# Patient Record
Sex: MALE | Race: WHITE | NOT HISPANIC OR LATINO | Employment: OTHER | URBAN - NONMETROPOLITAN AREA
[De-identification: names, ages, dates, MRNs, and addresses within clinical notes are randomized per-mention and may not be internally consistent; named-entity substitution may affect disease eponyms.]

---

## 2017-02-08 ENCOUNTER — COMMUNICATION - GICH (OUTPATIENT)
Dept: FAMILY MEDICINE | Facility: OTHER | Age: 62
End: 2017-02-08

## 2017-02-09 ENCOUNTER — AMBULATORY - GICH (OUTPATIENT)
Dept: SCHEDULING | Facility: OTHER | Age: 62
End: 2017-02-09

## 2017-02-12 ENCOUNTER — HISTORY (OUTPATIENT)
Dept: EMERGENCY MEDICINE | Facility: OTHER | Age: 62
End: 2017-02-12

## 2017-02-13 ENCOUNTER — COMMUNICATION - GICH (OUTPATIENT)
Dept: FAMILY MEDICINE | Facility: OTHER | Age: 62
End: 2017-02-13

## 2017-02-13 DIAGNOSIS — I10 ESSENTIAL (PRIMARY) HYPERTENSION: ICD-10-CM

## 2017-02-17 ENCOUNTER — HISTORY (OUTPATIENT)
Dept: INTERNAL MEDICINE | Facility: OTHER | Age: 62
End: 2017-02-17

## 2017-02-17 ENCOUNTER — OFFICE VISIT - GICH (OUTPATIENT)
Dept: INTERNAL MEDICINE | Facility: OTHER | Age: 62
End: 2017-02-17

## 2017-02-17 DIAGNOSIS — M54.31 SCIATICA OF RIGHT SIDE: ICD-10-CM

## 2017-02-20 ENCOUNTER — COMMUNICATION - GICH (OUTPATIENT)
Dept: INTERNAL MEDICINE | Facility: OTHER | Age: 62
End: 2017-02-20

## 2017-02-27 ENCOUNTER — HOSPITAL ENCOUNTER (OUTPATIENT)
Dept: PHYSICAL THERAPY | Facility: OTHER | Age: 62
Setting detail: THERAPIES SERIES
End: 2017-02-27
Attending: INTERNAL MEDICINE

## 2017-02-27 ENCOUNTER — COMMUNICATION - GICH (OUTPATIENT)
Dept: PHYSICAL THERAPY | Facility: OTHER | Age: 62
End: 2017-02-27

## 2017-02-27 DIAGNOSIS — M54.31 SCIATICA OF RIGHT SIDE: ICD-10-CM

## 2017-03-02 ENCOUNTER — COMMUNICATION - GICH (OUTPATIENT)
Dept: INTERNAL MEDICINE | Facility: OTHER | Age: 62
End: 2017-03-02

## 2017-03-02 DIAGNOSIS — M54.31 SCIATICA OF RIGHT SIDE: ICD-10-CM

## 2017-03-04 ENCOUNTER — HISTORY (OUTPATIENT)
Dept: FAMILY MEDICINE | Facility: OTHER | Age: 62
End: 2017-03-04

## 2017-03-04 ENCOUNTER — OFFICE VISIT - GICH (OUTPATIENT)
Dept: FAMILY MEDICINE | Facility: OTHER | Age: 62
End: 2017-03-04

## 2017-03-04 DIAGNOSIS — M54.31 SCIATICA OF RIGHT SIDE: ICD-10-CM

## 2017-03-07 ENCOUNTER — HOSPITAL ENCOUNTER (OUTPATIENT)
Dept: RADIOLOGY | Facility: OTHER | Age: 62
End: 2017-03-07
Attending: INTERNAL MEDICINE

## 2017-03-07 ENCOUNTER — COMMUNICATION - GICH (OUTPATIENT)
Dept: INTERNAL MEDICINE | Facility: OTHER | Age: 62
End: 2017-03-07

## 2017-03-07 DIAGNOSIS — M54.41 LOW BACK PAIN WITH RIGHT-SIDED SCIATICA: ICD-10-CM

## 2017-03-07 DIAGNOSIS — M51.26 OTHER INTERVERTEBRAL DISC DISPLACEMENT, LUMBAR REGION: ICD-10-CM

## 2017-03-07 DIAGNOSIS — M48.061 SPINAL STENOSIS OF LUMBAR REGION: ICD-10-CM

## 2017-03-07 DIAGNOSIS — M54.31 SCIATICA OF RIGHT SIDE: ICD-10-CM

## 2017-03-14 ENCOUNTER — HOSPITAL ENCOUNTER (OUTPATIENT)
Dept: PHYSICAL THERAPY | Facility: OTHER | Age: 62
Setting detail: THERAPIES SERIES
End: 2017-03-14
Attending: INTERNAL MEDICINE

## 2017-03-20 ENCOUNTER — OFFICE VISIT - GICH (OUTPATIENT)
Dept: FAMILY MEDICINE | Facility: OTHER | Age: 62
End: 2017-03-20

## 2017-03-20 ENCOUNTER — HISTORY (OUTPATIENT)
Dept: FAMILY MEDICINE | Facility: OTHER | Age: 62
End: 2017-03-20

## 2017-03-20 ENCOUNTER — HOSPITAL ENCOUNTER (OUTPATIENT)
Dept: RADIOLOGY | Facility: OTHER | Age: 62
End: 2017-03-20
Attending: NURSE PRACTITIONER

## 2017-03-20 DIAGNOSIS — R06.02 SHORTNESS OF BREATH: ICD-10-CM

## 2017-03-20 DIAGNOSIS — R06.2 WHEEZING: ICD-10-CM

## 2017-03-20 DIAGNOSIS — J20.9 ACUTE BRONCHITIS: ICD-10-CM

## 2017-04-24 ENCOUNTER — HOSPITAL ENCOUNTER (OUTPATIENT)
Dept: RADIOLOGY | Facility: OTHER | Age: 62
End: 2017-04-24
Attending: PHYSICIAN ASSISTANT

## 2017-04-24 ENCOUNTER — HISTORY (OUTPATIENT)
Dept: FAMILY MEDICINE | Facility: OTHER | Age: 62
End: 2017-04-24

## 2017-04-24 ENCOUNTER — OFFICE VISIT - GICH (OUTPATIENT)
Dept: FAMILY MEDICINE | Facility: OTHER | Age: 62
End: 2017-04-24

## 2017-04-24 DIAGNOSIS — R05.9 COUGH: ICD-10-CM

## 2017-04-24 DIAGNOSIS — J22 ACUTE LOWER RESPIRATORY INFECTION: ICD-10-CM

## 2017-04-25 ENCOUNTER — COMMUNICATION - GICH (OUTPATIENT)
Dept: FAMILY MEDICINE | Facility: OTHER | Age: 62
End: 2017-04-25

## 2017-04-25 ENCOUNTER — HISTORY (OUTPATIENT)
Dept: EMERGENCY MEDICINE | Facility: OTHER | Age: 62
End: 2017-04-25

## 2017-04-27 ENCOUNTER — HISTORY (OUTPATIENT)
Dept: PEDIATRICS | Facility: OTHER | Age: 62
End: 2017-04-27

## 2017-04-27 ENCOUNTER — OFFICE VISIT - GICH (OUTPATIENT)
Dept: PEDIATRICS | Facility: OTHER | Age: 62
End: 2017-04-27

## 2017-04-27 DIAGNOSIS — E83.42 HYPOMAGNESEMIA: ICD-10-CM

## 2017-04-27 DIAGNOSIS — E11.8 TYPE 2 DIABETES MELLITUS WITH COMPLICATIONS (H): ICD-10-CM

## 2017-04-27 DIAGNOSIS — I10 ESSENTIAL (PRIMARY) HYPERTENSION: ICD-10-CM

## 2017-04-27 DIAGNOSIS — R06.01 ORTHOPNEA: ICD-10-CM

## 2017-04-27 DIAGNOSIS — R00.2 PALPITATIONS: ICD-10-CM

## 2017-04-27 DIAGNOSIS — R06.09 OTHER FORMS OF DYSPNEA: ICD-10-CM

## 2017-04-27 LAB
BNP SERPL-MCNC: 25 PG/ML
CHOL/HDL RATIO - HISTORICAL: 4.67
CHOLESTEROL TOTAL: 168 MG/DL
ESTIMATED AVERAGE GLUCOSE: 160 MG/DL
HDLC SERPL-MCNC: 36 MG/DL (ref 23–92)
HEMOGLOBIN A1C MONITORING (POCT) - HISTORICAL: 7.2 % (ref 4–6.2)
LDLC SERPL CALC-MCNC: 109 MG/DL
NON-HDL CHOLESTEROL - HISTORICAL: 132 MG/DL
PATIENT STATUS - HISTORICAL: ABNORMAL
TRIGL SERPL-MCNC: 117 MG/DL
TSH - HISTORICAL: 2.6 UIU/ML (ref 0.34–5.6)

## 2017-04-27 ASSESSMENT — PATIENT HEALTH QUESTIONNAIRE - PHQ9: SUM OF ALL RESPONSES TO PHQ QUESTIONS 1-9: 0

## 2017-04-28 LAB — LYME SCREEN W/REFLEX WEST BLOT - HISTORICAL: NEGATIVE

## 2017-07-25 ENCOUNTER — COMMUNICATION - GICH (OUTPATIENT)
Dept: FAMILY MEDICINE | Facility: OTHER | Age: 62
End: 2017-07-25

## 2017-07-25 DIAGNOSIS — R05.9 COUGH: ICD-10-CM

## 2017-07-25 DIAGNOSIS — J22 ACUTE LOWER RESPIRATORY INFECTION: ICD-10-CM

## 2017-07-29 ENCOUNTER — HISTORY (OUTPATIENT)
Dept: FAMILY MEDICINE | Facility: OTHER | Age: 62
End: 2017-07-29

## 2017-07-29 ENCOUNTER — OFFICE VISIT - GICH (OUTPATIENT)
Dept: FAMILY MEDICINE | Facility: OTHER | Age: 62
End: 2017-07-29

## 2017-07-29 DIAGNOSIS — R06.2 WHEEZING: ICD-10-CM

## 2017-07-29 DIAGNOSIS — R05.9 COUGH: ICD-10-CM

## 2017-08-14 ENCOUNTER — OFFICE VISIT - GICH (OUTPATIENT)
Dept: FAMILY MEDICINE | Facility: OTHER | Age: 62
End: 2017-08-14

## 2017-08-14 ENCOUNTER — HISTORY (OUTPATIENT)
Dept: FAMILY MEDICINE | Facility: OTHER | Age: 62
End: 2017-08-14

## 2017-08-14 DIAGNOSIS — J45.21 MILD INTERMITTENT ASTHMA WITH ACUTE EXACERBATION: ICD-10-CM

## 2017-08-14 DIAGNOSIS — E11.9 TYPE 2 DIABETES MELLITUS WITHOUT COMPLICATIONS (H): ICD-10-CM

## 2017-08-14 DIAGNOSIS — I10 ESSENTIAL (PRIMARY) HYPERTENSION: ICD-10-CM

## 2017-08-14 LAB
ANION GAP - HISTORICAL: 11 (ref 5–18)
BUN SERPL-MCNC: 16 MG/DL (ref 7–25)
BUN/CREAT RATIO - HISTORICAL: 17
CALCIUM SERPL-MCNC: 9.8 MG/DL (ref 8.6–10.3)
CHLORIDE SERPLBLD-SCNC: 96 MMOL/L (ref 98–107)
CO2 SERPL-SCNC: 31 MMOL/L (ref 21–31)
CREAT SERPL-MCNC: 0.96 MG/DL (ref 0.7–1.3)
ESTIMATED AVERAGE GLUCOSE: 157 MG/DL
GFR IF NOT AFRICAN AMERICAN - HISTORICAL: >60 ML/MIN/1.73M2
GLUCOSE SERPL-MCNC: 196 MG/DL (ref 70–105)
HEMOGLOBIN A1C MONITORING (POCT) - HISTORICAL: 7.1 % (ref 4–6.2)
POTASSIUM SERPL-SCNC: 3.4 MMOL/L (ref 3.5–5.1)
SODIUM SERPL-SCNC: 138 MMOL/L (ref 133–143)

## 2017-08-14 ASSESSMENT — ANXIETY QUESTIONNAIRES
2. NOT BEING ABLE TO STOP OR CONTROL WORRYING: NOT AT ALL
7. FEELING AFRAID AS IF SOMETHING AWFUL MIGHT HAPPEN: NOT AT ALL
5. BEING SO RESTLESS THAT IT IS HARD TO SIT STILL: NOT AT ALL
4. TROUBLE RELAXING: NOT AT ALL
6. BECOMING EASILY ANNOYED OR IRRITABLE: NOT AT ALL
GAD7 TOTAL SCORE: 0
3. WORRYING TOO MUCH ABOUT DIFFERENT THINGS: NOT AT ALL
1. FEELING NERVOUS, ANXIOUS, OR ON EDGE: NOT AT ALL

## 2017-08-15 ENCOUNTER — COMMUNICATION - GICH (OUTPATIENT)
Dept: FAMILY MEDICINE | Facility: OTHER | Age: 62
End: 2017-08-15

## 2017-08-15 DIAGNOSIS — R05.9 COUGH: ICD-10-CM

## 2017-08-17 ENCOUNTER — HOSPITAL ENCOUNTER (OUTPATIENT)
Dept: RADIOLOGY | Facility: OTHER | Age: 62
End: 2017-08-17
Attending: FAMILY MEDICINE

## 2017-08-17 DIAGNOSIS — R05.9 COUGH: ICD-10-CM

## 2017-08-21 ENCOUNTER — HOSPITAL ENCOUNTER (OUTPATIENT)
Dept: RESPIRATORY THERAPY | Facility: OTHER | Age: 62
End: 2017-08-21

## 2017-08-21 DIAGNOSIS — J45.21 MILD INTERMITTENT ASTHMA WITH ACUTE EXACERBATION: ICD-10-CM

## 2017-08-30 ENCOUNTER — COMMUNICATION - GICH (OUTPATIENT)
Dept: FAMILY MEDICINE | Facility: OTHER | Age: 62
End: 2017-08-30

## 2017-09-15 ENCOUNTER — HISTORY (OUTPATIENT)
Dept: INTERNAL MEDICINE | Facility: OTHER | Age: 62
End: 2017-09-15

## 2017-09-15 ENCOUNTER — OFFICE VISIT - GICH (OUTPATIENT)
Dept: INTERNAL MEDICINE | Facility: OTHER | Age: 62
End: 2017-09-15

## 2017-09-15 DIAGNOSIS — E87.6 HYPOKALEMIA: ICD-10-CM

## 2017-09-15 DIAGNOSIS — Z78.9 OTHER SPECIFIED HEALTH STATUS (CODE): ICD-10-CM

## 2017-09-15 DIAGNOSIS — E11.9 TYPE 2 DIABETES MELLITUS WITHOUT COMPLICATIONS (H): ICD-10-CM

## 2017-09-15 DIAGNOSIS — J41.0 SIMPLE CHRONIC BRONCHITIS (H): ICD-10-CM

## 2017-09-15 DIAGNOSIS — J44.9 CHRONIC OBSTRUCTIVE PULMONARY DISEASE (H): ICD-10-CM

## 2017-09-15 DIAGNOSIS — Z77.090 CONTACT WITH AND (SUSPECTED) EXPOSURE TO ASBESTOS (CODE): ICD-10-CM

## 2017-09-15 DIAGNOSIS — Z23 ENCOUNTER FOR IMMUNIZATION: ICD-10-CM

## 2017-09-15 DIAGNOSIS — I10 ESSENTIAL (PRIMARY) HYPERTENSION: ICD-10-CM

## 2017-12-28 NOTE — PATIENT INSTRUCTIONS
Patient Information     Patient Name MRN Efra Nino [id number] Male 1955      Patient Instructions by Dominic Holguin MD at 2017  3:00 PM     Author:  Dominic Holguin MD Service:  (none) Author Type:  Physician     Filed:  2017  3:37 PM Encounter Date:  2017 Status:  Signed     :  Dominic Holguin MD (Physician)            You do in fact have diabetes.  I would suggest starting metformin 500mg twice per day and checking blood sugar once per day in the morning.    I would also like you to meet with our dietician Kristen Klinefelter to better understand how you can dramatically improve your blood sugar by changing what types of food and how much you eat.    In terms of your cough, you have significant wheezing, I think this is likely asthma/COPD.  Start the flovent.  Take it twice per day every day.  Take the albuterol only as needed.  If you are taking it more than 3-4 times in the average week we need to increase the flovent dose.    You should have a chest xray and spirometry.  Both are ordered.  You can stop by main building any time and have the chest xray done.  They will call you for spirometry.

## 2017-12-28 NOTE — PROGRESS NOTES
Patient Information     Patient Name MRN Sex Efra Farias [id number] Male 1955      Progress Notes by Erendira Agustin NP at 2017  3:30 PM     Author:  Erendira Agustin NP Service:  (none) Author Type:  PHYS- Nurse Practitioner     Filed:  2017  4:07 PM Encounter Date:  2017 Status:  Signed     :  Erendira Agustin NP (PHYS- Nurse Practitioner)            HPI:  Nursing Notes:   Skyla Carrasco  2017  3:57 PM  Signed  Patient presents to clinic with cough, and wheezing.  Skyla CATES DaniloLPN ....................  2017   3:39 PM      Efra Zuniga is a 62 y.o. male who presents to clinic today for wheezing, SOB and cough that started three weeks ago and is making it hard to sleep at night. Denies history of COPD or asthma, never smoker, has had ongoing cough and wheezing for three months. Denies fevers, sore throat, ear pain, increased work of breathing, heartburn, weight gain or swelling in hands or feet. Cough seems to be the worst at night, especially when moving in bed. Treating symptoms with Albuterol which seems to improve them.    Past Medical History:     Diagnosis  Date     Arthralgia     Intermittent arthralgias      Blood per rectum     multiple colonoscopies scheduled and canceled by patient      Family history of heart murmur      Irritable bowel syndrome with constipation     Functioning bowel syndrome/constipation      Past Surgical History:      Procedure  Laterality Date     APPENDECTOMY      Coronary angiogram done by Aron Quick at [facility] in 2006 shows patent coronary arteries       CHOLECYSTECTOMY       SCAN-ANGIOGRAM  2006     Coronary angiogram done by Aron Quick at [facility] in shows patent coronary arteries       Social History     Substance Use Topics       Smoking status: Never Smoker     Smokeless tobacco: Never Used     Alcohol use No     Current Outpatient Prescriptions       Medication  Sig Dispense Refill      "acetaminophen (TYLENOL EXTRA STRGTH) 500 mg tablet Take 1,000 mg by mouth every 6 hours if needed. Max acetaminophen dose: 4000mg in 24 hrs.       albuterol (PROVENTIL) 0.083 % neb solution Inhale 3 mL via a nebulizer every 4 hours if needed. 1 box 0     albuterol HFA 90 mcg/actuation inhaler Inhale 2 Puffs by mouth every 4 hours if needed. 1 Inhaler 0     aspirin (ECOTRIN) 81 mg enteric coated tablet Take 1 tablet by mouth once daily with a meal.  0     aspirin-calcium carbonate 81 mg-300 mg calcium(777 mg) tab Take 81 mg by mouth.       atenolol-chlorthalidone, 100-25 mg, (TENORETIC 100) 100-25 mg tablet Take 1 tablet by mouth once daily. 90 tablet 3     magnesium oxide (MAG-) 400 mg tablet Take 1 tablet by mouth once daily. 30 tablet 0     Nebulizer Nebulizer, disposable neb kit x 4, reuseable neb kit x 1, mask x 1, filters x 1.   Frequency of use: daily;  Medication:albuterol Length of need: 99 months 1 Device 0     potassium chloride (KLOR-CON M20) 20 mEq Extended-Release tablet Take 1 tablet by mouth once daily with a meal. 90 tablet 3     No current facility-administered medications for this visit.      Medications have been reviewed by me and are current to the best of my knowledge and ability.    No Known Allergies    ROS:  Refer to HPI    /72  Pulse 60  Temp 97.7  F (36.5  C) (Tympanic)   Ht 1.88 m (6' 2\")  Wt 122.8 kg (270 lb 12.8 oz)  SpO2 96%  BMI 34.77 kg/m2    EXAM:  General Appearance: Well appearing male, appropriate appearance for age. No acute distress  Ears: Left TM with bony landmarks appreciated with cone of light, no erythema, no effusion, no bulging, no purulence.  Right TM with bony landmarks appreciated with cone of light, no erythema, no effusion, no bulging, no purulence.   Left auditory canal clear, Right auditory canal clear, normal external ears, non tender.  Orophayrnx: moist mucous membranes, posterior pharynx, tonsils without hypertrophy, no erythema, no exudates " or petechiae, no post nasal drip seen.  No sinus pain upon palpation of the frontal, maxillary, or ethmoid sinuses  Neck: supple without adenopathy  Respiratory: normal chest wall and respirations.  Normal effort, wheezes right and left upper lobes, right middle and lower lobes clear to ausculation, left lobe clear to auscultation oxygen saturation-96%  Cardiac: RRR  Psychological: normal affect, alert and pleasant    ASSESSMENT/PLAN:    ICD-10-CM    1. Wheezing R06.2    2. Cough R05 albuterol HFA 90 mcg/actuation inhaler   Cough and wheezing  On exam: well appearing male without fever, wheezes right and left upper lobes, other lobes clear to ausculation  Diagnosis: Wheezing  Treat with Albuterol inhaler 1-2 puffs every 4-6 hours prn wheezing/cough  Appointment scheduled with Dr. Dominic Holguin to evaluate and treat ongoing cough and wheezing      Patient Instructions      Index Syriac   Acute Bronchitis: Brief Version   What is acute bronchitis?  When you have acute bronchitis, the airways between your windpipe and your lungs are swollen and irritated. It is also called a chest cold.  What is the cause?  Acute bronchitis is most often caused by a virus, like a cold or the flu. Less often, it can be caused by bacteria.  Most of the time, it clears up in several days, but the cough can take longer to go away. It may take you longer to get better if:    You smoke cigarettes.    You have a heart or lung disease or other health problems.    There s a lot of air pollution or secondhand smoke where you live or work.  What are the symptoms?  You may:    Have a deep cough with yellowish or greenish mucus.    Feel pain in your chest when you breathe deeply or cough.    Wheeze or feel short of breath.    Have a fever or chills.  How can I take care of myself?  Rest at home and drink plenty of fluids to keep the mucus loose. Don t smoke, and stay away from others who are smoking. You should get better in a several days.  If  your symptoms are severe or you have heart or lung disease or diabetes, you may need to see your healthcare provider or take medicine.  How can I help prevent acute bronchitis?  You can lower your chances of getting bronchitis if you wash your hands after using the restroom, coughing, sneezing, or blowing your nose. Also wash your hands before eating or touching your eyes.  Developed by [employer].  Adult Advisor 2016.3 published by [employer].  Last modified: 2016-06-29  Last reviewed: 2016-06-08  This content is reviewed periodically and is subject to change as new health information becomes available. The information is intended to inform and educate and is not a replacement for medical evaluation, advice, diagnosis or treatment by a healthcare professional.  References   Adult Advisor 2016.3 Index    Copyright   2016 [employer], a division of [employer] All rights reserved.

## 2017-12-28 NOTE — TELEPHONE ENCOUNTER
Patient Information     Patient Name MRN Sex Efra Farias [id number] Male 1955      Telephone Encounter by Adrián Gonzalez RN at 2017 11:06 AM     Author:  Adrián Gonzalez RN Service:  (none) Author Type:  NURS- Registered Nurse     Filed:  2017 11:14 AM Encounter Date:  8/15/2017 Status:  Signed     :  Adrián Gonzalez RN (NURS- Registered Nurse)            Call placed to patient to discuss. Advised patient that chest xray order was placed by Dr. Raphael. No order for an oral steroid or abx at this time. Patient reports that he had spoken to Ifeoma at [facility] pharmacy. Reports that due to his income, flovent will more than likely be cheaper for him at [facility] pharmacy. Patient plans to meet with Ifeoma this afternoon to get that set up. Writer encouraged patient to get chest xray done at the same time. Patient is willing to do so. Understands plan of care. Writer transferred patient to scheduling staff for chest xray at this time.    Adrián Gonzalez RN ....................  2017   11:13 AM

## 2017-12-28 NOTE — TELEPHONE ENCOUNTER
Patient Information     Patient Name MRN Sex Efra Farias [id number] Male 1955      Telephone Encounter by Sinan Craft LPN at 2017  9:27 AM     Author:  Sinan Craft LPN Service:  (none) Author Type:  NURS- Licensed Practical Nurse     Filed:  2017  9:29 AM Encounter Date:  8/15/2017 Status:  Signed     :  Sinan Craft LPN (NURS- Licensed Practical Nurse)            Patient is wanting to try a different inhaler then the one he was last prescribed being it was over 200 dollars. Patient states he also did not receive the steroid pills that was discussed at his last appointment. Please send Rx's to [facility]. Please advise.  Sinan Craft LPN ..............2017 9:28 AM

## 2017-12-28 NOTE — TELEPHONE ENCOUNTER
Patient Information     Patient Name MRN Sex Efra Farias [id number] Male 1955      Telephone Encounter by Sinan Craft LPN at 8/15/2017  8:44 AM     Author:  Sinan Craft LPN Service:  (none) Author Type:  NURS- Licensed Practical Nurse     Filed:  8/15/2017  8:45 AM Encounter Date:  8/15/2017 Status:  Signed     :  Sinan Craft LPN (NURS- Licensed Practical Nurse)            Please advise.  Sinan Craft LPN ..............8/15/2017 8:44 AM

## 2017-12-28 NOTE — TELEPHONE ENCOUNTER
Patient Information     Patient Name MRN Sex Efra Farias [id number] Male 1955      Telephone Encounter by Tyesha James at 2017  9:38 AM     Author:  Tyesha James Service:  (none) Author Type:  (none)     Filed:  2017  9:40 AM Encounter Date:  8/15/2017 Status:  Signed     :  Tyesha James            Spoke with [facility], they stated there is no other inhaler that will be cheaper than the Flovent by more than 10 dollars, this may be due to patient's deductible, will call and notify patient. Tyesha James LPN......................2017 9:39 AM

## 2017-12-28 NOTE — TELEPHONE ENCOUNTER
Patient Information     Patient Name MRN Efra Nino [id number] Male 1955      Telephone Encounter by Adrián Gonzalez RN at 2017 10:34 AM     Author:  Adrián Gonzalez RN Service:  (none) Author Type:  NURS- Registered Nurse     Filed:  2017 10:58 AM Encounter Date:  8/15/2017 Status:  Signed     :  Adrián Gonzalez RN (NURS- Registered Nurse)            Writer received fax from [facility] pharmacy as well with regards to this concern (inhaler was too expensive for patient, as well as no oral steroid ordered for patient). Chart review shows that Dr. Raphael has addressed concern for steroid inhaler, but not oral steroid. Office visit notes with Dr. Holguin still pending from 17. Writer did contact [facility] pharmacy and spoke to Ifeoma about [facility]     Ifeoma reports that Flovent inhaler could be as low as $33 if patient meets guidelines for [facility] program.    Call placed to patient to discuss. Patient is willing to call the [facility] pharmacy to see if Flovent would be cheaper through our pharmacy. Number given to patient.     While on phone with patient, he states that he is typically prescribed an antibiotic and an oral steroid for the concerns he is having. Would like that to be completed if possible. Patient also reports that he was told by a nurse he was supposed to have a chest x-ray, but when he called to schedule it, there was no order so schedulers couldn't schedule it.    Chart review confirms no chest x-ray order, and office visit notes on 17 indicate that patient is to have a chest xray.     Patient also reports that he is going to start on metformin. Doesn't want to take his blood sugars however. Writer encouraged patient to do so. Patient states he will try.    Results of phone call as follows:    1. Patient needs an order for a chest xray as per Dr. Holguin's note on 17.  2. Writer advised patient that oral  steroid and abx are probably going to be dependent on chest xray results  3. Patient will call [facility] pharmacy to check on pricing of flovent through community care program    Patient would like this phone call sent to Dr. Raphael in PCP's absence so that orders as needed can be placed, as well as would like a call back with a plan of care.    Writer will route encounter to Dr. Raphael at this time.    Adrián Gonzalez RN ....................  8/17/2017   10:57 AM

## 2017-12-28 NOTE — TELEPHONE ENCOUNTER
Patient Information     Patient Name MRN Sex Efra Farias [id number] Male 1955      Telephone Encounter by Leonel Raphael MD at 2017 11:02 AM     Author:  Leonel Raphael MD Service:  (none) Author Type:  Physician     Filed:  2017 11:03 AM Encounter Date:  8/15/2017 Status:  Signed     :  Leonel Raphael MD (Physician)            cxr ordered

## 2017-12-28 NOTE — TELEPHONE ENCOUNTER
Patient Information     Patient Name MRN Sex Efra Farias [id number] Male 1955      Telephone Encounter by Isa Hawkins at 2017  4:52 PM     Author:  Isa Hawkins Service:  (none) Author Type:  (none)     Filed:  2017  4:53 PM Encounter Date:  2017 Status:  Signed     :  Isa Hawkins            Patient notified of Dr Holguin's response. He will call tomorrow to schedule a follow up appointment.

## 2017-12-28 NOTE — TELEPHONE ENCOUNTER
Patient Information     Patient Name MRN Sex Efra Farias [id number] Male 1955      Telephone Encounter by Tyesha James at 2017  9:24 AM     Author:  Tyesha James Service:  (none) Author Type:  (none)     Filed:  2017  9:38 AM Encounter Date:  8/15/2017 Status:  Signed     :  Tyesha James            Spoke with patient he stated he was seen by Dominic Holguin MD on Monday and he ordered an inhaler Flovent which his insurance doesn't cover 250 dollars of the cost, patient did not fill it. He is wondering if there is something else that would be less expensive to try. He also is wondering if he could get an oral steroid and an antibiotic, PCP is out until next Tuesday. Tyesha James LPN......................2017 9:28 AM

## 2017-12-28 NOTE — TELEPHONE ENCOUNTER
Patient Information     Patient Name MRN Sex Efra Farias [id number] Male 1955      Telephone Encounter by Leonel Raphael MD at 2017 10:30 AM     Author:  Leonel Raphael MD Service:  (none) Author Type:  Physician     Filed:  2017 10:31 AM Encounter Date:  8/15/2017 Status:  Signed     :  Leonel Raphael MD (Physician)            I would suggest he work with [facility] pharmacy as they might be able to provide a similar inhaler for much cheaper through their community care plan.

## 2017-12-28 NOTE — PATIENT INSTRUCTIONS
Patient Information     Patient Name MREfra Mena [id number] Male 1955      Patient Instructions by Erendira Agustin NP at 2017  3:30 PM     Author:  Erendira Agustin NP Service:  (none) Author Type:  PHYS- Nurse Practitioner     Filed:  2017  4:29 PM Encounter Date:  2017 Status:  Signed     :  Erendira Agustin NP (PHYS- Nurse Practitioner)               Index Macedonian   Acute Bronchitis: Brief Version   What is acute bronchitis?  When you have acute bronchitis, the airways between your windpipe and your lungs are swollen and irritated. It is also called a chest cold.  What is the cause?  Acute bronchitis is most often caused by a virus, like a cold or the flu. Less often, it can be caused by bacteria.  Most of the time, it clears up in several days, but the cough can take longer to go away. It may take you longer to get better if:    You smoke cigarettes.    You have a heart or lung disease or other health problems.    There s a lot of air pollution or secondhand smoke where you live or work.  What are the symptoms?  You may:    Have a deep cough with yellowish or greenish mucus.    Feel pain in your chest when you breathe deeply or cough.    Wheeze or feel short of breath.    Have a fever or chills.  How can I take care of myself?  Rest at home and drink plenty of fluids to keep the mucus loose. Don t smoke, and stay away from others who are smoking. You should get better in a several days.  If your symptoms are severe or you have heart or lung disease or diabetes, you may need to see your healthcare provider or take medicine.  How can I help prevent acute bronchitis?  You can lower your chances of getting bronchitis if you wash your hands after using the restroom, coughing, sneezing, or blowing your nose. Also wash your hands before eating or touching your eyes.  Developed by [employer].  Adult Advisor 2016.3 published by [employer].  Last  modified: 2016-06-29  Last reviewed: 2016-06-08  This content is reviewed periodically and is subject to change as new health information becomes available. The information is intended to inform and educate and is not a replacement for medical evaluation, advice, diagnosis or treatment by a healthcare professional.  References   Adult Advisor 2016.3 Index    Copyright   2016 [employer], a division of [employer] All rights reserved.

## 2017-12-28 NOTE — PROGRESS NOTES
Patient Information     Patient Name MRPETTY Sex Efra Farias [id number] Male 1955      Progress Notes by Lukas Kramer MD at 9/15/2017 11:00 AM     Author:  Lukas Karmer MD Service:  (none) Author Type:  Physician     Filed:  9/15/2017  5:45 PM Encounter Date:  9/15/2017 Status:  Signed     :  Lukas Kramer MD (Physician)            Nursing Notes:   Rosy Deleon  9/15/2017 10:40 AM  Signed  Previous A1C is at goal of <8  HEMOGLOBIN A1C MONITORING (POCT) (%)    Date Value   2017 7.1 (H)     Urine microalbumin:creatine: no results found  Foot exam unsure  Eye exam no eye exam in the last 365 days noted    Patient is a current smoker  Patient is not on a daily aspirin  Patient is on a Statin.  Blood pressure today of   is at the goal of <139/89 for diabetics.    Rosy Deleon LPN..............9/15/2017 10:39 AM    Efra Zuniga presents to clinic today for:   Chief Complaint    Patient presents with      Breathing Problem     HPI: Mr. Zuniga is a 62 y.o. male who presents today for evaluation of above.     (J41.0) Simple chronic bronchitis (HC)  (primary encounter diagnosis)  (Z78.9) Nonsmoker  (Z77.090) Asbestos exposure  (J44.9) Moderate COPD (chronic obstructive pulmonary disease) (HC)  (Z23) Need for 23-polyvalent pneumococcal polysaccharide vaccine  (E11.9) Type 2 diabetes mellitus without complication, without long-term current use of insulin (HC)  (I10) Essential hypertension  (E87.6) Hypokalemia     Patient presented for evaluation of chronic breathing issues.  States he has a lot of phlegm and mucus.  Seems like he almost drowns at night when he lays down.  Reports having a lot of clear phlegm and mucus.  He was recently put on fluticasone inhaler he does seem to think it helps.  He is still using his albuterol inhaler rather frequently.  He was intermittently on bouts of prednisone in the past which he thought was quite helpful.  There is frequent and large amount of  clear mucus and phlegm, starts anticholinergic inhaler.  Prescription printed.    Patient states he is a lifetime nonsmoker.  He does have history of asbestos exposure as well as working at the [employer].    Recent PFTs showed moderate COPD.  No significant reversible component noted.    Pneumococcal vaccination do.    Type 2 diabetes, new diagnosis.  Diarrhea with metformin 500 mg twice daily he cut down to 500 mg once daily.  Advised that he try taking 250 mg 3 times a day with each meal.  Medications list updated.    Hypertension, blood pressures are high.  He is taking atenolol and chlorthalidone.  Appears that he has a lot of issues with electrolyte abnormalities.  He is been taking oral potassium replacement.  Start low-dose spironolactone 25 mg daily.  Advised to continue current medication regimen otherwise.  He still has low potassium levels.  Hopefully, we can get him off his other medications    hypokalemia, ongoing despite oral potassium replacement.  Continue for now.  Start spironolactone.    Mr. Olsons Body mass index is 35.21 kg/(m^2). This is out of the normal range for a 62 y.o. Normal range for ages 18+ is between 18.5 and 24.9. To lose weight we reviewed risks and benefits of appropriate options such as diet, exercise, and medications. Patient's strategy will be  self-directed nutrition plan and self-directed exercise program   BP Readings from Last 1 Encounters:09/15/17 : 142/88  Mr. Walsh blood pressure is out of the normal range for adults. Per JNC-8 guidelines normal adult blood pressure is < 120/80, pre-hypertensive is between 120/80 and 139/89, and hypertension is 140/90 or greater. Risks of hypertension were discussed. Patient's strategy will be weight loss, increased activity and reduced salt intake    Functional Capacity: > or about 4 METS.   Reports that he can climb a flight of stairs with rare chest pain/heaviness + exertional shortness of breath noted.   Patient  reports no current symptoms of fevers, chills, nausea/vomiting.   Regular cough with clear phelgm - improved after starting fluticasone inhaler.   No resting shortness of breath.     + gurgles at night when laying down with breathing. + sleeps somewhat upright.   + loose stools with 1000 mg metformin.     No change in bowel/bladder habits. No melena, hematochezia. No Hematuria.   No rashes. No palpitations.  No vision or hearing issues.   No significant mood issues   No bruising.     JOE:  JOE-7 ANXIETY SCREENING 2/17/2017 8/14/2017   JOE date (doc flow) (No Data) 8/14/2017   Nervous, anxious - 0   Cannot stop worrying - 0   Worry about different things - 0   Cannot relax - 0   Feeling restless - 0   Easily annoyed/irritated - 0   Afraid of awful event - 0   Score - 0   Severity - none   Some recent data might be hidden         PHQ9:  PHQ Depression Screening 4/27/2017 8/14/2017   Date of PHQ exam (doc flow) 4/27/2017 8/14/2017   1. Lack of interest/pleasure 0 - Not at all 0 - Not at all   2. Feeling down/depressed 0 - Not at all 0 - Not at all   PHQ-2 TOTAL SCORE 0 0   3. Trouble sleeping 0 - Not at all -   4. Decreased energy 0 - Not at all -   5. Appetite change 0 - Not at all -   6. Feelings of failure 0 - Not at all -   7. Trouble concentrating 0 - Not at all -   8. Activity level 0 - Not at all -   9. Hurting yourself 0 - Not at all -   PHQ-9 TOTAL SCORE 0 -   PHQ-9 Severity Level none -   Functional Impairment not difficult at all -   Some recent data might be hidden          I have personally reviewed the past medical history, past surgical history, medications, allergies, family and social history as listed below, on 9/15/2017.    Patient Active Problem List       Diagnosis  Date Noted     Nonsmoker  09/15/2017     Simple chronic bronchitis (HC)  09/15/2017     Asbestos exposure  09/15/2017     Moderate COPD (chronic obstructive pulmonary disease) (HC)  09/15/2017     Essential hypertension  04/27/2017      Diabetes mellitus type 2, controlled (HC)  05/02/2014     ANXIETY DEPRESSION       chronic          BENIGN PROSTATIC HYPERTROPHY, HX OF       Past Medical History:     Diagnosis  Date     Arthralgia     Intermittent arthralgias      Blood per rectum     multiple colonoscopies scheduled and canceled by patient      Family history of heart murmur      Irritable bowel syndrome with constipation     Functioning bowel syndrome/constipation      Past Surgical History:      Procedure  Laterality Date     APPENDECTOMY      Coronary angiogram done by Aron Quick at [facility] in March 2006 shows patent coronary arteries       CHOLECYSTECTOMY       SCAN-ANGIOGRAM  March 2006     Coronary angiogram done by Aron Quick at [facility] in shows patent coronary arteries       Current Outpatient Prescriptions       Medication  Sig Dispense Refill     acetaminophen (TYLENOL EXTRA STRGTH) 500 mg tablet Take 1,000 mg by mouth every 6 hours if needed. Max acetaminophen dose: 4000mg in 24 hrs.       albuterol (PROVENTIL) 0.083 % neb solution Inhale 3 mL via a nebulizer every 4 hours if needed. 1 box 0     albuterol HFA 90 mcg/actuation inhaler Inhale 2 Puffs by mouth every 4 hours if needed. 1 Inhaler 0     aspirin (ECOTRIN) 81 mg enteric coated tablet Take 1 tablet by mouth once daily with a meal.  0     aspirin-calcium carbonate 81 mg-300 mg calcium(777 mg) tab Take 81 mg by mouth.       atenolol-chlorthalidone, 100-25 mg, (TENORETIC 100) 100-25 mg tablet Take 1 tablet by mouth once daily. 90 tablet 3     blood-glucose meter Dispense meter, test strips, lancets covered by pt ins. E11.65 IDDM type II, uncontrolled - Test 1 time/day 1 Device 0     fluticasone (FLOVENT HFA) 110 mcg/Actuation inhaler Inhale 1 Puff by mouth 2 times daily. 1 Inhaler 2     magnesium oxide (MAG-) 400 mg tablet Take 1 tablet by mouth once daily. 30 tablet 0     metFORMIN (GLUCOPHAGE) 500 mg tablet Take 0.5 tablets by mouth 3 times daily with meals. -- dose change  9/15/2017 60 tablet 2     Nebulizer Nebulizer, disposable neb kit x 4, reuseable neb kit x 1, mask x 1, filters x 1.   Frequency of use: daily;  Medication:albuterol Length of need: 99 months 1 Device 0     potassium chloride (KLOR-CON M20) 20 mEq Extended-Release tablet Take 1 tablet by mouth once daily with a meal. 90 tablet 3     spironolactone (ALDACTONE) 25 mg tablet Take 0.5-1 tablets by mouth once daily. - for blood pressure 30 tablet 0     umeclidinium-vilanterol (ANORO ELLIPTA) 62.5-25 mcg/actuation inhaler Take 2 puffs off of each capsule -- 1 capsule Daily -- For COPD -- vs similar anticholinergic inhaler 1 Each 11     No Known Allergies  Family History       Problem   Relation Age of Onset     Alzheimer's disease  Mother      Cancer  Father       with a brain tumor       Heart Disease  Father      CABG x3       ALS  Sister      Heart Disease  Maternal Grandmother      Heart Disease  Maternal Grandfather      Heart Disease  Paternal Grandmother      Heart Disease  Paternal Grandfather      Family Status     Relation  Status     Mother     alive in her 80s      Father     brain tumor      Sister      Maternal Grandmother      Maternal Grandfather      Paternal Grandmother      Paternal Grandfather      Social History     Social History        Marital status:       Spouse name: N/A     Number of children:  N/A     Years of education:  N/A     Social History Main Topics       Smoking status: Never Smoker     Smokeless tobacco: Never Used     Alcohol use No     Drug use: No     Sexual activity: Not on file     Other Topics  Concern     Not on file      Social History Narrative     Works at  home  .    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     EXAM:   Vitals:     09/15/17 1039   BP: 142/88   Pulse: 56   Resp: 20   Temp: 97.3  F (36.3  C)  "  TempSrc: Tympanic   SpO2: 94%   Weight: 124.4 kg (274 lb 3.2 oz)   Height: 1.88 m (6' 2\")     BP Readings from Last 3 Encounters:    09/15/17 142/88   08/14/17 138/82   07/29/17 132/72     Wt Readings from Last 3 Encounters:    09/15/17 124.4 kg (274 lb 3.2 oz)   08/14/17 122 kg (269 lb)   07/29/17 122.8 kg (270 lb 12.8 oz)     Estimated body mass index is 35.21 kg/(m^2) as calculated from the following:    Height as of this encounter: 1.88 m (6' 2\").    Weight as of this encounter: 124.4 kg (274 lb 3.2 oz).     EXAM:  Constitutional: well groomed / good hygiene, casual dress  Eyes:  Extraocular muscles intact, Sclera non-icteric, Conjunctiva without erythema  Lymphatic Exam: Non-palpable nodes in neck, clavicular regions  Pulmonary: Prolonged expiratory phase noted and Few scattered wheezes throughout bilaterally  Cardiovascular Exam: regular rate and rhythm, trace pedal edema present  Gastrointestinal Exam: Obese  Integument: No abnormal rashes, sores, or ulcerations noted  Neurologic Exam: CN 3-12 grossly intact   Musculoskeletal Exam: Moves upper and lower extremities symmetrically, No focal weakness  Gait and station appear grossly normal  Psychiatric Exam: Awake and Alert, Affect and mood appropriate  Speech is fluent, Thought process is normal    INVESTIGATIONS:  Results for orders placed or performed in visit on 08/14/17      Hgb A1c      Result  Value Ref Range    HEMOGLOBIN A1C MONITORING (POCT) 7.1 (H) 4.0 - 6.2 %    ESTIMATED AVERAGE GLUCOSE  157 mg/dL   BASIC METABOLIC PANEL      Result  Value Ref Range    SODIUM 138 133 - 143 mmol/L    POTASSIUM 3.4 (L) 3.5 - 5.1 mmol/L    CHLORIDE 96 (L) 98 - 107 mmol/L    CO2,TOTAL 31 21 - 31 mmol/L    ANION GAP 11 5 - 18                    GLUCOSE 196 (H) 70 - 105 mg/dL    CALCIUM 9.8 8.6 - 10.3 mg/dL    BUN 16 7 - 25 mg/dL    CREATININE 0.96 0.70 - 1.30 mg/dL    BUN/CREAT RATIO           17                    GFR if African American >60 >60 ml/min/1.73m2    GFR " if not African American >60 >60 ml/min/1.73m2     --> PFT and CXR -- see below.     ASSESSMENT AND PLAN:  Efra was seen today for breathing problem.    Diagnoses and all orders for this visit:    Simple chronic bronchitis (HC)  -     umeclidinium-vilanterol (ANORO ELLIPTA) 62.5-25 mcg/actuation inhaler; Take 2 puffs off of each capsule -- 1 capsule Daily -- For COPD -- vs similar anticholinergic inhaler    Nonsmoker    Asbestos exposure    Moderate COPD (chronic obstructive pulmonary disease) (HC)    Need for 23-polyvalent pneumococcal polysaccharide vaccine  -     PNEUMOCOCCAL VACCINE 23-VALENT => 3 YO IM  -     MI ADMIN VACC INITIAL    Type 2 diabetes mellitus without complication, without long-term current use of insulin (HC)    Essential hypertension  -     spironolactone (ALDACTONE) 25 mg tablet; Take 0.5-1 tablets by mouth once daily. - for blood pressure    Hypokalemia  -     spironolactone (ALDACTONE) 25 mg tablet; Take 0.5-1 tablets by mouth once daily. - for blood pressure    lab results and schedule of future lab studies reviewed with patient, reviewed diet, exercise and weight control, recommended sodium restriction, cardiovascular risk and specific lipid/LDL goals reviewed, specific diabetic recommendations low cholesterol diet, weight control and daily exercise discussed, home glucose monitoring emphasized, foot care discussed and Podiatry visits discussed, annual eye examinations at Ophthalmology discussed, glycohemoglobin and other lab monitoring discussed and long term diabetic complications discussed, use of aspirin to prevent MI and TIA's discussed    -- Expected clinical course discussed   -- Medications and their side effects discussed    The 10-year ASCVD risk score (Dashankit JIMENEZ Jr, et al., 2013) is: 28.9%    Values used to calculate the score:      Age: 62 years      Sex: Male      Is Non- : No      Diabetic: Yes      Tobacco smoker: No      Systolic Blood Pressure: 142  mmHg      Is BP treated: Yes      HDL Cholesterol: 36 mg/dL      Total Cholesterol: 168 mg/dL    Efra is also recommended to eat a heart-healthy diet, do regular aerobic exercises, maintain a desirable body weight, and avoid tobacco products. These recommendations are from the American Heart Association (AHA) which stresses the importance of lifestyle changes to lower cardiovascular disease risk.     Return in about 1 month (around 10/15/2017) for - follow-up breathing, HTN.    Patient Instructions     1. Nonsmoker  2. Simple chronic bronchitis (HC)    Start new inhaler Anoro -- or similar -- to help reduce your chronic phlegm.   Okay to continue fluticasone inhaler for now.     - umeclidinium-vilanterol (ANORO ELLIPTA) 62.5-25 mcg/actuation inhaler; Take 2 puffs off of each capsule -- 1 capsule Daily -- For COPD -- vs similar anticholinergic inhaler    Check on: ** [facility] **     3. Asbestos exposure  4. Moderate COPD (chronic obstructive pulmonary disease) (HC)    5. Need for 23-polyvalent pneumococcal polysaccharide vaccine  - PNEUMOCOCCAL VACCINE 23-VALENT => 3 YO IM    6. Type 2 diabetes mellitus without complication, without long-term current use of insulin (HC)    Dose change:  - metFORMIN (GLUCOPHAGE) 500 mg tablet; Take 0.5 tablets by mouth 3 times daily with meals. -- dose change 9/15/2017  Dispense: 60 tablet; Refill: 2    7. Essential hypertension  8. Hypokalemia    Start:   - spironolactone (ALDACTONE) 25 mg tablet; Take 0.5-1 tablets by mouth once daily. - for blood pressure  Dispense: 30 tablet; Refill: 0    -- hopefully with Spironolactone, we can get rid of some of your other blood pressure medications.     Blood pressure checks at home - check some in AM, some in Afternoon, some in Evening and record   -- bring these with you to your next appointment.     Goal blood pressures -- less than 140 and less than 90.     -- If running higher than this on regular basis, will need to  adjust your medications.   -- Ideally would like the numbers about 120-130 and 70-80.       Immunization History     Administered  Date(s) Administered     HepA-HepB (Twinrix) 03/25/2013, 04/23/2013     Hepatitis B (Adult) 09/27/2013     Influenza Virus, Unspecified 10/01/2003     Pneumococcal Poly,23-Valent (Pneumovax) 09/01/2003, 09/15/2017     Td (Age >=7 Years) 09/01/2003        -- Pneumococcal PCV 23 shot today.     Pneumococcal Pneumonia vaccines (PCV 13 and PCV 23)     Pneumococcal Conjugate 13 - Valent Vaccine (One time only).     Pneumococcal 23 - Valent Vaccine -- Two doses (One before age 65 and One After)    -- repeat every 5 years in certain patient populations.     PCV 13 should be given prior to PCV 23 -- THEN -- In eight weeks or more, PCV 23 can be given.   If the patient has already received PCV 23, they should not receive PCV 13 for one year.        Simple spirometry 8/21/2017 ---   Indication: 62-year-old male with diagnosis of asthma.  No reported dyspnea.  Productive cough.  Constant wheezing.  Nonsmoker.  Home medications include albuterol, Flovent.  Computer interpretation reviewed.   There is a moderately severe obstructive lung defect with no significant response of FVC or FEV1 to bronchodilator.    The FEV1 and FEF 25-75% reduced, but the FEV1/FVC ratio is increased.  Pre:  FVC 2.63 L (49 % pred), FEV1 2.15 L (53 % pred), [id number]  2.19 L/sec (68 % pred).  Post: FVC 2.82 L (52 % pred), FEV1 2.34 L (58 % pred), [id number]  2.97 L/sec (93 % pred).  FVC changed by +7 %. FEV1 changed by plus a %. [id number] changed by +35 %.  Additional restrictive lung defect or diffusion defect cannot be excluded by spirometry alone - consider more detailed PFT if clinically indicated.     Impression: Moderately severe obstructive airway disease.  Possible moderately severe restriction.      8/17/2017 -- XR CHEST 2 VIEWS PA AND LATERAL     HISTORY: 62 years Male Cough     COMPARISON: 04/24/2017     TECHNIQUE: 2  views of the chest were obtained.     FINDINGS: Two views of the chest were obtained. Heart size and pulmonary vascularity are within normal limits, lungs are clear both views. No consolidating air space opacities are present.     IMPRESSION: Clear chest.     Electronically Signed By: Sage Car M.D. on 8/17/2017 1:50 PM      Return in approximately 1 month(s), or sooner as needed for follow-up with Dr. Kramer.  - follow-up breathing, HTN    Clinic : [phone]  Appointment line: [phone]      Lukas Kramer MD

## 2017-12-28 NOTE — TELEPHONE ENCOUNTER
Patient Information     Patient Name MRN Efra Nino [id number] Male 1955      Telephone Encounter by Dominic Holguin MD at 2017  4:48 PM     Author:  Dominic Holguin MD Service:  (none) Author Type:  Physician     Filed:  2017  4:50 PM Encounter Date:  2017 Status:  Signed     :  Dominic Holguin MD (Physician)            Results show moderate to severe COPD.  I would like to see him back in clinic in a week or two to potentially look at daily medications to help with his lung disease. It would be good to discuss this in person. I also sent letter.

## 2017-12-28 NOTE — TELEPHONE ENCOUNTER
Patient Information     Patient Name MRN Sex Efra Farias [id number] Male 1955      Telephone Encounter by Isa Hawkins at 2017  1:05 PM     Author:  Isa Hawkins Service:  (none) Author Type:  (none)     Filed:  2017  1:08 PM Encounter Date:  2017 Status:  Signed     :  Isa Hawkins            Patient is calling for the results of his recent spirometry.

## 2017-12-28 NOTE — TELEPHONE ENCOUNTER
Patient Information     Patient Name MRN Sex Efra Farias [id number] Male 1955      Telephone Encounter by Medina Patel RN at 2017  4:03 PM     Author:  Medina Patel RN Service:  (none) Author Type:  NURS- Registered Nurse     Filed:  2017  4:20 PM Encounter Date:  2017 Status:  Signed     :  Medina Patel RN (NURS- Registered Nurse)            Request physician consideration to refill Ventolin HFA MDI as requested. This patient was seen by you on 17 and remains due for a follow up OV. Reminder letter sent    Bronchodilator Inhalers   17 OV with Dr. Chen-follow up in 2 weeks  Last visit with TONY GARCIA was on: 2017 in [facility]  17 OV with Dr. Chen-follow up   Max refills 12 months from last office visit.Prescription refilled per RN Unable to complete prescription refill per RN Medication Refill Policy.................... Medina Patel RN ....................  2017   4:17 PM

## 2017-12-28 NOTE — PROGRESS NOTES
Patient Information     Patient Name MRN Sex Efra Farias [id number] Male 1955      Progress Notes by Dominic Holguin MD at 2017  3:00 PM     Author:  Dominic Holguin MD Service:  (none) Author Type:  Physician     Filed:  2017  4:01 PM Encounter Date:  2017 Status:  Signed     :  Dominic Holguin MD (Physician)            SUBJECTIVE:  Efra Zuniga is a 62 y.o. Male.  He comes in to follow-up from recent rapid clinic visit. He reports he has had a persistent cough for 2 months. He tells me the cough is minimally productive. He has not had any fevers or chills. He feels wheezy and at times a little short of breath. He was prescribed albuterol inhaler 2 weeks ago rapid clinic and reports that it does seem to help for several hours after taking however then symptoms return. He does not have a formal diagnosis of COPD and has never smoked, but does report having frequent episodes of wheezing and symptoms that sound consistent with mild intermittent asthma.    It was also noted that patient has type 2 diabetes mellitus but tells me that he is not aware of this. He is not on any medications for this and does not check his blood sugar. Patient is obese.    Patient does take Tenoretic for hypertension. Has not had any side effects that he is aware of.      Social History     Substance Use Topics       Smoking status: Never Smoker     Smokeless tobacco: Never Used     Alcohol use No       I have personally reviewed and updated above noted social, family and/or past medical history.    A comprehensive review of systems was negative except for items noted in HPI/Subjective.      OBJECTIVE:  /82  Pulse 72  Temp 97.9  F (36.6  C)  Wt 122 kg (269 lb)  BMI 34.54 kg/m2  EXAM:  General Appearance: Pleasant, alert, appropriate appearance for age. No acute distress  Chest/Respiratory Exam: Patient has expiratory wheeze but no focal consolidation. Currently no increased work  of breathing  Cardiovascular Exam: Regular rate and rhythm. S1, S2, no murmur, click, gallop, or rubs.  Gastrointestinal Exam: Soft, nontender, no abnormal masses or organomegaly.    ASSESSMENT/Plan :    I personally reviewed the patients' labs and imaging.    Results for orders placed or performed in visit on 08/14/17      Hgb A1c      Result  Value Ref Range    HEMOGLOBIN A1C MONITORING (POCT) 7.1 (H) 4.0 - 6.2 %    ESTIMATED AVERAGE GLUCOSE  157 mg/dL   BASIC METABOLIC PANEL      Result  Value Ref Range    SODIUM 138 133 - 143 mmol/L    POTASSIUM 3.4 (L) 3.5 - 5.1 mmol/L    CHLORIDE 96 (L) 98 - 107 mmol/L    CO2,TOTAL 31 21 - 31 mmol/L    ANION GAP 11 5 - 18                    GLUCOSE 196 (H) 70 - 105 mg/dL    CALCIUM 9.8 8.6 - 10.3 mg/dL    BUN 16 7 - 25 mg/dL    CREATININE 0.96 0.70 - 1.30 mg/dL    BUN/CREAT RATIO           17                    GFR if African American >60 >60 ml/min/1.73m2    GFR if not African American >60 >60 ml/min/1.73m2       Efra was seen today for cough.    Diagnoses and all orders for this visit:    Type 2 diabetes mellitus without complication, without long-term current use of insulin (HC)  I discussed with patient that based on his last A1c 3 months ago he definitely does have diabetes. I discussed with him what exactly this means and how it develops. Stressed importance of weight loss and healthy eating. I think he should start metformin immediately. I also would like him to start checking blood sugars once per day immediately. He needs to meet with the dietitian for further instructions on dietary changes. Patient does not smoke. His blood pressure is borderline elevated but less than 140/90. He is currently taking aspirin 81 mg. He is not on a statin. I discussed with him that he likely should be on a statin but as to not overwhelm him we will first get him started on checking blood sugar and taking metformin. He needs to come back no later than 3 months and at that point we can  discuss with him starting statin.    -     metFORMIN (GLUCOPHAGE) 500 mg tablet; Take 1 tablet by mouth 2 times daily with meals.  -     blood-glucose meter; Dispense meter, test strips, lancets covered by pt ins. E11.65 IDDM type II, uncontrolled - Test 1 time/day  -     Hgb A1c; Future  -     AMB CONSULT TO DIETICIAN; Future  -     Hgb A1c    Essential hypertension with goal blood pressure less than 140/90  current antihypertensives.  -     atenolol-chlorthalidone, 100-25 mg, (TENORETIC 100) 100-25 mg tablet; Take 1 tablet by mouth once daily.    HYPERTENSION, LABILE  -     BASIC METABOLIC PANEL; Future  -     potassium chloride (KLOR-CON M20) 20 mEq Extended-Release tablet; Take 1 tablet by mouth once daily with a meal.  -     BASIC METABOLIC PANEL    Mild intermittent asthma with acute exacerbation  at this point I do not see any definitive evidence for infection. I discussed with patient for most common causes of chronic cough. I discussed with him that I think this would most likely be asthma/COPD especially with albuterol helping. I would like him to get on a daily inhaler such as Flovent. We can switch this to any inhaled steroid that insurance will cover. I would also like him to get spirometry a couple of weeks later to determine that we have the correct diagnosis. Discussed with patient that this could be silent acid reflux as he does not have any symptoms of GERD but this is less likely. Also on the differential diagnosis is allergies and postinfectious inflammation. He does not think he has had allergy symptoms and it has been going on for some time for it to be postinfectious. I discussed with patient that it is very unlikely to be malignancy but I would recommend chest x-ray.    If patient does not have improvement with inhaler we could do a burst of oral steroids. If he has fevers or chills he will need to come back in for antibiotics and lab work.    -     fluticasone (FLOVENT HFA) 110 mcg/Actuation  inhaler; Inhale 1 Puff by mouth 2 times daily.  -     SPIROMETRY W/ BRONCHODILATOR; Future          Patient Instructions   You do in fact have diabetes.  I would suggest starting metformin 500mg twice per day and checking blood sugar once per day in the morning.    I would also like you to meet with our dietician Kristen Klinefelter to better understand how you can dramatically improve your blood sugar by changing what types of food and how much you eat.    In terms of your cough, you have significant wheezing, I think this is likely asthma/COPD.  Start the flovent.  Take it twice per day every day.  Take the albuterol only as needed.  If you are taking it more than 3-4 times in the average week we need to increase the flovent dose.    You should have a chest xray and spirometry.  Both are ordered.  You can stop by main building any time and have the chest xray done.  They will call you for spirometry.      Dominic Holguin MD    This document was created using computer generated templates and voice activated software.

## 2017-12-29 NOTE — PATIENT INSTRUCTIONS
Patient Information     Patient Name MRN Efra Nino [id number] Male 1955      Patient Instructions by Lukas Kramer MD at 9/15/2017 11:00 AM     Author:  Lukas Kraemr MD  Service:  (none) Author Type:  Physician     Filed:  9/15/2017 11:52 AM  Encounter Date:  9/15/2017 Status:  Addendum     :  Lukas Kramer MD (Physician)        Related Notes: Original Note by Lukas Kramer MD (Physician) filed at 9/15/2017 11:52 AM            1. Nonsmoker  2. Simple chronic bronchitis (HC)    Start new inhaler Anoro -- or similar -- to help reduce your chronic phlegm.   Okay to continue fluticasone inhaler for now.     - umeclidinium-vilanterol (ANORO ELLIPTA) 62.5-25 mcg/actuation inhaler; Take 2 puffs off of each capsule -- 1 capsule Daily -- For COPD -- vs similar anticholinergic inhaler    [facility]: ** [facility] **     3. Asbestos exposure  4. Moderate COPD (chronic obstructive pulmonary disease) (HC)    5. Need for 23-polyvalent pneumococcal polysaccharide vaccine  - PNEUMOCOCCAL VACCINE 23-VALENT => 3 YO IM    6. Type 2 diabetes mellitus without complication, without long-term current use of insulin (HC)    Dose change:  - metFORMIN (GLUCOPHAGE) 500 mg tablet; Take 0.5 tablets by mouth 3 times daily with meals. -- dose change 9/15/2017  Dispense: 60 tablet; Refill: 2    7. Essential hypertension  8. Hypokalemia    Start:   - spironolactone (ALDACTONE) 25 mg tablet; Take 0.5-1 tablets by mouth once daily. - for blood pressure  Dispense: 30 tablet; Refill: 0    -- hopefully with Spironolactone, we can get rid of some of your other blood pressure medications.     Blood pressure checks at home - check some in AM, some in Afternoon, some in Evening and record   -- bring these with you to your next appointment.     Goal blood pressures -- less than 140 and less than 90.     -- If running higher than this on regular basis, will need to adjust your medications.   -- Ideally  would like the numbers about 120-130 and 70-80.       Immunization History     Administered  Date(s) Administered     HepA-HepB (Twinrix) 03/25/2013, 04/23/2013     Hepatitis B (Adult) 09/27/2013     Influenza Virus, Unspecified 10/01/2003     Pneumococcal Poly,23-Valent (Pneumovax) 09/01/2003, 09/15/2017     Td (Age >=7 Years) 09/01/2003        -- Pneumococcal PCV 23 shot today.     Pneumococcal Pneumonia vaccines (PCV 13 and PCV 23)     Pneumococcal Conjugate 13 - Valent Vaccine (One time only).     Pneumococcal 23 - Valent Vaccine -- Two doses (One before age 65 and One After)    -- repeat every 5 years in certain patient populations.     PCV 13 should be given prior to PCV 23 -- THEN -- In eight weeks or more, PCV 23 can be given.   If the patient has already received PCV 23, they should not receive PCV 13 for one year.        Simple spirometry 8/21/2017 ---   Indication: 62-year-old male with diagnosis of asthma.  No reported dyspnea.  Productive cough.  Constant wheezing.  Nonsmoker.  Home medications include albuterol, Flovent.  Computer interpretation reviewed.   There is a moderately severe obstructive lung defect with no significant response of FVC or FEV1 to bronchodilator.    The FEV1 and FEF 25-75% reduced, but the FEV1/FVC ratio is increased.  Pre:  FVC 2.63 L (49 % pred), FEV1 2.15 L (53 % pred), [id number]  2.19 L/sec (68 % pred).  Post: FVC 2.82 L (52 % pred), FEV1 2.34 L (58 % pred), [id number]  2.97 L/sec (93 % pred).  FVC changed by +7 %. FEV1 changed by plus a %. [id number] changed by +35 %.  Additional restrictive lung defect or diffusion defect cannot be excluded by spirometry alone - consider more detailed PFT if clinically indicated.     Impression: Moderately severe obstructive airway disease.  Possible moderately severe restriction.      8/17/2017 -- XR CHEST 2 VIEWS PA AND LATERAL     HISTORY: 62 years Male Cough     COMPARISON: 04/24/2017     TECHNIQUE: 2 views of the chest were  obtained.     FINDINGS: Two views of the chest were obtained. Heart size and pulmonary vascularity are within normal limits, lungs are clear both views. No consolidating air space opacities are present.     IMPRESSION: Clear chest.     Electronically Signed By: Sage Car M.D. on 8/17/2017 1:50 PM      Return in approximately 1 month(s), or sooner as needed for follow-up with Dr. Kramer.  - follow-up breathing, HTN    Clinic : [phone]  Appointment line: [phone]

## 2017-12-30 NOTE — NURSING NOTE
Patient Information     Patient Name MRN Sex Efra Farias [id number] Male 1955      Nursing Note by Skyla Carrasco at 2017  3:30 PM     Author:  Skyla Carrasco Service:  (none) Author Type:  (none)     Filed:  2017  3:57 PM Encounter Date:  2017 Status:  Signed     :  Skyla Carrasco            Patient presents to clinic with cough, and wheezing.  Skyla Zee ....................  2017   3:39 PM

## 2017-12-30 NOTE — NURSING NOTE
Patient Information     Patient Name MRN Sex Efra Farias [id number] Male 1955      Nursing Note by Rosy Deleon at 9/15/2017 11:00 AM     Author:  Rosy Deleon Service:  (none) Author Type:  (none)     Filed:  9/15/2017 10:40 AM Encounter Date:  9/15/2017 Status:  Signed     :  Rosy Deleon            Previous A1C is at goal of <8  HEMOGLOBIN A1C MONITORING (POCT) (%)    Date Value   2017 7.1 (H)     Urine microalbumin:creatine: no results found  Foot exam unsure  Eye exam no eye exam in the last 365 days noted    Patient is a current smoker  Patient is not on a daily aspirin  Patient is on a Statin.  Blood pressure today of   is at the goal of <139/89 for diabetics.    Rosy Deleon LPN..............9/15/2017 10:39 AM

## 2018-01-03 NOTE — TELEPHONE ENCOUNTER
Patient Information     Patient Name MRN Sex Efra Farias [id number] Male 1955      Telephone Encounter by Shelia Negrete PT at 2017  1:18 PM     Author:  Shelia Negrete PT Service:  (none) Author Type:  PT- Physical Therapist     Filed:  2017  1:30 PM Encounter Date:  2017 Status:  Signed     :  Shelia Negrete PT (PT- Physical Therapist)            Dr. Antoine:    Efra Zuniga was seen for PT evaluation for right sided sciatica today. Thank you for the referral. My evaluation report is available for your review.    I have 2 concerns after meeting with Mr. Zuniga. First, I am concerned with his use of over the counter medications to try to control his pain. He stated that he is using Naproxen twice daily as was recommended but he is also taking Tylenol 8-10 times a day as this seems to be the only thing that helps his pain.  Pain is still at an 8/10 during the night. I believe you did have him try a prednisone course which he stated did not really help.    Second, I am concerned about the altered sensation and the weakness in his leg. He is noted to have mild  quad and hip flexor weakness which may be more pain mediated. However, he has 3+/5 dorsiflexion at the ankle and is reportedly catching his foot frequently. Gastroc/soleus tests at 3/5 and great toe extension at 3+/5. His great toe on the right is numb on the dorsal surface with decreased light touch and proprioceptive testing noted. While he is slowly improving overall, there is concern with these findings. On chart review, he did have hip/pelvic xrays when in the ED. Do you feel these findings would warrant further imaging?     Mr. Zuniga does not have a follow up scheduled with you. He asked me to contact you with these concerns and then have your nurse contact him with recommendations.    Shelia Negrete PT

## 2018-01-03 NOTE — PROGRESS NOTES
Patient Information     Patient Name MRN Sex Efra Farias [id number] Male 1955      Progress Notes by Lucie Salas at 3/7/2017  8:26 AM     Author:  Lucie Salas Service:  (none) Author Type:  Other Clinical Staff     Filed:  3/7/2017  8:26 AM Date of Service:  3/7/2017  8:26 AM Status:  Signed     :  Lucie Salas (Other Clinical Staff)            Falls Risk Criteria:    Age 65 and older or under age 4        Sensory deficits    Poor vision    Use of ambulatory aides    Impaired judgment    Unable to walk independently    Meets High Risk criteria for falls:  no

## 2018-01-03 NOTE — TELEPHONE ENCOUNTER
Patient Information     Patient Name MRN Sex Efra Farias [id number] Male 1955      Telephone Encounter by Aarti Burciaga at 2017 10:35 AM     Author:  Aarti Burciaga Service:  (none) Author Type:  (none)     Filed:  2017 10:35 AM Encounter Date:  2017 Status:  Signed     :  Aarti Burciaga            Insurance information obtained from patient and PA done for Potassium.  Aarti Burciaga LPN....................2017 10:35 AM

## 2018-01-03 NOTE — TELEPHONE ENCOUNTER
Patient Information     Patient Name MRN Sex Efra Farias [id number] Male 1955      Telephone Encounter by Aarti Burciaga at 2017  9:09 AM     Author:  Aarti Burciaga Service:  (none) Author Type:  (none)     Filed:  2017  9:09 AM Encounter Date:  2017 Status:  Signed     :  Aarti Burciaga            Wife will have patient call back.  Aarti Burciaga LPN....................2017 9:09 AM

## 2018-01-03 NOTE — TELEPHONE ENCOUNTER
Patient Information     Patient Name MRN Sex Efra Farias [id number] Male 1955      Telephone Encounter by Luisa Antoine DO at 3/2/2017 11:53 AM     Author:  Luisa Antoine DO Service:  (none) Author Type:  PHYS- Osteopathic     Filed:  3/2/2017 11:53 AM Encounter Date:  3/2/2017 Status:  Signed     :  Luisa Antoine DO (PHYS- Osteopathic)            Order placed for MRI.  Please schedule.

## 2018-01-03 NOTE — PROGRESS NOTES
Patient Information     Patient Name MRN Sex     Efra Zuniga [id number] Male 1955      Progress Notes by Ayana Reyes at 3/4/2017  4:00 PM     Author:  Ayana Reyes Service:  (none) Author Type:  PHYS- Nurse Practitioner     Filed:  3/8/2017 10:15 PM Encounter Date:  3/4/2017 Status:  Signed     :  Ayana Reyes (PHYS- Nurse Practitioner)            HPI:    Efra Zuniga is a 61 y.o. male who presents to clinic today for increased pain in the right buttock, lateral thigh and medial calve. Rates pain 6/10 but stated earlier this am it was 20/10.  Is having difficulty walking and like he having trouble with controlling leg.  Pain has been increasing over the last 24 hours. Was in [facility] this AM, and went to [facility]. Reports that pain increased on the way back also. Is having numbness and tingling into right foot. Took two of the Zanaflex and did not feel like it helped at all. Has been taking tylenol up to 12 tabs a day, has taken 8 so far today. Also has taken 4 aleve today, has been taking about 8 of those. Reports with both of those he has had some relief. Denies saddle numbness, denies difficulty or changes with urinating or BMs.  Denies other consitutional symptoms.  Has seen Dr. Antoine and has an MRI scheduled on Tuesday. Has also seen physical therapy already bit felt there was no improvement. Asked patient about diabetes, reports he is unsure if he has diabetes. States was on metformin in the past but was unsure if he was indeed diabetic.        Past Medical History      Diagnosis   Date     Arthralgia       Intermittent arthralgias      Blood per rectum       multiple colonoscopies scheduled and canceled by patient      Family history of heart murmur       Irritable bowel syndrome with constipation       Functioning bowel syndrome/constipation      Past Surgical History       Procedure   Laterality Date     Appendectomy        Coronary angiogram done by Aron Quick at [facility]  in March 2006 shows patent coronary arteries       Cholecystectomy        Scan-angiogram   March 2006      Coronary angiogram done by Aron Quick at [facility] in shows patent coronary arteries       Social History     Substance Use Topics       Smoking status: Never Smoker     Smokeless tobacco: Never Used     Alcohol use No     Current Outpatient Prescriptions       Medication  Sig Dispense Refill     acetaminophen (TYLENOL EXTRA STRGTH) 500 mg tablet Take 1,000 mg by mouth every 6 hours if needed. Max acetaminophen dose: 4000mg in 24 hrs.       aspirin (ECOTRIN) 81 mg enteric coated tablet Take 1 tablet by mouth once daily with a meal.  0     atenolol-chlorthalidone, 100-25 mg, (TENORETIC 100) 100-25 mg tablet Take 1 tablet by mouth once daily. 90 tablet 3     potassium chloride (KLOR-CON M20) 20 mEq Extended-Release tablet Take 1 tablet by mouth once daily with a meal. 90 tablet 3     tiZANidine (ZANAFLEX) 2 mg tablet 2-4mg PO qHS PRN back pain/spasm. 15 tablet 0     No current facility-administered medications for this visit.      Medications have been reviewed by me and are current to the best of my knowledge and ability.    No Known Allergies    ROS:  Refer to HPI, otherwise negative.    EXAM:  General appearance: well appearing male, in no acute distress  Head: normocephalic, atraumatic,   Eyes: conjunctivae normal   Orophayrnx: moist mucous membranes,   Respiratory: clear to auscultation bilaterally  Cardiac: RRR with no murmurs, capillary refill is brisk bilaterally. Pedal pulses intact bilaterally  Musculoskeletal: Slight decreased strength with plantar flexion of right foot. Gait is steady, but slight guarded, no overt foot drop.   No paraspinal tenderness.   Dermatological: no rashes or lesions  Psychological: normal affect, alert and pleasant    ASSESSMENT/PLAN:    ICD-10-CM    1. Sciatic pain, right M54.31 traMADol (ULTRAM) 50 mg tablet       Plan: Pt is in the process of treating right sciatic pain. Is  awaiting MRI on 3.7.17  Significant increase in pain today. Has been taking excess NSAID's and tylenol.   Reviewed at length maximum daily doses of acetaminophen and NSAID's.  Discussed consistent use of Zanaflex, as well as use of ice and heat.   Small Rx - 15 tabs of tramadol.   Instructed to follow up with Dr. Antoine this week and also discuss resumption of metformin.   Reviewed importance of following instructions of MD and PT.   See patient instructions. Pt in agreements with plan.  Discussed expected course, Signs and symptoms to return to PCP.   No further questions.       Patient Instructions   Your circulation is intact to your foot. You need to follow up with having your MRI on Tuesday.     Avoid any activity which causes pain. Keep using the cane.     Apply cold packs to the affected area for 15-20 minutes, 4 times a day. A bag of frozen corn or peas often works well as a cold pack. A cold pack is usually the best treatment for the 1st two days after an injury. After 48 hours, apply heat or ice, whichever gives relief.    Continue to sleep with the pillow between your legs as the physical therapist has recommended. Continue seeing the physical therapist regularly.     Also, take ibuprofen (Advil, Motrin) 2-3 tabs up to three times a day or naproxen (Aleve) 2 tabs twice a day, or a similar prescription medication. Use regularly for the first 7-10 days. Later, take as needed for pain, swelling or stiffness. Take this type of medication with food to minimize any stomach irritation.     Tylenol may also be taken 2 tabs up to 4 times per day in addition to the ibuprofen or naproxen to help ease the pain. Maximum 8 pills a day.     I ordered a small supply of tramadol (ultram) 1 tab up to 4 times a day. This can cause constipation. Take Senna S - a natural vegetable laxative take 2 a day, up to 4 pills a day.     Call or return to clinic as needed if your pain becomes significantly worse, or fails to improve as  anticipated despite following the above recommendations.    You need to follow up with your primary care doctor on a regular bases. You need to ask about getting back on your metformin also.

## 2018-01-03 NOTE — NURSING NOTE
Patient Information     Patient Name MRN Sex Efra Farias [id number] Male 1955      Nursing Note by Nalini Fu at 2017  3:50 PM     Author:  Nalini Fu Service:  (none) Author Type:  (none)     Filed:  2017  4:01 PM Encounter Date:  2017 Status:  Signed     :  Nalini Fu            Patient presents to clinic experiencing right hip pain radiating down into right leg for past week after shoveling snow.  Pain rated at 8 to 9 when walking or with activity.  Nalini Fu LPN..................2017  3:56 PM

## 2018-01-03 NOTE — TELEPHONE ENCOUNTER
"Patient Information     Patient Name MRN Efra Nino [id number] Male 1955      Telephone Encounter by Ajit Montaño RN at 2017  9:05 AM     Author:  Ajit Montaño RN Service:  (none) Author Type:  NURS- Registered Nurse     Filed:  2017  9:19 AM Encounter Date:  2017 Status:  Signed     :  Ajit Montaño RN (NURS- Registered Nurse)            Patient states his pain is still not any better, he does not want to come in, as MICHAEL FERRER DO is not in today, and he only wants pain medication prescribed over the phone.  He does not want to even come in to see MICHAEL FERRER DO , states he was just in to see her, and he does not want to pick a PCP, as he states \"you can never get into see any of them.\"  AJIT MONTAÑO RN ....................  2017   9:17 AM]          "

## 2018-01-03 NOTE — PROGRESS NOTES
"Patient Information     Patient Name MREfra Mena [id number] Male 1955      Progress Notes by Luisa Antoine DO at 2017  3:50 PM     Author:  Luisa Antoine DO Service:  (none) Author Type:  PHYS- Osteopathic     Filed:  2017  4:31 PM Encounter Date:  2017 Status:  Signed     :  Luisa Antoine DO (PHYS- Osteopathic)            Chief Complaint     Patient presents with       Hip Pain/problem      pain radiating down into leg rated at 8        Subjective:   Mr. Zuniga is a 61 y.o. male  seen for the acute concern today of right hip pain that radiates down his leg.  He states this started approximately a week ago.  He denies any specific trauma.  He has been going to the chiropractor who felt he should come in and be seen.  The chiropractor recommended that he start prednisone and get an MRI.  We discussed today that probably we could try some more conservative treatments initially.  He has been taking Tylenol at home and it does help minimally but not completely.  He has previously had sciatica on the left and this feels very similar.  He does report that the pain starts in his mid buttock and goes all the way down his leg to his toes.  He does feel that he has 2 numb toes.  He does overall feel his leg is less strong and may give out on him.    He  reports that he has never smoked. He has never used smokeless tobacco.    ROS:   Pertinent ROS was performed and was negative, including for fever, chills, changes in bowel or bladder. No other concerns, with exception of HPI above.      Objective:    /70  Pulse 64  Temp 97  F (36.1  C) (Tympanic)  Resp 20  Ht 1.88 m (6' 2\")  Wt 126.3 kg (278 lb 8 oz)  BMI 35.76 kg/m2  GEN: Vitals reviewed.  Patient is in no acute distress. Cooperative with exam.  SKIN: Warm and dry to touch.  No rash on face, arms and legs.  EXT: No clubbing or cyanosis.  No peripheral edema.  MSK:  Gait is normal. ROM of hip flexors and extensors is limited by " pain.  No pain to palpation of paraspinal muscles in lumbar region.  No pain to palpation of spine directly.  BUE and BLE sensation is intact.  Minimal muscle spasm and hypertrophy in the lumbar region.  BUE and BLE muscle strength intact     Assessment/Plan:   1. Right sided sciatica  - Ice, elevation, gentle movement/rest as tolerated  - although we discussed using nonstructural anti-inflammatory medications, I am okay providing a short course of prednisone to try to reduce the inflammation.  - Ibuprofen, Naproxen or Tylenol as needed  - offered PT, patient accepted at this time and will set up appointment  - patient is to call if he has additional problems with this or if new symptoms develop and we will need to consider additional imaging at that time.  - AMB CONSULT TO PHYSICAL THERAPY; Future  - predniSONE (DELTASONE) 20 mg tablet; Take 1 tablet by mouth once daily with a meal.  Dispense: 5 tablet; Refill: 0      Return if symptoms worsen or fail to improve.    Patient Instructions   Ice every 3-4 hours, gentle exercise/rest as much as possible.    Start Naproxen 220 mg tablet (1-2 tablets) 1-2 times daily for 5-7 days after prednisone ends and then as needed.  Be sure to take with food.  Max of 4 per day.    Caution with NSAIDS (ibuprofen, aspirin, naproxen, aleve, advil) due to risk for increased blood pressure, stomach pain/nausea/ulcers and kidney damage; use minimal amount necessary    -- in addition to --     Tylenol 500mg-1000mg (1-2- extra strength 500mg tablets or 2-3 regular strength 325mg tablets) three times a day as needed; Maximum of 4000mg daily    - Return/call as needed for follow-up should any new symptoms develop, for worsening of current symptoms or if symptoms do not resolve with above plan.         MICHAEL FERRER DO   2/17/2017 4:23 PM    This document was prepared using voice generated softwear. While every attempt was made for accuracy, grammatical errors may exist.

## 2018-01-03 NOTE — TELEPHONE ENCOUNTER
Patient Information     Patient Name MRN Sex Efra Farias [id number] Male 1955      Telephone Encounter by Nalini Fu at 3/2/2017 11:20 AM     Author:  Nalini Fu Service:  (none) Author Type:  (none)     Filed:  3/2/2017 11:23 AM Encounter Date:  3/2/2017 Status:  Signed     :  Nalini Fu            Patient said he has not had any back surgeries in the past.  Per [facility], please place MDI order w/o contrast.    Patient states the prednisone did not help and all it did was upset his stomach.  He will wait for [facility] to call for assistance in scheduling the MRI and then set up follow up appointment with a provider.  Nalini Fu LPN..................3/2/2017  11:22 AM

## 2018-01-03 NOTE — TELEPHONE ENCOUNTER
Patient Information     Patient Name MRN Sex Efra Farias [id number] Male 1955      Telephone Encounter by Nalini Fu at 3/2/2017  9:56 AM     Author:  Nalini Fu  Service:  (none) Author Type:  (none)     Filed:  3/2/2017  9:58 AM  Encounter Date:  3/2/2017 Status:  Addendum     :  Nalini Fu        Related Notes: Original Note by Nalini Fu filed at 3/2/2017  9:57 AM            Left message for patient to return call to clinic.  He can be seen by Luisa Antoine DO today at 11:30 or tomorrow - Friday at 11:50 or 1:30 to discuss his pain management  Nalini Fu LPN..................3/2/2017  9:57 AM

## 2018-01-03 NOTE — TELEPHONE ENCOUNTER
Patient Information     Patient Name Efra Jefferson [id number] Male 1955      Telephone Encounter by Shelia Negrete, PT at 3/2/2017  9:43 AM     Author:  Shelia Negrete, PT Service:  (none) Author Type:  PT- Physical Therapist     Filed:  3/2/2017  9:45 AM Encounter Date:  2017 Status:  Signed     :  Shelia Negrete, PT (PT- Physical Therapist)            3/2/2017 Efra called and cancelled his PT appointments for next week, stating he has not heard back from Dr. Antoine about a follow up with her. I did directly contact Dr. Elina Reece's nurse by telephone to have her contact Mr Zuniga to schedule a clinic appointment.

## 2018-01-03 NOTE — TELEPHONE ENCOUNTER
Patient Information     Patient Name MRN Sex Efra Farias [id number] Male 1955      Telephone Encounter by Luisa Antoine DO at 3/7/2017  9:26 AM     Author:  Luisa Antoine DO Service:  (none) Author Type:  PHYS- Osteopathic     Filed:  3/7/2017  9:34 AM Encounter Date:  3/7/2017 Status:  Signed     :  Luisa Antoine DO (PHYS- Osteopathic)            Results shared with patient.  At this time we will place a neurosurgery consult for possible surgical intervention however patient does feel his pain has improved some at this time.    He was given tramadol through the walk-in clinic which he does feel has been helping significantly.    He was encouraged to set up an appointment if he feels he will need additional medication in the future.

## 2018-01-03 NOTE — TELEPHONE ENCOUNTER
Patient Information     Patient Name MRN Sex Efra Farias [id number] Male 1955      Telephone Encounter by Nalini Fu at 3/2/2017 10:05 AM     Author:  Nalini Fu Service:  (none) Author Type:  (none)     Filed:  3/2/2017 10:08 AM Encounter Date:  3/2/2017 Status:  Signed     :  Nalini Fu            Patient states his right leg is aching steady and he can not sleep.  The aleve medication work in the morning but at night the pain is unbearable and walking is very hard.  He is requesting pain medication and an immediate MRI for his pinched nerve.   Patient refuses to be seen in the clinic by provider again until he has MRI.  Please advise.    Nalini Fu LPN..................3/2/2017  10:06 AM

## 2018-01-03 NOTE — TELEPHONE ENCOUNTER
Patient Information     Patient Name MRN Sex Efra Farias [id number] Male 1955      Telephone Encounter by Luisa Antoine DO at 2017  1:42 PM     Author:  Luisa Antoine DO Service:  (none) Author Type:  PHYS- Osteopathic     Filed:  2017  1:44 PM Encounter Date:  2017 Status:  Signed     :  Luisa Antoine DO (PHYS- Osteopathic)            I would recommend he come back for follow-up in the next week or so to discuss possible MRI and medications.  Depending on the dose of the Tylenol he is taking 8-10 may be appropriate however more information would be needed.    Please assist him in scheduling this.

## 2018-01-03 NOTE — PATIENT INSTRUCTIONS
Patient Information     Patient Name MRN Sex Efra Farias [id number] Male 1955      Patient Instructions by Ayana Reyes at 3/4/2017  4:00 PM     Author:  Ayana Reyes Service:  (none) Author Type:  PHYS- Nurse Practitioner     Filed:  3/4/2017  4:59 PM Encounter Date:  3/4/2017 Status:  Signed     :  Ayana Reyes (PHYS- Nurse Practitioner)            Your circulation is intact to your foot. You need to follow up with having your MRI on Tuesday.     Avoid any activity which causes pain. Keep using the cane.     Apply cold packs to the affected area for 15-20 minutes, 4 times a day. A bag of frozen corn or peas often works well as a cold pack. A cold pack is usually the best treatment for the 1st two days after an injury. After 48 hours, apply heat or ice, whichever gives relief.    Continue to sleep with the pillow between your legs as the physical therapist has recommended. Continue seeing the physical therapist regularly.     Also, take ibuprofen (Advil, Motrin) 2-3 tabs up to three times a day or naproxen (Aleve) 2 tabs twice a day, or a similar prescription medication. Use regularly for the first 7-10 days. Later, take as needed for pain, swelling or stiffness. Take this type of medication with food to minimize any stomach irritation.     Tylenol may also be taken 2 tabs up to 4 times per day in addition to the ibuprofen or naproxen to help ease the pain. Maximum 8 pills a day.     I ordered a small supply of tramadol (ultram) 1 tab up to 4 times a day. This can cause constipation. Take Senna S - a natural vegetable laxative take 2 a day, up to 4 pills a day.     Call or return to clinic as needed if your pain becomes significantly worse, or fails to improve as anticipated despite following the above recommendations.    You need to follow up with your primary care doctor on a regular bases. You need to ask about getting back on your metformin also.

## 2018-01-03 NOTE — TELEPHONE ENCOUNTER
Patient Information     Patient Name MRN Sex Efra Farias [id number] Male 1955      Telephone Encounter by Luisa Antoine DO at 3/2/2017 11:05 AM     Author:  Luisa Antoine DO Service:  (none) Author Type:  PHYS- Osteopathic     Filed:  3/2/2017 11:12 AM Encounter Date:  3/2/2017 Status:  Signed     :  Luisa Antoine DO (PHYS- Osteopathic)            I will place an order for the MRI of the lumbar spine at this time.  It will be scheduled as soon as possible.    I would recommend that the patient follow up with myself or another provider 2 or 3 days after the MRI to review the results.    I am willing to send an additional prednisone if the patient felt that this helped and if not he can continue on the naproxen however if he feels he needs other medications he will need to make an appointment to be seen by a provider at the clinic.    Thanks.

## 2018-01-03 NOTE — PATIENT INSTRUCTIONS
Patient Information     Patient Name MRN Sex Efra Farias [id number] Male 1955      Patient Instructions by Luisa Antoine DO at 2017  3:50 PM     Author:  Luisa Antoine DO  Service:  (none) Author Type:  PHYS- Osteopathic     Filed:  2017  4:14 PM  Encounter Date:  2017 Status:  Addendum     :  Luisa Antoine DO (PHYS- Osteopathic)        Related Notes: Original Note by Luisa Antoine DO (PHYS- Osteopathic) filed at 2017  4:12 PM            Ice every 3-4 hours, gentle exercise/rest as much as possible.    Start Naproxen 220 mg tablet (1-2 tablets) 1-2 times daily for 5-7 days after prednisone ends and then as needed.  Be sure to take with food.  Max of 4 per day.    Caution with NSAIDS (ibuprofen, aspirin, naproxen, aleve, advil) due to risk for increased blood pressure, stomach pain/nausea/ulcers and kidney damage; use minimal amount necessary    -- in addition to --     Tylenol 500mg-1000mg (1-2- extra strength 500mg tablets or 2-3 regular strength 325mg tablets) three times a day as needed; Maximum of 4000mg daily    - Return/call as needed for follow-up should any new symptoms develop, for worsening of current symptoms or if symptoms do not resolve with above plan.

## 2018-01-03 NOTE — PROGRESS NOTES
"Patient Information     Patient Name MRN Sex Efra Farias [id number] Male 1955      Progress Notes by Shelia Negrete PT at 3/14/2017  8:42 AM     Author:  Shelia Negrete PT Service:  (none) Author Type:  PT- Physical Therapist     Filed:  3/14/2017 10:02 AM Date of Service:  3/14/2017  8:42 AM Status:  Signed     :  Shelia Negrete PT (PT- Physical Therapist)            [facility]  Outpatient PT - Daily Note        Date of Service: 3/14/2017     Number of Visits: 2    Patient Name: Efra Zuniga   YOB: 1955   Referring MD/Provider: Luisa Antoine DO  Medical and Treatment Diagnosis: right-sided sciatica  PT Treatment Diagnosis: right sided sciatica, lower back pain, weakness in right LE, limited ROM, postural dysfunction, decreased right LE sensation, facet and SI joint dysfunction  Insurance: Medicare and [facility]  Start of Service: 17  Certification Dates: Start of Service: 17  Medicare/[place] Re-Cert Due: 17         Subjective    Since pt was seen for his initial visit, he did undergo a lumbar MRI with findings as below. Notes that he went to [facility] yesterday to see the neurosurgeon: he thinks this was Dr. Medina. It was decided that he did not need surgery at this time but if the pain returns, then he may need to consider this. From what he describes, this would be a discectomy and laminectomy.  His pain has pretty much resolved. Notes that the only trouble he has now is with walking: his leg is weak and the muscles seem tight. Tightness is in the  Left leg in the back of the thigh. \"I have to watch myself as the right leg does not always go where I want it to and I am running in to things. I lose my balance but I have not fallen. I am catching my foot--I kind of drag it instead of picking it up\".    Pain Ratin = no pain, comfortable / Location:  Pain is present only when I cough--causes pain in the hip or the " back.       FINDINGS  Alignment of the lumbar spine is normal.. Signal intensity and caliber of the thoracic spinal cord is normal. The conus medullaris is at the level of L1-L2..     L5-S1: There is loss of disc signal. Disc height is normal. A mild broad-based disc bulge is present. The central canal is patent. The neuroforamen are patent. There is bilateral facet osteoarthritis.     L4-L5: There is moderate loss of disc height and signal. There is a broad-based disc protrusion with large right paracentral disc herniation. There is moderate to severe narrowing of the central spinal canal and severe narrowing of the right lateral recess. There is mass effect upon the right L5 nerve root. The neuroforamen are patent.     L3-L4: There is loss of disc signal without loss of disc height. Central spinal canal and neuroforamen are patent.     L2-L3: There is normal disc space height and signal. The central spinal canal and neural foramen are patent.     L1-L2: There is normal disc space height and signal. The central spinal canal and neural foramen are patent.      IMPRESSION: There is a large right paracentral disc herniation at L4-L5. There is moderate to severe central spinal canal stenosis and severe right lateral recess narrowing. There is mass effect upon the right L5 nerve root.     There is mild degenerative disc disease at L5-S1 and L3-L4.     Electronically Signed By: Sage Car M.D. on 3/7/2017 9:15 AM    Objective  Items left blank indicate that the test was inappropriate or not meaningful at the time of visit and therefore not performed.  Continues to have significant gait deviation with ER of the right LE and limited dorsiflexion and limited push off. Limited hip and knee flexion noted as well. Continues to have significant Hamstring tightness, L> R with 90/90 hamstring test at -45 left and -40 right. No pain with stretching. Ankle weakness noted in all 4 planes R>L. Quad and hip flexor weakness  "noted on the right as well.    Today's Intervention:  All exercises completed bilaterally:  Discussion of MRI findings and resultant LE weakness  Supine self stretch to hamstrings 2\" X 10  Supine SLR X 10  Supine TKE 3# X 10    Ankle theraband exercises: X 10 each  Dorsiflexion red  Inversion yellow  Eversion yellow     Standing calf raises with UE support  Sit to stand-standard chair height    Home Exercise Program:  Supine self stretch to hamstrings 2\" X 10  Supine SLR X 10  Supine TKE 3# X 10    Ankle theraband exercises: X 10 each  Dorsiflexion red  Inversion yellow  Eversion yellow     Standing calf raises with UE support  Sit to stand-standard chair height  Assessment    Therapist Assessment / Clinical Impression: Efra Zuniga is referred to PT due to right sciatica which has been present for one month after shoveling snow. He is slowly getting better with primary complaint of pain at night which disrupts his sleep. Noted to have difficulty being on his feet and with lifting as well as bending his right hip and knee. He notes that he is catching his right foot when he walks. Concern with the amount of OTC medication he is utilizing. Clinically, he presents with right sided sciatica, lower back pain, weakness in right LE, limited ROM, postural dysfunction, decreased right LE sensation, facet and SI joint dysfunction. Concerns with medication use and right sided sensory deficits and weakness relayed to Dr. Antoine.       3/14/2017 Pt has undergone MRI and had neurosurgical consult. He was found to have a  large right paracentral disc herniation at L4-L5 with moderate to severe central spinal canal stenosis and severe right lateral recess narrowing. There is mass effect upon the right L5 nerve root. No surgery is scheduled at this time. Pain has for the most part resolved but he continues to have weakness and limited flexibility.    Clinical Impression:  Right Lumbar Radiculopathy:  Facet Dysfunction:  Piriformis " Syndrome vs sciatica  Postural Dysfunction:  SI Joint dysfunction:  Myofascial:         Direct PT services are indicated to address the above noted impairments and to promote self management.        G codes and Modifier taken from patient completing the PSFS: 2/27/2017  Initial Primary G Code and Modifier:   Per the Patient's intake and/or assessment the Primary G Code is: Mobility .  The Patient's Impairment, Limitation or Restriction Modifier would be best described as: CJ - 20% - 40% Impairment.   Goal Primary G Code and Modifier:   The Patient's G Code Goal would be: Mobility   The Patient's Impairment, Limitation or Restriction Modifier goal would be best described as: CI - 1% - 20% Impairment.         Goals:  Patient goal (time reference required): to sleep at night and to be able to stretch my leg out. To get my leg a little stronger. (8 weeks)     Long term goal: Patient is to self-manage symptoms and return to prior function in 8-12 weeks.      Functional goals:   Patient is to be independent in their Home Exercise Program in 4 weeks.  Patient is to tolerate walking with improved gait with reports of catching his foot only 1-2 times daily  in 8 weeks.  Patient is to have improved spine mobility to flexion to mid shin to allow for improved dressing and self-cares with minimal to no pain in 6 weeks.  Patient is report the ability to sleep 4-6 hours without awakening due to pain in 8-12 weeks.  Patient is to display and maintain normal joint mobility/symmetry in the lumbar spine and pelvis to allow improved standing in 8 weeks.  Patient is to demonstrate improved LE strength by 1/2 grade or better to assist in improving gait and decreasing fall risk in 8-12 weeks.            Response to Intervention:  Good understanding of HEP. Motivated to work at strengthening. Noted he felt good after session.       Plan    Treatment Plan / Targeted Outcomes:     Frequency:   16 visits     Duration of Treatment: 2  months    Planned Interventions:  May include any of the following:  Home Exercise Program development  Therapeutic Exercise (ROM & Strengthening)  Manual Therapy  Neuromuscular Re-education  Ultrasound  Electrical Stimulation  Therapeutic Activities  Gait Training  Posture and Body Mechanics Education    Plan for next visit:  Will see pt again in approx 2-3 weeks to update HEP and to provide education in body mechanics.     Student or PTA has been instructed in and demonstrates skills necessary to carry out above stated treatment plan: Yes    Thank you for your referral to [facility].  Please call with any questions, concerns or comments.  [phone]

## 2018-01-03 NOTE — PATIENT INSTRUCTIONS
Patient Information     Patient Name MRN Sex Efra Farias [id number] Male 1955      Patient Instructions by Hazel James NP at 3/20/2017 12:45 PM     Author:  Hazel James NP Service:  (none) Author Type:  PHYS- Nurse Practitioner     Filed:  3/20/2017  1:15 PM Encounter Date:  3/20/2017 Status:  Signed     :  Hazel James NP (PHYS- Nurse Practitioner)            Azithromycin daily x 5 days     Albuterol inhaler 2 puffs every 4 hours as needed for shortness of breath, wheezing    Follow up with primary provider for physical and if symptoms persist/worsen

## 2018-01-03 NOTE — TELEPHONE ENCOUNTER
Patient Information     Patient Name MRN Sex Efra Farias [id number] Male 1955      Telephone Encounter by Nalini Fu at 3/2/2017 12:34 PM     Author:  Nalini Fu Service:  (none) Author Type:  (none)     Filed:  3/2/2017 12:34 PM Encounter Date:  3/2/2017 Status:  Signed     :  Nalini Fu            [facility] will assist patient with scheduling MRI.  Nalini Fu LPN..................3/2/2017  12:34 PM

## 2018-01-03 NOTE — INITIAL ASSESSMENTS
Patient Information     Patient Name MRN Sex Efra Farias [id number] Male 1955      Initial Assessments by Shelia Negrete PT at 2017 11:31 AM     Author:  Shelia Negrete PT Service:  (none) Author Type:  PT- Physical Therapist     Filed:  2017  1:58 PM Date of Service:  2017 11:31 AM Status:  Signed     :  Shelia Negrete PT (PT- Physical Therapist)            [facility]  Outpatient PT - Initial Evaluation  Lumbosacral  Evaluation       Date of Service: 2017     Patient Name: Efra Zuniga   YOB: 1955   Referring MD/Provider: Luisa Antoine DO  Medical and Treatment Diagnosis: right-sided sciatica  PT Treatment Diagnosis: right sided sciatica, lower back pain, weakness in right LE, limited ROM, postural dysfunction, decreased right LE sensation, facet and SI joint dysfunction  Insurance: Medicare and BCBS  Start of Service: 17  Certification Dates: Start of Service: 17   Medicare/MA Re-Cert Due: 17      Preferred Name: Gallo    Living Situations:  Independent in Living Situation     Preadmission Functional Mobility: Independent Notes that he had to use a cane when he had the pinched nerve--I could hardly walk  Precautions:    Cognition:  Oriented to Person, Place, and Time.     Were cultural / age or other special adaptations needed? No      Patient is a vulnerable adult: No      Patient is aware of diagnosis: Yes      Risks and benefits explained: Yes    Subjective      History of Injury: Notes that he was shoveling snow about a month ago and injured his back. Notes that he felt something pop but was able to continue. Notes the next day his back was very bad. Notes that he went into the ED after--he thought it was his hip. Notes he was given a muscle relaxant. Notes that this was not really helpful. Did see his chiropractor. His chiropractor told him it was a nerve problem so he told him to stop the medication, Notes that  then he was seen by Dr. Antoine for follow up. Notes that he was told to take aleve. Notes now that he does not have much pain during the day. Notes that at night his leg just aches--has to get up and take tylenol. Notes that he tried to to call to get some pain pills but was told he could not get them unless he went in to the clinic. Notes he is taking  Tylenol 8-10 times per day and aleve 2 times per day. Notes that he is having stomach problems now from the medication. Notes that he has seen the chiropractor 3 times--feels good when he goes in but then it comes back 12 hours later.  Notes he tried predisone for 5 days--did not change his pain. In bed, I get so hot at night--I sweat then. Wonders if this does something to cause his pain worsen. I want to sleep on my right side but it hurts. I have to try to sleep on my left side.         Current Symptoms:  ?   Decreased Motion a ittle bit. I cannot stretch my leg out  Weakness right leg: my foot does not  so I catch it  Instability occasionally-  Swelling  I don't know  Tingling/Numbness  My big toe and the one next to it are numb on top. My toes are ice cold--I have been sitting with a heating pad on my feet  Bowel/bladder changes no  Pain Ratin = no pain, comfortable Pain at night is 8/10 about 1-2 am  / Location:  Starts in the right hip laterally to the knee and it goes from there down to the ankle. When I get up I have a hard time walking        Aggravation Factors: sleeping, being on his feet for long periods, was told not to lift things. Catches his foot when he walks       Easing Factors: has not found anything that helps other than tylenol. Has tried heat and ice.       Subjective rating of current functional limitations:    Patient Specific Functional and Pain Scales (PSFS):    Clinician Instructions: Complete after the history and before the exam.    Initial Assessment: We want to know what 3 activities in your life you are unable to  perform, or are having the most difficulty performing, as a result of your chief problem. Please list and score at least 3 activities that you are unable to perform, or having the most difficulty performing, because of your chief problem.   Patient Specific Activity Scoring Scheme (score one number for each activity):   Activity Score (0-10)  0= Unable to perform activity  10= Able to perform activity at same level as before injury or problem   1. sleeping 3/10   2. shoveling 8/10   3. Being up on his feet for a long time. 9/10   Totals:  20/30 = 67 % ability which relates to 33% impairment    Patient verbally states that they understand that the information they have provided above is current and complete to the best of their knowledge.    Patient Specific Functional Scale Modifier Scale Conversion: (patient's modifier that correlates with pt's score on PSFS): 7-CJ (30% Impaired).          Functional Activity No Difficulty Mild Difficulty Mod. 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    Previous Injury / Surgery:     Notes hx of lower back problems--I think it is about the same thing but now the nerve is pinched    Previous Treatment:    Pain Meds / Anti-inflammatory Meds    ?   Chiropractic    Diagnostics:       X-Ray      Technique: Pelvis 2 views, right hip 2 views     Findings: Pelvis: The pelvis is intact. The sacrum and sacroiliac joints appear normal. There is mild degenerative arthritic changes seen of both hips.     Right hip 2 views the articular spaces are normal height at the right hip. There are no fractures or destructive lesions. There is sclerosis of the roof of the  acetabulum with subchondral cystic change.         Impression: Mild degenerative changes of both hips. No acute pelvic or hip abnormalities     Electronically Signed By: Chito Alberto M.D. on 2/12/2017 11:34 AM  Current Medications:   ? Reviewed (see chart)    Drug Allergies:  ? Reviewed (see chart)  ?   Latex Allergy:    Significant PMHX:    Past Medical History      Diagnosis   Date     Arthralgia       Intermittent arthralgias      Blood per rectum       multiple colonoscopies scheduled and canceled by patient      Family history of heart murmur       Irritable bowel syndrome with constipation       Functioning bowel syndrome/constipation      Past Surgical History       Procedure   Laterality Date     Appendectomy        Coronary angiogram done by Aron Quick at [facility] in March 2006 shows patent coronary arteries       Cholecystectomy        Scan-angiogram   March 2006      Coronary angiogram done by Aron Quick at [facility] in shows patent coronary arteries         Patient Goal: to sleep at night and to be able to stretch my leg out. To get my leg a little  Stronger.    Other: no physician follow up scheduled at this time.    Objective    Items left blank indicate that the test was inappropriate or not meaningful at the time of evaluation and therefore not performed.    Fall Risk Screening:  Patient reported fear or concerns of falling  Any other observed patient behaviors in the clinic that would warrant additional screening   Will incorporate into later session.     Posture/Structural:   Hand Dominance:   _____Right    Head and Neck Position: forward   Shoulders/scapulae: forward   Thoracic spine:kyphotic   Scoliosis:    Lumbar lordosis: decreased side bent right   [place]:  PSIS:  Weight bearing:decreased right    Seated Posture (Compared to standing):  Kyphotic, decreased weight on right. Holds right foot in 35 degrees flexion    Gait: right LE externally rotated. Decreased right hip and knee motion,  "decreased right ankle dorsiflexion    Postural loading:  General Listening: right LS  Head:   decreased  Shoulders: decrease to right foot  Pelvis: decreased to right foot proximal        Range of Motion:   AROM Repetitive: Comments:   Flexion To reach knees  Dysrhythmia:  ____ Yes/No     Extension 10     Left lateral flexion wfl     Right lateral flexion decreased     Right rotation (seated)      Left rotation (seated)          Special tests:   Standing Tests:   Right  Left Comments:   Standing flexion x     Stork x     Hip Drop        Seated Tests:   Right Left Comments:   Seated flexion x     Tissue fullness      ELLA inferior/posterior x     Seated SLR -15 -5    Slump sit neg neg      Supine Tests:   Right Left Comments:   Pubes X inf mild     ASIS inf     Leg length long     Iliac Crest      Iliac Shear Test      Knees to Chest 85   groin pain right   SLR 15 30    Laseque 15 neg    ARIELLE x  Groin pain right   FADIR x   groin pain right         Prone Tests: \"my toes go numb when I lay on my stomach\"   Right Left Comments:   Leg length  long    PSIS  inf    Ischial tuberosity   level   Sacrotuberous ligament x     Sacral base post     ELLA Posterior/inferior x     S-I Spring test      Iliopsoas tightness x x      Positional Facet Diagnoses:   ERS Right ERS Left Neutral  SB R/Rot Left Neutral  SB L/Rot Right FRS Right FRS Left   T10-11         T11-12         T12-L1         L1-2     x    L2-3      x   L3-4         L4-5 x    x    L5-S1         (* ERS = extended/rotated/sidebent; FRS = flexed/rotated/sidebent)    LE strength 5=normal      L    R   L2 Hip Flexion 4 4-   L3 Knee Ext.      5 4+   L4 Ankle DF 5- 3+   L5 1st MTP Ext. 5 3+   S1 Ankle P.F. 4 5   S2 Knee Flexion 5 5     Sensation screen: intact on left LE. Decreased light touch and proprioception over dorsal and medial surface of great toe.    Palpation: Tender over L4. Tender over the sacrotuberous ligament, ELLA right as well as the right sciatic " nerve/piriformis    Not able to assess flexibility of the piriformis on the right due to groin pain.    Today's Intervention:    Completed evaluation and discussed findings  PSFS  Manual stretch to right hip, hamstrings  MFR to right sacrotuberous ligament  MET shotgun technique for pubes    Home Exercise Program:   Not initiated due to time constraints.    Assessment    Therapist Assessment / Clinical Impression: Efra Zuniga is referred to PT due to right sciatica which has been present for one month after shoveling snow. He is slowly getting better with primary complaint of pain at night which disrupts his sleep. Noted to have difficulty being on his feet and with lifting as well as bending his right hip and knee. He notes that he is catching his right foot when he walks. Concern with the amount of OTC medication he is utilizing. Clinically, he presents with right sided sciatica, lower back pain, weakness in right LE, limited ROM, postural dysfunction, decreased right LE sensation, facet and SI joint dysfunction. Concerns with medication use and right sided sensory deficits and weakness relayed to Dr. Antoine.    Clinical Impression:   Right Lumbar Radiculopathy:   Facet Dysfunction:   Piriformis Syndrome vs sciatica   Postural Dysfunction:   SI Joint dysfunction:   Myofascial:       Direct PT services are indicated to address the above noted impairments and to promote self management.      Functional Impairment(s): Decreased Activity Tolerance:  limited sleep, standing/walking, lifting     Prior Function:   Not Limited    Physical Impairment(s):       MUSCULOSKELETAL:  Deconditioned, Loss of Motion, Muscle Tightness, Pain, Postural Problems and Weakness    G codes and Modifier taken from patient completing the PSFS: 2/27/2017  Initial Primary G Code and Modifier:    Per the Patient's intake and/or assessment the Primary G Code is: Mobility .   The Patient's Impairment, Limitation or Restriction Modifier would  be best described as: CJ - 20% - 40% Impairment.   Goal Primary G Code and Modifier:    The Patient's G Code Goal would be: Mobility    The Patient's Impairment, Limitation or Restriction Modifier goal would be best described as: CI - 1% - 20% Impairment.       Goals:  Patient goal (time reference required): to sleep at night and to be able to stretch my leg out. To get my leg a little stronger. (8 weeks)    Long term goal: Patient is to self-manage symptoms and return to prior function in 8-12 weeks.      Functional goals:   Patient is to be independent in their Home Exercise Program in 4 weeks.  Patient is to tolerate walking with improved gait with reports of catching his foot only 1-2 times daily  in 8 weeks.  Patient is to have improved spine mobility to flexion to mid shin to allow for improved dressing and self-cares with minimal to no pain in 6 weeks.  Patient is report the ability to sleep 4-6 hours without awakening due to pain in 8-12 weeks.  Patient is to display and maintain normal joint mobility/symmetry in the lumbar spine and pelvis to allow improved standing in 8 weeks.  Patient is to demonstrate improved LE strength by 1/2 grade or better to assist in improving gait and decreasing fall risk in 8-12 weeks.      Patient participated in goal selection and understand(s) the plan of care: Yes   Patient Potential for Achieving Desired Outcome:  Good    Response to Intervention:  Good understanding of evaluation/plan of care       Plan    Treatment Plan / Targeted Outcomes:     Frequency:   16 visits     Duration of Treatment: 2 months    Planned Interventions:  May include any of the following:  Home Exercise Program development  Therapeutic Exercise (ROM & Strengthening)  Manual Therapy  Neuromuscular Re-education  Ultrasound  Electrical Stimulation  Therapeutic Activities  Gait Training  Posture and Body Mechanics Education    Plan for next visit:  Manual therapy, MET, exercise; awaiting further  direction from Dr. Antoine    Student or PTA has been instructed in and demonstrates skills necessary to carry out above stated treatment plan: Yes    Thank you for your referral to [facility].  Please call with any questions, concerns or comments.  [phone]    The signature, of the referring medical provider, on this document indicates certification of the above prescribed plan of care and is medically necessary.

## 2018-01-03 NOTE — NURSING NOTE
Patient Information     Patient Name MRN Sex Efra Farias [id number] Male 1955      Nursing Note by Nadege Bowling at 3/20/2017 12:45 PM     Author:  Nadege Bowling Service:  (none) Author Type:  NURS- Student Practical Nurse     Filed:  3/20/2017 12:49 PM Encounter Date:  3/20/2017 Status:  Signed     :  Nadege Bowling (NURS- Student Practical Nurse)            Patient presents with cough nonproductive, shortness of breath starting last week. Patient states he was seen in December for the same issues. Patient has tried OTC with no relief. Nadege Bowling LPN .............3/20/2017  12:37 PM

## 2018-01-04 NOTE — PATIENT INSTRUCTIONS
Patient Information     Patient Name MRN Sex Efra Farias [id number] Male 1955      Patient Instructions by Fish Chen MD at 2017  7:45 AM     Author:  Fish Chen MD  Service:  (none) Author Type:  Physician     Filed:  2017  9:43 AM  Encounter Date:  2017 Status:  Addendum     :  Fish Chen MD (Physician)        Related Notes: Original Note by Fish Chen MD (Physician) filed at 2017  9:42 AM             -- Schedule echocardiogram   -- Schedule stress test: lexiscan cardiolyte   -- Schedule Zio patch   -- Schedule breathing test   -- Complete azithromycin and prednisone   -- Follow-up in 2 weeks or so, after testing   -- Follow-up sooner if you are worse

## 2018-01-04 NOTE — NURSING NOTE
Patient Information     Patient Name MRN Sex Efra Farias [id number] Male 1955      Nursing Note by Jyoti Barrientos at 2017  7:45 AM     Author:  Jyoti Barrientos Service:  (none) Author Type:  (none)     Filed:  2017  8:07 AM Encounter Date:  2017 Status:  Signed     :  Jyoti Barrientos            Patient presents to clinic for ER F/U on 17 for reactive airway disease.  States the was driving home and he became very short of breath and was starting to pass out.  States that he thinks he had a stroke.  Is still have the shortness of breath and fatigue.  Jyoti Barrientos LPN ....................  2017   7:50 AM

## 2018-01-04 NOTE — PATIENT INSTRUCTIONS
Patient Information     Patient Name MRN Efra Nino [id number] Male 1955      Patient Instructions by Celia Hart PA-C at 2017  8:45 AM     Author:  Celia Hart PA-C Service:  (none) Author Type:  PHYS- Physician Assistant     Filed:  2017  9:31 AM Encounter Date:  2017 Status:  Signed     :  Celia Hart PA-C (PHYS- Physician Assistant)            Antibiotic has been sent to pharmacy. Please take full course of antibiotic even if symptoms have completely resolved. This helps prevent against antibiotic resistance.     Encouraged to use albuterol inhaler at least 3 times per day over the next week to calm down coughing and inflammation.     Patient prescribed antibiotics. Monitor for any fevers or chills. Return in 7-10 days if not feeling better. Please call clinic with any questions or concerns. Please take in a lot of fluids and get rest. Return with any change or worsening of symptoms.    May use symptomatic care with tylenol or ibuprofen. Sudafed or mucinex work well for congestion. May use cough syrup or cough drops.  Using a humidifier works well to break up the congestion. You can also sleep propped up on a couple pillows to decrease symptoms at night. A nettipot works well to decrease nasal congestion.    Use a Neti Pot/sinus flush (Sukh Med Sinus Rinse) 3 times daily to irrigate sinuses/mucosal tissue.     Sudafed or mucinex work well for congestion.   If you choose pseudoephedrine, use for only 5-7 days AS DIRECTED. Speak to your pharmacist if you have any concerns about your medications. May also use decongestant nasal spray, but only for 3 days MAXIMUM.    You will need to be evaluated if you start to experience:  Fever higher than 102.5 F (39.2 C)   Sudden and severe pain in the face and head   Trouble seeing or seeing double   Trouble thinking clearly   Swelling or redness around 1 or both eyes   Trouble breathing or a stiff neck    * If you are a  smoker, try to quit *    Call 9-1-1 or go to the emergency room if you:  Have trouble breathing   Are drooling because you cannot swallow your saliva   Have swelling of the neck or tongue   Cannot move your neck or have trouble opening your mouth

## 2018-01-04 NOTE — NURSING NOTE
Patient Information     Patient Name MRN Efra Nino [id number] Male 1955      Nursing Note by Na Pendleton at 2017  8:45 AM     Author:  Na Pendleton Service:  (none) Author Type:  (none)     Filed:  2017  8:55 AM Encounter Date:  2017 Status:  Signed     :  Na Pendleton            Patient presents to the clinic for cough, chest congestion and shortness of breath that he states he has had for a couple of days.  Na Pendleton LPN........................2017  8:45 AM

## 2018-01-04 NOTE — DISCHARGE SUMMARY
"Patient Information     Patient Name MRN Efra Nino [id number] Male 1955      Discharge Summaries by Shelia Negrete PT at 2017 10:48 AM     Author:  Shelia Negrete PT Service:  (none) Author Type:  PT- Physical Therapist     Filed:  2017 10:54 AM Date of Service:  2017 10:48 AM Status:  Signed     :  Shelia Negrete PT (PT- Physical Therapist)            [facility]  Outpatient PT - Discharge Summary    Date of discharge: 2017     Patient Name: Efra Zuniga   YOB: 1955   Referring MD/Provider: Luisa Antoine DO  Medical and Treatment Diagnosis: right-sided sciatica  PT Treatment Diagnosis: right sided sciatica, lower back pain, weakness in right LE, limited ROM, postural dysfunction, decreased right LE sensation, facet and SI joint dysfunction  Insurance: Medicare and BCBS  Start of Service: 17  Certification Dates: Start of Service: 17  Medicare/MA Re-Cert Due: 17    Dates of Service: 17    Thru:  3-14-17  Number of visits: 2           Reason for Discharge:  Patient was last seen on 3-14-17. He then cancelled a follow up on 17, noting he just had too many other things going on to come in to PT. Plan was to hold his chart open X 2-3 weeks in the event that he called to reschedule. Patient failed to schedule further appointments      Progress from Initial to Discharge (if applicable):  Unknown. At the time of his last visit, he was doing much better with pain only present in his lower back and hip if he coughed. He did have significant weakness in his leg. Left leg tightness was also noted.    Comments: refer to note from 3-14-17 for last assessment of pt status. Established Home Exercise Program as below:  Supine self stretch to hamstrings 2\" X 10  Supine SLR X 10  Supine TKE 3# X 10     Ankle theraband exercises: X 10 each  Dorsiflexion red  Inversion yellow  Eversion yellow      Standing calf raises with UE " support  Sit to stand-standard chair height      G Codes and Modifier taken from patient completing the FOTO assessment:   The Patient's G Code Goal would be Mobility     The Patient's Impairment, Limitation or Restriction Modifier would be best described as CI - 1% - 20% Impairment.     The Patient's status upon Discharge is unknown due to unplanned discharge.        Further Recommendations:      Patient will hopefully continue with established home exercise program       Patient is discharged from Physical Therapy    Thank you for your referral to [facility].  Please call with any questions, concerns or comments.  [phone]

## 2018-01-04 NOTE — TELEPHONE ENCOUNTER
"Patient Information     Patient Name MRN Efra Nino [id number] Male 1955      Telephone Encounter by Damaris Mejia RN at 2017  1:32 PM     Author:  Damaris Mejia RN Service:  (none) Author Type:  NURS- Registered Nurse     Filed:  2017  1:40 PM Encounter Date:  2017 Status:  Signed     :  Damaris Mejia RN (NURS- Registered Nurse)            Patient saw Celia Hart PA-C yesterday and was diagnosed with pneumonia. He was started on azithromycin and albuterol inhaler. He states that today he is having episodes where \"everything turns black, a loud noise blares in my ears, it feels like needles in my face, and the top of my head burns.\" This has happened several times, today only. Due to momentary loss of vision with these episodes, patient was advised to present to ED. He was advised not to drive himself and stated he already had someone on the way to him.    Reason for Disposition    Loss of vision or double vision (Exception: same as prior migraines)    Protocols used: ADULT HEADACHE-A-AH            "

## 2018-01-04 NOTE — NURSING NOTE
Patient Information     Patient Name MRN Sex Efra Farias [id number] Male 1955      Nursing Note by Na Pendleton at 2017  8:45 AM     Author:  Na Pendleton Service:  (none) Author Type:  (none)     Filed:  2017  9:26 AM Encounter Date:  2017 Status:  Signed     :  Na Pendleton            Albuterol 2.5mg/3ml ordered by Celia MOSQUEDA.  Medication administered per order in [facility]   Lot # [id number]  Exp. 2018  NDC 6701-8866-57  Patient tolerated well.  Na Pendleton LPN ..................2017  9:25 AM

## 2018-01-04 NOTE — PROGRESS NOTES
Patient Information     Patient Name MRN Sex Efra Farias [id number] Male 1955      Progress Notes by Shelia Negrete PT at 4/3/2017  1:10 PM     Author:  Shelia Negrete, PT Service:  (none) Author Type:  PT- Physical Therapist     Filed:  4/3/2017  1:12 PM Date of Service:  4/3/2017  1:10 PM Status:  Signed     :  Shelia Negrete PT (PT- Physical Therapist)            [facility]  Outpatient PT - Cancellation        Date : 4/3/2017     Patient Name: Efra Zuniga   YOB: 1955   Referring MD/Provider: Luisa Antoine DO  Medical and Treatment Diagnosis: right-sided sciatica  PT Treatment Diagnosis: right sided sciatica, lower back pain, weakness in right LE, limited ROM, postural dysfunction, decreased right LE sensation, facet and SI joint dysfunction  Insurance: Medicare and [facility]  Start of Service: 17  Certification Dates: Start of Service: 17 Medicare/[place] Re-Cert Due: 17          Patient called to cancel his PT appt for tomorrow, noting he has just too much going on right now. He did request that his chart be held open for several weeks in the event he calls to reschedule.    Plan: hold chart open X 2-3 weeks.

## 2018-01-04 NOTE — PROGRESS NOTES
"Patient Information     Patient Name MRN Efra Nino [id number] Male 1955      Progress Notes by Fish Chen MD at 2017  7:45 AM     Author:  Fish Chen MD Service:  (none) Author Type:  Physician     Filed:  2017  9:52 AM Encounter Date:  2017 Status:  Signed     :  Fish Chen MD (Physician)            Subjective  Efra Zuniga is a 62 y.o. male who presents for ER follow-up. He's been coughing off and on all winter. He was accompanying his wife to a doctor's appointment. He was sitting in the car when he \"kind of blacked out.\" His face felt kind of prickly. He worried if he was having a stroke. He was seen in [facility] and diagnosed with lower respiratory tract infection, started on azithromycin. He was not improving. He was seen in the emergency room and diagnosed with reactive airway disease, started on prednisone. First dose was taken this morning. Last angiogram 20 years ago. Last stress test many years ago. He does have a history of fall allergies with weeds, but does not think it's been an allergy all winter long. This is a significant change over the last week. About a week or 2 ago he was unloading a load of black dirt easily. He did not work with any mold the or rotten logs. No chest pains. Last week he did have a spell where his heart would start racing and his chin would feel funny. He's probably had 5 episodes like this over the last couple of weeks that lasts for about 10 minutes. They're associated with dyspnea but no nausea or diaphoresis. He's been sleeping in a recliner for a few weeks because he feels shortness of breath with lying flat. His exercise tolerance has significantly decreased. He has lower extremity edema. Standing all day. He thinks he snores but doesn't know if he stops breathing. He has a history of hypertension which has been controlled with atenolol-chlorthalidone complicated by hypokalemia on potassium " supplementation.    Review of Systems:  Constitutional: Very fatigued, over the last 1-2 weeks significant worsening  Eyes: Normal  Ears/nose/mouth/throat: Normal  Cardiology/vascular: See HPI  Respiratory: See HPI  Psychiatric: Normal  GI: Normal  : Normal  Musculoskeletal: Normal  Neurological: See HPI  Endocrine: History of diabetes mellitus, on no medications, does not check his sugars.  Hematological/lymphatic: Normal  Integumentary: Normal  Allergy/immunizations: Normal      Problem List/PMH: reviewed in EMR, and made relevant updates today.  Medications: reviewed in EMR, and made relevant updates today.  Allergies: reviewed in EMR, and made relevant updates today.    Social Hx:  Social History     Substance Use Topics       Smoking status: Never Smoker     Smokeless tobacco: Never Used     Alcohol use No     Social History Narrative    Works at  home  .              I reviewed social history and made relevant updates today.    Family Hx:   Family History       Problem   Relation Age of Onset     Alzheimer's disease  Mother      Cancer  Father       with a brain tumor       Heart Disease  Father      CABG x3       ALS  Sister      Heart Disease  Maternal Grandmother      Heart Disease  Maternal Grandfather      Heart Disease  Paternal Grandmother      Heart Disease  Paternal Grandfather        Objective  Vitals: reviewed in EMR.  /76  Pulse 56  Temp 97.6  F (36.4  C) (Tympanic)   Wt 125.2 kg (276 lb)  SpO2 95%  BMI 35.44 kg/m2    Gen: Pleasant male, NAD.  HEENT: MMM, no OP erythema.   Neck: Supple, no JVD, no bruits.  CV: RRR no m/r/g.   Pulm: Decreased breath sounds throughout with scattered expiratory squeaks and no rhonchi.  Neuro: Grossly intact  Msk: No lower extremity edema.  Skin: No concerning lesions.  Psychiatric: Normal affect and insight. Does not appear anxious or depressed.    Component      Latest Ref Rng & Units 2017   WHITE BLOOD COUNT              4.5 -  11.0 thou/cu mm 5.3   RED BLOOD COUNT                4.30 - 5.90 mil/cu mm 5.90   HEMOGLOBIN                     13.5 - 17.5 g/dL 17.6 (H)   HEMATOCRIT                     37.0 - 53.0 % 52.9   MCV                            80 - 100 fL 90   MCH                            26.0 - 34.0 pg 29.7   MCHC                           32.0 - 36.0 g/dL 33.2   RDW                            11.5 - 15.5 % 12.2   PLATELET COUNT                 140 - 440 thou/cu mm 180   MPV                            6.5 - 11.0 fL 8.5   NEUTROPHILS                    42.0 - 72.0 % 56.9   LYMPHOCYTES                    20.0 - 44.0 % 25.0   MONOCYTES                      <12.0 % 14.1 (H)   EOSINOPHILS                    <8.0 % 3.0   BASOPHILS                      <3.0 % 1.1   ABSOLUTE NEUTROPHILS           1.7 - 7.0 thou/cu mm 3.0   ABSOLUTE LYMPHOCYTES           0.9 - 2.9 thou/cu mm 1.3   ABSOLUTE MONOCYTES             <0.9 thou/cu mm 0.8   ABSOLUTE EOSINOPHILS           <0.5 thou/cu mm 0.2   ABSOLUTE BASOPHILS             <0.3 thou/cu mm 0.1   SODIUM      133 - 143 mmol/L 137   POTASSIUM      3.5 - 5.1 mmol/L 3.1 (L)   CHLORIDE      98 - 107 mmol/L 93 (L)   CO2,TOTAL      21 - 31 mmol/L 31   ANION GAP      5 - 18                 13   GLUCOSE      70 - 105 mg/dL 161 (H)   CALCIUM      8.6 - 10.3 mg/dL 9.4   BUN      7 - 25 mg/dL 17   CREATININE      0.70 - 1.30 mg/dL 0.83   BUN/CREAT RATIO                 20   GFR if African American      >60 ml/min/1.73m2 >60   GFR if not African American      >60 ml/min/1.73m2 >60   INFLUENZA  A  PCR       Negative   INFLUENZA B PCR       Negative   MAGNESIUM      1.9 - 2.7 mg/dL 1.7 (L)       Results for orders placed or performed in visit on 04/27/17      BNP      Result  Value Ref Range    BRAIN TALON PEPTIDE GIH 25 <100 pg/mL   TSH      Result  Value Ref Range    TSH 2.60 0.34 - 5.60 uIU/mL   Hgb A1c      Result  Value Ref Range    HEMOGLOBIN A1C MONITORING (POCT) 7.2 (H) 4.0 - 6.2 %    ESTIMATED AVERAGE GLUCOSE   160 mg/dL   LIPID PANEL      Result  Value Ref Range    CHOLESTEROL,TOTAL 168 <200 mg/dL    TRIGLYCERIDES 117 <150 mg/dL    HDL CHOLESTEROL 36 23 - 92 mg/dL    NON-HDL CHOLESTEROL 132 <145 mg/dl    CHOL/HDL RATIO            4.67 (H) <4.50                    LDL CHOLESTEROL 109 (H) <100 mg/dL    PATIENT STATUS            NON-FASTING                     Result Notes   Notes Recorded by Lukas Kramer MD on 4/26/2017 at 10:11 PM  Normal sinus rhythm.  Rate 60 BPM.  Borderline left atrial enlargement.  Otherwise normal EKG.  Compared to previous tracing 4/3/2015, bradycardia has resolved.  First-degree AV block no longer noted.  Lukas Kramer MD     EKG study not available for my review today.    Results   XR CHEST 2 VIEWS PA AND LATERAL (Accession [id number]) (Order [id number])      Original Order Diagnosis: Cough [786.2]       PACS Images   Show images for XR CHEST 2 VIEWS PA AND LATERAL   Results         PROCEDURE:  XR CHEST 2 VIEWS PA AND LATERAL     HISTORY: Cough.     COMPARISON:  03/30/2017     FINDINGS:  The cardiomediastinal contours are stable.  The trachea is midline.  No focal consolidation, effusion or pneumothorax.    No suspicious osseous lesion or subdiaphragmatic free air.     IMPRESSION:       No new consolidation.       Electronically Signed By: Bong Ramesh on 4/24/2017 10:52 AM     chest x-ray was personally reviewed today, heart size appears upper limit of normal.    Assessment    ICD-10-CM    1. Orthopnea R06.01 BNP      TSH      LYME SCREEN W/REFLEX      ECHO COMPLETE W CONTRAST      ZIO PATCH-GICH      NM CARDIAC MPI STRESS TEST      STRESS TEST PHARMACOLOGICAL      BNP      TSH      LYME SCREEN W/REFLEX      COMPLETE PFT SPIROMETRY LUNG VOL DIFFUSION   2. RODRIGUEZ (dyspnea on exertion) R06.09 BNP      TSH      LYME SCREEN W/REFLEX      ECHO COMPLETE W CONTRAST      ZIO PATCH-GICH      NM CARDIAC MPI STRESS TEST      STRESS TEST PHARMACOLOGICAL      BNP      TSH      LYME SCREEN W/REFLEX       COMPLETE PFT SPIROMETRY LUNG VOL DIFFUSION   3. Heart palpitations R00.2 BNP      TSH      LYME SCREEN W/REFLEX      ECHO COMPLETE W CONTRAST      ZIO PATCH-GICH      NM CARDIAC MPI STRESS TEST      STRESS TEST PHARMACOLOGICAL      BNP      TSH      LYME SCREEN W/REFLEX      COMPLETE PFT SPIROMETRY LUNG VOL DIFFUSION   4. Hypomagnesemia E83.42    5. Controlled type 2 diabetes mellitus with complication, without long-term current use of insulin (HC) E11.8 Hgb A1c      LIPID PANEL      Hgb A1c      LIPID PANEL   6. Essential hypertension I10        Mr. Zuniga is a 62-year-old gentleman with a history of diabetes mellitus type 2, hypertension, morbid obesity who presents with acute on chronic shortness of breath. Differential diagnosis includes systolic and/or diastolic heart failure, atherosclerotic cardiovascular disease with angina, valvular disease, paroxysmal atrial fibrillation with rapid ventricular response, Lyme or viral myocarditis, asthma, interstitial lung disease, blastomycosis, acid reflux with aspiration, dysphagia with aspiration, adverse medication reaction, others. Based on his history and exam I was most suspicious of heart failure however BNP is totally normal today. We discussed options and decided to not make any medication changes today rather obtain further diagnostic workup including echocardiogram, stress testing, event monitoring.    Plan   -- Expected clinical course discussed   -- Medications and their side effects discussed  Patient Instructions    -- Schedule echocardiogram   -- Schedule stress test: lexiscan cardiolyte   -- Schedule Zio patch   -- Schedule breathing test   -- Complete azithromycin and prednisone   -- Follow-up in 2 weeks or so, after testing   -- Follow-up sooner if you are worse    Return in about 2 weeks (around 5/11/2017) for Discuss Results.    Signed, Fish Chen MD  Internal Medicine & Pediatrics

## 2018-01-04 NOTE — PROGRESS NOTES
Patient Information     Patient Name MRN Sex     Efra Zuniga [id number] Male 1955      Progress Notes by Celia Hart PA-C at 2017  8:45 AM     Author:  Celia Hart PA-C Service:  (none) Author Type:  PHYS- Physician Assistant     Filed:  2017 10:11 AM Encounter Date:  2017 Status:  Signed     :  Celia Hart PA-C (PHYS- Physician Assistant)            Nursing Notes:   Na Pendleton  2017  8:55 AM  Signed  Patient presents to the clinic for cough, chest congestion and shortness of breath that he states he has had for a couple of days.  Na Pendleton LPN........................2017  8:45 AM      Na Pendleton  2017  9:26 AM  Signed  Albuterol 2.5mg/3ml ordered by Celia STRATTON  Medication administered per order in [facility]   Lot # [id number]  Exp. 2018  NDC 5492-2510-42  Patient tolerated well.  aN Pendleton LPN ..................2017  9:25 AM      HPI:    Efra Zuniga is a 62 y.o. male who presents for cough, chest congestion and shortness of breath that he states he has had for a couple of days. 2-3 days. Hard to breath. Wheezing.  Get winded.  Hx pneumonia.  NO fevers, GI symptoms. Right ear pain.  No sinus pain. Hx runny nose.  ST previously.  PND.  Rattling in chest.      Past Medical History:     Diagnosis  Date     Arthralgia     Intermittent arthralgias      Blood per rectum     multiple colonoscopies scheduled and canceled by patient      Family history of heart murmur      Irritable bowel syndrome with constipation     Functioning bowel syndrome/constipation        Past Surgical History:      Procedure  Laterality Date     APPENDECTOMY      Coronary angiogram done by Aron Quick at [facility] in 2006 shows patent coronary arteries       CHOLECYSTECTOMY       SCAN-ANGIOGRAM  2006     Coronary angiogram done by Aron Quick at [facility] in shows patent coronary arteries         Social History     Substance Use Topics       Smoking  status: Never Smoker     Smokeless tobacco: Never Used     Alcohol use No       Current Outpatient Prescriptions       Medication  Sig Dispense Refill     acetaminophen (TYLENOL EXTRA STRGTH) 500 mg tablet Take 1,000 mg by mouth every 6 hours if needed. Max acetaminophen dose: 4000mg in 24 hrs.       albuterol HFA 90 mcg/actuation inhaler Inhale 2 Puffs by mouth every 4 hours if needed. 1 Inhaler 0     aspirin (ECOTRIN) 81 mg enteric coated tablet Take 1 tablet by mouth once daily with a meal.  0     atenolol-chlorthalidone, 100-25 mg, (TENORETIC 100) 100-25 mg tablet Take 1 tablet by mouth once daily. 90 tablet 3     potassium chloride (KLOR-CON M20) 20 mEq Extended-Release tablet Take 1 tablet by mouth once daily with a meal. 90 tablet 3     No current facility-administered medications for this visit.      Medications have been reviewed by me and are current to the best of my knowledge and ability.      No Known Allergies    REVIEW OF SYSTEMS:  Refer to HPI.    EXAM:   Vitals:    /68  Pulse 80  Temp 96.8  F (36  C) (Tympanic)  Wt 124.5 kg (274 lb 6.4 oz)  SpO2 90%  BMI 36.17 kg/m2    General Appearance: Pleasant, alert, appropriate appearance for age. No acute distress  Ear Exam: Normal TM's bilaterally, grey, translucent, bony landmarks appreciated.   Left/Right TM: Effusion is not present. TM is not bulging. There is no pus appreciated.    Normal auditory canals and external ears. Non-tender.   Nose Exam: Normal external nose, mucus membranes, and septum.  OroPharynx Exam:  Erythematous posterior pharynx with no exudates. No sinus pain upon palpation of frontal, ethmoid, and maxillary sinuses.  Chest/Respiratory Exam: Normal chest wall and respirations. Wheezing appreciated on posterior lobes bilaterally. No crackling appreciated. No retractions appreciated.  Cardiovascular Exam: Regular rate and rhythm. S1, S2, no murmur, click, gallop, or rubs.  Lymphatic Exam: ACLN.  Skin: no rash or  abnormalities  Psychiatric Exam: Alert and oriented - appropriate affect.    PHQ Depression Screen  Date of PHQ exam: 04/24/17  Over the last 2 weeks, how often have you been bothered by any of the following problems?  1. Little interest or pleasure in doing things: 0 - Not at all  2. Feeling down, depressed, or hopeless: 0 - Not at all       LABS:    No results found for this visit on 04/24/17.    ASSESSMENT AND PLAN:      ICD-10-CM    1. Lower resp. tract infection J22 azithromycin (ZITHROMAX) 250 mg tablet      albuterol HFA 90 mcg/actuation inhaler   2. Cough R05 albuterol (PROVENTIL) 0.083 % neb solution      XR CHEST 2 VIEWS PA AND LATERAL      azithromycin (ZITHROMAX) 250 mg tablet      albuterol HFA 90 mcg/actuation inhaler       Completed chest xray.  I personally reviewed the xray. I found no concerns appreciated upon initial read of xray.  Final read pending by radiology.    Gave albuterol neb in clinic. Oxygen improved from 88 to 90%.     Started on azithromycin and albuterol inhaler for lower respiratory tract infection.   Gave warning signs/symptoms.   Return to clinic with change/worsening of symptoms.     Patient Instructions   Antibiotic has been sent to pharmacy. Please take full course of antibiotic even if symptoms have completely resolved. This helps prevent against antibiotic resistance.     Encouraged to use albuterol inhaler at least 3 times per day over the next week to calm down coughing and inflammation.     Patient prescribed antibiotics. Monitor for any fevers or chills. Return in 7-10 days if not feeling better. Please call clinic with any questions or concerns. Please take in a lot of fluids and get rest. Return with any change or worsening of symptoms.    May use symptomatic care with tylenol or ibuprofen. Sudafed or mucinex work well for congestion. May use cough syrup or cough drops.  Using a humidifier works well to break up the congestion. You can also sleep propped up on a couple  pillows to decrease symptoms at night. A nettipot works well to decrease nasal congestion.    Use a Neti Pot/sinus flush (Sukh Med Sinus Rinse) 3 times daily to irrigate sinuses/mucosal tissue.     Sudafed or mucinex work well for congestion.   If you choose pseudoephedrine, use for only 5-7 days AS DIRECTED. Speak to your pharmacist if you have any concerns about your medications. May also use decongestant nasal spray, but only for 3 days MAXIMUM.    You will need to be evaluated if you start to experience:  Fever higher than 102.5 F (39.2 C)   Sudden and severe pain in the face and head   Trouble seeing or seeing double   Trouble thinking clearly   Swelling or redness around 1 or both eyes   Trouble breathing or a stiff neck    * If you are a smoker, try to quit *    Call 9-1-1 or go to the emergency room if you:  Have trouble breathing   Are drooling because you cannot swallow your saliva   Have swelling of the neck or tongue   Cannot move your neck or have trouble opening your mouth        Celia Hart PA-C..................4/24/2017 8:56 AM

## 2018-01-04 NOTE — PROGRESS NOTES
Patient Information     Patient Name MRN Sex     Efra Zuniga [id number] Male 1955      Progress Notes by Hazel James NP at 3/20/2017 12:45 PM     Author:  Hazel James NP Service:  (none) Author Type:  PHYS- Nurse Practitioner     Filed:  3/20/2017  3:17 PM Encounter Date:  3/20/2017 Status:  Signed     :  Hazel James NP (PHYS- Nurse Practitioner)            HPI:    Efra Zuniga is a 62 y.o. male who presents to clinic today for cough.  Cough x 4 days.  Coughed up some phlegm yesterday, non productive today.  No chest tightness.  No chest heaviness.  Shortness of breath with activity.  No fevers.  Chills initially.  Runny and stuffy nose initially, resolved.  Sore throat initially, resolved.  No headaches.  No body aches.  Appetite normal.  Energy decreased, feeling fatigued.  Non smoker.  No flu shot.            Past Medical History      Diagnosis   Date     Arthralgia       Intermittent arthralgias      Blood per rectum       multiple colonoscopies scheduled and canceled by patient      Family history of heart murmur       Irritable bowel syndrome with constipation       Functioning bowel syndrome/constipation      Past Surgical History       Procedure   Laterality Date     Appendectomy        Coronary angiogram done by Aron Quick at [facility] in 2006 shows patent coronary arteries       Cholecystectomy        Scan-angiogram   2006      Coronary angiogram done by Aron Quick at [facility] in shows patent coronary arteries       Social History     Substance Use Topics       Smoking status: Never Smoker     Smokeless tobacco: Never Used     Alcohol use No     Current Outpatient Prescriptions       Medication  Sig Dispense Refill     acetaminophen (TYLENOL EXTRA STRGTH) 500 mg tablet Take 1,000 mg by mouth every 6 hours if needed. Max acetaminophen dose: 4000mg in 24 hrs.       aspirin (ECOTRIN) 81 mg enteric coated tablet Take 1 tablet by mouth once daily with a meal.  0      "atenolol-chlorthalidone, 100-25 mg, (TENORETIC 100) 100-25 mg tablet Take 1 tablet by mouth once daily. 90 tablet 3     potassium chloride (KLOR-CON M20) 20 mEq Extended-Release tablet Take 1 tablet by mouth once daily with a meal. 90 tablet 3     No current facility-administered medications for this visit.      Medications have been reviewed by me and are current to the best of my knowledge and ability.    No Known Allergies    ROS:  Refer to HPI    Visit Vitals       /80     Pulse 61     Temp 97.2  F (36.2  C) (Tympanic)     Ht 1.855 m (6' 1.03\")     Wt 126.6 kg (279 lb 3.2 oz)     SpO2 91%  Comment: RA     BMI 36.8 kg/m2       EXAM:  General Appearance: Well appearing adult male, appropriate appearance for age. No acute distress  Eyes: conjunctivae normal, no drainage  Orophayrnx: moist mucous membranes, posterior pharynx without erythema, tonsils without hypertrophy, no erythema, no exudates or petechiae, no post nasal drip seen.    Respiratory: normal chest wall and respirations.  Normal effort.  Diffuse wheezes and rattles through out to auscultation bilaterally, occasional congested cough appreciated, oxygen saturation 91%  Cardiac: RRR with no murmurs  Psychological: normal affect, alert and pleasant        PROCEDURE:  XR CHEST 2 VIEWS PA AND LATERAL  HISTORY: Shortness of breath.  COMPARISON:  10/16/2015  FINDINGS:  The cardiomediastinal contours are stable.  The trachea is midline.  No focal consolidation, effusion or pneumothorax.    No suspicious osseous lesion or subdiaphragmatic free air.  IMPRESSION:    Cardiomegaly. No new focal consolidation.    Electronically Signed By: Bong Ramesh on 3/20/2017 1:22 PM        ASSESSMENT/PLAN:    ICD-10-CM    1. Shortness of breath R06.02 NV PULSE OXIMETRY SINGLE DETERMINATION      XR CHEST 2 VIEWS PA AND LATERAL      albuterol HFA 90 mcg/actuation inhaler   2. Wheezing on auscultation R06.2 XR CHEST 2 VIEWS PA AND LATERAL      albuterol HFA 90 mcg/actuation " inhaler   3. Acute bronchitis, unspecified organism J20.9 azithromycin (ZITHROMAX Z-SANTOSH) 250 mg tablet           CXR completed and reviewed, slight lower haziness, radiologist over read normal  Azithromycin daily x 5 days   Albuterol inhaler 2 puffs Q 4 hours PRN.  No prednisone use as he has been on it twice recently and has hx of elevated BGTs.    Encouraged fluids  Symptomatic treatment - salt water gargles, honey, elevation, humidifier, sinus rinse/netti pot, lozenges, etc   Tylenol or ibuprofen PRN  Follow up if symptoms persist or worsen or concerns        Patient Instructions   Azithromycin daily x 5 days     Albuterol inhaler 2 puffs every 4 hours as needed for shortness of breath, wheezing    Follow up with primary provider for physical and if symptoms persist/worsen

## 2018-01-16 PROBLEM — F34.1 DYSTHYMIC DISORDER: Status: ACTIVE | Noted: 2018-01-16

## 2018-01-16 PROBLEM — J41.0 SIMPLE CHRONIC BRONCHITIS (H): Status: ACTIVE | Noted: 2017-09-15

## 2018-01-16 PROBLEM — Z77.090 ASBESTOS EXPOSURE: Status: ACTIVE | Noted: 2017-09-15

## 2018-01-16 PROBLEM — Z78.9 NONSMOKER: Status: ACTIVE | Noted: 2017-09-15

## 2018-01-16 PROBLEM — Z87.898 HISTORY OF DISEASE: Status: ACTIVE | Noted: 2018-01-16

## 2018-01-16 RX ORDER — FLUTICASONE PROPIONATE 110 UG/1
1 AEROSOL, METERED RESPIRATORY (INHALATION)
Status: ON HOLD | COMMUNITY
Start: 2017-08-14 | End: 2024-01-07

## 2018-01-16 RX ORDER — INSULIN PUMP SYRINGE, 3 ML
EACH MISCELLANEOUS
Status: ON HOLD | COMMUNITY
Start: 2017-08-14 | End: 2024-01-07

## 2018-01-16 RX ORDER — SPIRONOLACTONE 25 MG/1
12.5-25 TABLET ORAL
Status: ON HOLD | COMMUNITY
Start: 2017-09-15 | End: 2024-01-07

## 2018-01-16 RX ORDER — ACETAMINOPHEN 500 MG
1000 TABLET ORAL
Status: ON HOLD | COMMUNITY
End: 2024-01-07

## 2018-01-16 RX ORDER — ASPIRIN 81 MG/1
81 TABLET ORAL
Status: ON HOLD | COMMUNITY
Start: 2015-10-16 | End: 2024-01-07

## 2018-01-16 RX ORDER — ATENOLOL AND CHLORTHALIDONE TABLET 100; 25 MG/1; MG/1
1 TABLET ORAL
COMMUNITY
Start: 2017-08-14 | End: 2019-01-02

## 2018-01-16 RX ORDER — POTASSIUM CHLORIDE 1500 MG/1
20 TABLET, EXTENDED RELEASE ORAL
COMMUNITY
Start: 2017-08-14 | End: 2019-01-02

## 2018-01-16 RX ORDER — ALBUTEROL SULFATE 90 UG/1
2 AEROSOL, METERED RESPIRATORY (INHALATION)
Status: ON HOLD | COMMUNITY
Start: 2017-07-29 | End: 2024-01-07

## 2018-01-16 RX ORDER — MAGNESIUM OXIDE 400 MG/1
400 TABLET ORAL
COMMUNITY
Start: 2017-04-25 | End: 2019-01-02

## 2018-01-16 RX ORDER — ALBUTEROL SULFATE 0.83 MG/ML
2.5 SOLUTION RESPIRATORY (INHALATION)
Status: ON HOLD | COMMUNITY
Start: 2017-04-25 | End: 2024-01-07

## 2018-01-26 ENCOUNTER — DOCUMENTATION ONLY (OUTPATIENT)
Dept: FAMILY MEDICINE | Facility: OTHER | Age: 63
End: 2018-01-26

## 2018-01-27 VITALS
TEMPERATURE: 96.8 F | HEIGHT: 73 IN | WEIGHT: 279.2 LBS | HEART RATE: 61 BPM | TEMPERATURE: 97.6 F | BODY MASS INDEX: 35.44 KG/M2 | TEMPERATURE: 97.2 F | BODY MASS INDEX: 37 KG/M2 | BODY MASS INDEX: 35.23 KG/M2 | DIASTOLIC BLOOD PRESSURE: 80 MMHG | HEART RATE: 56 BPM | OXYGEN SATURATION: 90 % | DIASTOLIC BLOOD PRESSURE: 68 MMHG | WEIGHT: 276 LBS | SYSTOLIC BLOOD PRESSURE: 140 MMHG | DIASTOLIC BLOOD PRESSURE: 76 MMHG | HEART RATE: 80 BPM | OXYGEN SATURATION: 91 % | SYSTOLIC BLOOD PRESSURE: 142 MMHG | OXYGEN SATURATION: 95 % | WEIGHT: 274.4 LBS | SYSTOLIC BLOOD PRESSURE: 130 MMHG

## 2018-01-27 VITALS
TEMPERATURE: 97.7 F | DIASTOLIC BLOOD PRESSURE: 72 MMHG | BODY MASS INDEX: 34.75 KG/M2 | SYSTOLIC BLOOD PRESSURE: 132 MMHG | HEART RATE: 60 BPM | HEIGHT: 74 IN | WEIGHT: 270.8 LBS | OXYGEN SATURATION: 96 %

## 2018-01-27 VITALS
RESPIRATION RATE: 20 BRPM | BODY MASS INDEX: 35.74 KG/M2 | HEIGHT: 74 IN | HEART RATE: 64 BPM | WEIGHT: 278.5 LBS | TEMPERATURE: 97 F | DIASTOLIC BLOOD PRESSURE: 70 MMHG | SYSTOLIC BLOOD PRESSURE: 138 MMHG

## 2018-01-27 VITALS
HEART RATE: 56 BPM | OXYGEN SATURATION: 94 % | SYSTOLIC BLOOD PRESSURE: 142 MMHG | RESPIRATION RATE: 20 BRPM | DIASTOLIC BLOOD PRESSURE: 88 MMHG | HEIGHT: 74 IN | BODY MASS INDEX: 35.19 KG/M2 | TEMPERATURE: 97.3 F | WEIGHT: 274.2 LBS

## 2018-01-27 VITALS
WEIGHT: 284 LBS | HEART RATE: 72 BPM | WEIGHT: 269 LBS | DIASTOLIC BLOOD PRESSURE: 82 MMHG | SYSTOLIC BLOOD PRESSURE: 156 MMHG | TEMPERATURE: 97.9 F | SYSTOLIC BLOOD PRESSURE: 138 MMHG | TEMPERATURE: 97.7 F | DIASTOLIC BLOOD PRESSURE: 84 MMHG

## 2018-02-02 ASSESSMENT — PATIENT HEALTH QUESTIONNAIRE - PHQ9: SUM OF ALL RESPONSES TO PHQ QUESTIONS 1-9: 0

## 2018-02-02 ASSESSMENT — ANXIETY QUESTIONNAIRES: GAD7 TOTAL SCORE: 0

## 2018-02-06 ENCOUNTER — COMMUNICATION - GICH (OUTPATIENT)
Dept: FAMILY MEDICINE | Facility: OTHER | Age: 63
End: 2018-02-06

## 2018-02-06 DIAGNOSIS — E11.9 TYPE 2 DIABETES MELLITUS WITHOUT COMPLICATIONS (H): ICD-10-CM

## 2018-02-13 NOTE — TELEPHONE ENCOUNTER
Patient Information     Patient Name MRN Sex Efra Farias [id number] Male 1955      Telephone Encounter by Nalini Hernandez RN at 2018  1:15 PM     Author:  Nalini Hernandez RN Service:  (none) Author Type:  NURS- Registered Nurse     Filed:  2018  1:31 PM Encounter Date:  2018 Status:  Signed     :  Nalini Hernandez RN (NURS- Registered Nurse)            Biguanides    Office visit in the past 12 months or per provider note.    Last visit with DEVIN OLIVO was on: 2017 in [facility]  Saw Lukas Kramer MD on 9-15-17 for breathing problem. Metformin was reviewed and changed. Letter sent to patient to remind that provider wanted follow up on the breathing concerns and hypertension.     Lab test requirements:  HgbA1c annually or per provider note.  HEMOGLOBIN A1C MONITORING (POCT) (%)    Date Value   2017 7.1 (H)     Max refill for 12 months from last office visit or per provider note.    If taking for polycystic ovary disease, may refill for 12 months.  Prescription refilled per RN Medication Refill Policy.................... Nalini Hernandez RN ....................  2018   1:16 PM

## 2018-07-24 NOTE — PROGRESS NOTES
Patient Information     Patient Name  Efra Zuniga MRN  [id number] Sex  Male   1955      Letter by Fish Chen MD at      Author:  Fish Chen MD Service:  (none) Author Type:  (none)    Filed:   Encounter Date:  2017 Status:  (Other)           Efra Zuniga  [address]  [facility] [address]          2017    Dear Mr. Zuniga:    This is to remind you that you are due for your follow up office visit with Dr. Fish Chen. Your last visit was on 2017.     Additional refills of your medication require you to complete this visit.    Please call [phone] to schedule your appointment.    Thank you for choosing [facility] for your health care needs.    Sincerely,      Refill RN  [facility]  2

## 2018-07-24 NOTE — PROGRESS NOTES
Patient Information     Patient Name  Efra Zuniga MRN  [id number] Sex  Male   1955      Letter by Dominic Holguin MD at      Author:  Dominic Holguin MD Service:  (none) Author Type:  (none)    Filed:   Encounter Date:  2017 Status:  (Other)         Efra Zuniga  [address]  [place] [address]    2017      Dear Mr. Zuniga,    Your lab results are listed below and your A1c remains elevated. I think it is very important that he start taking the metformin and start checking her blood sugars once daily. I would like to see you back in no more than 3 months to see how your diabetes is doing. Additionally, I think you should start taking Lipitor 10 mg. Please let me know if you are in agreement and I will fax it off otherwise we can discuss it further at her next visit.    I apologize for the confusion with the chest x-ray. I did review it and it looks normal. I did send off a burst of prednisone for you but would hold off on antibiotics unless he develops fevers, chills or worsening symptoms. If that is the case please return to the [facility] for a walk in appointment or call and leave a phone message for me.  Please continue on your current medications.    Contact us with any questions.    I'm sending along your actual numbers so that you will have them for your general interest and your records.       Take Care,   Dominic Holguin MD      Resulted Orders      Hgb A1c (Collected: 2017  3:39 PM)      Result  Value Ref Range    HEMOGLOBIN A1C MONITORING (POCT) 7.1 (H) 4.0 - 6.2 %    ESTIMATED AVERAGE GLUCOSE  157 mg/dL    Narrative           (<=6.9%)           Indicates good control       (7.0% to 7.9%)     Indicates fair control       (>=8.0%)           Indicates poor control       NOTE:  These thresholds are guidelines and            individual targets may vary.       BASIC METABOLIC PANEL (Collected: 2017  3:39 PM)      Result  Value Ref Range    SODIUM 138 133 - 143 mmol/L     POTASSIUM 3.4 (L) 3.5 - 5.1 mmol/L    CHLORIDE 96 (L) 98 - 107 mmol/L    CO2,TOTAL 31 21 - 31 mmol/L    ANION GAP 11 5 - 18                    GLUCOSE 196 (H) 70 - 105 mg/dL    CALCIUM 9.8 8.6 - 10.3 mg/dL    BUN 16 7 - 25 mg/dL    CREATININE 0.96 0.70 - 1.30 mg/dL    BUN/CREAT RATIO           17                    GFR if African American >60 >60 ml/min/1.73m2    GFR if not African American >60 >60 ml/min/1.73m2

## 2018-07-24 NOTE — PROGRESS NOTES
Patient Information     Patient Name  Efra Zuniga MRN  [id number] Sex  Male   1955      Letter by Dominic Holguin MD at      Author:  Dominic Holguin MD Service:  (none) Author Type:  (none)    Filed:   Encounter Date:  2018 Status:  (Other)           Efra Zuniga  [address]  [facility] [address]          2018        Dear Mr. Zuniga:      This letter is to remind you that you are due for your follow-up visit with Luksa Kramer MD . At your last comprehensive visit on 9- for breathing problems with Dr. Kramer, he requested that you follow up in one-month to recheck blood pressure and effectiveness of change in medications.     A refill of Metformin was sent into your pharmacy.  Additional refills of medications will require a follow-up appointment with Lukas Kramer MD. Please call the clinic at [phone] to schedule your appointment.    Thank you for choosing [facility] for your health care needs.    Sincerely,         The Refill Nurse  [facility]

## 2018-07-24 NOTE — PROGRESS NOTES
Patient Information     Patient Name  Efra Zuniga MRN  [id number] Sex  Male   1955      Letter by Dominic Holguin MD at      Author:  Dominic Holguin MD Service:  (none) Author Type:  (none)    Filed:   Date of Service:   Status:  (Other)         Efra Zuniga  [address]  [facility] [address]    2017      Dear Mr. Zuniga,      The internist reviewed your recent images and you do have significant COPD.  It would be a good idea to have you come back in the clinic to discuss medications to treat this.  There are a few options and we can discuss pro's and con's (which is mainly cost) of each.    I'm sending along the actual report so that you will have it for your general interest and  records.       Take Care,   Dominic Holguin MD

## 2018-07-24 NOTE — PROGRESS NOTES
Patient Information     Patient Name  Efra Zuniga MRN  [id number] Sex  Male   1955      Letter by Celia Hart PA-C at      Author:  Celia Hart PA-C Service:  (none) Author Type:  (none)    Filed:   Date of Service:   Status:  (Other)         Efra Zuniga  [address]  [place] [address]    2017      Dear Mr. Zuniga,      We've received the results back from the imaging.  Your results are normal. Please contact us at [phone] with any questions or concerns that you have.    I attached your results for your records.        Take Care,         Celia Hart PA-C      Resulted Orders    XR CHEST 2 VIEWS PA AND LATERAL (Exam End: 2017  9:08 AM)     Narrative    PROCEDURE:  XR CHEST 2 VIEWS PA AND LATERAL    HISTORY: Cough.    COMPARISON:  2017    FINDINGS:  The cardiomediastinal contours are stable.  The trachea is midline.  No focal consolidation, effusion or pneumothorax.    No suspicious osseous lesion or subdiaphragmatic free air.    IMPRESSION:      No new consolidation.      Electronically Signed By: Bong Ramesh on 2017 10:52 AM

## 2019-01-02 ENCOUNTER — OFFICE VISIT (OUTPATIENT)
Dept: FAMILY MEDICINE | Facility: OTHER | Age: 64
End: 2019-01-02
Attending: NURSE PRACTITIONER
Payer: COMMERCIAL

## 2019-01-02 VITALS
SYSTOLIC BLOOD PRESSURE: 132 MMHG | WEIGHT: 286.8 LBS | HEART RATE: 73 BPM | DIASTOLIC BLOOD PRESSURE: 70 MMHG | BODY MASS INDEX: 38.85 KG/M2 | OXYGEN SATURATION: 93 % | HEIGHT: 72 IN | TEMPERATURE: 97.6 F

## 2019-01-02 DIAGNOSIS — J44.9 MODERATE COPD (CHRONIC OBSTRUCTIVE PULMONARY DISEASE) (H): Primary | ICD-10-CM

## 2019-01-02 DIAGNOSIS — J22 LOWER RESPIRATORY INFECTION (E.G., BRONCHITIS, PNEUMONIA, PNEUMONITIS, PULMONITIS): ICD-10-CM

## 2019-01-02 PROCEDURE — 99214 OFFICE O/P EST MOD 30 MIN: CPT | Performed by: NURSE PRACTITIONER

## 2019-01-02 PROCEDURE — G0463 HOSPITAL OUTPT CLINIC VISIT: HCPCS

## 2019-01-02 RX ORDER — PREDNISONE 20 MG/1
TABLET ORAL
Qty: 20 TABLET | Refills: 0 | Status: SHIPPED | OUTPATIENT
Start: 2019-01-02 | End: 2019-01-14

## 2019-01-02 RX ORDER — DOXYCYCLINE 100 MG/1
100 CAPSULE ORAL 2 TIMES DAILY
Qty: 14 CAPSULE | Refills: 0 | Status: SHIPPED | OUTPATIENT
Start: 2019-01-02 | End: 2019-01-09

## 2019-01-02 ASSESSMENT — PAIN SCALES - GENERAL: PAINLEVEL: NO PAIN (0)

## 2019-01-02 ASSESSMENT — MIFFLIN-ST. JEOR: SCORE: 2135.92

## 2019-01-02 NOTE — PROGRESS NOTES
Nursing Notes:   Nadege Bowling LPN  1/2/2019  5:28 PM  Sign at exiting of workspace  Chief Complaint   Patient presents with     Cough       Medication Reconciliation: complete   Patient presents with cough productive yellow, congestion for 2 weeks. Patient saw a MD on the 17th and had zpak and prednisone and states it got better but didn't go away.   Patient has not been using nebulizers or inhalers for this. Nadege Bowling LPN .............1/2/2019  5:23 PM        SUBJECTIVE:   Efra Zuniga is a 63 year old male who presents to clinic today for the following health issues:    RESPIRATORY SYMPTOMS      Duration: Got some better after the 17th and came back.     Description  cough, wheezing and SOB    Severity: severe    Accompanying signs and symptoms: No fevers, chills, He is eating well, drinking ok. Sleeping with one pillow    History (predisposing factors):  COPD    Precipitating or alleviating factors: None    Therapies tried and outcome:  rest and fluids No OTC meds, Tried prednisone and Z pac      Problem list and histories reviewed & adjusted, as indicated.  Additional history: as documented    Past Medical History:   Diagnosis Date     Family history of other specified conditions (CODE)     No Comments Provided     Hemorrhage of anus and rectum     multiple colonoscopies scheduled and canceled by patient     Irritable bowel syndrome with constipation     Functioning bowel syndrome/constipation     Pain in joint     Intermittent arthralgias       Current Outpatient Medications   Medication Sig Dispense Refill     acetaminophen (TYLENOL) 500 MG tablet Take 1,000 mg by mouth       aspirin EC 81 MG EC tablet Take 81 mg by mouth       Blood Glucose Monitoring Suppl (FIFTY50 GLUCOSE METER 2.0) W/DEVICE KIT Dispense meter, test strips, lancets covered by pt ins. E11.65 IDDM type II, uncontrolled - Test 1 time/day       metFORMIN (GLUCOPHAGE) 500 MG tablet Take 250 mg by mouth       spironolactone  "(ALDACTONE) 25 MG tablet Take 12.5-25 mg by mouth       albuterol (2.5 MG/3ML) 0.083% neb solution Inhale 2.5 mg into the lungs       albuterol (PROAIR HFA/PROVENTIL HFA/VENTOLIN HFA) 108 (90 BASE) MCG/ACT Inhaler Inhale 2 puffs into the lungs       fluticasone (FLOVENT HFA) 110 MCG/ACT Inhaler Inhale 1 puff into the lungs       umeclidinium-vilanterol (ANORO ELLIPTA) 62.5-25 MCG/INH oral inhaler Take 2 puffs off of each capsule -- 1 capsule Daily -- For COPD -- vs similar anticholinergic inhaler       No Known Allergies      ROS:  Notable findings in the HPI.       OBJECTIVE:     /70 (BP Location: Left arm, Patient Position: Sitting, Cuff Size: Adult Regular)   Pulse 73   Temp 97.6  F (36.4  C) (Tympanic)   Ht 1.84 m (6' 0.44\")   Wt 130.1 kg (286 lb 12.8 oz)   SpO2 93%   BMI 38.42 kg/m    Body mass index is 38.42 kg/m .  GENERAL: alert and no distress  EYES: Eyes grossly normal to inspection  HENT: normal cephalic/atraumatic, right ear: normal: no effusions, no erythema, normal landmarks, left ear: normal: no effusions, no erythema, normal landmarks, nose and mouth without ulcers or lesions, oropharynx clear, oral mucous membranes moist and sinuses: not tender  NECK: no adenopathy  RESP: no rales , no rhonchi, expiratory wheezes R upper posterior, R mid posterior, L upper posterior and L mid posterior and inspiratory wheezes R upper posterior and R mid posterior  CV: regular rates and rhythm, normal S1 S2, no S3 or S4, no murmur, click or rub and no peripheral edema  SKIN: no suspicious lesions or rashes  PSYCH: mentation appears normal, affect normal/bright    Diagnostic Test Results:  none     ASSESSMENT/PLAN:     1. Moderate COPD (chronic obstructive pulmonary disease) (H)  - doxycycline hyclate (VIBRAMYCIN) 100 MG capsule; Take 1 capsule (100 mg) by mouth 2 times daily for 7 days  Dispense: 14 capsule; Refill: 0  - predniSONE (DELTASONE) 20 MG tablet; Take 60 mg by mouth daily for 3 days, THEN 40 mg " daily for 3 days, THEN 20 mg daily for 3 days, THEN 10 mg daily for 3 days.  Dispense: 20 tablet; Refill: 0    2. Lower respiratory infection (e.g., bronchitis, pneumonia, pneumonitis, pulmonitis)  - doxycycline hyclate (VIBRAMYCIN) 100 MG capsule; Take 1 capsule (100 mg) by mouth 2 times daily for 7 days  Dispense: 14 capsule; Refill: 0  - predniSONE (DELTASONE) 20 MG tablet; Take 60 mg by mouth daily for 3 days, THEN 40 mg daily for 3 days, THEN 20 mg daily for 3 days, THEN 10 mg daily for 3 days.  Dispense: 20 tablet; Refill: 0    PLAN:    URI Adult:  Tylenol, Ibuprofen, Fluids, Rest, OTC cough suppressant/expectorant, OTC decongestant/antihistamine and Rx bronchitis  Doxy BID  RX meds as discussed.     Followup:    If not improving or if condition worsens, follow up with your Primary Care Provider    I explained my diagnostic considerations and recommendations to the patient, who voiced understanding and agreement with the treatment plan. All questions were answered. We discussed potential side effects of any prescribed or recommended therapies, as well as expectations for response to treatments. He was advised to contact our office if there is no improvement or worsening of conditions or symptoms.  If s/s worsen or persist, patient will either come back or follow up with PCP.    Disclaimer:  This note consists of words and symbols derived from keyboarding, dictation, or using voice recognition software. As a result, there may be errors in the script that have gone undetected. Please consider this when interpreting information found in this note.      Nivia Yanez NP, 1/2/2019 5:45 PM

## 2019-01-02 NOTE — NURSING NOTE
Chief Complaint   Patient presents with     Cough       Medication Reconciliation: complete   Patient presents with cough productive yellow, congestion for 2 weeks. Patient saw a MD on the 17th and had zpak and prednisone and states it got better but didn't go away.   Patient has not been using nebulizers or inhalers for this. Nadege Bowling LPN .............1/2/2019  5:23 PM

## 2019-01-03 NOTE — PATIENT INSTRUCTIONS
Patient Education     COPD Flare  You have had a flare-up of your COPD.  COPD, or chronic obstructive pulmonary disease, is a common lung disease. It causes your airways to become irritated and narrower. This makes it harder for you to breathe. Emphysema and chronic bronchitis are both types of COPD. This is a chronic condition, which means you always have it. Sometimes it gets worse. When this happens, it is called a flare-up.  Symptoms of COPD  People with COPD may have symptoms most of the time. In a flare-up, your symptoms get worse. These symptoms may mean you are having a flare-up:    Shortness of breath, shallow or rapid breathing, or wheezing that gets worse    Lung infection    Cough that gets worse    More mucus, thicker mucus or mucus of a different color    Tiredness, decreased energy, or trouble doing your usual activities    Fever    Chest tightness    Your symptoms don t get better even when you use your usual medicines, inhalers, and nebulizer    Trouble talking    You feel confused  Causes of flare-ups  Unfortunately, a flare-up can happen even though you did everything right, and you followed your doctor s instructions. Some causes of flare-ups are:    Smoking or secondhand smoke    Colds, the flu, or respiratory infections    Air pollution    Sudden change in the weather    Dust, irritating chemicals, or strong fumes    Not taking your medicines as prescribed  Home care  Here are some things you can do at home to treat a flare-up:    Try not to panic. This makes it harder to breathe, and keeps you from doing the right things.    Don t smoke or be around others who are smoking.    Try to drink more fluids than usual during a flare-up, unless your doctor has told you not to because of heart and kidney problems. More fluids can help loosen the mucus.    Use your inhalers and nebulizer, if you have one, as you have been told to.    If you were given antibiotics, take them until they are used up or  your doctor tells you to stop. It s important to finish the antibiotics, even though you feel better. This will make sure the infection has cleared.    If you were given prednisone or another steroid, finish it even if you feel better.  Preventing a flare-up  Even though flare-ups happen, the best way to treat one is to prevent it before it starts. Here are some pointers:    Don t smoke or be around others who are smoking.    Take your medicines as you have been told.    Talk with your doctor about getting a flu shot every year. Also find out if you need a pneumonia shot.    If there is a weather advisory warning to stay indoors, try to stay inside when possible.    Try to eat healthy and get plenty of sleep.    Try to avoid things that usually set you off, like dust, chemical fumes, hairsprays, or strong perfumes.  Follow-up care  Follow up with your healthcare provider, or as advised.  If a culture was done, you will be told if your treatment needs to be changed. You can call as directed for the results.  If X-rays were done, you will be notified of any new findings that may affect your care.  Call 911  Call 911 if any of these occur:    You have trouble breathing    You feel confused or it s difficult to wake you up    You faint or lose consciousness    You have a rapid heart rate    You have new pain in your chest, arm, shoulder, neck or upper back  When to seek medical advice  Call your healthcare provider right away if any of these occur:    Wheezing or shortness of breath gets worse    You need to use your inhalers more often than usual without relief    Fever of 100.4 F (38 C) or higher, or as directed by your healthcare provider    Coughing up lots of dark-colored or bloody mucus (sputum)    Chest pain with each breath    You do not start to get better within 24 hours    Swelling of your ankles gets worse    Dizziness or weakness

## 2019-10-06 ENCOUNTER — HOSPITAL ENCOUNTER (EMERGENCY)
Facility: OTHER | Age: 64
Discharge: HOME | End: 2019-10-06
Attending: INTERNAL MEDICINE | Admitting: INTERNAL MEDICINE
Payer: MEDICARE

## 2019-10-06 VITALS
RESPIRATION RATE: 18 BRPM | HEIGHT: 72 IN | TEMPERATURE: 98 F | OXYGEN SATURATION: 95 % | WEIGHT: 304 LBS | BODY MASS INDEX: 41.17 KG/M2 | HEART RATE: 61 BPM | SYSTOLIC BLOOD PRESSURE: 130 MMHG | DIASTOLIC BLOOD PRESSURE: 80 MMHG

## 2019-10-06 DIAGNOSIS — I48.0 PAROXYSMAL ATRIAL FIBRILLATION WITH RVR (H): ICD-10-CM

## 2019-10-06 DIAGNOSIS — R79.0 LOW BLOOD MAGNESIUM: ICD-10-CM

## 2019-10-06 LAB
ALBUMIN SERPL-MCNC: 4.3 G/DL (ref 3.5–5.7)
ALP SERPL-CCNC: 55 U/L (ref 34–104)
ALT SERPL W P-5'-P-CCNC: 20 U/L (ref 7–52)
ANION GAP SERPL CALCULATED.3IONS-SCNC: 8 MMOL/L (ref 3–14)
AST SERPL W P-5'-P-CCNC: 16 U/L (ref 13–39)
BASOPHILS # BLD AUTO: 0.1 10E9/L (ref 0–0.2)
BASOPHILS NFR BLD AUTO: 0.8 %
BILIRUB SERPL-MCNC: 0.8 MG/DL (ref 0.3–1)
BUN SERPL-MCNC: 19 MG/DL (ref 7–25)
CALCIUM SERPL-MCNC: 9.1 MG/DL (ref 8.6–10.3)
CHLORIDE SERPL-SCNC: 96 MMOL/L (ref 98–107)
CO2 SERPL-SCNC: 34 MMOL/L (ref 21–31)
CREAT SERPL-MCNC: 0.93 MG/DL (ref 0.7–1.3)
DIFFERENTIAL METHOD BLD: NORMAL
EOSINOPHIL # BLD AUTO: 0.6 10E9/L (ref 0–0.7)
EOSINOPHIL NFR BLD AUTO: 6.7 %
ERYTHROCYTE [DISTWIDTH] IN BLOOD BY AUTOMATED COUNT: 12.7 % (ref 10–15)
GFR SERPL CREATININE-BSD FRML MDRD: 82 ML/MIN/{1.73_M2}
GLUCOSE SERPL-MCNC: 183 MG/DL (ref 70–105)
HCT VFR BLD AUTO: 49.5 % (ref 40–53)
HGB BLD-MCNC: 15.7 G/DL (ref 13.3–17.7)
IMM GRANULOCYTES # BLD: 0 10E9/L (ref 0–0.4)
IMM GRANULOCYTES NFR BLD: 0.2 %
LYMPHOCYTES # BLD AUTO: 2.3 10E9/L (ref 0.8–5.3)
LYMPHOCYTES NFR BLD AUTO: 26.6 %
MAGNESIUM SERPL-MCNC: 1.8 MG/DL (ref 1.9–2.7)
MCH RBC QN AUTO: 29.1 PG (ref 26.5–33)
MCHC RBC AUTO-ENTMCNC: 31.7 G/DL (ref 31.5–36.5)
MCV RBC AUTO: 92 FL (ref 78–100)
MONOCYTES # BLD AUTO: 0.7 10E9/L (ref 0–1.3)
MONOCYTES NFR BLD AUTO: 7.4 %
NEUTROPHILS # BLD AUTO: 5.1 10E9/L (ref 1.6–8.3)
NEUTROPHILS NFR BLD AUTO: 58.3 %
NT-PROBNP SERPL-MCNC: 21 PG/ML (ref 0–100)
PLATELET # BLD AUTO: 192 10E9/L (ref 150–450)
POTASSIUM SERPL-SCNC: 3.8 MMOL/L (ref 3.5–5.1)
PROT SERPL-MCNC: 7.5 G/DL (ref 6.4–8.9)
RBC # BLD AUTO: 5.39 10E12/L (ref 4.4–5.9)
SODIUM SERPL-SCNC: 138 MMOL/L (ref 134–144)
WBC # BLD AUTO: 8.8 10E9/L (ref 4–11)

## 2019-10-06 PROCEDURE — 85025 COMPLETE CBC W/AUTO DIFF WBC: CPT | Performed by: INTERNAL MEDICINE

## 2019-10-06 PROCEDURE — 99284 EMERGENCY DEPT VISIT MOD MDM: CPT | Performed by: INTERNAL MEDICINE

## 2019-10-06 PROCEDURE — 83880 ASSAY OF NATRIURETIC PEPTIDE: CPT | Performed by: INTERNAL MEDICINE

## 2019-10-06 PROCEDURE — 25000132 ZZH RX MED GY IP 250 OP 250 PS 637: Performed by: INTERNAL MEDICINE

## 2019-10-06 PROCEDURE — 83735 ASSAY OF MAGNESIUM: CPT | Performed by: INTERNAL MEDICINE

## 2019-10-06 PROCEDURE — 93005 ELECTROCARDIOGRAM TRACING: CPT | Performed by: INTERNAL MEDICINE

## 2019-10-06 PROCEDURE — 36415 COLL VENOUS BLD VENIPUNCTURE: CPT | Performed by: INTERNAL MEDICINE

## 2019-10-06 PROCEDURE — 93010 ELECTROCARDIOGRAM REPORT: CPT | Performed by: INTERNAL MEDICINE

## 2019-10-06 PROCEDURE — 99283 EMERGENCY DEPT VISIT LOW MDM: CPT | Mod: Z6 | Performed by: INTERNAL MEDICINE

## 2019-10-06 PROCEDURE — 80053 COMPREHEN METABOLIC PANEL: CPT | Performed by: INTERNAL MEDICINE

## 2019-10-06 RX ORDER — FUROSEMIDE 20 MG
20 TABLET ORAL
Status: ON HOLD | COMMUNITY
Start: 2019-07-16 | End: 2024-01-07

## 2019-10-06 RX ORDER — METOPROLOL SUCCINATE 50 MG/1
50 TABLET, EXTENDED RELEASE ORAL
COMMUNITY
Start: 2019-09-26 | End: 2019-10-06

## 2019-10-06 RX ORDER — MAGNESIUM OXIDE 400 MG/1
800 TABLET ORAL ONCE
Status: COMPLETED | OUTPATIENT
Start: 2019-10-06 | End: 2019-10-06

## 2019-10-06 RX ORDER — METOPROLOL SUCCINATE 100 MG/1
100 TABLET, EXTENDED RELEASE ORAL DAILY
Qty: 90 TABLET | Refills: 3 | Status: ON HOLD | OUTPATIENT
Start: 2019-10-06 | End: 2024-01-07

## 2019-10-06 RX ORDER — LOSARTAN POTASSIUM 50 MG/1
TABLET ORAL
Status: ON HOLD | COMMUNITY
Start: 2019-04-20 | End: 2024-01-07

## 2019-10-06 RX ORDER — METOPROLOL SUCCINATE 50 MG/1
50 TABLET, EXTENDED RELEASE ORAL ONCE
Status: COMPLETED | OUTPATIENT
Start: 2019-10-06 | End: 2019-10-06

## 2019-10-06 RX ORDER — POTASSIUM CHLORIDE 20MEQ/15ML
40 LIQUID (ML) ORAL ONCE
Status: COMPLETED | OUTPATIENT
Start: 2019-10-06 | End: 2019-10-06

## 2019-10-06 RX ORDER — FUROSEMIDE 40 MG
80 TABLET ORAL ONCE
Status: COMPLETED | OUTPATIENT
Start: 2019-10-06 | End: 2019-10-06

## 2019-10-06 RX ORDER — CITALOPRAM HYDROBROMIDE 20 MG/1
20 TABLET ORAL
Status: ON HOLD | COMMUNITY
Start: 2019-03-19 | End: 2024-01-07

## 2019-10-06 RX ORDER — WARFARIN SODIUM 5 MG/1
TABLET ORAL
Status: ON HOLD | COMMUNITY
Start: 2019-09-26 | End: 2024-01-07

## 2019-10-06 RX ORDER — IPRATROPIUM BROMIDE AND ALBUTEROL SULFATE 2.5; .5 MG/3ML; MG/3ML
3 SOLUTION RESPIRATORY (INHALATION)
Status: ON HOLD | COMMUNITY
Start: 2019-05-06 | End: 2024-01-07

## 2019-10-06 RX ADMIN — Medication 800 MG: at 14:21

## 2019-10-06 RX ADMIN — FUROSEMIDE 80 MG: 40 TABLET ORAL at 14:21

## 2019-10-06 RX ADMIN — POTASSIUM CHLORIDE 40 MEQ: 1.5 SOLUTION ORAL at 14:21

## 2019-10-06 RX ADMIN — METOPROLOL SUCCINATE 50 MG: 50 TABLET, EXTENDED RELEASE ORAL at 14:21

## 2019-10-06 ASSESSMENT — ENCOUNTER SYMPTOMS
ARTHRALGIAS: 0
DIARRHEA: 0
DYSURIA: 0
AGITATION: 0
HEMATURIA: 0
WOUND: 0
MYALGIAS: 0
LIGHT-HEADEDNESS: 1
FEVER: 0
BRUISES/BLEEDS EASILY: 0
NAUSEA: 0
CHILLS: 0
PALPITATIONS: 1
SHORTNESS OF BREATH: 1
WHEEZING: 0
DIZZINESS: 1
CONFUSION: 0
COUGH: 0
ABDOMINAL PAIN: 0
VOMITING: 0

## 2019-10-06 ASSESSMENT — MIFFLIN-ST. JEOR: SCORE: 2201.93

## 2019-10-06 NOTE — DISCHARGE INSTRUCTIONS
Increase metoprolol extended release (Toprol-XL) up to 100 mg daily.    Start magnesium 400 mg daily.    Make sure you are taking your furosemide/Lasix daily and spironolactone daily.    Send a new pacemaker report in later this week to review your heart rates -- with the new medication dose increase and start a magnesium supplement.    -- Contact your cardiology clinic and maintain follow-up as scheduled later this month.

## 2019-10-06 NOTE — ED PROVIDER NOTES
History     Chief Complaint   Patient presents with     Pacemaker Problem     HPI  Efra Zuniga is a 64 year old male who presents today because of his pacemaker.      + Pacemaker was placed in September 2019 due to pauses.    He had a recent pacemaker check on 9/11.  It showed numerous SVT and NSVT episodes.  There was mention that his RV threshold had been reached.  --Review of the [facility] telephone call/clinic note showed that whoever looked at his pacemaker tracings, it actually looked like he was having multiple episodes of atrial fibrillation with rapid ventricular response.    They tried to reach him a number of times were not able to get a hold of him and he did not call them back.    Yesterday afternoon he states that he got real dizzy and might of passed out while trying to sit down into his chair.  States that he did fall into his chair and thinks he was out for a short amount of time.  He actually feels a lot better today.    He forgot his morning medications and did not take them yesterday until about noon.    He is been dealing with some edema and fluid retention issues and some wheezing and breathing issues.  Just saw his cardiology team at [facility] a few days ago because of some of these concerns.  He has follow-up with his cardiology clinic at the end of October in less than a month from now.    He is currently only on metoprolol XL 50 mg daily.  His magnesium was low with his labs were checked in the last month or 2.  He is only taking Lasix 20 mg daily plus spironolactone 12.5-25 mg daily.    States that he has been having some lightheaded spells intermittently.    States that he typically takes his medications regularly and generally does not ever forget a day.  Incidentally yesterday he forgot to take his morning meds until noon.    Reports that he is actually feeling very good today and feels better than he has in a while.    Allergies:  No Known Allergies    Problem List:     Patient Active Problem List    Diagnosis Date Noted     Paroxysmal atrial fibrillation with RVR (H) 10/06/2019     Priority: Medium     Low blood magnesium 10/06/2019     Priority: Medium     Dysthymic disorder 2018     Priority: Medium     Overview:   chronic       History of disease 2018     Priority: Medium     Asbestos exposure 09/15/2017     Priority: Medium     Moderate COPD (chronic obstructive pulmonary disease) (H) 09/15/2017     Priority: Medium     Nonsmoker 09/15/2017     Priority: Medium     Simple chronic bronchitis (H) 09/15/2017     Priority: Medium     Essential hypertension 2017     Priority: Medium     Diabetes mellitus type 2, controlled (H) 2014     Priority: Medium        Past Medical History:    Past Medical History:   Diagnosis Date     Family history of other specified conditions (CODE)      Hemorrhage of anus and rectum      Irritable bowel syndrome with constipation      Pain in joint        Past Surgical History:    Past Surgical History:   Procedure Laterality Date     APPENDECTOMY OPEN      Coronary angiogram done by Aron Quick at [facility] in 2006 shows patent coronary arteries     CHOLECYSTECTOMY      No Comments Provided     OTHER SURGICAL HISTORY      2006,2074,SCAN-ANGIOGRAM,Coronary angiogram done by Aron Quick at [facility] in shows patent coronary arteries       Family History:    Family History   Problem Relation Age of Onset     Alzheimer Disease Mother         Alzheimer's disease     Cancer Father         Cancer, with a brain tumor     Heart Disease Father         Heart Disease,CABG x3     Heart Disease Maternal Grandmother         Heart Disease     Heart Disease Maternal Grandfather         Heart Disease     Heart Disease Paternal Grandmother         Heart Disease     Heart Disease Paternal Grandfather         Heart Disease     Other - See Comments Sister         ALS       Social History:  Marital Status:  Single [1]  Social History      Tobacco Use     Smoking status: Never Smoker     Smokeless tobacco: Never Used   Substance Use Topics     Alcohol use: No     Drug use: No        Medications:    acetaminophen (TYLENOL) 500 MG tablet  albuterol (PROAIR HFA/PROVENTIL HFA/VENTOLIN HFA) 108 (90 BASE) MCG/ACT Inhaler  aspirin EC 81 MG EC tablet  citalopram (CELEXA) 20 MG tablet  fluticasone (FLOVENT HFA) 110 MCG/ACT Inhaler  furosemide (LASIX) 20 MG tablet  ipratropium - albuterol 0.5 mg/2.5 mg/3 mL (DUONEB) 0.5-2.5 (3) MG/3ML neb solution  losartan (COZAAR) 50 MG tablet  magnesium oxide (MAG-OX) 400 (241.3 Mg) MG tablet  metFORMIN (GLUCOPHAGE) 500 MG tablet  metoprolol succinate ER (TOPROL-XL) 100 MG 24 hr tablet  warfarin ANTICOAGULANT (COUMADIN) 5 MG tablet  albuterol (2.5 MG/3ML) 0.083% neb solution  Blood Glucose Monitoring Suppl (FIFTY50 GLUCOSE METER 2.0) W/DEVICE KIT  spironolactone (ALDACTONE) 25 MG tablet  umeclidinium-vilanterol (ANORO ELLIPTA) 62.5-25 MCG/INH oral inhaler          Review of Systems   Constitutional: Negative for chills and fever.   HENT: Negative for congestion and hearing loss.    Eyes: Negative for visual disturbance.   Respiratory: Positive for shortness of breath. Negative for cough and wheezing.    Cardiovascular: Positive for palpitations and leg swelling. Negative for chest pain.   Gastrointestinal: Negative for abdominal pain, diarrhea, nausea and vomiting.   Endocrine: Negative for cold intolerance and heat intolerance.   Genitourinary: Negative for dysuria and hematuria.   Musculoskeletal: Negative for arthralgias and myalgias.   Skin: Negative for rash and wound.   Allergic/Immunologic: Negative for immunocompromised state.   Neurological: Positive for dizziness and light-headedness.   Hematological: Does not bruise/bleed easily.   Psychiatric/Behavioral: Negative for agitation and confusion.       Physical Exam   BP: (!) 161/90  Pulse: 66  Heart Rate: 63  Temp: 97.6  F (36.4  C)  Resp: 18  Height: 182.9 cm  (6')  Weight: 137.9 kg (304 lb)  SpO2: 96 %      Physical Exam  Constitutional:       General: He is not in acute distress.     Appearance: He is well-developed. He is not diaphoretic.   HENT:      Head: Normocephalic and atraumatic.   Eyes:      General: No scleral icterus.     Conjunctiva/sclera: Conjunctivae normal.   Neck:      Musculoskeletal: Neck supple.   Cardiovascular:      Rate and Rhythm: Normal rate and regular rhythm.      Comments: Occasional paced complexes  Pulmonary:      Effort: Pulmonary effort is normal.      Breath sounds: Wheezing and rhonchi (  Bilateral basilar crackles.) present.   Abdominal:      Palpations: Abdomen is soft.      Tenderness: There is no tenderness.   Musculoskeletal:         General: Swelling present. No deformity.   Lymphadenopathy:      Cervical: No cervical adenopathy.   Skin:     General: Skin is warm and dry.      Findings: No rash.   Neurological:      General: No focal deficit present.      Mental Status: He is alert.   Psychiatric:         Mood and Affect: Mood normal.         Behavior: Behavior normal.         ED Course     ED Course as of Oct 06 1603   Sun Oct 06, 2019   1340 EKG shows ventricular rate of 63.  Normal sinus rhythm, normal EKG.  Time of 13: 37      1443 Patient declines IV.  He is currently asymptomatic.Labs ordered.  Lasix 80 mg x 1 oral tablet, oral magnesium 800 mg, oral metoprolol extended release 24-hour tablet 50 mg x 1 ordered, in addition to 40 mEq of potassium chloride.      1552 Magnesium is a little low, otherwise labs returned satisfactory.  Patient is feeling much better after above interventions.  He would like to discharge home.        Procedures            Results for orders placed or performed during the hospital encounter of 10/06/19 (from the past 24 hour(s))   CBC with platelets differential   Result Value Ref Range    WBC 8.8 4.0 - 11.0 10e9/L    RBC Count 5.39 4.4 - 5.9 10e12/L    Hemoglobin 15.7 13.3 - 17.7 g/dL    Hematocrit  49.5 40.0 - 53.0 %    MCV 92 78 - 100 fl    MCH 29.1 26.5 - 33.0 pg    MCHC 31.7 31.5 - 36.5 g/dL    RDW 12.7 10.0 - 15.0 %    Platelet Count 192 150 - 450 10e9/L    Diff Method Automated Method     % Neutrophils 58.3 %    % Lymphocytes 26.6 %    % Monocytes 7.4 %    % Eosinophils 6.7 %    % Basophils 0.8 %    % Immature Granulocytes 0.2 %    Absolute Neutrophil 5.1 1.6 - 8.3 10e9/L    Absolute Lymphocytes 2.3 0.8 - 5.3 10e9/L    Absolute Monocytes 0.7 0.0 - 1.3 10e9/L    Absolute Eosinophils 0.6 0.0 - 0.7 10e9/L    Absolute Basophils 0.1 0.0 - 0.2 10e9/L    Abs Immature Granulocytes 0.0 0 - 0.4 10e9/L   Comprehensive metabolic panel   Result Value Ref Range    Sodium 138 134 - 144 mmol/L    Potassium 3.8 3.5 - 5.1 mmol/L    Chloride 96 (L) 98 - 107 mmol/L    Carbon Dioxide 34 (H) 21 - 31 mmol/L    Anion Gap 8 3 - 14 mmol/L    Glucose 183 (H) 70 - 105 mg/dL    Urea Nitrogen 19 7 - 25 mg/dL    Creatinine 0.93 0.70 - 1.30 mg/dL    GFR Estimate 82 >60 mL/min/[1.73_m2]    GFR Estimate If Black >90 >60 mL/min/[1.73_m2]    Calcium 9.1 8.6 - 10.3 mg/dL    Bilirubin Total 0.8 0.3 - 1.0 mg/dL    Albumin 4.3 3.5 - 5.7 g/dL    Protein Total 7.5 6.4 - 8.9 g/dL    Alkaline Phosphatase 55 34 - 104 U/L    ALT 20 7 - 52 U/L    AST 16 13 - 39 U/L   Magnesium   Result Value Ref Range    Magnesium 1.8 (L) 1.9 - 2.7 mg/dL   NT pro BNP   Result Value Ref Range    N-Terminal Pro BNP Inpatient 21 0 - 100 pg/mL       Medications   potassium chloride (KAYCIEL) solution 40 mEq (40 mEq Oral Given 10/6/19 1421)   furosemide (LASIX) tablet 80 mg (80 mg Oral Given 10/6/19 1421)   magnesium oxide (MAG-OX) tablet 800 mg (800 mg Oral Given 10/6/19 1421)   metoprolol succinate ER (TOPROL-XL) 24 hr tablet 50 mg (50 mg Oral Given 10/6/19 1421)       Assessments & Plan (with Medical Decision Making)     I have reviewed the nursing notes.    I have reviewed the findings, diagnosis, plan and need for follow up with the patient.  Patient felt much better  after above interventions.  Start magnesium 400 mg daily.    Increase metoprolol up to 100 mg Toprol-XL once daily.    Advised to send in a new pacemaker upload/recheck in the next 3 to 5 days for reevaluation of his heart rates.    Also advised to contact his cardiology clinic on Monday or Tuesday of this week and let him know the changes that have been made.      Advised to maintain follow-up later this month as scheduled.    New Prescriptions    MAGNESIUM OXIDE (MAG-OX) 400 (241.3 MG) MG TABLET    Take 1 tablet (400 mg) by mouth daily Start 10/6/2019       Final diagnoses:   Paroxysmal atrial fibrillation with RVR (H)   Low blood magnesium       10/6/2019   [facility]     Lukas Kramer MD  10/06/19 [id number]

## 2019-10-06 NOTE — ED TRIAGE NOTES
"Pt to ER with c/o his pacemaker \"going off\".  States 5-8 times in last hour.  States it has been going off \"once and a while\".  States he hears a beep and that is what he believes is it \"going off\".  States no pain, cannot feel anything with these beeps. Pace maker placed 9/9/19 in [place].  "

## 2019-10-06 NOTE — ED AVS SNAPSHOT
[facility]  [address]  [place] [address]  Phone:  [phone]  Fax:  [phone]                                    Efra Zuniga   MRN: [id number]    Department:  [facility]   Date of Visit:  10/6/2019           After Visit Summary Signature Page    I have received my discharge instructions, and my questions have been answered. I have discussed any challenges I see with this plan with the nurse or doctor.    ..........................................................................................................................................  Patient/Patient Representative Signature      ..........................................................................................................................................  Patient Representative Print Name and Relationship to Patient    ..................................................               ................................................  Date                                   Time    ..........................................................................................................................................  Reviewed by Signature/Title    ...................................................              ..............................................  Date                                               Time          22EPIC Rev 08/18

## 2019-10-06 NOTE — ED NOTES
"Pt questioned about why he had not returned cardiology messages.  Pt states \"Well I don't know, I just didn't.\"  MD to bedside.  "

## 2020-10-26 RX ORDER — MAGNESIUM OXIDE 400 MG/1
TABLET ORAL
Qty: 90 TABLET | OUTPATIENT
Start: 2020-10-26

## 2020-10-26 NOTE — TELEPHONE ENCOUNTER
Will refuse, pt doctors through [facility] now.        Na Godwin RN  ....................  10/26/2020   1:38 PM

## 2020-11-30 DIAGNOSIS — M25.561 CHRONIC PAIN OF RIGHT KNEE: Primary | ICD-10-CM

## 2020-11-30 DIAGNOSIS — G89.29 CHRONIC PAIN OF RIGHT KNEE: Primary | ICD-10-CM

## 2022-02-26 ENCOUNTER — HOSPITAL ENCOUNTER (OUTPATIENT)
Dept: GENERAL RADIOLOGY | Facility: OTHER | Age: 67
End: 2022-02-26
Attending: PHYSICIAN ASSISTANT
Payer: MEDICARE

## 2022-02-26 ENCOUNTER — OFFICE VISIT (OUTPATIENT)
Dept: FAMILY MEDICINE | Facility: OTHER | Age: 67
End: 2022-02-26
Attending: PHYSICIAN ASSISTANT
Payer: MEDICARE

## 2022-02-26 VITALS
SYSTOLIC BLOOD PRESSURE: 128 MMHG | WEIGHT: 303.7 LBS | BODY MASS INDEX: 41.19 KG/M2 | DIASTOLIC BLOOD PRESSURE: 76 MMHG | TEMPERATURE: 97.1 F | RESPIRATION RATE: 24 BRPM | HEART RATE: 71 BPM | OXYGEN SATURATION: 94 %

## 2022-02-26 DIAGNOSIS — R06.2 WHEEZING: Primary | ICD-10-CM

## 2022-02-26 DIAGNOSIS — J44.9 MODERATE COPD (CHRONIC OBSTRUCTIVE PULMONARY DISEASE) (H): ICD-10-CM

## 2022-02-26 PROCEDURE — 99214 OFFICE O/P EST MOD 30 MIN: CPT | Performed by: PHYSICIAN ASSISTANT

## 2022-02-26 PROCEDURE — 71046 X-RAY EXAM CHEST 2 VIEWS: CPT

## 2022-02-26 PROCEDURE — G0463 HOSPITAL OUTPT CLINIC VISIT: HCPCS

## 2022-02-26 RX ORDER — PREDNISONE 20 MG/1
40 TABLET ORAL DAILY
Qty: 14 TABLET | Refills: 0 | Status: SHIPPED | OUTPATIENT
Start: 2022-02-26 | End: 2022-03-05

## 2022-02-26 ASSESSMENT — PAIN SCALES - GENERAL: PAINLEVEL: NO PAIN (0)

## 2022-02-26 NOTE — PATIENT INSTRUCTIONS
You were prescribed an antibiotic, please take into consideration the following information:  - Take entire course of antibiotic even if you start to feel better.  - Antibiotics can cause stomach upset including nausea and diarrhea. Read your bottle or ask the pharmacist if antibiotic can be taken with food to help prevent nausea. If you have symptoms of diarrhea you can take an over-the-counter probiotic and/or increase foods with probiotics such as yogurt, Shar, sauerkraut.  -Use caution in sunlight as can lead to increased risk of sunburn while on ABX (antibiotics).     - Take prednisone - steroid in morning with food. You will take 2 tablets a day for 7 days    - Augmentin - antibiotic - take one tablet twice a day (with food) for 7 days.     - Call your coumadin clinic and let them know you are on a steroid - they may adjust your dosing possibly     Patient Education     Diagnosing COPD  Your healthcare provider will use your past health history, a physical exam, and certain tests to diagnose COPD (chronic obstructive pulmonary disease).  Health history  Your healthcare provider will ask you about your health history::    Current illnesses. Tell your provider about your symptoms. Also tell him or her how long you've had them. Talk with your provider about other conditions you have and the medicines you use to treat them.    Past health and surgical history. Share other health problems and surgeries you've had.    Family history. Report health problems in family members, especially lung problems.    Social and environmental history. The most important factor in COPD is if you smoke or have smoked in the past. You should also tell your provider if you have been around secondhand smoke, harmful chemicals, asbestos, or air pollution.    Daily activity. Report if breathing gets in the way of your daily activities.  Physical exam    Your provider will examine you. He or she will check your heart and lungs with a  stethoscope. The focus will be on your airways, including your nose and throat.   Diagnostic tests    Pulmonary function tests. These measure the flow of air into and out of your lungs. They also check how much air your lungs can hold. The most common pulmonary function test is spirometry. This measures how fast and how much air you can blow out (exhale). This test is important to help diagnose COPD. Sometimes you will get an inhaled medicine to see if your spirometry results improve.    Pulse oximetry. This test shows how much oxygen is in your blood (oxygen saturation). This may be done at rest. It may also be done during and after exercise.    Arterial blood gas tests. These measure levels of oxygen and carbon dioxide in your blood.    Chest X-rays. These show the size and shape of your lungs. They can also show certain problems in the lungs.    CT scans. These imaging tests show detailed images of the lungs.    Angelica last reviewed this educational content on 2/1/2019 2000-2021 [employer]. All rights reserved. This information is not intended as a substitute for professional medical care. Always follow your healthcare professional's instructions.

## 2022-02-26 NOTE — PROGRESS NOTES
ASSESSMENT/PLAN:    I have reviewed the nursing notes.  I have reviewed the findings, diagnosis, plan and need for follow up with the patient.    1. Wheezing  - XR Chest 2 Views  - Clear chest, no wheezing noted on examination today which is reassuring. Stable examination today from cardiopulmonary standpoint.     2. Moderate COPD (chronic obstructive pulmonary disease) (H)  - amoxicillin-clavulanate (AUGMENTIN) 875-125 MG tablet; Take 1 tablet by mouth 2 times daily for 7 days  Dispense: 14 tablet; Refill: 0  - predniSONE (DELTASONE) 20 MG tablet; Take 2 tablets (40 mg) by mouth daily for 7 days  Dispense: 14 tablet; Refill: 0  - Vitals stable. O2 normal. CXR clear. Labs from 2/23/22 reviewed, these are normal, declined lab update today as no change in symptoms. Reviewed COPD diagnosis with patient. Discussed rationale for inhalers and nebulizer. Unfortunately, we are limited on combined inhaler prescription due to cost, we reviewed cost of Breo, Spiriva, Advair and other medications on Albiorexx and all unfortunately, patient states are beyond reasonable price range to him. Will treat COPD flare with augmentin and prednisone burst. Discussed we will not do a Zpak like his usual, due to contraindication with Coumadin. Slight interaction with Prednisone, he will reach out to INR clinic Monday to see if dosing change is needed. He will also contact pulmonology and his PCP in regards to COPD long term management. Follow up with SOB, worsening of symptoms. Patient is in agreement and understanding of the above treatment plan. All questions and concerns were addressed and answered to patient's satisfaction. AVS reviewed with patient.     30 minutes face to face on care.     Discussed warning signs/symptoms indicative of need to f/u    Follow up if symptoms persist or worsen or concerns    I explained my diagnostic considerations and recommendations to the patient, who voiced understanding and agreement with the treatment  plan. All questions were answered. We discussed potential side effects of any prescribed or recommended therapies, as well as expectations for response to treatments.    Emili Cantu PA-C  2/26/2022  1:22 PM    HPI:    Efra Zuniga is a 66 year old male  who presents to [facility] today for concerns of URI, x 3-4 months    Pacemaker as well - left sided. Check up 3 days ago, was told needs to follow up and was told there was something wrong with a lead. Pacemaker is 2 years ago. Also prescribed steroid inhaler, unsure of name, notes that was over $400, therefore, did not  medication.     Wheezing is worse at night with COPD. Increasing shortness of breath with activity.     Symptoms:  No fevers or chills.   No sore throat/pharyngitis/tonsillitis.   No allergy/URI Symptoms  No Muffled Sounds/Change in Hearing  No Sensation of Fullness in Ear(s)  No Ringing in Ears/Tinnitus  No Balance Changes  No Dizziness  Yes Congestion (head/nasal/chest)  Yes Cough/Productive Cough  No Post Nasal Drip   No Headache  No Sinus Pain/Pressure  No Myalgias  No Otalgia  Activity Level Changes: SOB with activity at time - chronic in nature  Appetite/Liquid Intake Changes: No  Changes to Bowel Habits: No  Changes to Bladder Habits: No  Additional Symptoms to Report: No  History of similar symptoms: No  Prior workup: No    Treatments tried: Inhalers    Site of exposure: not known.  Type of exposure: not known    Vaccination status:   - Influenza: not vaccinated at this time   - COVID: 1/22/21, 2/19/21    Cardiopulmonary History:  Recent Infections (Pneumonia, etc): No  Smoker: No  Asthma: Yes  COPD: Yes  Additional History: A fib - Coumadin and Pacemaker for this.     NKDA    PCP: [place]    Past Medical History:   Diagnosis Date     Family history of other specified conditions (CODE)     No Comments Provided     Hemorrhage of anus and rectum     multiple colonoscopies scheduled and canceled by patient     Irritable bowel  syndrome with constipation     Functioning bowel syndrome/constipation     Pain in joint     Intermittent arthralgias     Past Surgical History:   Procedure Laterality Date     APPENDECTOMY OPEN      Coronary angiogram done by Aron Quick at [facility] in March 2006 shows patent coronary arteries     CHOLECYSTECTOMY      No Comments Provided     OTHER SURGICAL HISTORY      March 2006,[id number],SCAN-ANGIOGRAM,Coronary angiogram done by Aron Quick at [facility] in shows patent coronary arteries     Social History     Tobacco Use     Smoking status: Never Smoker     Smokeless tobacco: Never Used   Substance Use Topics     Alcohol use: No     Current Outpatient Medications   Medication Sig Dispense Refill     acetaminophen (TYLENOL) 500 MG tablet Take 1,000 mg by mouth       albuterol (2.5 MG/3ML) 0.083% neb solution Inhale 2.5 mg into the lungs       albuterol (PROAIR HFA/PROVENTIL HFA/VENTOLIN HFA) 108 (90 BASE) MCG/ACT Inhaler Inhale 2 puffs into the lungs       aspirin EC 81 MG EC tablet Take 81 mg by mouth       Blood Glucose Monitoring Suppl (FIFTY50 GLUCOSE METER 2.0) W/DEVICE KIT Dispense meter, test strips, lancets covered by pt ins. E11.65 IDDM type II, uncontrolled - Test 1 time/day       citalopram (CELEXA) 20 MG tablet Take 20 mg by mouth       fluticasone (FLOVENT HFA) 110 MCG/ACT Inhaler Inhale 1 puff into the lungs       furosemide (LASIX) 20 MG tablet Take 20 mg by mouth       ipratropium - albuterol 0.5 mg/2.5 mg/3 mL (DUONEB) 0.5-2.5 (3) MG/3ML neb solution Inhale 3 mLs into the lungs       losartan (COZAAR) 50 MG tablet TAKE 1/2 TABLET BY MOUTH EVERY DAY       magnesium oxide (MAG-OX) 400 (241.3 Mg) MG tablet Take 1 tablet (400 mg) by mouth daily Start 10/6/2019 90 tablet 3     metFORMIN (GLUCOPHAGE) 500 MG tablet Take 250 mg by mouth       metoprolol succinate ER (TOPROL-XL) 100 MG 24 hr tablet Take 1 tablet (100 mg) by mouth daily -- okay to use 2 of your 50 mg tab daily until gone -- Dose Increase 10/6/2019  90 tablet 3     spironolactone (ALDACTONE) 25 MG tablet Take 12.5-25 mg by mouth       umeclidinium-vilanterol (ANORO ELLIPTA) 62.5-25 MCG/INH oral inhaler Take 2 puffs off of each capsule -- 1 capsule Daily -- For COPD -- vs similar anticholinergic inhaler       warfarin ANTICOAGULANT (COUMADIN) 5 MG tablet Take 2 tablets on Wednesday and 1 tablet all other days. Dose adjusted by INR clinic PRN per INR reading.       No Known Allergies  Past medical history, past surgical history, current medications and allergies reviewed and accurate to the best of my knowledge.      ROS:  Refer to HPI    /76   Pulse 71   Temp 97.1  F (36.2  C) (Tympanic)   Resp 24   Wt 137.8 kg (303 lb 11.2 oz)   SpO2 94%   BMI 41.19 kg/m      EXAM:  General Appearance: Well appearing 66 year old male, appropriate appearance for age. No acute distress   Respiratory: normal chest wall and respirations.  Normal effort.  Clear to auscultation bilaterally, no wheezing, crackles or rhonchi.  No increased work of breathing. Able to transfer from chair to table, XR to clinic room without difficulty. Able to speak in full sentences without pausing/difficulty. Dry cough.   Cardiac: RRR with no murmurs Equal movement of bilateral lower extremities.  Normal gait.    Dermatological: no rashes noted of exposed skin  Psychological: normal affect, alert, oriented, and pleasant.     Labs:  None     Xray:  CXR clear chest

## 2022-02-26 NOTE — NURSING NOTE
Chief Complaint   Patient presents with     Respiratory Problems     Wheezing       Initial /76   Pulse 71   Temp 97.1  F (36.2  C) (Tympanic)   Resp 24   Wt 137.8 kg (303 lb 11.2 oz)   SpO2 94%   BMI 41.19 kg/m   Estimated body mass index is 41.19 kg/m  as calculated from the following:    Height as of 10/6/19: 1.829 m (6').    Weight as of this encounter: 137.8 kg (303 lb 11.2 oz).  FOOD SECURITY SCREENING QUESTIONS  Hunger Vital Signs:  Within the past 12 months we worried whether our food would run out before we got money to buy more. Never  Within the past 12 months the food we bought just didn't last and we didn't have money to get more. Never        Vince Rivera, LEON

## 2022-03-03 DIAGNOSIS — J44.9 MODERATE COPD (CHRONIC OBSTRUCTIVE PULMONARY DISEASE) (H): ICD-10-CM

## 2022-03-04 RX ORDER — PREDNISONE 20 MG/1
40 TABLET ORAL DAILY
Qty: 14 TABLET | Refills: 0 | OUTPATIENT
Start: 2022-03-04

## 2022-03-04 NOTE — TELEPHONE ENCOUNTER
"Unable to reach patient at home and cell (no answer, no voicemail). Pharmacy line also busy. Routing for second attempt to reach pharmacy. Prednisone was prescribed as a burst without refills. Need to know if this was an automated request or if patient needs triage in the event that his COPD flare has not resolved. Janiya Granda RN on 3/4/2022 at 9:30 AM    Last Prescription Date: 2/26/22  Last Qty/Refills: 14 / 0  Last Office Visit: 2/26/22 Alondra \"Will treat COPD flare with augmentin and prednisone burst.\"  Future Office Visit: none     Requested Prescriptions   Pending Prescriptions Disp Refills     predniSONE (DELTASONE) 20 MG tablet 14 tablet 0     Sig: Take 2 tablets (40 mg) by mouth daily       There is no refill protocol information for this order          "

## 2022-03-04 NOTE — TELEPHONE ENCOUNTER
Pharmacist confirmed the request was generated automatically and is safe to refuse. Janiya Granda RN on 3/4/2022 at 11:08 AM

## 2022-04-22 ENCOUNTER — OFFICE VISIT (OUTPATIENT)
Dept: FAMILY MEDICINE | Facility: OTHER | Age: 67
End: 2022-04-22
Attending: PHYSICIAN ASSISTANT
Payer: COMMERCIAL

## 2022-04-22 VITALS
DIASTOLIC BLOOD PRESSURE: 82 MMHG | RESPIRATION RATE: 21 BRPM | HEART RATE: 64 BPM | BODY MASS INDEX: 41.09 KG/M2 | SYSTOLIC BLOOD PRESSURE: 114 MMHG | WEIGHT: 303 LBS | TEMPERATURE: 97 F | OXYGEN SATURATION: 93 %

## 2022-04-22 DIAGNOSIS — J44.1 COPD EXACERBATION (H): Primary | ICD-10-CM

## 2022-04-22 PROCEDURE — G0463 HOSPITAL OUTPT CLINIC VISIT: HCPCS

## 2022-04-22 PROCEDURE — 99214 OFFICE O/P EST MOD 30 MIN: CPT | Performed by: NURSE PRACTITIONER

## 2022-04-22 RX ORDER — PREDNISONE 20 MG/1
40 TABLET ORAL DAILY
Qty: 10 TABLET | Refills: 0 | Status: SHIPPED | OUTPATIENT
Start: 2022-04-22 | End: 2022-04-27

## 2022-04-22 ASSESSMENT — PAIN SCALES - GENERAL: PAINLEVEL: NO PAIN (0)

## 2022-04-22 NOTE — NURSING NOTE
Chief Complaint   Patient presents with     Cough     Wheezing and trouble breathing     Patient presents to [facility] for s/s stated above. Patient stated he has COPD, but for the last couple weeks has been wheezing and having trouble breathing.    Initial /82 (BP Location: Left arm, Patient Position: Sitting, Cuff Size: Adult Large)   Pulse 64   Temp 97  F (36.1  C) (Tympanic)   Resp 21   Wt 137.4 kg (303 lb)   SpO2 93%   BMI 41.09 kg/m   Estimated body mass index is 41.09 kg/m  as calculated from the following:    Height as of 10/6/19: 1.829 m (6').    Weight as of this encounter: 137.4 kg (303 lb).  Medication Reconciliation: Completed     Advanced Care Directive Reviewed    Sinan Do LPN

## 2022-04-22 NOTE — PROGRESS NOTES
ASSESSMENT/PLAN:    I have reviewed the nursing notes.  I have reviewed the findings, diagnosis, plan and need for follow up with the patient.    1. COPD exacerbation (H)  Oxygen with Augmentin rather than Z-Maurizio 2 to patient being on Coumadin and interactions.  Historically Augmentin and prednisone have worked for COPD exacerbation with similar presentation.  Offered chest x-ray, patient declined and I felt that was reasonable based on stable vital signs and symptoms most consistent with COPD exacerbation.  He is agreeable that he will follow-up if he does not improve with the below treatment plan.  He did have a chest x-ray done 2 months ago which I reviewed which was clear at that time with same symptoms as he presents with today.   - predniSONE (DELTASONE) 20 MG tablet; Take 2 tablets (40 mg) by mouth daily for 5 days  Dispense: 10 tablet; Refill: 0  - amoxicillin-clavulanate (AUGMENTIN) 875-125 MG tablet; Take 1 tablet by mouth 2 times daily for 7 days  Dispense: 14 tablet; Refill: 0    Follow up if symptoms persist or worsen or concerns    I explained my diagnostic considerations and recommendations to the patient, who voiced understanding and agreement with the treatment plan. All questions were answered. We discussed potential side effects of any prescribed or recommended therapies, as well as expectations for response to treatments.    Letty Lema NP  4/22/2022  12:03 PM    HPI:  Efra Zuniga is a 67 year old male who presents to [facility] today for concerns of cough, wheezing and trouble breathing ongoing for past couple of weeks. Cough is productive of yellow sputum. Has known COPD. He does not feel unwell in any way. Does not currently smoke. No fevers, chills, fatigue, body aches.     Symptoms are not improving or worsening, staying persistent.     Historically has required prednisone and antibiotic which has treated acute COPD exacerbations.     Symptoms are worse at night.     History of  pneumonia before, this does not feel like pneumonia per patient statement. He declines chest xray.     Past Medical History:   Diagnosis Date     Family history of other specified conditions (CODE)     No Comments Provided     Hemorrhage of anus and rectum     multiple colonoscopies scheduled and canceled by patient     Irritable bowel syndrome with constipation     Functioning bowel syndrome/constipation     Pain in joint     Intermittent arthralgias     Past Surgical History:   Procedure Laterality Date     APPENDECTOMY OPEN      Coronary angiogram done by Aron Quick at [facility] in March 2006 shows patent coronary arteries     CHOLECYSTECTOMY      No Comments Provided     OTHER SURGICAL HISTORY      March 2006,[id number],SCAN-ANGIOGRAM,Coronary angiogram done by Aron Quick at [facility] in shows patent coronary arteries     Social History     Tobacco Use     Smoking status: Never Smoker     Smokeless tobacco: Never Used   Substance Use Topics     Alcohol use: No     Current Outpatient Medications   Medication Sig Dispense Refill     amoxicillin-clavulanate (AUGMENTIN) 875-125 MG tablet Take 1 tablet by mouth 2 times daily for 7 days 14 tablet 0     predniSONE (DELTASONE) 20 MG tablet Take 2 tablets (40 mg) by mouth daily for 5 days 10 tablet 0     acetaminophen (TYLENOL) 500 MG tablet Take 1,000 mg by mouth       albuterol (2.5 MG/3ML) 0.083% neb solution Inhale 2.5 mg into the lungs       albuterol (PROAIR HFA/PROVENTIL HFA/VENTOLIN HFA) 108 (90 BASE) MCG/ACT Inhaler Inhale 2 puffs into the lungs       aspirin EC 81 MG EC tablet Take 81 mg by mouth       Blood Glucose Monitoring Suppl (FIFTY50 GLUCOSE METER 2.0) W/DEVICE KIT Dispense meter, test strips, lancets covered by pt ins. E11.65 IDDM type II, uncontrolled - Test 1 time/day       citalopram (CELEXA) 20 MG tablet Take 20 mg by mouth       fluticasone (FLOVENT HFA) 110 MCG/ACT Inhaler Inhale 1 puff into the lungs       furosemide (LASIX) 20 MG tablet Take 20 mg by  mouth       ipratropium - albuterol 0.5 mg/2.5 mg/3 mL (DUONEB) 0.5-2.5 (3) MG/3ML neb solution Inhale 3 mLs into the lungs       losartan (COZAAR) 50 MG tablet TAKE 1/2 TABLET BY MOUTH EVERY DAY       magnesium oxide (MAG-OX) 400 (241.3 Mg) MG tablet Take 1 tablet (400 mg) by mouth daily Start 10/6/2019 90 tablet 3     metFORMIN (GLUCOPHAGE) 500 MG tablet Take 250 mg by mouth       metoprolol succinate ER (TOPROL-XL) 100 MG 24 hr tablet Take 1 tablet (100 mg) by mouth daily -- okay to use 2 of your 50 mg tab daily until gone -- Dose Increase 10/6/2019 90 tablet 3     spironolactone (ALDACTONE) 25 MG tablet Take 12.5-25 mg by mouth       umeclidinium-vilanterol (ANORO ELLIPTA) 62.5-25 MCG/INH oral inhaler Take 2 puffs off of each capsule -- 1 capsule Daily -- For COPD -- vs similar anticholinergic inhaler       warfarin ANTICOAGULANT (COUMADIN) 5 MG tablet Take 2 tablets on Wednesday and 1 tablet all other days. Dose adjusted by INR clinic PRN per INR reading.       No Known Allergies  Past medical history, past surgical history, current medications and allergies reviewed and accurate to the best of my knowledge.      ROS:  Refer to [facility]    /82 (BP Location: Left arm, Patient Position: Sitting, Cuff Size: Adult Large)   Pulse 64   Temp 97  F (36.1  C) (Tympanic)   Resp 21   Wt 137.4 kg (303 lb)   SpO2 93%   BMI 41.09 kg/m      EXAM:  General Appearance: Well appearing 67 year old male, appropriate appearance for age. No acute distress   Ears: Left TM intact, translucent with bony landmarks appreciated, no erythema, no effusion, no bulging, no purulence.  Right TM intact, translucent with bony landmarks appreciated, no erythema, no effusion, no bulging, no purulence.  Left auditory canal clear.  Right auditory canal clear.  Normal external ears, non tender.  Eyes: conjunctivae normal without erythema or irritation, corneas clear, no drainage or crusting, no eyelid swelling, pupils equal   Oropharynx: moist  mucous membranes, posterior pharynx without erythema, tonsils symmetric, no erythema, no exudates or petechiae, no post nasal drip seen,  voice clear.    Sinuses:  No sinus tenderness upon palpation of the frontal or maxillary sinuses  Nose:  Bilateral nares: no erythema, no edema, no drainage or congestion   Neck: supple without adenopathy  Respiratory: normal chest wall and respirations.  Normal effort.  + scattered expiratory wheezes, no crackles or rhonchi.  No increased work of breathing.  + productive cough appreciated.  Cardiac: RRR with no murmurs  Psychological: normal affect, alert, oriented, and pleasant.

## 2022-05-20 ENCOUNTER — OFFICE VISIT (OUTPATIENT)
Dept: FAMILY MEDICINE | Facility: OTHER | Age: 67
End: 2022-05-20
Attending: NURSE PRACTITIONER
Payer: COMMERCIAL

## 2022-05-20 VITALS
OXYGEN SATURATION: 89 % | HEART RATE: 60 BPM | WEIGHT: 307.2 LBS | TEMPERATURE: 97.5 F | DIASTOLIC BLOOD PRESSURE: 75 MMHG | SYSTOLIC BLOOD PRESSURE: 132 MMHG | BODY MASS INDEX: 41.66 KG/M2 | RESPIRATION RATE: 26 BRPM

## 2022-05-20 DIAGNOSIS — J44.1 COPD EXACERBATION (H): Primary | ICD-10-CM

## 2022-05-20 PROCEDURE — G0463 HOSPITAL OUTPT CLINIC VISIT: HCPCS

## 2022-05-20 PROCEDURE — 99214 OFFICE O/P EST MOD 30 MIN: CPT | Performed by: NURSE PRACTITIONER

## 2022-05-20 RX ORDER — PREDNISONE 20 MG/1
TABLET ORAL
Qty: 13 TABLET | Refills: 0 | Status: SHIPPED | OUTPATIENT
Start: 2022-05-20 | End: 2022-07-25

## 2022-05-20 RX ORDER — DOXYCYCLINE 100 MG/1
100 CAPSULE ORAL 2 TIMES DAILY
Qty: 14 CAPSULE | Refills: 0 | Status: SHIPPED | OUTPATIENT
Start: 2022-05-20 | End: 2022-05-27

## 2022-05-20 ASSESSMENT — PAIN SCALES - GENERAL: PAINLEVEL: NO PAIN (0)

## 2022-05-20 NOTE — NURSING NOTE
Chief Complaint   Patient presents with     Cough     Breathing Problem     Patient presents to  for s/s stated above. Patient stated he has COPD, but the last few days he has been dealing with the cough and getting more easily winded.    Initial /75 (BP Location: Right arm, Patient Position: Sitting)   Pulse 60   Temp 97.5  F (36.4  C) (Tympanic)   Resp 26   Wt 139.3 kg (307 lb 3.2 oz)   SpO2 (!) 89%   BMI 41.66 kg/m   Estimated body mass index is 41.66 kg/m  as calculated from the following:    Height as of 10/6/19: 1.829 m (6').    Weight as of this encounter: 139.3 kg (307 lb 3.2 oz).  Medication Reconciliation: Completed     Advanced Care Directive Reviewed    Sinan Do LPN

## 2022-05-20 NOTE — PROGRESS NOTES
ASSESSMENT/PLAN:     I have reviewed the nursing notes.  I have reviewed the findings, diagnosis, plan and need for follow up with the patient.      1. COPD exacerbation (H)    - predniSONE (DELTASONE) 20 MG tablet; Take 2 tablets (40 mg) by mouth daily for 5 days, THEN 1 tablet (20 mg) daily for 3 days.  Dispense: 13 tablet; Refill: 0  - doxycycline hyclate (VIBRAMYCIN) 100 MG capsule; Take 1 capsule (100 mg) by mouth 2 times daily for 7 days  Dispense: 14 capsule; Refill: 0    Patient declines CXR today as this feels like all his previous COPD flares and not pneumonia.    Patient declines Covid testing today.  Patient without fevers and vitals are stable.     Last CXR was 2/26/22 with no active pulmonary infiltrates.  Patient states that prednisone and antibiotic always clears up his symptoms of COPD flare.    Patient states that prednisone works so well for him and requests to be on low dose long term after completing burst.  Discussed with patient there is side effects with long term prednisone use and he would need to discuss with his PCP.  Discussed with patient risk of increased INR with use of Doxycycline while on Warfarin, patient states awareness and will monitor.   Increase use of nebulizer up to BID or TID PRN  Continue to use Anoro Ellipta inhaler daily    Discussed warning signs/symptoms indicative of need to f/u  Follow up if symptoms persist or worsen or concerns      I explained my diagnostic considerations and recommendations to the patient, who voiced understanding and agreement with the treatment plan. All questions were answered. We discussed potential side effects of any prescribed or recommended therapies, as well as expectations for response to treatments.    Hazel James NP  [facility]      SUBJECTIVE:   Efra Zuniga is a 67 year old male who presents to clinic today for the following health issues:  Cough and shortness of breath    HPI  Treated with Augmentin and  Prednisone for COPD flare on 4/22/22, states his symptoms completely resolved after treatment.  He developed symptoms again about 4 days ago.  States this feels just like his usual flares.  Congested productive cough.  Phlegm is greenish.  Cough is worse at night.   Chest tightness and heaviness.  Easily winded and short of breath with exertion.  No chest pain.  Intermittent racing heart - He has a pacemaker that isn't functionally properly so will be having it adjusted next week in [place].  He is on Warfarin.  No sore throat.  No nasal drainage/congestion.  No fevers, chills, or sweats.  No lower extremity edema.  No calf pain.  Appetite normal.  Easily fatigued.  No headaches.    Albuterol and Duonebs - using about once a day prior to bedtime, notes relief after using.    Anoro Ellipta inhaler once daily       Past Medical History:   Diagnosis Date     Family history of other specified conditions (CODE)     No Comments Provided     Hemorrhage of anus and rectum     multiple colonoscopies scheduled and canceled by patient     Irritable bowel syndrome with constipation     Functioning bowel syndrome/constipation     Pain in joint     Intermittent arthralgias     Past Surgical History:   Procedure Laterality Date     APPENDECTOMY OPEN      Coronary angiogram done by Aron Quick at [facility] in March 2006 shows patent coronary arteries     CHOLECYSTECTOMY      No Comments Provided     OTHER SURGICAL HISTORY      March 2006,[id number],SCAN-ANGIOGRAM,Coronary angiogram done by Aron Quick at [facility] in shows patent coronary arteries     Social History     Tobacco Use     Smoking status: Never Smoker     Smokeless tobacco: Never Used   Substance Use Topics     Alcohol use: No     Current Outpatient Medications   Medication Sig Dispense Refill     acetaminophen (TYLENOL) 500 MG tablet Take 1,000 mg by mouth       albuterol (2.5 MG/3ML) 0.083% neb solution Inhale 2.5 mg into the lungs       albuterol (PROAIR HFA/PROVENTIL  HFA/VENTOLIN HFA) 108 (90 BASE) MCG/ACT Inhaler Inhale 2 puffs into the lungs       aspirin EC 81 MG EC tablet Take 81 mg by mouth       Blood Glucose Monitoring Suppl (FIFTY50 GLUCOSE METER 2.0) W/DEVICE KIT Dispense meter, test strips, lancets covered by pt ins. E11.65 IDDM type II, uncontrolled - Test 1 time/day       citalopram (CELEXA) 20 MG tablet Take 20 mg by mouth       fluticasone (FLOVENT HFA) 110 MCG/ACT Inhaler Inhale 1 puff into the lungs       furosemide (LASIX) 20 MG tablet Take 20 mg by mouth       ipratropium - albuterol 0.5 mg/2.5 mg/3 mL (DUONEB) 0.5-2.5 (3) MG/3ML neb solution Inhale 3 mLs into the lungs       losartan (COZAAR) 50 MG tablet TAKE 1/2 TABLET BY MOUTH EVERY DAY       magnesium oxide (MAG-OX) 400 (241.3 Mg) MG tablet Take 1 tablet (400 mg) by mouth daily Start 10/6/2019 90 tablet 3     metFORMIN (GLUCOPHAGE) 500 MG tablet Take 250 mg by mouth       metoprolol succinate ER (TOPROL-XL) 100 MG 24 hr tablet Take 1 tablet (100 mg) by mouth daily -- okay to use 2 of your 50 mg tab daily until gone -- Dose Increase 10/6/2019 90 tablet 3     spironolactone (ALDACTONE) 25 MG tablet Take 12.5-25 mg by mouth       umeclidinium-vilanterol (ANORO ELLIPTA) 62.5-25 MCG/INH oral inhaler Take 2 puffs off of each capsule -- 1 capsule Daily -- For COPD -- vs similar anticholinergic inhaler       warfarin ANTICOAGULANT (COUMADIN) 5 MG tablet Take 2 tablets on Wednesday and 1 tablet all other days. Dose adjusted by INR clinic PRN per INR reading.       No Known Allergies      Past medical history, past surgical history, current medications and allergies reviewed and accurate to the best of my knowledge.        OBJECTIVE:     /75 (BP Location: Right arm, Patient Position: Sitting)   Pulse 60   Temp 97.5  F (36.4  C) (Tympanic)   Resp 26   Wt 139.3 kg (307 lb 3.2 oz)   SpO2 (!) 89%   BMI 41.66 kg/m    Body mass index is 41.66 kg/m .     Physical Exam  General Appearance: Well appearing adult  male, non ill appearance, appropriate appearance for age. No acute distress. Well groomed.   Orophayrnx: moist mucous membranes, pharynx without erythema, tonsils without hypertrophy, tonsils without erythema, no tonsillar exudates, no oral lesions, no palate petechiae, no post nasal drip seen, no trismus, voice clear.    Nose:  No noted drainage or congestion   Neck: supple without adenopathy  Respiratory: normal chest wall and respirations.  Normal effort.  Clear to auscultation bilaterally, no wheezing, crackles or rhonchi.  No increased work of breathing.  No cough appreciated.  Cardiac: RRR with no murmurs  Musculoskeletal:  Equal movement of bilateral upper extremities.  Equal movement of bilateral lower extremities.  Normal gait.   Psychological: normal affect, alert, oriented, and pleasant.

## 2022-05-26 DIAGNOSIS — J44.1 COPD EXACERBATION (H): ICD-10-CM

## 2022-05-27 RX ORDER — PREDNISONE 20 MG/1
TABLET ORAL
Qty: 13 TABLET | Refills: 0 | OUTPATIENT
Start: 2022-05-27 | End: 2022-06-04

## 2022-05-27 NOTE — TELEPHONE ENCOUNTER
Refill refused; called pt to assess need for medication as this was filled following his visit on 5/20/22 at the [facility].  Client reports that he was in [facility] at [facility] on Wed. 5/25/22 and had his old pacemaker removed and a new pacemaker placed.  He reports no concerns with breathing today, when asked if he felt the breathing has improved since the placement, he reports yes.  States that since the visit at the [facility] he did not take all of his prednisone last taken was no 5/24, states he has not needed it.  Also states his normal pcp is at [facility] but has difficulty getting in there.  Informed pt that if he has any concerns or symptoms return to seek medical tx with pcp or [facility] and not to restart the prednisone as this is a tapered med and if needed he would need a visit.  Pt reported understanding.  Trina Quiñones RN on 5/27/2022 at 3:41 PM       Requested Prescriptions   Pending Prescriptions Disp Refills     predniSONE (DELTASONE) 20 MG tablet 13 tablet 0     Sig: Take 2 tablets (40 mg) by mouth daily for 5 days, THEN 1 tablet (20 mg) daily for 3 days.       There is no refill protocol information for this order

## 2022-07-25 ENCOUNTER — OFFICE VISIT (OUTPATIENT)
Dept: FAMILY MEDICINE | Facility: OTHER | Age: 67
End: 2022-07-25
Attending: NURSE PRACTITIONER
Payer: COMMERCIAL

## 2022-07-25 ENCOUNTER — HOSPITAL ENCOUNTER (OUTPATIENT)
Dept: GENERAL RADIOLOGY | Facility: OTHER | Age: 67
Discharge: HOME | End: 2022-07-25
Attending: STUDENT IN AN ORGANIZED HEALTH CARE EDUCATION/TRAINING PROGRAM
Payer: MEDICARE

## 2022-07-25 VITALS
TEMPERATURE: 96.8 F | OXYGEN SATURATION: 92 % | RESPIRATION RATE: 30 BRPM | WEIGHT: 309.13 LBS | BODY MASS INDEX: 41.92 KG/M2 | DIASTOLIC BLOOD PRESSURE: 72 MMHG | HEART RATE: 60 BPM | SYSTOLIC BLOOD PRESSURE: 130 MMHG

## 2022-07-25 DIAGNOSIS — J44.1 COPD EXACERBATION (H): ICD-10-CM

## 2022-07-25 DIAGNOSIS — R06.02 SHORTNESS OF BREATH: ICD-10-CM

## 2022-07-25 DIAGNOSIS — I50.23 ACUTE ON CHRONIC SYSTOLIC CONGESTIVE HEART FAILURE (H): ICD-10-CM

## 2022-07-25 DIAGNOSIS — R06.02 SHORTNESS OF BREATH: Primary | ICD-10-CM

## 2022-07-25 PROCEDURE — 99214 OFFICE O/P EST MOD 30 MIN: CPT | Performed by: STUDENT IN AN ORGANIZED HEALTH CARE EDUCATION/TRAINING PROGRAM

## 2022-07-25 PROCEDURE — G0463 HOSPITAL OUTPT CLINIC VISIT: HCPCS

## 2022-07-25 PROCEDURE — G0463 HOSPITAL OUTPT CLINIC VISIT: HCPCS | Mod: 25

## 2022-07-25 PROCEDURE — 71046 X-RAY EXAM CHEST 2 VIEWS: CPT

## 2022-07-25 RX ORDER — FUROSEMIDE 20 MG
60 TABLET ORAL DAILY
Qty: 9 TABLET | Refills: 0 | Status: SHIPPED | OUTPATIENT
Start: 2022-07-25 | End: 2022-07-28

## 2022-07-25 RX ORDER — PREDNISONE 20 MG/1
TABLET ORAL
Qty: 13 TABLET | Refills: 0 | Status: SHIPPED | OUTPATIENT
Start: 2022-07-25 | End: 2022-08-02

## 2022-07-25 ASSESSMENT — PAIN SCALES - GENERAL: PAINLEVEL: NO PAIN (0)

## 2022-07-25 NOTE — PROGRESS NOTES
Assessment & Plan     Shortness of breath  COPD exacerbation (H)  Acute on chronic systolic congestive heart failure (H)  1 week of SOB, wheezing on exam. ddx includes COPD exacerbation vs CHF exacerbation. cxr negative for pneumonia. O2 in clinic is 92 but no increase work of breathing. Will treat as a COPD exacerbation with prednisone taper and augmentin given he had a recent COPD exacerbation treated 3.5 weeks ago. Pitting leg edema and looks like he is up 2 lbs so will also give three days of increase lasix from 20 mg daily to 60 mg daily. He has an appt with cardiology tomorrow I encouraged him to follow up with. Given strict return precautions and signs/symptoms of when to see emergent care  - XR Chest 2 Views; Future  - predniSONE (DELTASONE) 20 MG tablet; Take 2 tablets (40 mg) by mouth daily for 5 days, THEN 1 tablet (20 mg) daily for 3 days.  - amoxicillin-clavulanate (AUGMENTIN) 875-125 MG tablet; Take 1 tablet by mouth 2 times daily for 7 days  - furosemide (LASIX) 20 MG tablet; Take 3 tablets (60 mg) by mouth daily for 3 days Take lasix 60 mg for 3 days then resume home 20 mg daily                 No follow-ups on file.    Madyson London MD  [facility]    Subjective   Efra is a 67 year old, presenting for the following health issues:  Respiratory Problems (Wheezing, shortness of breath)      HPI     Shortness of breath   - 1 week  - getting worse  - harder to lay down at night  - wheezing all day  - feels like a COPD exacerbation   - saw his pcp about 3.5 weeks ago, treated for COPD exacerbation with prednisone and doxycycline, increased lasix at that time for 3 days as well   - no recent illness, not missing medication or inhaler doses, no new foods or going out to eat  - swelling is at baseline   - no chest pain, no fevers, no congestion          Review of Systems   Constitutional, HEENT, cardiovascular, pulmonary, gi and gu systems are negative, except as otherwise  noted.      Objective    /72 (BP Location: Right arm, Patient Position: Sitting, Cuff Size: Adult Large)   Pulse 60   Temp 96.8  F (36  C) (Tympanic)   Resp 30   Wt 140.2 kg (309 lb 2 oz)   SpO2 92%   BMI 41.92 kg/m    Body mass index is 41.92 kg/m .  Physical Exam   GENERAL: healthy, alert and no distress  EYES: Eyes grossly normal to inspection, PERRL and conjunctivae and sclerae normal  HENT: ear canals and TM's normal, nose and mouth without ulcers or lesions  NECK: no JVD  RESP: prolonged expiratory wheezes, no crackles. Carrying on full conversation, no increased work of breathing     CV: regular rate and rhythm, normal S1 S2, no S3 or S4, no murmur, click or rub, no peripheral edema and peripheral pulses strong  ABDOMEN: soft, nontender, without hepatosplenomegaly or masses  MS: pitting edema bilateral lower legs to mid shin   SKIN: no suspicious lesions or rashes  PSYCH: mentation appears normal, affect normal/bright    XR Chest 2 Views    Result Date: 7/25/2022  PROCEDURE: XR CHEST 2 VIEWS 7/25/2022 2:52 PM HISTORY: 1 week of progressive shortness of breath. has known CHF and COPD; Shortness of breath COMPARISONS: 2/6/2022. TECHNIQUE: 2 views. FINDINGS: There is left-sided dual lead pacemaker. Heart is enlarged but is similar to the prior exam. No acute infiltrate or effusion is seen. Degenerative changes seen in the mid and lower thoracic spine. There is no acute compression fracture.        IMPRESSION: No acute disease. KADE JOHNSON MD   SYSTEM ID:  [id number]                .  ..

## 2022-07-25 NOTE — NURSING NOTE
Patient presents to clinic experiencing audible wheezing and shortness of breath for past week.    Medication Rec Complete  Nalini Fu LPN............7/25/2022 2:22 PM

## 2022-07-25 NOTE — PATIENT INSTRUCTIONS
Take lasix 60 mg for three days then go back to your normal 20 mg daily   Start prednisone taper   Start 5 day course of antibiotics

## 2022-07-25 NOTE — RESULT ENCOUNTER NOTE
Results discussed directly with patient while patient was present. Any further details documented in the note.   Madyson London MD

## 2022-09-04 ENCOUNTER — APPOINTMENT (OUTPATIENT)
Dept: CT IMAGING | Facility: OTHER | Age: 67
End: 2022-09-04
Attending: PHYSICIAN ASSISTANT
Payer: MEDICARE

## 2022-09-04 ENCOUNTER — HOSPITAL ENCOUNTER (EMERGENCY)
Facility: OTHER | Age: 67
Discharge: ANOTHER HOSPITAL | End: 2022-09-04
Attending: PHYSICIAN ASSISTANT | Admitting: PHYSICIAN ASSISTANT
Payer: MEDICARE

## 2022-09-04 VITALS
RESPIRATION RATE: 13 BRPM | DIASTOLIC BLOOD PRESSURE: 43 MMHG | OXYGEN SATURATION: 93 % | TEMPERATURE: 98 F | HEART RATE: 142 BPM | SYSTOLIC BLOOD PRESSURE: 100 MMHG

## 2022-09-04 DIAGNOSIS — I48.0 PAROXYSMAL ATRIAL FIBRILLATION WITH RVR (H): ICD-10-CM

## 2022-09-04 DIAGNOSIS — R09.02 HYPOXIA: ICD-10-CM

## 2022-09-04 DIAGNOSIS — J44.1 COPD EXACERBATION (H): ICD-10-CM

## 2022-09-04 PROBLEM — Z95.0 CARDIAC PACEMAKER IN SITU: Status: ACTIVE | Noted: 2019-09-10

## 2022-09-04 LAB
ALBUMIN SERPL-MCNC: 3.9 G/DL (ref 3.5–5.7)
ALP SERPL-CCNC: 59 U/L (ref 34–104)
ALT SERPL W P-5'-P-CCNC: 16 U/L (ref 7–52)
ANION GAP SERPL CALCULATED.3IONS-SCNC: 6 MMOL/L (ref 3–14)
AST SERPL W P-5'-P-CCNC: 16 U/L (ref 13–39)
BASE EXCESS BLDA CALC-SCNC: 9.2 MMOL/L (ref -9–1.8)
BASOPHILS # BLD AUTO: 0.1 10E3/UL (ref 0–0.2)
BASOPHILS NFR BLD AUTO: 1 %
BILIRUB SERPL-MCNC: 1.1 MG/DL (ref 0.3–1)
BUN SERPL-MCNC: 23 MG/DL (ref 7–25)
CALCIUM SERPL-MCNC: 9.3 MG/DL (ref 8.6–10.3)
CHLORIDE BLD-SCNC: 98 MMOL/L (ref 98–107)
CO2 SERPL-SCNC: 36 MMOL/L (ref 21–31)
CREAT SERPL-MCNC: 0.76 MG/DL (ref 0.7–1.3)
CRP SERPL-MCNC: 28.4 MG/L
EOSINOPHIL # BLD AUTO: 0.2 10E3/UL (ref 0–0.7)
EOSINOPHIL NFR BLD AUTO: 3 %
ERYTHROCYTE [DISTWIDTH] IN BLOOD BY AUTOMATED COUNT: 13.8 % (ref 10–15)
FLUAV RNA SPEC QL NAA+PROBE: NEGATIVE
FLUBV RNA RESP QL NAA+PROBE: NEGATIVE
GFR SERPL CREATININE-BSD FRML MDRD: >90 ML/MIN/1.73M2
GLUCOSE BLD-MCNC: 169 MG/DL (ref 70–105)
HCO3 BLD-SCNC: 38 MMOL/L (ref 21–28)
HCT VFR BLD AUTO: 49.9 % (ref 40–53)
HGB BLD-MCNC: 15.5 G/DL (ref 13.3–17.7)
IMM GRANULOCYTES # BLD: 0 10E3/UL
IMM GRANULOCYTES NFR BLD: 0 %
INR PPP: 1.44 (ref 0.85–1.15)
LACTATE SERPL-SCNC: 1.1 MMOL/L (ref 0.7–2)
LYMPHOCYTES # BLD AUTO: 2.2 10E3/UL (ref 0.8–5.3)
LYMPHOCYTES NFR BLD AUTO: 24 %
MAGNESIUM SERPL-MCNC: 2 MG/DL (ref 1.9–2.7)
MCH RBC QN AUTO: 29.2 PG (ref 26.5–33)
MCHC RBC AUTO-ENTMCNC: 31.1 G/DL (ref 31.5–36.5)
MCV RBC AUTO: 94 FL (ref 78–100)
MONOCYTES # BLD AUTO: 0.6 10E3/UL (ref 0–1.3)
MONOCYTES NFR BLD AUTO: 7 %
NEUTROPHILS # BLD AUTO: 5.9 10E3/UL (ref 1.6–8.3)
NEUTROPHILS NFR BLD AUTO: 65 %
NRBC # BLD AUTO: 0 10E3/UL
NRBC BLD AUTO-RTO: 0 /100
NT-PROBNP SERPL-MCNC: 71 PG/ML (ref 0–100)
O2/TOTAL GAS SETTING VFR VENT: 44 %
OXYHGB MFR BLD: 94 % (ref 92–100)
PCO2 BLD: 70 MM HG (ref 35–45)
PH BLD: 7.34 [PH] (ref 7.35–7.45)
PLATELET # BLD AUTO: 251 10E3/UL (ref 150–450)
PO2 BLD: 77 MM HG (ref 80–105)
POTASSIUM BLD-SCNC: 4.1 MMOL/L (ref 3.5–5.1)
PROT SERPL-MCNC: 7.3 G/DL (ref 6.4–8.9)
RBC # BLD AUTO: 5.3 10E6/UL (ref 4.4–5.9)
RSV RNA SPEC NAA+PROBE: NEGATIVE
SARS-COV-2 RNA RESP QL NAA+PROBE: NEGATIVE
SODIUM SERPL-SCNC: 140 MMOL/L (ref 134–144)
TROPONIN I SERPL-MCNC: 9.6 PG/ML (ref 0–34)
WBC # BLD AUTO: 9 10E3/UL (ref 4–11)

## 2022-09-04 PROCEDURE — 83735 ASSAY OF MAGNESIUM: CPT | Performed by: PHYSICIAN ASSISTANT

## 2022-09-04 PROCEDURE — 250N000011 HC RX IP 250 OP 636: Performed by: PHYSICIAN ASSISTANT

## 2022-09-04 PROCEDURE — 36415 COLL VENOUS BLD VENIPUNCTURE: CPT | Performed by: PHYSICIAN ASSISTANT

## 2022-09-04 PROCEDURE — 87637 SARSCOV2&INF A&B&RSV AMP PRB: CPT | Performed by: PHYSICIAN ASSISTANT

## 2022-09-04 PROCEDURE — 99285 EMERGENCY DEPT VISIT HI MDM: CPT | Mod: CS | Performed by: PHYSICIAN ASSISTANT

## 2022-09-04 PROCEDURE — 250N000013 HC RX MED GY IP 250 OP 250 PS 637: Performed by: PHYSICIAN ASSISTANT

## 2022-09-04 PROCEDURE — 94640 AIRWAY INHALATION TREATMENT: CPT | Mod: 76

## 2022-09-04 PROCEDURE — 96375 TX/PRO/DX INJ NEW DRUG ADDON: CPT | Mod: XU | Performed by: PHYSICIAN ASSISTANT

## 2022-09-04 PROCEDURE — 94640 AIRWAY INHALATION TREATMENT: CPT

## 2022-09-04 PROCEDURE — C9803 HOPD COVID-19 SPEC COLLECT: HCPCS | Performed by: PHYSICIAN ASSISTANT

## 2022-09-04 PROCEDURE — 36600 WITHDRAWAL OF ARTERIAL BLOOD: CPT | Performed by: PHYSICIAN ASSISTANT

## 2022-09-04 PROCEDURE — 250N000009 HC RX 250: Performed by: EMERGENCY MEDICINE

## 2022-09-04 PROCEDURE — 96368 THER/DIAG CONCURRENT INF: CPT | Performed by: PHYSICIAN ASSISTANT

## 2022-09-04 PROCEDURE — 93010 ELECTROCARDIOGRAM REPORT: CPT | Performed by: INTERNAL MEDICINE

## 2022-09-04 PROCEDURE — 83605 ASSAY OF LACTIC ACID: CPT | Performed by: PHYSICIAN ASSISTANT

## 2022-09-04 PROCEDURE — 87040 BLOOD CULTURE FOR BACTERIA: CPT | Performed by: PHYSICIAN ASSISTANT

## 2022-09-04 PROCEDURE — 258N000003 HC RX IP 258 OP 636: Performed by: PHYSICIAN ASSISTANT

## 2022-09-04 PROCEDURE — 85004 AUTOMATED DIFF WBC COUNT: CPT | Performed by: PHYSICIAN ASSISTANT

## 2022-09-04 PROCEDURE — 84484 ASSAY OF TROPONIN QUANT: CPT | Performed by: PHYSICIAN ASSISTANT

## 2022-09-04 PROCEDURE — 86140 C-REACTIVE PROTEIN: CPT | Performed by: PHYSICIAN ASSISTANT

## 2022-09-04 PROCEDURE — G1010 CDSM STANSON: HCPCS

## 2022-09-04 PROCEDURE — 93005 ELECTROCARDIOGRAM TRACING: CPT | Mod: 76 | Performed by: PHYSICIAN ASSISTANT

## 2022-09-04 PROCEDURE — 99291 CRITICAL CARE FIRST HOUR: CPT | Mod: 25,CS | Performed by: PHYSICIAN ASSISTANT

## 2022-09-04 PROCEDURE — 93005 ELECTROCARDIOGRAM TRACING: CPT | Performed by: PHYSICIAN ASSISTANT

## 2022-09-04 PROCEDURE — 80053 COMPREHEN METABOLIC PANEL: CPT | Performed by: PHYSICIAN ASSISTANT

## 2022-09-04 PROCEDURE — 96365 THER/PROPH/DIAG IV INF INIT: CPT | Performed by: PHYSICIAN ASSISTANT

## 2022-09-04 PROCEDURE — 250N000009 HC RX 250: Performed by: PHYSICIAN ASSISTANT

## 2022-09-04 PROCEDURE — 999N000157 HC STATISTIC RCP TIME EA 10 MIN

## 2022-09-04 PROCEDURE — 82805 BLOOD GASES W/O2 SATURATION: CPT | Performed by: PHYSICIAN ASSISTANT

## 2022-09-04 PROCEDURE — 85610 PROTHROMBIN TIME: CPT | Performed by: PHYSICIAN ASSISTANT

## 2022-09-04 PROCEDURE — 83880 ASSAY OF NATRIURETIC PEPTIDE: CPT | Performed by: PHYSICIAN ASSISTANT

## 2022-09-04 RX ORDER — DILTIAZEM HYDROCHLORIDE 5 MG/ML
25 INJECTION INTRAVENOUS ONCE
Status: COMPLETED | OUTPATIENT
Start: 2022-09-04 | End: 2022-09-04

## 2022-09-04 RX ORDER — ADENOSINE 3 MG/ML
6 INJECTION, SOLUTION INTRAVENOUS ONCE
Status: COMPLETED | OUTPATIENT
Start: 2022-09-04 | End: 2022-09-04

## 2022-09-04 RX ORDER — ADENOSINE 3 MG/ML
12 INJECTION, SOLUTION INTRAVENOUS ONCE
Status: COMPLETED | OUTPATIENT
Start: 2022-09-04 | End: 2022-09-04

## 2022-09-04 RX ORDER — DILTIAZEM HCL IN NACL,ISO-OSM 125 MG/125
5-15 PLASTIC BAG, INJECTION (ML) INTRAVENOUS CONTINUOUS
Status: DISCONTINUED | OUTPATIENT
Start: 2022-09-04 | End: 2022-09-04

## 2022-09-04 RX ORDER — WARFARIN SODIUM 5 MG/1
10 TABLET ORAL
Status: COMPLETED | OUTPATIENT
Start: 2022-09-04 | End: 2022-09-04

## 2022-09-04 RX ORDER — IPRATROPIUM BROMIDE AND ALBUTEROL SULFATE 2.5; .5 MG/3ML; MG/3ML
3 SOLUTION RESPIRATORY (INHALATION) ONCE
Status: COMPLETED | OUTPATIENT
Start: 2022-09-04 | End: 2022-09-04

## 2022-09-04 RX ORDER — ALBUTEROL SULFATE 0.83 MG/ML
2.5 SOLUTION RESPIRATORY (INHALATION) ONCE
Status: COMPLETED | OUTPATIENT
Start: 2022-09-04 | End: 2022-09-04

## 2022-09-04 RX ORDER — IOPAMIDOL 755 MG/ML
100 INJECTION, SOLUTION INTRAVASCULAR ONCE
Status: COMPLETED | OUTPATIENT
Start: 2022-09-04 | End: 2022-09-04

## 2022-09-04 RX ORDER — BUDESONIDE 0.5 MG/2ML
0.5 INHALANT ORAL ONCE
Status: COMPLETED | OUTPATIENT
Start: 2022-09-04 | End: 2022-09-04

## 2022-09-04 RX ADMIN — WARFARIN SODIUM 10 MG: 5 TABLET ORAL at 21:28

## 2022-09-04 RX ADMIN — ADENOSINE 12 MG: 3 INJECTION, SOLUTION INTRAVENOUS at 18:43

## 2022-09-04 RX ADMIN — DILTIAZEM HYDROCHLORIDE 25 MG: 5 INJECTION INTRAVENOUS at 19:03

## 2022-09-04 RX ADMIN — AMIODARONE HYDROCHLORIDE 150 MG: 1.5 INJECTION, SOLUTION INTRAVENOUS at 21:33

## 2022-09-04 RX ADMIN — IPRATROPIUM BROMIDE AND ALBUTEROL SULFATE 3 ML: .5; 3 SOLUTION RESPIRATORY (INHALATION) at 16:22

## 2022-09-04 RX ADMIN — BUDESONIDE INHALATION 0.5 MG: 0.5 SUSPENSION RESPIRATORY (INHALATION) at 16:32

## 2022-09-04 RX ADMIN — ALBUTEROL SULFATE 2.5 MG: 2.5 SOLUTION RESPIRATORY (INHALATION) at 16:34

## 2022-09-04 RX ADMIN — DILTIAZEM HYDROCHLORIDE 25 MG: 5 INJECTION INTRAVENOUS at 18:43

## 2022-09-04 RX ADMIN — IOPAMIDOL 100 ML: 755 INJECTION, SOLUTION INTRAVENOUS at 19:42

## 2022-09-04 RX ADMIN — ADENOSINE 6 MG: 3 INJECTION, SOLUTION INTRAVENOUS at 18:37

## 2022-09-04 RX ADMIN — SODIUM CHLORIDE 1 MG/MIN: 0.9 INJECTION, SOLUTION INTRAVENOUS at 21:38

## 2022-09-04 ASSESSMENT — ENCOUNTER SYMPTOMS
CHILLS: 0
NAUSEA: 0
FEVER: 0
DIZZINESS: 0
FACIAL SWELLING: 0
COUGH: 1
SEIZURES: 0
WEAKNESS: 0
FACIAL ASYMMETRY: 0
LIGHT-HEADEDNESS: 0
SHORTNESS OF BREATH: 1
DIAPHORESIS: 0
TREMORS: 0

## 2022-09-04 ASSESSMENT — ACTIVITIES OF DAILY LIVING (ADL)
ADLS_ACUITY_SCORE: 35

## 2022-09-04 NOTE — ED NOTES
Had brief rate reduction after 2nd dose of adenosine. Cardizem 25 mg then given with variable rate control 80's to low 100's. Remains asymptomatic.

## 2022-09-04 NOTE — ED NOTES
While at rest he developed a sudden onset narrow complex tachycardia. EKG ordered.Denies any chest discomfort or sensation of palpitations.

## 2022-09-04 NOTE — ED NOTES
Presents with inspiratory and expiratory wheeze bilaterally. Sat was in low to mid 80's in RA. Recovered to mid 90's on 2 liters per NC. Duo neb initiated.

## 2022-09-05 NOTE — PROGRESS NOTES

## 2022-09-05 NOTE — ED PROVIDER NOTES
"  History     Chief Complaint   Patient presents with     Shortness of Breath     Cough     HPI  Efra Zuniga is a 67 year old male who comes in on his own accord with increasing shortness of breath.  He reports that he was recently on vacation to [place].  But he has been having shortness of breath ever since.  He denies any chest pain.  No nausea or vomiting.  No lightheadedness or dizziness.  Denies any fever or chills.  He has been vaccinated for COVID.  Past medical history is significant for asbestos exposure, dysthymic  disorder, diabetes mellitus type 2 controlled, hypertension, COPD, non-smoker, chronic bronchitis, proximal atrial fibrillation for which she takes Coumadin, and low blood magnesium.  He also has a pacemaker     Allergies:  Allergies   Allergen Reactions     Lisinopril      Other reaction(s): Tachycardia  Felt like \"a heart attack\"; hands went numb       Problem List:    Patient Active Problem List    Diagnosis Date Noted     Paroxysmal atrial fibrillation with RVR (H) 10/06/2019     Priority: Medium     Low blood magnesium 10/06/2019     Priority: Medium     Dysthymic disorder 01/16/2018     Priority: Medium     Overview:   chronic       History of disease 01/16/2018     Priority: Medium     Asbestos exposure 09/15/2017     Priority: Medium     Moderate COPD (chronic obstructive pulmonary disease) (H) 09/15/2017     Priority: Medium     Nonsmoker 09/15/2017     Priority: Medium     Simple chronic bronchitis (H) 09/15/2017     Priority: Medium     Essential hypertension 04/27/2017     Priority: Medium     Diabetes mellitus type 2, controlled (H) 05/02/2014     Priority: Medium        Past Medical History:    Past Medical History:   Diagnosis Date     Family history of other specified conditions (CODE)      Hemorrhage of anus and rectum      Irritable bowel syndrome with constipation      Pain in joint        Past Surgical History:    Past Surgical History:   Procedure Laterality Date     " APPENDECTOMY OPEN      Coronary angiogram done by Aron Quick at [facility] in 2006 shows patent coronary arteries     CHOLECYSTECTOMY      No Comments Provided     OTHER SURGICAL HISTORY      2006,2074,SCAN-ANGIOGRAM,Coronary angiogram done by Aron Quick at [facility] in shows patent coronary arteries       Family History:    Family History   Problem Relation Age of Onset     Alzheimer Disease Mother         Alzheimer's disease     Cancer Father         Cancer, with a brain tumor     Heart Disease Father         Heart Disease,CABG x3     Heart Disease Maternal Grandmother         Heart Disease     Heart Disease Maternal Grandfather         Heart Disease     Heart Disease Paternal Grandmother         Heart Disease     Heart Disease Paternal Grandfather         Heart Disease     Other - See Comments Sister         ALS       Social History:  Marital Status:  Single [1]  Social History     Tobacco Use     Smoking status: Never Smoker     Smokeless tobacco: Never Used   Substance Use Topics     Alcohol use: No     Drug use: No        Medications:    acetaminophen (TYLENOL) 500 MG tablet  albuterol (2.5 MG/3ML) 0.083% neb solution  albuterol (PROAIR HFA/PROVENTIL HFA/VENTOLIN HFA) 108 (90 BASE) MCG/ACT Inhaler  aspirin EC 81 MG EC tablet  Blood Glucose Monitoring Suppl (FIFTY50 GLUCOSE METER 2.0) W/DEVICE KIT  citalopram (CELEXA) 20 MG tablet  fluticasone (FLOVENT HFA) 110 MCG/ACT Inhaler  furosemide (LASIX) 20 MG tablet  furosemide (LASIX) 20 MG tablet  ipratropium - albuterol 0.5 mg/2.5 mg/3 mL (DUONEB) 0.5-2.5 (3) MG/3ML neb solution  losartan (COZAAR) 50 MG tablet  magnesium oxide (MAG-OX) 400 (241.3 Mg) MG tablet  metFORMIN (GLUCOPHAGE) 500 MG tablet  metoprolol succinate ER (TOPROL-XL) 100 MG 24 hr tablet  spironolactone (ALDACTONE) 25 MG tablet  umeclidinium-vilanterol (ANORO ELLIPTA) 62.5-25 MCG/INH oral inhaler  warfarin ANTICOAGULANT (COUMADIN) 5 MG tablet          Review of Systems    Constitutional: Negative for chills, diaphoresis and fever.   HENT: Negative for facial swelling.    Respiratory: Positive for cough and shortness of breath.    Cardiovascular: Negative for chest pain.   Gastrointestinal: Negative for nausea.   Neurological: Negative for dizziness, tremors, seizures, syncope, facial asymmetry, weakness and light-headedness.   All other systems reviewed and are negative.      Physical Exam   BP: (!) 145/80  Pulse: 90  Temp: 98  F (36.7  C)  Resp: 16  SpO2: (!) 86 %    Vitals:    09/04/22 1930 09/04/22 1945 09/04/22 2000 09/04/22 2015   BP:  124/83 (!) 131/95 100/43   Pulse: (!) 149 (!) 135 105 (!) 142   Resp: 17 24 20 13   Temp:       SpO2: 94% 95% 95% 93%       Physical Exam  Vitals and nursing note reviewed.   Constitutional:       General: He is not in acute distress.     Appearance: He is well-developed. He is not ill-appearing.   HENT:      Head: Normocephalic.      Nose: Nose normal.   Eyes:      General: No scleral icterus.     Extraocular Movements: Extraocular movements intact.   Cardiovascular:      Rate and Rhythm: Tachycardia present.   Pulmonary:      Effort: Pulmonary effort is normal. No respiratory distress.      Breath sounds: Normal breath sounds.      Comments: Lung sounds are decreased throughout.  SaO2 is 94% on 2 L via nasal cannula.  He was in the 86% range on room air upon arrival.  He is not tachypneic.  He and he does not appear to be in respiratory distress.  Musculoskeletal:      Cervical back: Normal range of motion.   Neurological:      Mental Status: He is alert.         ED Course     1657-EKG shows atrial flutter with variable AV block.  Possible anterior infarct, age undetermined.  ST and T wave abnormality.  Consider inferior ischemia  1830-EKG shows supraventricular tachycardia.  ST and T wave abnormality consider inferior ischemia.  Heart rate is 173.      ED Course as of 09/04/22 2031   Sun Sep 04, 2022   1804 Sodium: 140   2010 Protein Total:  7.3       Results for orders placed or performed during the hospital encounter of 09/04/22 (from the past 24 hour(s))   CBC with platelets differential    Narrative    The following orders were created for panel order CBC with platelets differential.  Procedure                               Abnormality         Status                     ---------                               -----------         ------                     CBC with platelets and d...[[id number]]  Abnormal            Final result                 Please view results for these tests on the individual orders.   Comprehensive metabolic panel   Result Value Ref Range    Sodium 140 134 - 144 mmol/L    Potassium 4.1 3.5 - 5.1 mmol/L    Chloride 98 98 - 107 mmol/L    Carbon Dioxide (CO2) 36 (H) 21 - 31 mmol/L    Anion Gap 6 3 - 14 mmol/L    Urea Nitrogen 23 7 - 25 mg/dL    Creatinine 0.76 0.70 - 1.30 mg/dL    Calcium 9.3 8.6 - 10.3 mg/dL    Glucose 169 (H) 70 - 105 mg/dL    Alkaline Phosphatase 59 34 - 104 U/L    AST 16 13 - 39 U/L    ALT 16 7 - 52 U/L    Protein Total 7.3 6.4 - 8.9 g/dL    Albumin 3.9 3.5 - 5.7 g/dL    Bilirubin Total 1.1 (H) 0.3 - 1.0 mg/dL    GFR Estimate >90 >60 mL/min/1.73m2   INR   Result Value Ref Range    INR 1.44 (H) 0.85 - 1.15   Lactic acid whole blood   Result Value Ref Range    Lactic Acid 1.1 0.7 - 2.0 mmol/L   Troponin I   Result Value Ref Range    Troponin I 9.6 0.0 - 34.0 pg/mL   Magnesium   Result Value Ref Range    Magnesium 2.0 1.9 - 2.7 mg/dL   Nt probnp inpatient (BNP)   Result Value Ref Range    N terminal Pro BNP Inpatient 71 0 - 100 pg/mL   CRP inflammation   Result Value Ref Range    CRP Inflammation 28.4 (H) <10.0 mg/L   Blood gas arterial and oxyhgb   Result Value Ref Range    pH Arterial 7.34 (L) 7.35 - 7.45    pCO2 Arterial 70 (H) 35 - 45 mm Hg    pO2 Arterial 77 (L) 80 - 105 mm Hg    Bicarbonate Arterial 38 (H) 21 - 28 mmol/L    Oxyhemoglobin Arterial 94 92 - 100 %    Base Excess/Deficit (+/-) 9.2 (H) -9.0 - 1.8  mmol/L    FIO2 44    CBC with platelets and differential   Result Value Ref Range    WBC Count 9.0 4.0 - 11.0 10e3/uL    RBC Count 5.30 4.40 - 5.90 10e6/uL    Hemoglobin 15.5 13.3 - 17.7 g/dL    Hematocrit 49.9 40.0 - 53.0 %    MCV 94 78 - 100 fL    MCH 29.2 26.5 - 33.0 pg    MCHC 31.1 (L) 31.5 - 36.5 g/dL    RDW 13.8 10.0 - 15.0 %    Platelet Count 251 150 - 450 10e3/uL    % Neutrophils 65 %    % Lymphocytes 24 %    % Monocytes 7 %    % Eosinophils 3 %    % Basophils 1 %    % Immature Granulocytes 0 %    NRBCs per 100 WBC 0 <1 /100    Absolute Neutrophils 5.9 1.6 - 8.3 10e3/uL    Absolute Lymphocytes 2.2 0.8 - 5.3 10e3/uL    Absolute Monocytes 0.6 0.0 - 1.3 10e3/uL    Absolute Eosinophils 0.2 0.0 - 0.7 10e3/uL    Absolute Basophils 0.1 0.0 - 0.2 10e3/uL    Absolute Immature Granulocytes 0.0 <=0.4 10e3/uL    Absolute NRBCs 0.0 10e3/uL   Asymptomatic Influenza A/B & SARS-CoV2 (COVID-19) Virus PCR Multiplex Nasopharyngeal    Specimen: Nasopharyngeal; Swab   Result Value Ref Range    Influenza A PCR Negative Negative    Influenza B PCR Negative Negative    RSV PCR Negative Negative    SARS CoV2 PCR Negative Negative    Narrative    Testing was performed using the Xpert Xpress CoV2/Flu/RSV Assay on the [employer] GeneXpert Instrument. This test should be ordered for the detection of SARS-CoV-2 and influenza viruses in individuals who meet clinical and/or epidemiological criteria. Test performance is unknown in asymptomatic patients. This test is for in vitro diagnostic use under the FDA EUA for laboratories certified under CLIA to perform high or moderate complexity testing. This test has not been FDA cleared or approved. A negative result does not rule out the presence of PCR inhibitors in the specimen or target RNA in concentration below the limit of detection for the assay. If only one viral target is positive but coinfection with multiple targets is suspected, the sample should be re-tested with another FDA cleared,  approved, or authorized test, if coinfection would change clinical management. This test was validated by the [facility]. These laboratories are certified under the Clinical  Laboratory Improvement Amendments of 1988 (CLIA-88) as qualified to perform high complexity laboratory testing.   CT Chest Pulmonary Embolism w Contrast    Narrative    CT CHEST PULMONARY EMBOLISM W CONTRAST    HISTORY: 67 years Male sub therapeutic INR, hypoxic    TECHNIQUE: Axial CT imaging of the chest was performed With  intravenous contrast. Coronal and sagittal reconstructions were  obtained.  This exam was performed using one or more of the following dose  reduction techniques:  Automated exposure control, adjustment of the MAEGAN and/or KV according  to patient's size, and/or use of iterative reconstruction technique.    COMPARISON: Chest x-ray 7/25/2010    FINDINGS:  There are no filling defects in the pulmonary arteries. There is no  evidence of pulmonary embolism. There is no evidence of thoracic  aortic aneurysm or dissection.  There is no mediastinal or hilar or axillary lymphadenopathy.      There is mild atelectasis at the left lung base. Lungs are otherwise  clear.         No concerning osseous lesions are identified      Impression    IMPRESSION: No evidence of pulmonary embolism.    Mild atelectasis at the left lung base.    AIDA ZHAO MD         SYSTEM ID:  [id number]       Medications   warfarin ANTICOAGULANT (COUMADIN) tablet 10 mg (has no administration in time range)   amiodarone (CORDARONE) infusion ADULT STANDARD 900 mg/500 mL (1.8 mg/mL) mixture (has no administration in time range)   ipratropium - albuterol 0.5 mg/2.5 mg/3 mL (DUONEB) neb solution 3 mL (3 mLs Nebulization Given 9/4/22 1622)   albuterol (PROVENTIL) neb solution 2.5 mg (2.5 mg Nebulization Given 9/4/22 1634)   budesonide (PULMICORT) neb solution 0.5 mg (0.5 mg Nebulization Given 9/4/22 1632)   diltiazem (CARDIZEM) injection 25 mg  (25 mg Intravenous Given 9/4/22 1843)   adenosine (ADENOCARD) injection 6 mg (6 mg Intravenous Given 9/4/22 1837)   adenosine (ADENOCARD) injection 12 mg (12 mg Intravenous Given 9/4/22 1843)   diltiazem (CARDIZEM) injection 25 mg (25 mg Intravenous Given 9/4/22 1903)   iopamidol (ISOVUE-370) solution 100 mL (100 mLs Intravenous Given 9/4/22 1942)       Assessments & Plan (with Medical Decision Making)     I have reviewed the nursing notes.    I have reviewed the findings, diagnosis, plan and need for follow up with the patient.      New Prescriptions    No medications on file       Final diagnoses:   Paroxysmal atrial fibrillation with RVR (H)   Hypoxia   COPD exacerbation (H)     67 year old male who comes in on his own accord with increasing shortness of breath.  He reports that he was recently on vacation to [place].  But he has been having shortness of breath ever since.  He denies any chest pain.  No nausea or vomiting.  No lightheadedness or dizziness.  Denies any fever or chills.  He has been vaccinated for COVID.  ABG shows a pH of 7.34, PCO2 is 70, PO2 is 77, HCO3 is 38, base excess is 9.2, and his oxyhemoglobin is 94 with FiO2 of 44.  IV was established.  The patient was given DuoNeb initially as well as albuterol with Pulmicort.  1657-EKG shows atrial flutter with variable AV block.  Possible anterior infarct, age undetermined.  ST and T wave abnormality.  Consider inferior ischemia  1830-EKG shows supraventricular tachycardia.  ST and T wave abnormality consider inferior ischemia.  Heart rate is 173.  CRP is 20.4.  Magnesium is normal.  BNP is normal.  The patient was given Adenosine 6 mg followed by 12 mg.  He would convert slightly into the 110 range and then return back to the atrial fib /flutter.  He was given 25 mg of Cardizem, with additional dose 10 minutes later of 25 mg.    His troponin is normal.  BNP is normal.  Magnesium is normal.'s CMP is unremarkable.  His CRP is slightly elevated at 28.4.   His INR is subtherapeutic is 1.4.  Blood cultures are pending.  CT PE study shows  No evidence of pulmonary embolism.  Mild atelectasis at the left lung base.  Influenza, RSV and COVID tests are negative.  Proximal atrial fibrillation with RVR uncontrolled.  Hypoxia.  And COPD exacerbation.  I discussed this case with Dr. Sanchez at [facility] the patient will be given his warfarin of 10 mg orally tonight.  He will be started on a amiodarone infusion as well.  The patient will be transported via ground ambulance to [facility] 6 W.      9/4/2022   [facility]  09/04/22 2053

## 2022-09-06 LAB
ATRIAL RATE - MUSE: 174 BPM
ATRIAL RATE - MUSE: 330 BPM
ATRIAL RATE - MUSE: 357 BPM
DIASTOLIC BLOOD PRESSURE - MUSE: NORMAL MMHG
INTERPRETATION ECG - MUSE: NORMAL
P AXIS - MUSE: 86 DEGREES
P AXIS - MUSE: NORMAL DEGREES
P AXIS - MUSE: NORMAL DEGREES
PR INTERVAL - MUSE: NORMAL MS
QRS DURATION - MUSE: 100 MS
QRS DURATION - MUSE: 80 MS
QRS DURATION - MUSE: 86 MS
QT - MUSE: 266 MS
QT - MUSE: 268 MS
QT - MUSE: 332 MS
QTC - MUSE: 412 MS
QTC - MUSE: 437 MS
QTC - MUSE: 451 MS
R AXIS - MUSE: 54 DEGREES
R AXIS - MUSE: 65 DEGREES
R AXIS - MUSE: 68 DEGREES
SYSTOLIC BLOOD PRESSURE - MUSE: NORMAL MMHG
T AXIS - MUSE: 208 DEGREES
T AXIS - MUSE: 223 DEGREES
T AXIS - MUSE: 96 DEGREES
VENTRICULAR RATE- MUSE: 160 BPM
VENTRICULAR RATE- MUSE: 173 BPM
VENTRICULAR RATE- MUSE: 93 BPM

## 2022-09-09 LAB
BACTERIA BLD CULT: NO GROWTH
BACTERIA BLD CULT: NO GROWTH

## 2022-11-06 ENCOUNTER — HOSPITAL ENCOUNTER (EMERGENCY)
Facility: OTHER | Age: 67
Discharge: HOME | End: 2022-11-06
Payer: MEDICARE

## 2022-11-06 ENCOUNTER — OFFICE VISIT (OUTPATIENT)
Dept: FAMILY MEDICINE | Facility: OTHER | Age: 67
End: 2022-11-06
Attending: NURSE PRACTITIONER
Payer: MEDICARE

## 2022-11-06 VITALS
OXYGEN SATURATION: 92 % | DIASTOLIC BLOOD PRESSURE: 77 MMHG | HEART RATE: 89 BPM | RESPIRATION RATE: 20 BRPM | TEMPERATURE: 98.4 F | SYSTOLIC BLOOD PRESSURE: 161 MMHG

## 2022-11-06 VITALS
HEART RATE: 87 BPM | TEMPERATURE: 97.3 F | WEIGHT: 315 LBS | OXYGEN SATURATION: 88 % | DIASTOLIC BLOOD PRESSURE: 88 MMHG | BODY MASS INDEX: 42.88 KG/M2 | RESPIRATION RATE: 22 BRPM | SYSTOLIC BLOOD PRESSURE: 166 MMHG

## 2022-11-06 DIAGNOSIS — I48.20 CHRONIC ATRIAL FIBRILLATION (H): ICD-10-CM

## 2022-11-06 DIAGNOSIS — R06.02 SHORTNESS OF BREATH: Primary | ICD-10-CM

## 2022-11-06 DIAGNOSIS — J44.9 CHRONIC OBSTRUCTIVE PULMONARY DISEASE, UNSPECIFIED COPD TYPE (H): ICD-10-CM

## 2022-11-06 DIAGNOSIS — R53.83 FATIGUE, UNSPECIFIED TYPE: ICD-10-CM

## 2022-11-06 PROCEDURE — 99207 PR NO CHARGE LOS: CPT

## 2022-11-06 PROCEDURE — G0463 HOSPITAL OUTPT CLINIC VISIT: HCPCS

## 2022-11-06 ASSESSMENT — PAIN SCALES - GENERAL: PAINLEVEL: NO PAIN (0)

## 2022-11-06 NOTE — NURSING NOTE
Chief Complaint   Patient presents with     Shortness of Breath     Patient is here for SOB that started a couple days ago and worsened today. Patient denies cough or fever.     Initial BP (!) 166/88   Pulse 87   Temp 97.3  F (36.3  C) (Tympanic)   Resp 22   Wt 143.4 kg (316 lb 3.2 oz)   SpO2 (!) 88%   BMI 42.88 kg/m   Estimated body mass index is 42.88 kg/m  as calculated from the following:    Height as of 10/6/19: 1.829 m (6').    Weight as of this encounter: 143.4 kg (316 lb 3.2 oz).  Medication Reconciliation: complete    Trina Dias LPN

## 2022-11-06 NOTE — PROGRESS NOTES
Triaged from: [facility]    DIAGNOSIS:   1. Shortness of breath    2. Chronic atrial fibrillation (H)    3. Chronic obstructive pulmonary disease, unspecified COPD type (H)    4. Fatigue, unspecified type        Initial BP (!) 166/88   Pulse 87   Temp 97.3  F (36.3  C) (Tympanic)   Resp 22   Wt 143.4 kg (316 lb 3.2 oz)   SpO2 (!) 88%   BMI 42.88 kg/m   Estimated body mass index is 42.88 kg/m  as calculated from the following:    Height as of 10/6/19: 1.829 m (6').    Weight as of this encounter: 143.4 kg (316 lb 3.2 oz).    Patient presents with a week of symptoms including significant shortness of breath, hypoxia and fatigue.  Patient was recently hospitalized 9/4/2022 for new onset A. fib with RVR and COPD exacerbation.  Patient was discharged home on warfarin which he states he has not been taking.  He has not been taking any of his prescribed medications for over a month.  When inquired as to why he is not taking his medications he states he does not like taking pills.  Patient also states he has home oxygen, inhalers and nebulizers but refuses to use those as well.    Due to complexity of patient symptoms with comorbidities patient is transferred to the ER for further evaluation, management, and possible admission.  Face-to-face report was provided to ER MD.    Hazel James NP

## 2022-11-06 NOTE — ED TRIAGE NOTES
[facility] with report of sohortness of breath and poor medication compliance. Patient report not liking to take meds as they upset his stomach. Reported 89% in Clinic. Patient has history of COPD. BP (!) 161/77   Pulse 89   Temp 98.4  F (36.9  C) (Temporal)   Resp 20   SpO2 92%        Triage Assessment     Row Name 11/06/22 1548       Triage Assessment (Adult)    Airway WDL WDL       Respiratory WDL    Respiratory WDL WDL       Skin Circulation/Temperature WDL    Skin Circulation/Temperature WDL WDL       Cardiac WDL    Cardiac WDL WDL       Peripheral/Neurovascular WDL    Peripheral Neurovascular WDL WDL       Cognitive/Neuro/Behavioral WDL    Cognitive/Neuro/Behavioral WDL WDL

## 2022-11-07 ENCOUNTER — HOSPITAL ENCOUNTER (EMERGENCY)
Facility: OTHER | Age: 67
Discharge: HOME | End: 2022-11-07
Attending: EMERGENCY MEDICINE | Admitting: EMERGENCY MEDICINE
Payer: MEDICARE

## 2022-11-07 ENCOUNTER — APPOINTMENT (OUTPATIENT)
Dept: CT IMAGING | Facility: OTHER | Age: 67
End: 2022-11-07
Attending: EMERGENCY MEDICINE
Payer: MEDICARE

## 2022-11-07 ENCOUNTER — APPOINTMENT (OUTPATIENT)
Dept: GENERAL RADIOLOGY | Facility: OTHER | Age: 67
End: 2022-11-07
Attending: EMERGENCY MEDICINE
Payer: MEDICARE

## 2022-11-07 VITALS
TEMPERATURE: 98 F | HEART RATE: 73 BPM | DIASTOLIC BLOOD PRESSURE: 84 MMHG | SYSTOLIC BLOOD PRESSURE: 163 MMHG | OXYGEN SATURATION: 92 %

## 2022-11-07 DIAGNOSIS — M62.81 GENERALIZED MUSCLE WEAKNESS: ICD-10-CM

## 2022-11-07 DIAGNOSIS — Z91.148 NONCOMPLIANCE WITH MEDICATION REGIMEN: ICD-10-CM
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
## 2022-11-07 LAB
ALBUMIN SERPL-MCNC: 3.8 G/DL (ref 3.5–5.7)
ALP SERPL-CCNC: 63 U/L (ref 34–104)
ALT SERPL W P-5'-P-CCNC: 14 U/L (ref 7–52)
ANION GAP SERPL CALCULATED.3IONS-SCNC: 6 MMOL/L (ref 3–14)
AST SERPL W P-5'-P-CCNC: 12 U/L (ref 13–39)
ATRIAL RATE - MUSE: 83 BPM
BASOPHILS # BLD AUTO: 0.1 10E3/UL (ref 0–0.2)
BASOPHILS NFR BLD AUTO: 1 %
BILIRUB SERPL-MCNC: 1.4 MG/DL (ref 0.3–1)
BUN SERPL-MCNC: 14 MG/DL (ref 7–25)
CALCIUM SERPL-MCNC: 9.3 MG/DL (ref 8.6–10.3)
CHLORIDE BLD-SCNC: 98 MMOL/L (ref 98–107)
CO2 SERPL-SCNC: 34 MMOL/L (ref 21–31)
CREAT SERPL-MCNC: 0.82 MG/DL (ref 0.7–1.3)
DIASTOLIC BLOOD PRESSURE - MUSE: NORMAL MMHG
EOSINOPHIL # BLD AUTO: 0.3 10E3/UL (ref 0–0.7)
EOSINOPHIL NFR BLD AUTO: 4 %
ERYTHROCYTE [DISTWIDTH] IN BLOOD BY AUTOMATED COUNT: 14.6 % (ref 10–15)
GFR SERPL CREATININE-BSD FRML MDRD: >90 ML/MIN/1.73M2
GLUCOSE BLD-MCNC: 223 MG/DL (ref 70–105)
HCT VFR BLD AUTO: 45.7 % (ref 40–53)
HGB BLD-MCNC: 14.7 G/DL (ref 13.3–17.7)
HOLD SPECIMEN: NORMAL
HOLD SPECIMEN: NORMAL
IMM GRANULOCYTES # BLD: 0 10E3/UL
IMM GRANULOCYTES NFR BLD: 0 %
INR PPP: 1 (ref 0.85–1.15)
INTERPRETATION ECG - MUSE: NORMAL
LYMPHOCYTES # BLD AUTO: 1.4 10E3/UL (ref 0.8–5.3)
LYMPHOCYTES NFR BLD AUTO: 17 %
MCH RBC QN AUTO: 30 PG (ref 26.5–33)
MCHC RBC AUTO-ENTMCNC: 32.2 G/DL (ref 31.5–36.5)
MCV RBC AUTO: 93 FL (ref 78–100)
MONOCYTES # BLD AUTO: 0.6 10E3/UL (ref 0–1.3)
MONOCYTES NFR BLD AUTO: 7 %
NEUTROPHILS # BLD AUTO: 5.8 10E3/UL (ref 1.6–8.3)
NEUTROPHILS NFR BLD AUTO: 71 %
NRBC # BLD AUTO: 0 10E3/UL
NRBC BLD AUTO-RTO: 0 /100
NT-PROBNP SERPL-MCNC: 30 PG/ML (ref 0–100)
P AXIS - MUSE: 24 DEGREES
PLATELET # BLD AUTO: 179 10E3/UL (ref 150–450)
POTASSIUM BLD-SCNC: 3.8 MMOL/L (ref 3.5–5.1)
PR INTERVAL - MUSE: 168 MS
PROT SERPL-MCNC: 6.9 G/DL (ref 6.4–8.9)
QRS DURATION - MUSE: 84 MS
QT - MUSE: 384 MS
QTC - MUSE: 451 MS
R AXIS - MUSE: 54 DEGREES
RBC # BLD AUTO: 4.9 10E6/UL (ref 4.4–5.9)
SODIUM SERPL-SCNC: 138 MMOL/L (ref 134–144)
SYSTOLIC BLOOD PRESSURE - MUSE: NORMAL MMHG
T AXIS - MUSE: 75 DEGREES
VENTRICULAR RATE- MUSE: 83 BPM
WBC # BLD AUTO: 8.3 10E3/UL (ref 4–11)

## 2022-11-07 PROCEDURE — 250N000009 HC RX 250: Performed by: EMERGENCY MEDICINE

## 2022-11-07 PROCEDURE — 999N000157 HC STATISTIC RCP TIME EA 10 MIN

## 2022-11-07 PROCEDURE — 80053 COMPREHEN METABOLIC PANEL: CPT | Performed by: EMERGENCY MEDICINE

## 2022-11-07 PROCEDURE — 93010 ELECTROCARDIOGRAM REPORT: CPT | Performed by: INTERNAL MEDICINE

## 2022-11-07 PROCEDURE — 85025 COMPLETE CBC W/AUTO DIFF WBC: CPT | Performed by: EMERGENCY MEDICINE

## 2022-11-07 PROCEDURE — 250N000013 HC RX MED GY IP 250 OP 250 PS 637: Performed by: EMERGENCY MEDICINE

## 2022-11-07 PROCEDURE — 94640 AIRWAY INHALATION TREATMENT: CPT

## 2022-11-07 PROCEDURE — 99283 EMERGENCY DEPT VISIT LOW MDM: CPT | Performed by: EMERGENCY MEDICINE

## 2022-11-07 PROCEDURE — G1010 CDSM STANSON: HCPCS

## 2022-11-07 PROCEDURE — 71045 X-RAY EXAM CHEST 1 VIEW: CPT

## 2022-11-07 PROCEDURE — 83880 ASSAY OF NATRIURETIC PEPTIDE: CPT | Performed by: EMERGENCY MEDICINE

## 2022-11-07 PROCEDURE — 99285 EMERGENCY DEPT VISIT HI MDM: CPT | Mod: 25 | Performed by: EMERGENCY MEDICINE

## 2022-11-07 PROCEDURE — 85610 PROTHROMBIN TIME: CPT | Performed by: EMERGENCY MEDICINE

## 2022-11-07 PROCEDURE — 36415 COLL VENOUS BLD VENIPUNCTURE: CPT | Performed by: EMERGENCY MEDICINE

## 2022-11-07 PROCEDURE — 93005 ELECTROCARDIOGRAM TRACING: CPT | Performed by: EMERGENCY MEDICINE

## 2022-11-07 RX ORDER — SPIRONOLACTONE 25 MG/1
25 TABLET ORAL DAILY
Status: DISCONTINUED | OUTPATIENT
Start: 2022-11-07 | End: 2022-11-07 | Stop reason: HOSPADM

## 2022-11-07 RX ORDER — FUROSEMIDE 20 MG
20 TABLET ORAL ONCE
Status: DISCONTINUED | OUTPATIENT
Start: 2022-11-07 | End: 2022-11-07

## 2022-11-07 RX ORDER — FUROSEMIDE 20 MG
20 TABLET ORAL ONCE
Status: COMPLETED | OUTPATIENT
Start: 2022-11-07 | End: 2022-11-07

## 2022-11-07 RX ORDER — IPRATROPIUM BROMIDE AND ALBUTEROL SULFATE 2.5; .5 MG/3ML; MG/3ML
3 SOLUTION RESPIRATORY (INHALATION) ONCE
Status: COMPLETED | OUTPATIENT
Start: 2022-11-07 | End: 2022-11-07

## 2022-11-07 RX ORDER — SPIRONOLACTONE 25 MG/1
25 TABLET ORAL DAILY
Status: DISCONTINUED | OUTPATIENT
Start: 2022-11-07 | End: 2022-11-07

## 2022-11-07 RX ADMIN — SPIRONOLACTONE 25 MG: 25 TABLET ORAL at 11:20

## 2022-11-07 RX ADMIN — IPRATROPIUM BROMIDE AND ALBUTEROL SULFATE 3 ML: 2.5; .5 SOLUTION RESPIRATORY (INHALATION) at 09:07

## 2022-11-07 RX ADMIN — FUROSEMIDE 20 MG: 20 TABLET ORAL at 11:20

## 2022-11-07 ASSESSMENT — ENCOUNTER SYMPTOMS
CHEST TIGHTNESS: 0
VOMITING: 0
WEAKNESS: 1
CHILLS: 0
NAUSEA: 0
FEVER: 0
DYSURIA: 0
AGITATION: 0
ARTHRALGIAS: 0
LIGHT-HEADEDNESS: 1
SHORTNESS OF BREATH: 1

## 2022-11-07 ASSESSMENT — ACTIVITIES OF DAILY LIVING (ADL)
ADLS_ACUITY_SCORE: 36

## 2022-11-07 NOTE — ED PROVIDER NOTES
"  History     Chief Complaint   Patient presents with     Syncope     HPI  Efra Zuniga is a 67 year old male who comes in by ambulance after having fallen at home.  He said he was working outside and he just fell, he does not know really why he fell.  He does not think that he fell because he passed out.  He was unable to get back up however so ambulance was called for a lift assist and they brought him in.  He says he did not hurt himself in the fall except for a little bruise on his knee.  He does have a little abrasion on his forehead but denies any head pain or discomfort of any kind.  Patient has been recently in the hospital, he was at [facility] from September 4 through 12 with diagnosis of acute on chronic heart failure.  Since then patient has followed up in clinic with [facility] cardiology.  He apparently is not taking any of his medications as he says they make him tired.  He was seen here in the emergency department yesterday with shortness of breath and at that point also told staff he just was not taking his medications.  He says he does not have a primary care provider at this time.    Allergies:  Allergies   Allergen Reactions     Lisinopril      Other reaction(s): Tachycardia  Felt like \"a heart attack\"; hands went numb       Problem List:    Patient Active Problem List    Diagnosis Date Noted     Chronic heart failure with preserved ejection fraction (H) 04/20/2021     Priority: Medium     Paroxysmal atrial fibrillation with RVR (H) 10/06/2019     Priority: Medium     Low blood magnesium 10/06/2019     Priority: Medium     Cardiac pacemaker in situ 09/10/2019     Priority: Medium     Formatting of this note might be different from the original.  Implant Date 5/25/22  Implanting Physician Bong Edmonds-explanted old leads   09/10/2019 [employer] Accolade, model L331, serial# [id number]   Lead   Right Atrium  Model Ingevity + IS-1 BiPositive Fix RA/RV 52cm                                 " 7841 Serial Number [id number]  Implant Date 22  Lead  Left Ventricle  Model Ingevity + IS-1 BiPositive Fix RA/RV 59cm                                 7842 Serial Number [id number]   Implant Date 22  LB area       c       Panlobular emphysema (H) 2018     Priority: Medium     Dysthymic disorder 2018     Priority: Medium     Overview:   chronic       History of disease 2018     Priority: Medium     Asbestos exposure 09/15/2017     Priority: Medium     Moderate COPD (chronic obstructive pulmonary disease) (H) 09/15/2017     Priority: Medium     Nonsmoker 09/15/2017     Priority: Medium     Simple chronic bronchitis (H) 09/15/2017     Priority: Medium     Essential hypertension 2017     Priority: Medium     Diabetes mellitus type 2, controlled (H) 2014     Priority: Medium        Past Medical History:    Past Medical History:   Diagnosis Date     Family history of other specified conditions (CODE)      Hemorrhage of anus and rectum      Irritable bowel syndrome with constipation      Pain in joint        Past Surgical History:    Past Surgical History:   Procedure Laterality Date     APPENDECTOMY OPEN      Coronary angiogram done by Aron Quick at [facility] in 2006 shows patent coronary arteries     CHOLECYSTECTOMY      No Comments Provided     OTHER SURGICAL HISTORY      2006,2074,SCAN-ANGIOGRAM,Coronary angiogram done by Aron Quick at [facility] in shows patent coronary arteries       Family History:    Family History   Problem Relation Age of Onset     Alzheimer Disease Mother         Alzheimer's disease     Cancer Father         Cancer, with a brain tumor     Heart Disease Father         Heart Disease,CABG x3     Heart Disease Maternal Grandmother         Heart Disease     Heart Disease Maternal Grandfather         Heart Disease     Heart Disease Paternal Grandmother         Heart Disease     Heart Disease Paternal Grandfather         Heart Disease     Other - See  Comments Sister         ALS       Social History:  Marital Status:  Single [1]  Social History     Tobacco Use     Smoking status: Never     Smokeless tobacco: Never   Vaping Use     Vaping Use: Never used   Substance Use Topics     Alcohol use: No     Drug use: No        Medications:    acetaminophen (TYLENOL) 500 MG tablet  albuterol (2.5 MG/3ML) 0.083% neb solution  albuterol (PROAIR HFA/PROVENTIL HFA/VENTOLIN HFA) 108 (90 BASE) MCG/ACT Inhaler  aspirin EC 81 MG EC tablet  Blood Glucose Monitoring Suppl (FIFTY50 GLUCOSE METER 2.0) W/DEVICE KIT  citalopram (CELEXA) 20 MG tablet  fluticasone (FLOVENT HFA) 110 MCG/ACT Inhaler  furosemide (LASIX) 20 MG tablet  furosemide (LASIX) 20 MG tablet  ipratropium - albuterol 0.5 mg/2.5 mg/3 mL (DUONEB) 0.5-2.5 (3) MG/3ML neb solution  losartan (COZAAR) 50 MG tablet  magnesium oxide (MAG-OX) 400 (241.3 Mg) MG tablet  metFORMIN (GLUCOPHAGE) 500 MG tablet  metoprolol succinate ER (TOPROL-XL) 100 MG 24 hr tablet  spironolactone (ALDACTONE) 25 MG tablet  umeclidinium-vilanterol (ANORO ELLIPTA) 62.5-25 MCG/INH oral inhaler  warfarin ANTICOAGULANT (COUMADIN) 5 MG tablet          Review of Systems   Constitutional: Negative for chills and fever.   HENT: Negative for congestion.    Eyes: Negative for visual disturbance.   Respiratory: Positive for shortness of breath. Negative for chest tightness.    Cardiovascular: Negative for chest pain.   Gastrointestinal: Negative for nausea and vomiting.   Genitourinary: Negative for dysuria.   Musculoskeletal: Negative for arthralgias.   Skin: Negative for rash.   Neurological: Positive for weakness and light-headedness.   Psychiatric/Behavioral: Negative for agitation.       Physical Exam   BP: (!) 191/101  Pulse: 84  Temp: 98  F (36.7  C)  Resp: 20  SpO2: (!) 89 %      Physical Exam  Vitals and nursing note reviewed.   Constitutional:       Appearance: Normal appearance.   HENT:      Head: Normocephalic and atraumatic.      Mouth/Throat:       Mouth: Mucous membranes are moist.   Eyes:      Conjunctiva/sclera: Conjunctivae normal.   Cardiovascular:      Rate and Rhythm: Normal rate and regular rhythm.      Heart sounds: Normal heart sounds.   Pulmonary:      Effort: Pulmonary effort is normal.      Breath sounds: Normal breath sounds.   Skin:     General: Skin is warm and dry.   Neurological:      Mental Status: He is alert and oriented to person, place, and time.   Psychiatric:         Behavior: Behavior normal.         ED Course              ED Course as of 11/07/22 1247   Mon Nov 07, 2022   1225 Patient's work-up is unremarkable.  Head CT is normal, CBC is normal.  Metabolic panel does show slight elevated bilirubin but electrolytes and renal function look good.  He does have 2-3+ pedal edema.  He was given some Lasix and Aldactone here today.  We discussed the importance of taking his medications, he says he will start taking them again.  We were also able to schedule an appointment for him in clinic for this coming Friday to establish care with primary care.   1225 Anion Gap: 6     Procedures         EKG shows normal sinus rhythm 83 bpm.  No acute ST segment or T wave changes.  No ectopy.       Results for orders placed or performed during the hospital encounter of 11/07/22 (from the past 24 hour(s))   EKG 12-lead, tracing only   Result Value Ref Range    Systolic Blood Pressure  mmHg    Diastolic Blood Pressure  mmHg    Ventricular Rate 83 BPM    Atrial Rate 83 BPM    NV Interval 168 ms    QRS Duration 84 ms     ms    QTc 451 ms    P Axis 24 degrees    R AXIS 54 degrees    T Axis 75 degrees    Interpretation ECG       Sinus rhythm  Normal ECG  When compared with ECG of 04-SEP-2022 18:47,  Sinus rhythm has replaced Atrial fibrillation  Confirmed by MD JAMES, SHAHNAZ ([id number]) on 11/7/2022 11:10:43 AM     CBC with platelets differential    Narrative    The following orders were created for panel order CBC with platelets differential.  Procedure                                Abnormality         Status                     ---------                               -----------         ------                     CBC with platelets and d...[[id number]]                      Final result                 Please view results for these tests on the individual orders.   Comprehensive metabolic panel   Result Value Ref Range    Sodium 138 134 - 144 mmol/L    Potassium 3.8 3.5 - 5.1 mmol/L    Chloride 98 98 - 107 mmol/L    Carbon Dioxide (CO2) 34 (H) 21 - 31 mmol/L    Anion Gap 6 3 - 14 mmol/L    Urea Nitrogen 14 7 - 25 mg/dL    Creatinine 0.82 0.70 - 1.30 mg/dL    Calcium 9.3 8.6 - 10.3 mg/dL    Glucose 223 (H) 70 - 105 mg/dL    Alkaline Phosphatase 63 34 - 104 U/L    AST 12 (L) 13 - 39 U/L    ALT 14 7 - 52 U/L    Protein Total 6.9 6.4 - 8.9 g/dL    Albumin 3.8 3.5 - 5.7 g/dL    Bilirubin Total 1.4 (H) 0.3 - 1.0 mg/dL    GFR Estimate >90 >60 mL/min/1.73m2   Nt probnp inpatient (BNP)   Result Value Ref Range    N terminal Pro BNP Inpatient 30 0 - 100 pg/mL   INR   Result Value Ref Range    INR 1.00 0.85 - 1.15   CBC with platelets and differential   Result Value Ref Range    WBC Count 8.3 4.0 - 11.0 10e3/uL    RBC Count 4.90 4.40 - 5.90 10e6/uL    Hemoglobin 14.7 13.3 - 17.7 g/dL    Hematocrit 45.7 40.0 - 53.0 %    MCV 93 78 - 100 fL    MCH 30.0 26.5 - 33.0 pg    MCHC 32.2 31.5 - 36.5 g/dL    RDW 14.6 10.0 - 15.0 %    Platelet Count 179 150 - 450 10e3/uL    % Neutrophils 71 %    % Lymphocytes 17 %    % Monocytes 7 %    % Eosinophils 4 %    % Basophils 1 %    % Immature Granulocytes 0 %    NRBCs per 100 WBC 0 <1 /100    Absolute Neutrophils 5.8 1.6 - 8.3 10e3/uL    Absolute Lymphocytes 1.4 0.8 - 5.3 10e3/uL    Absolute Monocytes 0.6 0.0 - 1.3 10e3/uL    Absolute Eosinophils 0.3 0.0 - 0.7 10e3/uL    Absolute Basophils 0.1 0.0 - 0.2 10e3/uL    Absolute Immature Granulocytes 0.0 <=0.4 10e3/uL    Absolute NRBCs 0.0 10e3/uL   Extra Tube    Narrative    The following orders were  created for panel order Extra Tube.  Procedure                               Abnormality         Status                     ---------                               -----------         ------                     Extra Serum Separator Tu...[[id number]]                      Final result               Extra Green Top (Lithium...[[id number]]                      Final result                 Please view results for these tests on the individual orders.   Extra Serum Separator Tube (SST)   Result Value Ref Range    Hold Specimen JIC    Extra Green Top (Lithium Heparin) Tube   Result Value Ref Range    Hold Specimen JIC    XR Chest Port 1 View    Narrative    PROCEDURE:  XR CHEST PORT 1 VIEW    HISTORY: sob    COMPARISON:  CT chest 94.2; chest radiographs 7/25/2022    FINDINGS: Single view chest radiograph  Left chest implantable cardiac device with leads projecting over the  expected location of the heart. Cardiomediastinal silhouette is  enlarged, stable. No acute airspace opacities. Pleural spaces are  clear. No subdiaphragmatic free air.      Impression    IMPRESSION:   No acute cardiopulmonary process.      NIALL ONEIL MD         SYSTEM ID:  [id number]   CT Head w/o Contrast    Narrative    Exam: CT HEAD W/O CONTRAST      Exam reason: fall, blow to head    Technique:   Axial images of the head obtained without contrast. Coronal and  sagittal reformations were generated. This CT was performed using one  or more of the following dose reduction techniques: automated exposure  control, adjustment of the mA and/or kV according to patient size,  and/or use of iterative reconstruction technique.    Comparison: None.        Findings:      Parenchyma: No evidence of intraparenchymal hemorrhage, mass, acute  cortical infarct or prior infarct in a major vascular territory.      There is patchy periventricular and deep white matter hypoattenuation,  nonspecific but likely due to chronic prevascular ischemic change.    No midline  shift. The basilar cisterns are patent.    Extra-axial spaces: No extra-axial fluid collection or hemorrhage.     Ventricles: Normal.  Paranasal sinuses: There is diffuse mucosal thickening throughout the  sinuses.   Mastoid air cells: Clear.    Osseous: No acute findings.  Orbits: Normal.    Soft tissues: Unremarkable.       Impression    Impression:  No acute intracranial abnormality.      MARYJANE TAVAREZ MD         SYSTEM ID:  [id number]       Medications   spironolactone (ALDACTONE) tablet 25 mg (25 mg Oral Given 11/7/22 1120)   ipratropium - albuterol 0.5 mg/2.5 mg/3 mL (DUONEB) neb solution 3 mL (3 mLs Nebulization Given 11/7/22 0907)   furosemide (LASIX) tablet 20 mg (20 mg Oral Given 11/7/22 1120)       Assessments & Plan (with Medical Decision Making)     I have reviewed the nursing notes.    I have reviewed the findings, diagnosis, plan and need for follow up with the patient.  We were able to get him up and ambulate him in the hallway and he did well with this.  I tried to encourage him to restart his medications as prescribed and he has said that he will do so, however he in the past has  proven to be pretty unreliable in this regard.  We did schedule him with primary care, hopefully establishing him with someone here locally will help him to comply with his medical regimen.  Return here if worse.    I did offer  and home health nursing to try to help set up his medications, however he declined this.    New Prescriptions    No medications on file       Final diagnoses:   Fall, initial encounter   Generalized muscle weakness   Noncompliance with medication regimen       11/7/2022   [facility]     Sanjiv Gonzalez MD  11/07/22 6662

## 2022-11-07 NOTE — PROGRESS NOTES
:    Met with patient in room to discuss possible resources for medication compliance.     Patient stated he hates to take pills as he does not believe in taking them.    Patient stated he has oxygen, inhalers and a nebulizer machine at home and will not use them.    Dicussed with patient that he may feel better if he does take them and he said probably.     Patient did not have any other questions or concerns at this time.  Also discussed that he mentions this to his primary doctor.      MAN Calloway on 11/7/2022 at 10:45 AM

## 2022-11-07 NOTE — DISCHARGE INSTRUCTIONS
You should start taking your medications as prescribed.  Follow-up on Friday in clinic with the appointment that we have made for you.  Return here if worse.

## 2022-11-11 DIAGNOSIS — R79.0 LOW BLOOD MAGNESIUM: Primary | ICD-10-CM

## 2022-11-15 RX ORDER — MAGNESIUM OXIDE 400 MG/1
400 TABLET ORAL DAILY
Qty: 30 TABLET | Refills: 1 | OUTPATIENT
Start: 2022-11-15

## 2022-11-15 NOTE — TELEPHONE ENCOUNTER
Patient reports at refill request on 5/26/22 . PCP at [facility].  Lucila Hernandez RN on 11/15/2022 at 2:41 PM

## 2023-07-10 ENCOUNTER — HOSPITAL ENCOUNTER (EMERGENCY)
Facility: OTHER | Age: 68
Discharge: LEFT BEFORE DISCHARGE | End: 2023-07-10
Admitting: EMERGENCY MEDICINE
Payer: MEDICARE

## 2023-07-10 VITALS
BODY MASS INDEX: 40.6 KG/M2 | OXYGEN SATURATION: 95 % | SYSTOLIC BLOOD PRESSURE: 214 MMHG | TEMPERATURE: 98.3 F | DIASTOLIC BLOOD PRESSURE: 97 MMHG | HEIGHT: 74 IN | RESPIRATION RATE: 18 BRPM | HEART RATE: 82 BPM

## 2023-07-10 PROCEDURE — 93005 ELECTROCARDIOGRAM TRACING: CPT | Performed by: EMERGENCY MEDICINE

## 2023-07-10 PROCEDURE — 93010 ELECTROCARDIOGRAM REPORT: CPT | Performed by: INTERNAL MEDICINE

## 2023-07-10 PROCEDURE — 99283 EMERGENCY DEPT VISIT LOW MDM: CPT | Performed by: EMERGENCY MEDICINE

## 2023-07-10 PROCEDURE — 99281 EMR DPT VST MAYX REQ PHY/QHP: CPT | Performed by: EMERGENCY MEDICINE

## 2023-07-10 NOTE — ED TRIAGE NOTES
"Patient here with SOB and also an episode of chest pain he had prior to arrival.  No chest pain at this time, but states it is hard to breath.  Patients SOB especially with activity last few days.  Blood pressure is high in triage.  BP (!) 214/97   Pulse 82   Temp 98.3  F (36.8  C) (Temporal)   Resp 18   Ht 1.88 m (6' 2\")   SpO2 95%   BMI 40.60 kg/m         Triage Assessment     Row Name 07/10/23 4145       Triage Assessment (Adult)    Airway WDL WDL       Respiratory WDL    Respiratory WDL X  hard to breath for last few days       Skin Circulation/Temperature WDL    Skin Circulation/Temperature WDL WDL       Cardiac WDL    Cardiac WDL WDL       Peripheral/Neurovascular WDL    Peripheral Neurovascular WDL WDL       Cognitive/Neuro/Behavioral WDL    Cognitive/Neuro/Behavioral WDL WDL              "

## 2023-07-10 NOTE — ED NOTES
MD shown EKG, NSR and patient okay to go to waiting room.  Patient should come back to a room soon per MD.

## 2023-07-11 LAB
ATRIAL RATE - MUSE: 81 BPM
DIASTOLIC BLOOD PRESSURE - MUSE: NORMAL MMHG
INTERPRETATION ECG - MUSE: NORMAL
P AXIS - MUSE: 22 DEGREES
PR INTERVAL - MUSE: 162 MS
QRS DURATION - MUSE: 86 MS
QT - MUSE: 376 MS
QTC - MUSE: 436 MS
R AXIS - MUSE: 48 DEGREES
SYSTOLIC BLOOD PRESSURE - MUSE: NORMAL MMHG
T AXIS - MUSE: 78 DEGREES
VENTRICULAR RATE- MUSE: 81 BPM

## 2024-01-03 ENCOUNTER — PATIENT OUTREACH (OUTPATIENT)
Dept: INTERNAL MEDICINE | Facility: OTHER | Age: 69
End: 2024-01-03
Payer: COMMERCIAL

## 2024-01-03 NOTE — TELEPHONE ENCOUNTER
Patient Quality Outreach    Patient is due for the following:   Diabetes -  A1C, Eye Exam, Microalbumin, and Foot Exam  Colon Cancer Screening  Depression  -  PHQ-9 needed and DAP  Physical Annual Wellness Visit    Next Steps:   Schedule a Annual Wellness Visit    Type of outreach:    Sent letter.      Questions for provider review:    None           Mackenzie Solorzano

## 2024-01-03 NOTE — LETTER
[facility]  [address]  [place] [id number] [phone]       January 3, 2024    Efra MATTHEW [place]uced[place]  [address]  [place] [address]    Dear Efra,    We care about your health and have reviewed your health plan and are making recommendations based on this review, to optimize your health.    You are in particular need of attention regarding:  -Depression  -Diabetes  -Colon Cancer Screening  -Wellness (Physical) Visit     We are recommending that you:  -schedule a WELLNESS (Physical) APPOINTMENT with me.        -schedule a COLONOSCOPY to look for colon cancer (due every 10 years or 5 years in higher risk situations.)        Colon cancer is now the second leading cause of cancer-related deaths in the [place] for both men and women and there are over 130,000 new cases and 50,000 deaths per year from colon cancer.  Colonoscopies can prevent 90-95% of these deaths.  Problem lesions can be removed before they ever become cancer.  This test is not only looking for cancer, but also getting rid of precancerious lesions.    If you are under/uninsured, we recommend you contact the [employer] program. [employer] is a free colorectal cancer screening program that provides colonoscopies for eligible under/uninsured [place] men and women. If you are interested in receiving a free colonoscopy, please call [employer] at [phone] (mention code [employer]) to see if you re eligible.      If you do not wish to do a colonoscopy or cannot afford to do one, at this time, there is another option. It is called a FIT test or Fecal Immunochemical Occult Blood Test (take home stool sample kit).  It does not replace the colonoscopy for colorectal cancer screening, but it can detect hidden bleeding in the lower colon.  It does need to be repeated every year and if a positive result is obtained, you would be referred for a colonoscopy.          If you have completed either one  of these tests at another facility, please call with the details of when and where the tests were done and if they were normal or not. Or have the records sent to our clinic so that we can best coordinate your care.    -Diabetic Eye Exam is due.  If this was done within the last 12 months then please contact the clinic with the date and location so we can update your records.    In addition, here is a list of due or overdue Health Maintenance reminders.    Health Maintenance Due   Topic Date Due    Heart Failure Action Plan  Never done    Kidney Microalbumin Urine Test  Never done    Diabetic Foot Exam  Never done    Breathing Capacity Test  Never done    Discuss Advance Care Planning  Never done    COPD Action Plan  Never done    Depression Action Plan  Never done    Eye Exam  Never done    Colorectal Cancer Screening  Never done    Hepatitis C Screening  Never done    Diptheria Tetanus Pertussis (DTAP/TDAP/TD) Vaccine (1 - Tdap) 09/02/2003    Zoster (Shingles) Vaccine (1 of 2) Never done    RSV VACCINE (Pregnancy & 60+) (1 - 1-dose 60+ series) Never done    Depression Assessment  10/27/2017    A1C Lab  02/14/2018    Cholesterol Lab  04/27/2018    Pneumococcal Vaccine (2 of 2 - PCV) 09/15/2018    Annual Wellness Visit  Never done    AORTIC ANEURYSM SCREENING (SYSTEM ASSIGNED)  Never done    FALL RISK ASSESSMENT  02/26/2023    Basic Metabolic Panel  05/07/2023    Flu Vaccine (1) 09/01/2023    COVID-19 Vaccine (3 - 2023-24 season) 09/01/2023    Liver Monitoring Lab  11/07/2023    Complete Blood Count  11/07/2023     To address the above recommendations, we encourage you to contact us at [phone]. They will assist you with finding the most convenient time and location.    Thank you for trusting [facility] and we appreciate the opportunity to serve you.  We look forward to supporting your healthcare needs in the future.    Healthy Regards,  Your [facility] Team

## 2024-01-07 ENCOUNTER — HOSPITAL ENCOUNTER (INPATIENT)
Facility: OTHER | Age: 69
LOS: 8 days | Discharge: NURSING, REHAB OR LONG-TERM CARE | DRG: 291 | End: 2024-01-15
Attending: INTERNAL MEDICINE | Admitting: INTERNAL MEDICINE
Payer: MEDICARE

## 2024-01-07 ENCOUNTER — APPOINTMENT (OUTPATIENT)
Dept: GENERAL RADIOLOGY | Facility: OTHER | Age: 69
DRG: 291 | End: 2024-01-07
Attending: INTERNAL MEDICINE
Payer: MEDICARE

## 2024-01-07 DIAGNOSIS — E11.65 TYPE 2 DIABETES MELLITUS WITH HYPERGLYCEMIA, WITHOUT LONG-TERM CURRENT USE OF INSULIN (H): ICD-10-CM

## 2024-01-07 DIAGNOSIS — I48.0 PAROXYSMAL ATRIAL FIBRILLATION WITH RVR (H): ICD-10-CM

## 2024-01-07 DIAGNOSIS — Z20.822 LAB TEST NEGATIVE FOR COVID-19 VIRUS: Primary | ICD-10-CM

## 2024-01-07 DIAGNOSIS — J96.01 ACUTE RESPIRATORY FAILURE WITH HYPOXIA (H): ICD-10-CM

## 2024-01-07 DIAGNOSIS — I50.33 ACUTE ON CHRONIC HEART FAILURE WITH PRESERVED EJECTION FRACTION (H): ICD-10-CM

## 2024-01-07 DIAGNOSIS — Z91.199 MEDICAL NON-COMPLIANCE: ICD-10-CM

## 2024-01-07 DIAGNOSIS — J44.9 MODERATE COPD (CHRONIC OBSTRUCTIVE PULMONARY DISEASE) (H): ICD-10-CM

## 2024-01-07 PROBLEM — I10 ESSENTIAL HYPERTENSION: Status: ACTIVE | Noted: 2017-04-27

## 2024-01-07 PROBLEM — G31.84 MILD COGNITIVE IMPAIRMENT: Status: ACTIVE | Noted: 2022-09-09

## 2024-01-07 PROBLEM — J96.22 ACUTE ON CHRONIC RESPIRATORY FAILURE WITH HYPOXIA AND HYPERCAPNIA (H): Status: ACTIVE | Noted: 2022-09-04

## 2024-01-07 PROBLEM — T46.6X5S ADVERSE EFFECT OF ANTIHYPERLIPIDEMIC AND ANTIARTERIOSCLEROTIC DRUGS, SEQUELA: Status: ACTIVE | Noted: 2020-12-30

## 2024-01-07 PROBLEM — J96.22 ACUTE ON CHRONIC RESPIRATORY FAILURE WITH HYPOXIA AND HYPERCAPNIA (H): Status: RESOLVED | Noted: 2022-09-04 | Resolved: 2024-01-07

## 2024-01-07 PROBLEM — I50.32 CHRONIC HEART FAILURE WITH PRESERVED EJECTION FRACTION (H): Status: RESOLVED | Noted: 2021-04-20 | Resolved: 2024-01-07

## 2024-01-07 PROBLEM — J96.21 ACUTE ON CHRONIC RESPIRATORY FAILURE WITH HYPOXIA AND HYPERCAPNIA (H): Status: RESOLVED | Noted: 2022-09-04 | Resolved: 2024-01-07

## 2024-01-07 PROBLEM — J96.21 ACUTE ON CHRONIC RESPIRATORY FAILURE WITH HYPOXIA AND HYPERCAPNIA (H): Status: ACTIVE | Noted: 2022-09-04

## 2024-01-07 PROBLEM — I50.32 CHRONIC HEART FAILURE WITH PRESERVED EJECTION FRACTION (H): Status: ACTIVE | Noted: 2021-04-20

## 2024-01-07 LAB
ALBUMIN SERPL BCG-MCNC: 4.3 G/DL (ref 3.5–5.2)
ALLEN'S TEST: NO
ALP SERPL-CCNC: 79 U/L (ref 40–150)
ALT SERPL W P-5'-P-CCNC: 22 U/L (ref 0–70)
ANION GAP SERPL CALCULATED.3IONS-SCNC: 11 MMOL/L (ref 7–15)
AST SERPL W P-5'-P-CCNC: 20 U/L (ref 0–45)
B-OH-BUTYR SERPL-SCNC: 0.4 MMOL/L
BASE EXCESS BLDA CALC-SCNC: 7 MMOL/L (ref -9–1.8)
BASOPHILS # BLD AUTO: 0.1 10E3/UL (ref 0–0.2)
BASOPHILS NFR BLD AUTO: 1 %
BILIRUB SERPL-MCNC: 2.3 MG/DL
BUN SERPL-MCNC: 17.6 MG/DL (ref 8–23)
CALCIUM SERPL-MCNC: 9.2 MG/DL (ref 8.8–10.2)
CHLORIDE SERPL-SCNC: 97 MMOL/L (ref 98–107)
CREAT SERPL-MCNC: 0.75 MG/DL (ref 0.67–1.17)
CRP SERPL-MCNC: 30.9 MG/L
DEPRECATED HCO3 PLAS-SCNC: 33 MMOL/L (ref 22–29)
EGFRCR SERPLBLD CKD-EPI 2021: >90 ML/MIN/1.73M2
EOSINOPHIL # BLD AUTO: 0.5 10E3/UL (ref 0–0.7)
EOSINOPHIL NFR BLD AUTO: 5 %
ERYTHROCYTE [DISTWIDTH] IN BLOOD BY AUTOMATED COUNT: 13.7 % (ref 10–15)
FLUAV RNA SPEC QL NAA+PROBE: NEGATIVE
FLUBV RNA RESP QL NAA+PROBE: NEGATIVE
GLUCOSE BLDC GLUCOMTR-MCNC: 173 MG/DL (ref 70–99)
GLUCOSE BLDC GLUCOMTR-MCNC: 193 MG/DL (ref 70–99)
GLUCOSE SERPL-MCNC: 187 MG/DL (ref 70–99)
HBA1C MFR BLD: 8.5 % (ref 4–6.2)
HCO3 BLD-SCNC: 35 MMOL/L (ref 21–28)
HCT VFR BLD AUTO: 52.1 % (ref 40–53)
HGB BLD-MCNC: 16.7 G/DL (ref 13.3–17.7)
IMM GRANULOCYTES # BLD: 0 10E3/UL
IMM GRANULOCYTES NFR BLD: 0 %
LACTATE SERPL-SCNC: 1.6 MMOL/L (ref 0.7–2)
LYMPHOCYTES # BLD AUTO: 1.4 10E3/UL (ref 0.8–5.3)
LYMPHOCYTES NFR BLD AUTO: 14 %
MAGNESIUM SERPL-MCNC: 1.8 MG/DL (ref 1.7–2.3)
MCH RBC QN AUTO: 29.5 PG (ref 26.5–33)
MCHC RBC AUTO-ENTMCNC: 32.1 G/DL (ref 31.5–36.5)
MCV RBC AUTO: 92 FL (ref 78–100)
MONOCYTES # BLD AUTO: 0.7 10E3/UL (ref 0–1.3)
MONOCYTES NFR BLD AUTO: 7 %
NEUTROPHILS # BLD AUTO: 7.1 10E3/UL (ref 1.6–8.3)
NEUTROPHILS NFR BLD AUTO: 73 %
NRBC # BLD AUTO: 0 10E3/UL
NRBC BLD AUTO-RTO: 0 /100
NT-PROBNP SERPL-MCNC: 46 PG/ML (ref 0–900)
O2/TOTAL GAS SETTING VFR VENT: 32 %
OXYHGB MFR BLD: 96 % (ref 92–100)
PCO2 BLD: 61 MM HG (ref 35–45)
PH BLD: 7.37 [PH] (ref 7.35–7.45)
PLATELET # BLD AUTO: 184 10E3/UL (ref 150–450)
PO2 BLD: 83 MM HG (ref 80–105)
POTASSIUM SERPL-SCNC: 4.1 MMOL/L (ref 3.4–5.3)
PROCALCITONIN SERPL IA-MCNC: 0.08 NG/ML
PROT SERPL-MCNC: 7.9 G/DL (ref 6.4–8.3)
RBC # BLD AUTO: 5.66 10E6/UL (ref 4.4–5.9)
RSV RNA SPEC NAA+PROBE: NEGATIVE
SARS-COV-2 RNA RESP QL NAA+PROBE: NEGATIVE
SODIUM SERPL-SCNC: 141 MMOL/L (ref 135–145)
TROPONIN T SERPL HS-MCNC: 17 NG/L
WBC # BLD AUTO: 9.8 10E3/UL (ref 4–11)

## 2024-01-07 PROCEDURE — 93005 ELECTROCARDIOGRAM TRACING: CPT | Performed by: INTERNAL MEDICINE

## 2024-01-07 PROCEDURE — 83880 ASSAY OF NATRIURETIC PEPTIDE: CPT | Performed by: INTERNAL MEDICINE

## 2024-01-07 PROCEDURE — 36600 WITHDRAWAL OF ARTERIAL BLOOD: CPT | Performed by: INTERNAL MEDICINE

## 2024-01-07 PROCEDURE — 120N000001 HC R&B MED SURG/OB

## 2024-01-07 PROCEDURE — 84145 PROCALCITONIN (PCT): CPT | Performed by: INTERNAL MEDICINE

## 2024-01-07 PROCEDURE — 93010 ELECTROCARDIOGRAM REPORT: CPT | Performed by: STUDENT IN AN ORGANIZED HEALTH CARE EDUCATION/TRAINING PROGRAM

## 2024-01-07 PROCEDURE — 250N000012 HC RX MED GY IP 250 OP 636 PS 637: Performed by: INTERNAL MEDICINE

## 2024-01-07 PROCEDURE — 87637 SARSCOV2&INF A&B&RSV AMP PRB: CPT | Performed by: INTERNAL MEDICINE

## 2024-01-07 PROCEDURE — 99291 CRITICAL CARE FIRST HOUR: CPT | Performed by: INTERNAL MEDICINE

## 2024-01-07 PROCEDURE — 99223 1ST HOSP IP/OBS HIGH 75: CPT | Mod: AI | Performed by: INTERNAL MEDICINE

## 2024-01-07 PROCEDURE — 87581 M.PNEUMON DNA AMP PROBE: CPT | Performed by: INTERNAL MEDICINE

## 2024-01-07 PROCEDURE — 82805 BLOOD GASES W/O2 SATURATION: CPT | Performed by: INTERNAL MEDICINE

## 2024-01-07 PROCEDURE — 86140 C-REACTIVE PROTEIN: CPT | Performed by: INTERNAL MEDICINE

## 2024-01-07 PROCEDURE — 96374 THER/PROPH/DIAG INJ IV PUSH: CPT | Performed by: INTERNAL MEDICINE

## 2024-01-07 PROCEDURE — 87633 RESP VIRUS 12-25 TARGETS: CPT | Performed by: INTERNAL MEDICINE

## 2024-01-07 PROCEDURE — 82010 KETONE BODYS QUAN: CPT | Performed by: INTERNAL MEDICINE

## 2024-01-07 PROCEDURE — 84484 ASSAY OF TROPONIN QUANT: CPT | Performed by: INTERNAL MEDICINE

## 2024-01-07 PROCEDURE — 250N000011 HC RX IP 250 OP 636: Performed by: INTERNAL MEDICINE

## 2024-01-07 PROCEDURE — 71045 X-RAY EXAM CHEST 1 VIEW: CPT

## 2024-01-07 PROCEDURE — 99291 CRITICAL CARE FIRST HOUR: CPT | Mod: 25 | Performed by: INTERNAL MEDICINE

## 2024-01-07 PROCEDURE — 83735 ASSAY OF MAGNESIUM: CPT | Performed by: INTERNAL MEDICINE

## 2024-01-07 PROCEDURE — 36415 COLL VENOUS BLD VENIPUNCTURE: CPT | Performed by: INTERNAL MEDICINE

## 2024-01-07 PROCEDURE — 250N000013 HC RX MED GY IP 250 OP 250 PS 637: Performed by: INTERNAL MEDICINE

## 2024-01-07 PROCEDURE — 83605 ASSAY OF LACTIC ACID: CPT | Performed by: INTERNAL MEDICINE

## 2024-01-07 PROCEDURE — 83036 HEMOGLOBIN GLYCOSYLATED A1C: CPT | Performed by: INTERNAL MEDICINE

## 2024-01-07 PROCEDURE — 85004 AUTOMATED DIFF WBC COUNT: CPT | Performed by: INTERNAL MEDICINE

## 2024-01-07 PROCEDURE — 80053 COMPREHEN METABOLIC PANEL: CPT | Performed by: INTERNAL MEDICINE

## 2024-01-07 PROCEDURE — 87040 BLOOD CULTURE FOR BACTERIA: CPT | Performed by: INTERNAL MEDICINE

## 2024-01-07 RX ORDER — CALCIUM CARBONATE 500 MG/1
1000 TABLET, CHEWABLE ORAL 4 TIMES DAILY PRN
Status: DISCONTINUED | OUTPATIENT
Start: 2024-01-07 | End: 2024-01-15 | Stop reason: HOSPADM

## 2024-01-07 RX ORDER — AMOXICILLIN 250 MG
1 CAPSULE ORAL 2 TIMES DAILY PRN
Status: DISCONTINUED | OUTPATIENT
Start: 2024-01-07 | End: 2024-01-15 | Stop reason: HOSPADM

## 2024-01-07 RX ORDER — ACETAMINOPHEN 650 MG/1
650 SUPPOSITORY RECTAL EVERY 4 HOURS PRN
Status: DISCONTINUED | OUTPATIENT
Start: 2024-01-07 | End: 2024-01-15 | Stop reason: HOSPADM

## 2024-01-07 RX ORDER — ALBUTEROL SULFATE 0.83 MG/ML
2.5 SOLUTION RESPIRATORY (INHALATION)
Status: DISCONTINUED | OUTPATIENT
Start: 2024-01-07 | End: 2024-01-15 | Stop reason: HOSPADM

## 2024-01-07 RX ORDER — LOSARTAN POTASSIUM 25 MG/1
25 TABLET ORAL DAILY
Status: DISCONTINUED | OUTPATIENT
Start: 2024-01-07 | End: 2024-01-08

## 2024-01-07 RX ORDER — SPIRONOLACTONE 25 MG/1
25 TABLET ORAL DAILY
Status: DISCONTINUED | OUTPATIENT
Start: 2024-01-07 | End: 2024-01-10

## 2024-01-07 RX ORDER — NICOTINE POLACRILEX 4 MG
15-30 LOZENGE BUCCAL
Status: DISCONTINUED | OUTPATIENT
Start: 2024-01-07 | End: 2024-01-15 | Stop reason: HOSPADM

## 2024-01-07 RX ORDER — HYDRALAZINE HYDROCHLORIDE 20 MG/ML
10 INJECTION INTRAMUSCULAR; INTRAVENOUS EVERY 4 HOURS PRN
Status: DISCONTINUED | OUTPATIENT
Start: 2024-01-07 | End: 2024-01-15 | Stop reason: HOSPADM

## 2024-01-07 RX ORDER — ASPIRIN 81 MG/1
81 TABLET ORAL DAILY
Status: DISCONTINUED | OUTPATIENT
Start: 2024-01-07 | End: 2024-01-15 | Stop reason: HOSPADM

## 2024-01-07 RX ORDER — AMOXICILLIN 250 MG
2 CAPSULE ORAL 2 TIMES DAILY PRN
Status: DISCONTINUED | OUTPATIENT
Start: 2024-01-07 | End: 2024-01-15 | Stop reason: HOSPADM

## 2024-01-07 RX ORDER — LIDOCAINE 40 MG/G
CREAM TOPICAL
Status: DISCONTINUED | OUTPATIENT
Start: 2024-01-07 | End: 2024-01-15 | Stop reason: HOSPADM

## 2024-01-07 RX ORDER — PROCHLORPERAZINE 25 MG
12.5 SUPPOSITORY, RECTAL RECTAL EVERY 12 HOURS PRN
Status: DISCONTINUED | OUTPATIENT
Start: 2024-01-07 | End: 2024-01-15 | Stop reason: HOSPADM

## 2024-01-07 RX ORDER — ONDANSETRON 2 MG/ML
4 INJECTION INTRAMUSCULAR; INTRAVENOUS EVERY 6 HOURS PRN
Status: DISCONTINUED | OUTPATIENT
Start: 2024-01-07 | End: 2024-01-15 | Stop reason: HOSPADM

## 2024-01-07 RX ORDER — PROCHLORPERAZINE MALEATE 5 MG
5 TABLET ORAL EVERY 6 HOURS PRN
Status: DISCONTINUED | OUTPATIENT
Start: 2024-01-07 | End: 2024-01-15 | Stop reason: HOSPADM

## 2024-01-07 RX ORDER — DEXTROSE MONOHYDRATE 25 G/50ML
25-50 INJECTION, SOLUTION INTRAVENOUS
Status: DISCONTINUED | OUTPATIENT
Start: 2024-01-07 | End: 2024-01-15 | Stop reason: HOSPADM

## 2024-01-07 RX ORDER — FUROSEMIDE 10 MG/ML
40 INJECTION INTRAMUSCULAR; INTRAVENOUS EVERY 12 HOURS
Status: DISCONTINUED | OUTPATIENT
Start: 2024-01-07 | End: 2024-01-08

## 2024-01-07 RX ORDER — FUROSEMIDE 10 MG/ML
40 INJECTION INTRAMUSCULAR; INTRAVENOUS ONCE
Status: COMPLETED | OUTPATIENT
Start: 2024-01-07 | End: 2024-01-07

## 2024-01-07 RX ORDER — ONDANSETRON 4 MG/1
4 TABLET, ORALLY DISINTEGRATING ORAL EVERY 6 HOURS PRN
Status: DISCONTINUED | OUTPATIENT
Start: 2024-01-07 | End: 2024-01-15 | Stop reason: HOSPADM

## 2024-01-07 RX ORDER — CARVEDILOL 12.5 MG/1
12.5 TABLET ORAL 2 TIMES DAILY
Status: DISCONTINUED | OUTPATIENT
Start: 2024-01-07 | End: 2024-01-15 | Stop reason: HOSPADM

## 2024-01-07 RX ORDER — ACETAMINOPHEN 325 MG/1
650 TABLET ORAL EVERY 4 HOURS PRN
Status: DISCONTINUED | OUTPATIENT
Start: 2024-01-07 | End: 2024-01-15 | Stop reason: HOSPADM

## 2024-01-07 RX ADMIN — FUROSEMIDE 40 MG: 10 INJECTION, SOLUTION INTRAMUSCULAR; INTRAVENOUS at 10:31

## 2024-01-07 RX ADMIN — ASPIRIN 81 MG: 81 TABLET, COATED ORAL at 15:13

## 2024-01-07 RX ADMIN — CARVEDILOL 12.5 MG: 12.5 TABLET, FILM COATED ORAL at 21:51

## 2024-01-07 RX ADMIN — SPIRONOLACTONE 25 MG: 25 TABLET ORAL at 15:13

## 2024-01-07 RX ADMIN — LOSARTAN POTASSIUM 25 MG: 25 TABLET, FILM COATED ORAL at 15:14

## 2024-01-07 RX ADMIN — FUROSEMIDE 40 MG: 10 INJECTION, SOLUTION INTRAMUSCULAR; INTRAVENOUS at 21:52

## 2024-01-07 RX ADMIN — APIXABAN 5 MG: 5 TABLET, FILM COATED ORAL at 21:51

## 2024-01-07 RX ADMIN — CARVEDILOL 12.5 MG: 12.5 TABLET, FILM COATED ORAL at 15:13

## 2024-01-07 RX ADMIN — INSULIN ASPART 1 UNITS: 100 INJECTION, SOLUTION INTRAVENOUS; SUBCUTANEOUS at 17:18

## 2024-01-07 ASSESSMENT — ACTIVITIES OF DAILY LIVING (ADL)
ADLS_ACUITY_SCORE: 20
ADLS_ACUITY_SCORE: 35
ADLS_ACUITY_SCORE: 35
ADLS_ACUITY_SCORE: 20
ADLS_ACUITY_SCORE: 18

## 2024-01-07 NOTE — ED PROVIDER NOTES
Emergency Department Provider Note  : 1955 Age: 68 year old Sex: male MRN: [id number]    Chief Complaint   Patient presents with    Shortness of Breath    Cough       Medical Decision Making / Assessment / Plan   68 year old male presenting with acute on chronic heart failure with preserved ejection fraction, acute hypoxic respiratory failure    ED Course as of 24 1115   Sun 2024   0945 Patient presents with acute hypoxic respiratory failure, tachypnea, hypoxia, tachycardia.  wheezing, cough,  clear phlegm.  Concern for CHF exacerbation.  Also flagging for checking lactate/sepsis.  Check lab work, blood cultures, BNP, troponin, EKG, chest x-ray.  IV Lasix 40 mg ordered.  Patient is now requiring 4 L of oxygen.  Initially 83% on room air, now 90% on 4 L.  ABG ordered and pending.   0948 Indicates that he is no longer taking any of his medications and feels better without them.  Patient was previously taking warfarin, and Anoro Ellipta, Metformin, Losartan.     1008 Nursing and lab unable to get blood drawn.  -Patient is difficult IV start.  Nursing placed long 18 with ultrasound.   1048 CBC is normal.  Lactic acid normal.   1048 EKG shows sinus rhythm with rate of 93.  Cannot rule out previous anterior infarction, age undetermined.  QTc measuring 432 ms.  Abnormal EKG.   1059 Discussed with patient about possible hospitalization for heart failure.  He is understanding.   1059 Troponin returned within normal limits.    BNP pending.   1106 Probable admission medical floor versus ICU, depending on blood gas results.        -Briefly discussed with hospitalist.  Patient signed out to oncoming ER provider per routine change of shift.    New Prescriptions    No medications on file       Final diagnoses:   Acute on chronic heart failure with preserved ejection fraction (H)   Acute respiratory failure with hypoxia (H)   Moderate COPD (chronic obstructive pulmonary disease) (H)   Medical non-compliance  "  Type 2 diabetes mellitus with hyperglycemia, without long-term current use of insulin (H)       Lukas Kramer MD  1/7/2024   Emergency Department    Subjective   Efra is a 68 year old male who presents at  9:26 AM with shortness of breath the last few days, has been worsening with worsening lower extremity edema the last few days as well.  States that he is coughing frequently, coughing up clear phlegm.  Frequent cough, wheezing.  Has history of COPD, history of heart failure with preserved ejection fraction.    He is quite hypoxic upon arrival, 83% on room air.    Denies fevers, chills, chest pain or heaviness.    States that at 1 point in time he got fed up with taking all of his medications and subsequently stopped all of them.    He is not currently taking any diuretics.  No known sick contacts.    I have reviewed the Medications, Allergies, Past Medical and Surgical History, and Social History in the [employer] [facility] and with family.    Review of Systems:  Please see Subjective / HPI for pertinent positives and negatives. All other systems reviewed and found to be negative.      Objective   Patient Vitals for the past 24 hrs:   BP Temp Temp src Pulse Resp SpO2 Height Weight   01/07/24 1037 (!) 192/101 -- -- 84 -- 95 % -- --   01/07/24 0939 -- -- -- -- -- 90 % -- --   01/07/24 0937 -- -- -- -- -- (!) 85 % -- --   01/07/24 0924 -- -- -- -- -- (!) 86 % -- --   01/07/24 0922 (!) 183/83 97.7  F (36.5  C) Tympanic 103 30 (!) 83 % 1.88 m (6' 2\") 135.6 kg (299 lb)     Physical Exam:     General: Awake, alert, mild respiratory distress with tachypnea, wheezing  Head: Normocephalic, atraumatic.  Eyes: Conjugate gaze.  ENT: Moist membranes, external ear appears normal.   Chest/Respiratory: Equal chest rise, diffuse wheezing bilaterally with tachypnea.  Cardiovascular: Peripheral pulses present, tachycardic rate, regular rhythm.  2-3+ pitting bilateral lower extremity edema.  Abdominal: Soft, non-distended, " non-tender.  Extremities: No obvious long bone deformity.  Neurological: GCS 15, extremities without gross deficit.  Skin: Warm, venous stasis changes bilateral lower extremities with hypertrophic skin of the distal foot and toes. No bruising.   Psychiatric: Appropriate affect.     Procedures / Critical Care   Procedures    Aggregate Critical Care Time: 30 minutes.  This was the time seeing the patient at the bedside while the patient was critical.  My time did not include any pertinent procedures or activities that did not contribute to the patient's care while the patient was critical.      Orders Placed This Encounter   Procedures    XR Chest Port 1 View    Comprehensive metabolic panel    Procalcitonin    CRP inflammation    Nt probnp inpatient (BNP)    Troponin T, High Sensitivity    Symptomatic Influenza A/B, RSV, & SARS-CoV2 PCR (COVID-19)    Lactic acid whole blood    CBC with platelets and differential    Hemoglobin A1c    Ketone Beta-Hydroxybutyrate Quantitative    Blood gas arterial with oxyhemoglobin    EKG 12-lead, tracing only    Peripheral IV catheter    Cardiac Continuous Monitoring    Pulse oximetry nursing    Oxygen: Nasal cannula    CBC with platelets differential       RESULTS: As noted above.            Medical/Surgical History:  Past Medical History:   Diagnosis Date    Family history of other specified conditions (CODE)     No Comments Provided    Hemorrhage of anus and rectum     multiple colonoscopies scheduled and canceled by patient    Irritable bowel syndrome with constipation     Functioning bowel syndrome/constipation    Pain in joint     Intermittent arthralgias     Past Surgical History:   Procedure Laterality Date    APPENDECTOMY OPEN      Coronary angiogram done by Aron [facility] at [facility] in March 2006 shows patent coronary arteries    CHOLECYSTECTOMY      No Comments Provided    OTHER SURGICAL HISTORY      March 2006,207497,SCAN-ANGIOGRAM,Coronary angiogram done by Aron Quick at  [facility] in shows patent coronary arteries       Medications:  No current facility-administered medications for this encounter.     Current Outpatient Medications   Medication    acetaminophen (TYLENOL) 500 MG tablet    albuterol (2.5 MG/3ML) 0.083% neb solution    albuterol (PROAIR HFA/PROVENTIL HFA/VENTOLIN HFA) 108 (90 BASE) MCG/ACT Inhaler    aspirin EC 81 MG EC tablet    Blood Glucose Monitoring Suppl (FIFTY50 GLUCOSE METER 2.0) W/DEVICE KIT    citalopram (CELEXA) 20 MG tablet    fluticasone (FLOVENT HFA) 110 MCG/ACT Inhaler    furosemide (LASIX) 20 MG tablet    ipratropium - albuterol 0.5 mg/2.5 mg/3 mL (DUONEB) 0.5-2.5 (3) MG/3ML neb solution    losartan (COZAAR) 50 MG tablet    magnesium oxide (MAG-OX) 400 (241.3 Mg) MG tablet    metFORMIN (GLUCOPHAGE) 500 MG tablet    metoprolol succinate ER (TOPROL-XL) 100 MG 24 hr tablet    spironolactone (ALDACTONE) 25 MG tablet    umeclidinium-vilanterol (ANORO ELLIPTA) 62.5-25 MCG/INH oral inhaler    warfarin ANTICOAGULANT (COUMADIN) 5 MG tablet       Allergies:  Lisinopril    Relevant labs, images, EKGs, Epic and outside hospital (if applicable) charts were reviewed. The findings, diagnosis, plan, and need for follow up were discussed with the patient/family. Nursing notes were reviewed.      Lukas Kramer MD  01/07/24 0207    Addendum:  Assumed care of this patient in the beginning my shift from Dr. Kramer.  Sounds like the patient will be admitted to the hospital for hypoxia most likely due to fluid overload at this time.  We are awaiting ABG to determine the location of his admission.  EKG appears moderately hypercapnic but otherwise appears well.  He is accepted for admission by Dr. Lynn.     KLEVER Call Alexander, PA  01/07/24 2960

## 2024-01-07 NOTE — H&P
"[facility]    History and Physical  Hospitalist       Date of Admission:  1/7/2024    Assessment & Plan   Efra Zuniga is a 68 year old male history of chronic diastolic CHF, type 2 diabetes, hypertension, paroxysmal A-fib status post pacemaker placement presenting with probable acute diastolic CHF exacerbation causing acute hypoxic respiratory failure.      Clinically Significant Risk Factors Present on Admission                  # Hypertension: Noted on problem list   # Acute Respiratory Failure: Documented O2 saturation < 91%.  Continue supplemental oxygen as needed    # DMII: A1C = 8.5 % (Ref range: 4.0 - 6.2 %) within past 6 months    # Obesity: Estimated body mass index is 38.39 kg/m  as calculated from the following:    Height as of this encounter: 1.88 m (6' 2\").    Weight as of this encounter: 135.6 kg (299 lb).            Principal Problem:    Acute on chronic heart failure with preserved ejection fraction (H)    Assessment: Likely secondary to acute CHF exacerbation of unknown type.  No obvious evidence pneumonia, COPD ACS or PE playing a role.  Likely secondary to pulmonary edema and already improving with initial diuresis.    Plan:   -Lasix 40 mg IV every 12  -TTE in a.m.  -Continue diuresis  -Wean oxygen as able  -Nebs as needed  -Check viral PCR      Essential hypertension    Assessment: Poorly controlled    Plan:   -Resume home ARB  -Resume home Aldactone  -Discontinue metoprolol and start carvedilol 12.5 mg p.o. twice daily      Paroxysmal atrial fibrillation with RVR (H)    Assessment: Status post pacemaker placement in the past for sick sinus syndrome through [place], no interrogation was the last year.  Is otherwise not taken any of his medications.  After discussion risks and benefits and given his [id number] of 4 benefit of anticoagulation clearly outweighs the risk and patient agreeable to trialing DOAC.    Plan:   -Telemetry  -Follow-up with cardiology as an " outpatient  -Start Eliquis with price check via pharmacy in a.m.    Active Problems:    Type 2 diabetes mellitus with hyperglycemia, without long-term current use of insulin (H)    Assessment: Poorly controlled last hemoglobin A1c of 8.5    Plan:   -Hold metformin  -ISS ACHS      Moderate COPD (chronic obstructive pulmonary disease) (H)    Assessment: Chronic and stable without evidence of exacerbation    Plan:   -Monitor    DVT Prophylaxis: Pneumatic Compression Devices  Code Status: Full Code    Beau Lynn MD    Primary Care Physician   Lukas Kramer    Chief Complaint   Dyspnea    History is obtained from the patient and chart review.    History of Present Illness   68 year old male history of chronic diastolic CHF, type 2 diabetes, hypertension, paroxysmal A-fib status post pacemaker placement presenting with probable acute diastolic CHF exacerbation causing acute hypoxic respiratory failure.      Patient was last seen by his primary April, he had not been taking any of his medications and had noted some slight increased swelling in his legs.  Over the last number of months he has had progressive dyspnea on exertion no chest pain with exertion, today he was so short of breath that he presented to the emergency department.    In the ER he had a 4 L supplemental oxygen requirement, was otherwise hemodynamically stable, underwent extensive lab eval, was given 40 mg IV Lasix with prompt diuresis, he already feels significantly improved and he was subsequently admitted for further management.    Past Medical History    I have reviewed this patient's medical history and updated it with pertinent information if needed.   Past Medical History:   Diagnosis Date    Family history of other specified conditions (CODE)     No Comments Provided    Hemorrhage of anus and rectum     multiple colonoscopies scheduled and canceled by patient    Irritable bowel syndrome with constipation     Functioning bowel syndrome/constipation  "   Pain in joint     Intermittent arthralgias       Past Surgical History   I have reviewed this patient's surgical history and updated it with pertinent information if needed.  Past Surgical History:   Procedure Laterality Date    APPENDECTOMY OPEN      Coronary angiogram done by Aron Quick at [facility] in 2006 shows patent coronary arteries    CHOLECYSTECTOMY      No Comments Provided    OTHER SURGICAL HISTORY      2006,2074,SCAN-ANGIOGRAM,Coronary angiogram done by Aron Quick at [facility] in shows patent coronary arteries       Prior to Admission Medications   None     Allergies   Allergies   Allergen Reactions    Lisinopril      Other reaction(s): Tachycardia  Felt like \"a heart attack\"; hands went numb       Social History   I have reviewed this patient's social history and updated it with pertinent information if needed. Efra Zuniga  reports that he has never smoked. He has never used smokeless tobacco. He reports that he does not drink alcohol and does not use drugs.    Family History   I have reviewed this patient's family history and updated it with pertinent information if needed.   Family History   Problem Relation Age of Onset    Alzheimer Disease Mother         Alzheimer's disease    Cancer Father         Cancer, with a brain tumor    Heart Disease Father         Heart Disease,CABG x3    Heart Disease Maternal Grandmother         Heart Disease    Heart Disease Maternal Grandfather         Heart Disease    Heart Disease Paternal Grandmother         Heart Disease    Heart Disease Paternal Grandfather         Heart Disease    Other - See Comments Sister         ALS       Review of Systems     Constitutional: normal energy and appetite, no recent sick contacts, he does have some sinus congestion and a sore throat and he feels like he has a chest cold.  Eyes: no changes in vision  Ears, nose, mouth, throat, and face: no mouth sores, dysphagia, or odynophagia  Respiratory: no shortness of " breath at rest currently, he has a dry nonproductive cough, no significant wheezing.  Cardiovascular: no chest pain, palpitations, orthopnea, increased lower extremity edema, or syncope.   Gastrointestinal: no constipation, diarrhea, nausea, vomiting or abdominal pain.  Genitourinary: no dysuria, hematuria, urgency or frequency.   Hematologic/lymphatic: no unintentional weight loss or night sweats.  Musculoskeletal: no pain to extremities or falls.   Neurological: no new weakness, tingling, numbness.   Endocrine: He does not routinely check his blood sugars.      Additions to the above include: 6 months ago he could walk 4-5 city blocks, now he can only walk about a half a block for needing to stop due to shortness of breath.  No chest pain with this level of exertion.    Physical Exam   Temp: 98.7  F (37.1  C) Temp src: Tympanic BP: (!) 168/94 Pulse: 104   Resp: 24 SpO2: 90 % O2 Device: Nasal cannula Oxygen Delivery: 5 LPM  Vital Signs with Ranges  Temp:  [97.7  F (36.5  C)-98.7  F (37.1  C)] 98.7  F (37.1  C)  Pulse:  [] 104  Resp:  [24-30] 24  BP: (154-192)/() 168/94  SpO2:  [83 %-95 %] 90 %  299 lbs 0 oz    GENERAL: Talkative, pleasant and appropriate, laying in bed, in no apparent distress.  Head: normocephalic and atraumatic  Eyes: anicteric and non-injected sclera  Nose: no rhinorrhea or epistaxis.   Throat: moist mucous membranes with no active oral lesions.  NECK: Supple, jugular venous distension unable to be visualized due to patient's body habitus.  CARDIOVASCULAR: regular rate and rhythm, no significant murmurs, rubs, or gallops. Normal S1/S2.  2+ chronic appearing lower extremity edema with hypertrophic chronic stasis changes of his ankles.    RESPIRATORY: Fuhs air movement in all fields with bibasilar crackles, no significant wheezing, no accessory muscle use or evidence of respiratory distress.   GI: Obese, soft, non-tender, non-distended, normoactive bowel sounds.  MUSCULOSKELETAL: warm  and well perfused, 2+ dorsalis pedis pulses.    SKIN: Chronic hypertrophic lymphedema changes of bilateral ankles and shins.  NEUROLOGY: AAOx3, follows commands, speech and language without focal deficits.      Data   Data reviewed today:    EKG reviewed: Normal sinus rhythm, normal axis, no new ST-T wave inversions elevations or depressions  Recent Labs   Lab 01/07/24  1018   WBC 9.8   HGB 16.7   MCV 92         POTASSIUM 4.1   CHLORIDE 97*   CO2 33*   BUN 17.6   CR 0.75   ANIONGAP 11   SCAR 9.2   *   ALBUMIN 4.3   PROTTOTAL 7.9   BILITOTAL 2.3*   ALKPHOS 79   ALT 22   AST 20       Recent Results (from the past 24 hour(s))   XR Chest Port 1 View    Narrative    PROCEDURE:  XR CHEST PORT 1 VIEW    HISTORY: Dyspnea, hypoxia. .    COMPARISON:  11/7/2022    FINDINGS:    The cardiomediastinal contours are enlarged.  No focal consolidation, effusion or pneumothorax.      Impression    IMPRESSION:  Stable chest.      VIRA GILMAN MD         SYSTEM ID:  [id number]

## 2024-01-07 NOTE — PLAN OF CARE
Goal Outcome Evaluation:  Pt admitted due to CHF exacerbation. Pt is A&O with some forgetfulness requiring reminders to use hat for measured urine output, keep O2 tubing on, and to ask for assistance up out of bed due to tubing/scd's. SBA. LS exp wheezes. SOB while lying and upon exertion. Spo2 low 80s when o2 off, 90-95 with 3L. Denies pain. Edema noted to bilat lower extremties, left worse than right. LE are alon, scaly, and brown in color. Frequent congested cough noted. Additional viral panel sent to lab.       Plan of Care Reviewed With: patient    Overall Patient Progress: no changeOverall Patient Progress: no change

## 2024-01-07 NOTE — PHARMACY-ADMISSION MEDICATION HISTORY
Pharmacist Admission Medication History    Admission medication history is complete. The information provided in this note is only as accurate as the sources available at the time of the update.    Information Source(s): Patient via in-person    Pertinent Information: Patient has stopped taking all of his prescribed medications. He said he did this a few months ago. He wasn't feeling well - so he stopped taking his medications. He said this made him feel better for awhile. He also denied using any over the counter medications.     Changes made to PTA medication list:  Added: NONE  Deleted: (all medications)  Losartan 25 mg once daily - per patient - still active on [facility]'s medication list  Citalopram 20 mg once daily - per patient - still active on [facility] medication list  Metformin 1000 mg twice daily - per patient - still active on [facility] medication list  Metoprolol succinate 200 mg once daily - per patient - still active on [facility] medication list  Spironolactone 25 mg once daily - per patient - still active on [facility]'s medication list  Note  Torsemide 20 mg daily - this was not added to the GI home medication list - still active on [facility] medication list  Jardiance 10 mg tablet - this was not added to the GI home medication list - still active on [facility]'s medication list  Diltiazem  mg 24 hour - this was not added to the [facility] home medication list - still active on [facility]'s medication list  Dulera 100/5 mcg/act - this was not added to the [facility] home medication list - [facility]  Changed: None    Medication Affordability: patient states he does have prescription drug coverage    Allergies reviewed with patient and updates made in EHR: yes    Medication History Completed By: Milady Vee [facility] 1/7/2024 2:01 PM    No outpatient medications have been marked as taking for the 1/7/24  encounter (Hospital Encounter).

## 2024-01-07 NOTE — ED TRIAGE NOTES
"Patient is here with SOB for the last two days and a productive cough.  Patient does have history of some COPD and no current home oxygen.  Patient to room 3 for evaluation.    BP (!) 183/83   Pulse 103   Temp 97.7  F (36.5  C) (Tympanic)   Resp 30   Ht 1.88 m (6' 2\")   Wt 135.6 kg (299 lb)   SpO2 (!) 86%   BMI 38.39 kg/m       Triage Assessment (Adult)       Row Name 01/07/24 0923          Triage Assessment    Airway WDL X        Respiratory WDL    Respiratory WDL X;cough  SOB and wheezes     Cough Frequency frequent     Cough Type productive        Skin Circulation/Temperature WDL    Skin Circulation/Temperature WDL WDL        Cardiac WDL    Cardiac WDL X     Cardiac Rhythm ST        Peripheral/Neurovascular WDL    Peripheral Neurovascular WDL WDL        Cognitive/Neuro/Behavioral WDL    Cognitive/Neuro/Behavioral WDL WDL                     "

## 2024-01-07 NOTE — PROGRESS NOTES
Admission Note    Data:  Efra Zuniga admitted to 314 from emergency room at 1255.      Action:  Dr. Lynn has been notified of admission. Pt oriented to unit, call light in reach.     Response:  Patient tolerated transfer well.

## 2024-01-07 NOTE — PROVIDER NOTIFICATION
01/07/24 1304   Initial Information   Patient Belongings remains with patient   Patient Belongings Remaining with Patient cash/credit card;clothing;jewelry;purse/wallet   Did you bring any home meds/supplements to the hospital?  No     [facility] will make every effort per our policy to help keep your items safe while in the hospital.  If you choose to keep any items at the bedside, we cannot be held responsible for any items that are lost or broken.      List items sent to safe: none    I have reviewed my belongings list on admission and verify that it is correct.     Patient signature_______________________________  Date/Time_____________________    2nd Staff person if patient unable to sign __________________________  Date/Time ______________________      I have received all my belongings noted above at discharge.    Patient signature________________________________  Date/Time  __________________________

## 2024-01-08 ENCOUNTER — APPOINTMENT (OUTPATIENT)
Dept: CARDIOLOGY | Facility: OTHER | Age: 69
DRG: 291 | End: 2024-01-08
Attending: INTERNAL MEDICINE
Payer: MEDICARE

## 2024-01-08 LAB
ALLEN'S TEST: NO
ALLEN'S TEST: YES
ALLEN'S TEST: YES
ANION GAP SERPL CALCULATED.3IONS-SCNC: 8 MMOL/L (ref 7–15)
BASE EXCESS BLDA CALC-SCNC: 6.8 MMOL/L (ref -9–1.8)
BASE EXCESS BLDA CALC-SCNC: 7.1 MMOL/L (ref -9–1.8)
BASE EXCESS BLDA CALC-SCNC: 7.6 MMOL/L (ref -9–1.8)
BUN SERPL-MCNC: 28.3 MG/DL (ref 8–23)
C PNEUM DNA SPEC QL NAA+PROBE: NOT DETECTED
CALCIUM SERPL-MCNC: 9.1 MG/DL (ref 8.8–10.2)
CHLORIDE SERPL-SCNC: 99 MMOL/L (ref 98–107)
CREAT SERPL-MCNC: 1.31 MG/DL (ref 0.67–1.17)
DEPRECATED HCO3 PLAS-SCNC: 35 MMOL/L (ref 22–29)
EGFRCR SERPLBLD CKD-EPI 2021: 59 ML/MIN/1.73M2
FLUAV H1 2009 PAND RNA SPEC QL NAA+PROBE: NOT DETECTED
FLUAV H1 RNA SPEC QL NAA+PROBE: NOT DETECTED
FLUAV H3 RNA SPEC QL NAA+PROBE: NOT DETECTED
FLUAV RNA SPEC QL NAA+PROBE: NOT DETECTED
FLUBV RNA SPEC QL NAA+PROBE: NOT DETECTED
GLUCOSE BLDC GLUCOMTR-MCNC: 164 MG/DL (ref 70–99)
GLUCOSE BLDC GLUCOMTR-MCNC: 164 MG/DL (ref 70–99)
GLUCOSE BLDC GLUCOMTR-MCNC: 189 MG/DL (ref 70–99)
GLUCOSE BLDC GLUCOMTR-MCNC: 201 MG/DL (ref 70–99)
GLUCOSE SERPL-MCNC: 172 MG/DL (ref 70–99)
HADV DNA SPEC QL NAA+PROBE: NOT DETECTED
HCO3 BLD-SCNC: 38 MMOL/L (ref 21–28)
HCO3 BLD-SCNC: 39 MMOL/L (ref 21–28)
HCO3 BLD-SCNC: 39 MMOL/L (ref 21–28)
HCOV PNL SPEC NAA+PROBE: NOT DETECTED
HMPV RNA SPEC QL NAA+PROBE: NOT DETECTED
HOLD SPECIMEN: NORMAL
HPIV1 RNA SPEC QL NAA+PROBE: NOT DETECTED
HPIV2 RNA SPEC QL NAA+PROBE: NOT DETECTED
HPIV3 RNA SPEC QL NAA+PROBE: NOT DETECTED
HPIV4 RNA SPEC QL NAA+PROBE: NOT DETECTED
LVEF ECHO: NORMAL
M PNEUMO DNA SPEC QL NAA+PROBE: NOT DETECTED
MAGNESIUM SERPL-MCNC: 1.9 MG/DL (ref 1.7–2.3)
O2/TOTAL GAS SETTING VFR VENT: 24 %
O2/TOTAL GAS SETTING VFR VENT: 30 %
O2/TOTAL GAS SETTING VFR VENT: 30 %
PCO2 BLD: 85 MM HG (ref 35–45)
PCO2 BLD: 88 MM HG (ref 35–45)
PCO2 BLD: 88 MM HG (ref 35–45)
PH BLD: 7.25 [PH] (ref 7.35–7.45)
PH BLD: 7.26 [PH] (ref 7.35–7.45)
PH BLD: 7.26 [PH] (ref 7.35–7.45)
PO2 BLD: 64 MM HG (ref 80–105)
PO2 BLD: 66 MM HG (ref 80–105)
PO2 BLD: 69 MM HG (ref 80–105)
POTASSIUM SERPL-SCNC: 4.6 MMOL/L (ref 3.4–5.3)
RSV RNA SPEC QL NAA+PROBE: NOT DETECTED
RSV RNA SPEC QL NAA+PROBE: NOT DETECTED
RV+EV RNA SPEC QL NAA+PROBE: DETECTED
SODIUM SERPL-SCNC: 142 MMOL/L (ref 135–145)

## 2024-01-08 PROCEDURE — 5A09457 ASSISTANCE WITH RESPIRATORY VENTILATION, 24-96 CONSECUTIVE HOURS, CONTINUOUS POSITIVE AIRWAY PRESSURE: ICD-10-PCS | Performed by: INTERNAL MEDICINE

## 2024-01-08 PROCEDURE — 99291 CRITICAL CARE FIRST HOUR: CPT | Performed by: INTERNAL MEDICINE

## 2024-01-08 PROCEDURE — 250N000011 HC RX IP 250 OP 636: Performed by: INTERNAL MEDICINE

## 2024-01-08 PROCEDURE — 255N000002 HC RX 255 OP 636: Performed by: INTERNAL MEDICINE

## 2024-01-08 PROCEDURE — 258N000003 HC RX IP 258 OP 636: Performed by: INTERNAL MEDICINE

## 2024-01-08 PROCEDURE — 36415 COLL VENOUS BLD VENIPUNCTURE: CPT | Performed by: INTERNAL MEDICINE

## 2024-01-08 PROCEDURE — 250N000013 HC RX MED GY IP 250 OP 250 PS 637: Performed by: INTERNAL MEDICINE

## 2024-01-08 PROCEDURE — 94660 CPAP INITIATION&MGMT: CPT

## 2024-01-08 PROCEDURE — 83735 ASSAY OF MAGNESIUM: CPT | Performed by: INTERNAL MEDICINE

## 2024-01-08 PROCEDURE — 200N000001 HC R&B ICU

## 2024-01-08 PROCEDURE — 80048 BASIC METABOLIC PNL TOTAL CA: CPT | Performed by: INTERNAL MEDICINE

## 2024-01-08 PROCEDURE — 999N000208 ECHOCARDIOGRAM COMPLETE

## 2024-01-08 PROCEDURE — 36600 WITHDRAWAL OF ARTERIAL BLOOD: CPT | Performed by: INTERNAL MEDICINE

## 2024-01-08 PROCEDURE — 999N000157 HC STATISTIC RCP TIME EA 10 MIN

## 2024-01-08 PROCEDURE — 93306 TTE W/DOPPLER COMPLETE: CPT | Mod: 26 | Performed by: STUDENT IN AN ORGANIZED HEALTH CARE EDUCATION/TRAINING PROGRAM

## 2024-01-08 PROCEDURE — 82803 BLOOD GASES ANY COMBINATION: CPT | Performed by: INTERNAL MEDICINE

## 2024-01-08 RX ORDER — SODIUM CHLORIDE 9 MG/ML
INJECTION, SOLUTION INTRAVENOUS CONTINUOUS
Status: DISCONTINUED | OUTPATIENT
Start: 2024-01-08 | End: 2024-01-13

## 2024-01-08 RX ADMIN — INSULIN ASPART 1 UNITS: 100 INJECTION, SOLUTION INTRAVENOUS; SUBCUTANEOUS at 11:59

## 2024-01-08 RX ADMIN — SODIUM CHLORIDE 500 ML: 9 INJECTION, SOLUTION INTRAVENOUS at 18:36

## 2024-01-08 RX ADMIN — LOSARTAN POTASSIUM 25 MG: 25 TABLET, FILM COATED ORAL at 09:15

## 2024-01-08 RX ADMIN — CARVEDILOL 12.5 MG: 12.5 TABLET, FILM COATED ORAL at 09:15

## 2024-01-08 RX ADMIN — APIXABAN 5 MG: 5 TABLET, FILM COATED ORAL at 09:15

## 2024-01-08 RX ADMIN — SODIUM CHLORIDE: 9 INJECTION, SOLUTION INTRAVENOUS at 22:32

## 2024-01-08 RX ADMIN — ASPIRIN 81 MG: 81 TABLET, COATED ORAL at 09:16

## 2024-01-08 RX ADMIN — APIXABAN 5 MG: 5 TABLET, FILM COATED ORAL at 22:33

## 2024-01-08 RX ADMIN — SODIUM CHLORIDE: 9 INJECTION, SOLUTION INTRAVENOUS at 13:38

## 2024-01-08 RX ADMIN — SPIRONOLACTONE 25 MG: 25 TABLET ORAL at 09:16

## 2024-01-08 RX ADMIN — FAMOTIDINE 20 MG: 10 INJECTION INTRAVENOUS at 22:23

## 2024-01-08 RX ADMIN — INSULIN ASPART 1 UNITS: 100 INJECTION, SOLUTION INTRAVENOUS; SUBCUTANEOUS at 17:54

## 2024-01-08 RX ADMIN — FAMOTIDINE 20 MG: 10 INJECTION INTRAVENOUS at 13:57

## 2024-01-08 RX ADMIN — INSULIN ASPART 1 UNITS: 100 INJECTION, SOLUTION INTRAVENOUS; SUBCUTANEOUS at 08:25

## 2024-01-08 RX ADMIN — PERFLUTREN 4 ML: 6.52 INJECTION, SUSPENSION INTRAVENOUS at 08:10

## 2024-01-08 ASSESSMENT — ACTIVITIES OF DAILY LIVING (ADL)
ADLS_ACUITY_SCORE: 30
ADLS_ACUITY_SCORE: 20
ADLS_ACUITY_SCORE: 30
ADLS_ACUITY_SCORE: 30
ADLS_ACUITY_SCORE: 20
ADLS_ACUITY_SCORE: 30
ADLS_ACUITY_SCORE: 20
ADLS_ACUITY_SCORE: 30
ADLS_ACUITY_SCORE: 20
ADLS_ACUITY_SCORE: 20

## 2024-01-08 NOTE — PROGRESS NOTES
Patient keeps taking his oxygen off, O2 dropping to low 80's on room air. Patient did not appear any more short of breath with O2 off. Placed O2 back on and sats went up to  94%.

## 2024-01-08 NOTE — PLAN OF CARE
"Goal Outcome Evaluation:  Patient is alert and oriented x4. Vss. /60 (BP Location: Right arm, Patient Position: Semi-Hoskins's, Cuff Size: Adult Regular)   Pulse 67   Temp 98.5  F (36.9  C) (Tympanic)   Resp 18   Ht 1.88 m (6' 2\")   Wt 135.6 kg (299 lb)   SpO2 93%   BMI 38.39 kg/m   Patient is on 3L O2 via nasal cannula. Wheezes throughout lung fields. SCD's on. Lotion applied to legs, patients lower extremities are edematous, left foot more so than the right. Patient has patchy areas on bilateral feet that are brown and rough. Both of patients great toenails are very long, patient could benefit from podiatry upon discharge as patient is diabetic.                       "

## 2024-01-08 NOTE — PROGRESS NOTES
Nutrition instruction:   Pt moved to ICU this afternoon due to increased somnolence with respiratory failure. Will visit with pt on next available day for nutrition instruction.   Capri Tolliver RD on 1/8/2024 at 2:42 PM

## 2024-01-08 NOTE — PROGRESS NOTES
"[facility]    Medicine Progress Note - Hospitalist Service    Date of Admission:  1/7/2024    Assessment & Plan   Efra Zuniga is a 68 year old male history of chronic diastolic CHF, type 2 diabetes, hypertension, paroxysmal A-fib status post pacemaker placement presenting with probable acute diastolic CHF exacerbation causing acute hypoxic respiratory failure.      Acute Respiratory Failure due to Chronic Untreated Diastolic Dysfunction  - given rapid improvement with initial diuresis , likely diagnosis  - TTE 1/7/24:   Technically difficult study.Extremely difficult acoustic windows despite theuse of contrast for endcardial border definition.  Left ventricular size, wall motion and function are normal. The ejectionfraction is 55-60%.  Global right ventricular function is normal.  Pulmonary artery systolic pressure cannot be assessed.  IVC diameter <2.1 cm collapsing >50% with sniff suggests a normal RA pressure of 3 mmHg. No pericardial effusion is present.  - creat elevation with further diuresis today, have dc'd lasix  - cont aldactone  - does have \"habitus\" of pickwickian syndrome  - continue Coreg today (for BP control)  - question if he really is stable on RA at baseline     DM-II  - A1c 8.5 percent  - cont insulin coverage    WADE  - elevated creat with lasix and ARB dosed for 1 day  - have dc'd both - will recheck tomorrow     HTN  - adequate control today, cont Coreg which was just started    ? LOIDA  - appears to be very solmonlent and suspect underlying LOIDA        Diet: Combination Diet Regular Diet Adult    DVT Prophylaxis: DOAC  Reynaga Catheter: Not present  Lines: None     Cardiac Monitoring: ACTIVE order. Indication: Acute decompensated heart failure (48 hours)  Code Status: Full Code      Clinically Significant Risk Factors Present on Admission                  # Hypertension: Noted on problem list  # Acute heart failure with preserved ejection fraction: heart failure noted on " "problem list, last echo with EF >50%, and receiving IV diuretics  # Acute Respiratory Failure: Documented O2 saturation < 91%.  Continue supplemental oxygen as needed    # DMII: A1C = 8.5 % (Ref range: 4.0 - 6.2 %) within past 6 months    # Obesity: Estimated body mass index is 38.71 kg/m  as calculated from the following:    Height as of this encounter: 1.88 m (6' 2\").    Weight as of this encounter: 136.8 kg (301 lb 8 oz).              Disposition Plan     Expected Discharge Date: 01/08/2024                    Shiraz Motley MD  Hospitalist Service  [facility]  Securely message with [employer] (more info)  Text page via [facility] Paging/Directory   ______________________________________________________________________    Interval History   He feels much better with diuresis, no complaints     Physical Exam   Vital Signs: Temp: 98.7  F (37.1  C) Temp src: Tympanic BP: (!) 162/81 Pulse: 83   Resp: 18 SpO2: 94 % O2 Device: Nasal cannula Oxygen Delivery: 3 LPM  Weight: 301 lbs 8 oz    Constitutional: solmonlent but arrounsable   Eyes: lids and lashes normal, pupils equal, round and reactive to light, sclera clear, and conjunctiva normal  Respiratory: no increased work of breathing, no retractions, and clear to auscultation.  Very difficult lung exam due to habitus  Cardiovascular: Very distant lung sounds, regular rate and rhythm, normal S1 and S2, no S3 or S4, and no murmur noted  GI: No scars, normal bowel sounds, soft, non-distended, non-tender, no masses palpated, no hepatosplenomegally  Skin: Bilateral stasis dermatisit, + BLE  Neuropsychiatric: General: normal, calm, and normal eye contact    Medical Decision Making       45 MINUTES SPENT BY ME on the date of service doing chart review, history, exam, documentation & further activities per the note.      Data     I have personally reviewed the following data over the past 24 hrs:    N/A  \   N/A   / N/A     142 99 28.3 (H) /  189 (H)   4.6 35 (H) 1.31 " (H) \     ALT: N/A AST: N/A AP: N/A TBILI: N/A   ALB: N/A TOT PROTEIN: N/A LIPASE: N/A     Trop: N/A BNP: N/A     TSH: N/A T4: N/A A1C: N/A     Procal: N/A CRP: N/A Lactic Acid: N/A         Imaging results reviewed over the past 24 hrs:   Recent Results (from the past 24 hour(s))   Echocardiogram Complete   Result Value    LVEF  55-60%    [facility]    [id number]  [id number]  [id number]  [id number]^MARVIN^DEA^ROCIO     [facility]  [address] [place] [address]     Name: ONUR TREJO  MRN: [id number]  : 1955  Study Date: 2024 07:17 AM  Age: 68 yrs  Gender: Male  Patient Location: [facility]  Reason For Study: Heart Failure  Ordering Physician: DEA WONG  Performed By: Emerita Rangel     BSA: 2.6 m2  Height: 74 in  Weight: 301 lb  HR: 82  BP: 131/72 mmHg  ______________________________________________________________________________  Procedure  Complete Portable Echo Adult. Contrast Definity. Echocardiogram with two-  dimensional, color and spectral Doppler performed. Technically difficult  study.Extremely difficult acoustic windows despite the use of contrast for  endcardial border definition. Definity (NDC #40517-307-41) given  intravenously. Patient was given 4ml mixture of 1.5ml Definity and 8.5ml  saline. 6 ml wasted. Definity Lot # 1345 .  ______________________________________________________________________________  Interpretation Summary  Technically difficult study.Extremely difficult acoustic windows despite the  use of contrast for endcardial border definition.  Left ventricular size, wall motion and function are normal. The ejection  fraction is 55-60%.  Global right ventricular function is normal.  Pulmonary artery systolic pressure cannot be assessed.  IVC diameter <2.1 cm collapsing >50% with sniff suggests a normal RA pressure  of 3 mmHg.  No pericardial effusion is present.  ______________________________________________________________________________  Left  Ventricle  Left ventricular size, wall motion and function are normal. The ejection  fraction is 55-60%. Left ventricular diastolic function is indeterminate.     Right Ventricle  The right ventricle is normal size. Global right ventricular function is  normal.     Atria  Both atria appear normal.     Mitral Valve  The mitral valve is normal.     Aortic Valve  The valve leaflets are not well visualized. On Doppler interrogation, there is  no significant stenosis or regurgitation.     Tricuspid Valve  The tricuspid valve is normal. Trace tricuspid insufficiency is present.  Pulmonary artery systolic pressure cannot be assessed.     Pulmonic Valve  The pulmonic valve is normal.     Vessels  The aorta root is normal. The thoracic aorta is normal. The pulmonary artery  cannot be assessed. The inferior vena cava was normal in size with preserved  respiratory variability. IVC diameter <2.1 cm collapsing >50% with sniff  suggests a normal RA pressure of 3 mmHg.     Pericardium  No pericardial effusion is present.     Compared to Previous Study  Previous study not available for comparison.     ______________________________________________________________________________  MMode/2D Measurements & Calculations  Ao root diam: 3.1 cm  asc Aorta Diam: 3.3 cm  LVOT diam: 2.0 cm  LVOT area: 3.1 cm2     Ao root diam index Ht(cm/m): 1.6  Ao root diam index BSA (cm/m2): 1.2  Asc Ao diam index BSA (cm/m2): 1.3  Asc Ao diam index Ht(cm/m): 1.8     Doppler Measurements & Calculations  MV E max denny: 58.3 cm/sec  MV A max denny: 63.2 cm/sec  MV E/A: 0.92  MV dec time: 0.16 sec  Ao V2 max: 130.7 cm/sec  Ao max P.0 mmHg  Ao V2 mean: 87.6 cm/sec  Ao mean PG: 3.7 mmHg  Ao V2 VTI: 22.5 cm  ARMEN(I,D): 2.5 cm2  ARMEN(V,D): 2.7 cm2  LV V1 max P.0 mmHg  LV V1 max: 111.0 cm/sec  LV V1 VTI: 17.6 cm  SV(LVOT): 55.2 ml  SI(LVOT): 21.3 ml/m2  AV Denny Ratio (DI): 0.85  ARMEN Index (cm2/m2): 0.95      ______________________________________________________________________________  Report approved by: MD Say Mccarthy 01/08/2024 08:48 AM

## 2024-01-08 NOTE — PHARMACY-CONSULT NOTE
Pharmacy - Transfer Medication Reconciliation     The patient's transfer medication orders have been compared to the medication administration record and to the Prior to Admissions Medications list - any noted discrepancies were resolved with the MD.     Thank you. Pharmacy will continue to monitor.     Milady Vee [facility] ....................  1/8/2024   1:21 PM

## 2024-01-08 NOTE — PROGRESS NOTES
Interdisciplinary Discharge Planning Note    Anticipated Discharge Date: 1/10    Anticipated Discharge Location: Home    Clinical Needs Before Discharge:  stable oxygen requirement    Treatment Needs After Discharge:  homecare    Potential Barriers to Discharge: None identified at this time    HENOK Jones  1/8/2024,  1:39 PM

## 2024-01-08 NOTE — PROGRESS NOTES
DATE/TIME OF CALL RECEIVED FROM LAB:  01/08/24 at 12:28 PM   LAB TEST:  ABG CO2  LAB VALUE:  88  PROVIDER NOTIFIED?: Yes  PROVIDER NAME: Dr. Motley  DATE/TIME LAB VALUE REPORTED TO PROVIDER: 2260  MECHANISM OF PROVIDER NOTIFICATION: Face-To-Face  PROVIDER RESPONSE: Transfer to ICU and place on BiPAP

## 2024-01-08 NOTE — PROGRESS NOTES
Pt found with his O2 off, SpO2 75% on RA. Placed back on 1L NC with SpO2 increasing to 90%. MD notified and ABG ordered. Pina Pastrana RRT   Nursing/Patient/Family

## 2024-01-08 NOTE — PROGRESS NOTES
SAFETY CHECKLIST  ID Bands and Risk clasps correct and in place (DNR, Fall risk, Allergy, Latex, Limb):  Yes  All Lines Reconciled and labeled correctly: Yes  Whiteboard updated:Yes  Environmental interventions: Yes  Verify Tele #:  [phone]

## 2024-01-08 NOTE — PROGRESS NOTES
"Patient took off O2 again, sats at 80%. Educated patient on the need to keep his oxygen on, patient states \"It keeps falling off\". Oxygen reapplied. Sats 92% on 3L  "

## 2024-01-08 NOTE — PROGRESS NOTES
Updated Note    On assessment by RT noted to have increased solmnolence - ABG done showed increased hypercapnic respiratory failure.    Transfer to ICU and place on BiPap    Repeat ABG pending    Patient has chronic CO2 retention, and likely chronic hypoxia.    Will start gentle IVFs.    Total Time Critical Care 35 mins

## 2024-01-09 LAB
ALLEN'S TEST: NO
ALLEN'S TEST: NO
ALLEN'S TEST: YES
ANION GAP SERPL CALCULATED.3IONS-SCNC: 7 MMOL/L (ref 7–15)
ATRIAL RATE - MUSE: 93 BPM
BASE EXCESS BLDA CALC-SCNC: 6.5 MMOL/L (ref -9–1.8)
BASE EXCESS BLDA CALC-SCNC: 6.9 MMOL/L (ref -9–1.8)
BASE EXCESS BLDA CALC-SCNC: 8.3 MMOL/L (ref -9–1.8)
BUN SERPL-MCNC: 36.9 MG/DL (ref 8–23)
CALCIUM SERPL-MCNC: 8.4 MG/DL (ref 8.8–10.2)
CHLORIDE SERPL-SCNC: 99 MMOL/L (ref 98–107)
CREAT SERPL-MCNC: 1.13 MG/DL (ref 0.67–1.17)
DEPRECATED HCO3 PLAS-SCNC: 31 MMOL/L (ref 22–29)
DIASTOLIC BLOOD PRESSURE - MUSE: NORMAL MMHG
EGFRCR SERPLBLD CKD-EPI 2021: 70 ML/MIN/1.73M2
GLUCOSE BLDC GLUCOMTR-MCNC: 147 MG/DL (ref 70–99)
GLUCOSE BLDC GLUCOMTR-MCNC: 171 MG/DL (ref 70–99)
GLUCOSE BLDC GLUCOMTR-MCNC: 180 MG/DL (ref 70–99)
GLUCOSE BLDC GLUCOMTR-MCNC: 194 MG/DL (ref 70–99)
GLUCOSE BLDC GLUCOMTR-MCNC: 234 MG/DL (ref 70–99)
GLUCOSE SERPL-MCNC: 179 MG/DL (ref 70–99)
HCO3 BLD-SCNC: 36 MMOL/L (ref 21–28)
HCO3 BLD-SCNC: 37 MMOL/L (ref 21–28)
HCO3 BLD-SCNC: 38 MMOL/L (ref 21–28)
HOLD SPECIMEN: NORMAL
INTERPRETATION ECG - MUSE: NORMAL
O2/TOTAL GAS SETTING VFR VENT: 28 %
O2/TOTAL GAS SETTING VFR VENT: 30 %
O2/TOTAL GAS SETTING VFR VENT: 30 %
OXYHGB MFR BLD: 92 % (ref 92–100)
P AXIS - MUSE: 60 DEGREES
PCO2 BLD: 68 MM HG (ref 35–45)
PCO2 BLD: 75 MM HG (ref 35–45)
PCO2 BLD: 85 MM HG (ref 35–45)
PH BLD: 7.25 [PH] (ref 7.35–7.45)
PH BLD: 7.31 [PH] (ref 7.35–7.45)
PH BLD: 7.33 [PH] (ref 7.35–7.45)
PO2 BLD: 66 MM HG (ref 80–105)
PO2 BLD: 71 MM HG (ref 80–105)
PO2 BLD: 75 MM HG (ref 80–105)
POTASSIUM SERPL-SCNC: 5 MMOL/L (ref 3.4–5.3)
PR INTERVAL - MUSE: 174 MS
QRS DURATION - MUSE: 102 MS
QT - MUSE: 348 MS
QTC - MUSE: 432 MS
R AXIS - MUSE: 70 DEGREES
SODIUM SERPL-SCNC: 137 MMOL/L (ref 135–145)
SYSTOLIC BLOOD PRESSURE - MUSE: NORMAL MMHG
T AXIS - MUSE: 78 DEGREES
VENTRICULAR RATE- MUSE: 93 BPM

## 2024-01-09 PROCEDURE — 250N000011 HC RX IP 250 OP 636: Performed by: INTERNAL MEDICINE

## 2024-01-09 PROCEDURE — 82803 BLOOD GASES ANY COMBINATION: CPT | Performed by: INTERNAL MEDICINE

## 2024-01-09 PROCEDURE — 93005 ELECTROCARDIOGRAM TRACING: CPT

## 2024-01-09 PROCEDURE — 200N000001 HC R&B ICU

## 2024-01-09 PROCEDURE — 36600 WITHDRAWAL OF ARTERIAL BLOOD: CPT | Performed by: INTERNAL MEDICINE

## 2024-01-09 PROCEDURE — 80048 BASIC METABOLIC PNL TOTAL CA: CPT | Performed by: INTERNAL MEDICINE

## 2024-01-09 PROCEDURE — 258N000003 HC RX IP 258 OP 636: Performed by: INTERNAL MEDICINE

## 2024-01-09 PROCEDURE — 93010 ELECTROCARDIOGRAM REPORT: CPT | Performed by: STUDENT IN AN ORGANIZED HEALTH CARE EDUCATION/TRAINING PROGRAM

## 2024-01-09 PROCEDURE — 999N000157 HC STATISTIC RCP TIME EA 10 MIN

## 2024-01-09 PROCEDURE — 99233 SBSQ HOSP IP/OBS HIGH 50: CPT | Performed by: INTERNAL MEDICINE

## 2024-01-09 PROCEDURE — 250N000013 HC RX MED GY IP 250 OP 250 PS 637: Performed by: FAMILY MEDICINE

## 2024-01-09 PROCEDURE — 82805 BLOOD GASES W/O2 SATURATION: CPT | Performed by: INTERNAL MEDICINE

## 2024-01-09 PROCEDURE — 94660 CPAP INITIATION&MGMT: CPT

## 2024-01-09 PROCEDURE — 250N000013 HC RX MED GY IP 250 OP 250 PS 637: Performed by: INTERNAL MEDICINE

## 2024-01-09 RX ORDER — METHYLPREDNISOLONE SODIUM SUCCINATE 40 MG/ML
40 INJECTION, POWDER, LYOPHILIZED, FOR SOLUTION INTRAMUSCULAR; INTRAVENOUS EVERY 12 HOURS
Status: DISCONTINUED | OUTPATIENT
Start: 2024-01-09 | End: 2024-01-11

## 2024-01-09 RX ORDER — NYSTATIN 100000 [USP'U]/G
POWDER TOPICAL 2 TIMES DAILY
Status: DISCONTINUED | OUTPATIENT
Start: 2024-01-09 | End: 2024-01-15 | Stop reason: HOSPADM

## 2024-01-09 RX ADMIN — SPIRONOLACTONE 25 MG: 25 TABLET ORAL at 09:31

## 2024-01-09 RX ADMIN — CARVEDILOL 12.5 MG: 12.5 TABLET, FILM COATED ORAL at 20:17

## 2024-01-09 RX ADMIN — NYSTATIN: 100000 POWDER TOPICAL at 21:35

## 2024-01-09 RX ADMIN — FAMOTIDINE 20 MG: 10 INJECTION INTRAVENOUS at 09:30

## 2024-01-09 RX ADMIN — INSULIN ASPART 1 UNITS: 100 INJECTION, SOLUTION INTRAVENOUS; SUBCUTANEOUS at 07:23

## 2024-01-09 RX ADMIN — APIXABAN 5 MG: 5 TABLET, FILM COATED ORAL at 20:17

## 2024-01-09 RX ADMIN — INSULIN ASPART 1 UNITS: 100 INJECTION, SOLUTION INTRAVENOUS; SUBCUTANEOUS at 12:27

## 2024-01-09 RX ADMIN — METHYLPREDNISOLONE SODIUM SUCCINATE 40 MG: 40 INJECTION, POWDER, FOR SOLUTION INTRAMUSCULAR; INTRAVENOUS at 09:27

## 2024-01-09 RX ADMIN — ASPIRIN 81 MG: 81 TABLET, COATED ORAL at 09:31

## 2024-01-09 RX ADMIN — SODIUM CHLORIDE: 9 INJECTION, SOLUTION INTRAVENOUS at 11:47

## 2024-01-09 RX ADMIN — INSULIN ASPART 2 UNITS: 100 INJECTION, SOLUTION INTRAVENOUS; SUBCUTANEOUS at 17:19

## 2024-01-09 RX ADMIN — METHYLPREDNISOLONE SODIUM SUCCINATE 40 MG: 40 INJECTION, POWDER, FOR SOLUTION INTRAMUSCULAR; INTRAVENOUS at 20:10

## 2024-01-09 RX ADMIN — FAMOTIDINE 20 MG: 10 INJECTION INTRAVENOUS at 21:39

## 2024-01-09 RX ADMIN — CARVEDILOL 12.5 MG: 12.5 TABLET, FILM COATED ORAL at 09:31

## 2024-01-09 RX ADMIN — APIXABAN 5 MG: 5 TABLET, FILM COATED ORAL at 09:31

## 2024-01-09 ASSESSMENT — ACTIVITIES OF DAILY LIVING (ADL)
ADLS_ACUITY_SCORE: 30
ADLS_ACUITY_SCORE: 34
ADLS_ACUITY_SCORE: 30
ADLS_ACUITY_SCORE: 34
ADLS_ACUITY_SCORE: 35
ADLS_ACUITY_SCORE: 30
ADLS_ACUITY_SCORE: 34
ADLS_ACUITY_SCORE: 35
ADLS_ACUITY_SCORE: 34

## 2024-01-09 NOTE — PROGRESS NOTES
"Pt's sister Jessica visited along with family. After visiting, pt asked writer \"where am I?\". Pt became emotional after stating \"I don't know anything\". Pt reoriented to hospital and reason for being here. Pt encouraged that he is doing the necessary treatments to improve. Pt is up sitting in chair, tolerating BiPAP fairly well.   "

## 2024-01-09 NOTE — PROGRESS NOTES
Dr. Motley notified of lower systolic blood pressures (low 90's upper 80's). Order obtained for 500 ml bolus and to increase maintenance 75/hr. He is also aware of elevated CO2. No changes to bi pap settings at this time. Will recheck ABG in the AM.

## 2024-01-09 NOTE — PLAN OF CARE
Problem: Adult Inpatient Plan of Care  Goal: Plan of Care Review    Outcome: Progressing  Flowsheets (Taken 1/9/2024 7426)  Plan of Care Reviewed With: patient  Goal: Patient-Specific Goal (Individualized)    Outcome: Progressing  Goal: Absence of Hospital-Acquired Illness or Injury  Outcome: Progressing  Intervention: Identify and Manage Fall Risk    Intervention: Prevent Skin Injury    Intervention: Prevent and Manage VTE (Venous Thromboembolism) Risk    Intervention: Prevent Infection    Goal: Optimal Comfort and Wellbeing  Outcome: Progressing  Intervention: Provide Person-Centered Care    Goal: Readiness for Transition of Care  Outcome: Progressing     Problem: Fluid Volume Excess  Goal: Fluid Balance  Outcome: Progressing     Problem: Fall Injury Risk  Goal: Absence of Fall and Fall-Related Injury  Outcome: Progressing  Intervention: Identify and Manage Contributors    Intervention: Promote Injury-Free Environment    Problem: Pain Acute  Goal: Optimal Pain Control and Function  Outcome: Progressing  Intervention: Prevent or Manage Pain     Problem: Gas Exchange Impaired  Goal: Optimal Gas Exchange  Outcome: Progressing  Intervention: Optimize Oxygenation and Ventilation     Goal Outcome Evaluation:      Plan of Care Reviewed With: patient    Overall Patient Progress: improvingOverall Patient Progress: improving

## 2024-01-09 NOTE — PROGRESS NOTES
[facility]    Medicine Progress Note - Hospitalist Service    Date of Admission:  1/7/2024    Assessment & Plan   Efra Zuniga is a 68 year old male history of chronic diastolic CHF, type 2 diabetes, hypertension, paroxysmal A-fib status post pacemaker placement presenting with acute respiratory failure     Acute on Chronic Respiratory Failure with possible component of Chronic Untreated Diastolic Dysfunction, with baseline severe Obesity Hypoventilation Syndrome, +Rhinovirus on respiratory Viral Panel  - at admit in ED, felt to be in CHF and given lasix with rapid improvement in symptoms -> felt likely to have acute diastolic CHF.  However following morning after minimal diuresis had a creat increase and lasix stopped  - on morning of 1/8 had increase solmnolence and on ABG noted to be in acute on chronic hypercarbic respiratory failure -> transferred to ICU and placed on BiPap  - TTE 1/7/24:   Technically difficult study.Extremely difficult acoustic windows despite theuse of contrast for endcardial border definition.  Left ventricular size, wall motion and function are normal. The ejectionfraction is 55-60%.  Global right ventricular function is normal.  Pulmonary artery systolic pressure cannot be assessed.  IVC diameter <2.1 cm collapsing >50% with sniff suggests a normal RA pressure of 3 mmHg. No pericardial effusion is present.  - At this time, I feel primary etiology is viral respiratory worsening of severe obesity - hypoventilation syndrome  - He does not want to wear bipap - and I shared with him the only other option would be intubation.  He is willing to try again today with breaks - he understands risks with refusing tx  Plan:  - Cont BiPap today  - Given wheezing, will start low dose of IV steroids  - RCAT    DM-II - Uncontrolled at baseline  - A1c 8.5   - cont insulin coverage - expect increase with steroids     WADE (baseline creat 0.75)  - elevated creat with lasix and ARB dosed  "for 1 day  - have dc'd both on 1/8 with improvement of creat today to 1.13    HTN  - adequate control today, cont Coreg which was just started    H/O A Fib s/p Ablation  - On chronic DOAC tx     Diet: Combination Diet Regular Diet Adult    DVT Prophylaxis: DOAC  Reynaga Catheter: Not present  Lines: None     Cardiac Monitoring: ACTIVE order. Indication: ICU  Code Status: Full Code      Clinically Significant Risk Factors          # Hypocalcemia: Lowest Ca = 8.4 mg/dL in last 2 days, will monitor and replace as appropriate         # Hypertension: Noted on problem list  # Acute heart failure with preserved ejection fraction: heart failure noted on problem list, last echo with EF >50%, and receiving IV diuretics      # DMII: A1C = 8.5 % (Ref range: 4.0 - 6.2 %) within past 6 months, PRESENT ON ADMISSION  # Obesity: Estimated body mass index is 36.26 kg/m  as calculated from the following:    Height as of this encounter: 1.88 m (6' 2\").    Weight as of this encounter: 128.1 kg (282 lb 6.6 oz)., PRESENT ON ADMISSION                     Shiraz Motley MD  Hospitalist Service  [facility]  Securely message with [employer] (more info)  Text page via [facility] Paging/Directory   ______________________________________________________________________    Interval History   No complaints today. He is aware he is hear due to breathing and does not like wearing the bipap    Physical Exam   Vital Signs: Temp: 98.7  F (37.1  C) Temp src: Oral BP: 135/72 Pulse: 81   Resp: 21 SpO2: 92 % O2 Device: Nasal cannula Oxygen Delivery: 1 LPM  Weight: 282 lbs 6.55 oz    Constitutional: solmonlent but arrounsable   Eyes: lids and lashes normal, pupils equal, round and reactive to light, sclera clear, and conjunctiva normal  Respiratory: No use of ARM, shallow resp with late exp wheezing.  Very difficult lung exam due to habitus  Cardiovascular: Very distant lung sounds, regular rate and rhythm, normal S1 and S2, no S3 or S4, and no " murmur noted  GI: No scars, normal bowel sounds, soft, non-distended, non-tender, no masses palpated, no hepatosplenomegally  Skin: Bilateral stasis dermatisit, + BLE  Neuropsychiatric: General: normal, calm, and normal eye contact    Medical Decision Making       45 MINUTES SPENT BY ME on the date of service doing chart review, history, exam, documentation & further activities per the note.      Data     I have personally reviewed the following data over the past 24 hrs:    N/A  \   N/A   / N/A     137 99 36.9 (H) /  147 (H)   5.0 31 (H) 1.13 \       Imaging results reviewed over the past 24 hrs:   No results found for this or any previous visit (from the past 24 hour(s)).

## 2024-01-09 NOTE — PLAN OF CARE
Problem: Gas Exchange Impaired  Goal: Optimal Gas Exchange  Outcome: Progressing  Intervention: Optimize Oxygenation and Ventilation     Goal Outcome Evaluation:     Pt wore BiPAP from 1300 yesterday to 0240 this AM. He is now refusing to wear it any longer. Pt had blood gas ordered for 0500 and called tech to come draw him early. Pt is now on nasal cannula at 2 liters. He is alert and oriented.   Breath sounds diminished with occasion exp wheeze. Breathing appears non-labored and he has not been tachypneic.    Problem:     Pt has not voided since 1700. Bladder scanned for 156 mL. Obtaining morning labs early as above. UPDATE: pt had large incontinent void at 0430.    Problem: CV    Soft BP's at start of shift and pt was finishing up a 500 mL bolus for this. Even with softer BP's pt was asymptomatic lying in bed and all extremities were warm with easily palpable pulses. BP's reading WDL now since around 2300. Had held PM dose of coreg for SBP 85 mmHg.   Pt on continuous telemetry. There are no pacer spikes present on tele however I believe he is atrial paced d/t HR being consistently exactly 60 and he has a PPM for SSS.

## 2024-01-09 NOTE — PROGRESS NOTES
Pt transferred to ICU and placed on a BiPAP. Current settings 20/10 30%.  Pt tolerating well. AM ABG ordered. MD aware of pt settings and plan to maintain throughout the night. No further respiratory interventions. Pina Pastrana RRT

## 2024-01-09 NOTE — PROGRESS NOTES
"Pt appears to be more alert this afternoon, while still being forgetful at times. Was able to get up to chair with assist x2. Pt requested break from BiPAP for lunchtime. Pt did state he \"wants to get out of here\". Nursing educated pt on disease process and plan of care. RT updated       "

## 2024-01-09 NOTE — PLAN OF CARE
"  Problem: Adult Inpatient Plan of Care  Goal: Plan of Care Review  Description: The Plan of Care Review/Shift note should be completed every shift.  The Outcome Evaluation is a brief statement about your assessment that the patient is improving, declining, or no change.  This information will be displayed automatically on your shift  note.  Outcome: Progressing  Flowsheets (Taken 1/8/2024 1846)  Plan of Care Reviewed With: patient  Overall Patient Progress: improving  Goal: Patient-Specific Goal (Individualized)  Description: You can add care plan individualizations to a care plan. Examples of Individualization might be:  \"Parent requests to be called daily at 9am for status\", \"I have a hard time hearing out of my right ear\", or \"Do not touch me to wake me up as it startles  me\".  Outcome: Progressing  Goal: Absence of Hospital-Acquired Illness or Injury  Outcome: Progressing  Intervention: Identify and Manage Fall Risk  Recent Flowsheet Documentation  Taken 1/8/2024 1623 by Richie Nunn RN  Safety Promotion/Fall Prevention:   safety round/check completed   clutter free environment maintained   room door open   room near nurse's station    Intervention: Prevent and Manage VTE (Venous Thromboembolism) Risk  Recent Flowsheet Documentation  Taken 1/8/2024 1623 by Richie Nunn RN  VTE Prevention/Management: SCDs (sequential compression devices) on  Intervention: Prevent Infection  Recent Flowsheet Documentation  Taken 1/8/2024 1623 by Richie Nnun RN  Infection Prevention:   rest/sleep promoted   hand hygiene promoted  Goal: Optimal Comfort and Wellbeing  Outcome: Progressing  Intervention: Provide Person-Centered Care  Recent Flowsheet Documentation  Taken 1/8/2024 1623 by Richie Nunn RN  Trust Relationship/Rapport:   care explained   choices provided  Goal: Readiness for Transition of Care  Outcome: Progressing     Problem: Fluid Volume Excess  Goal: Fluid Balance  Outcome: Progressing   "   Problem: Fall Injury Risk  Goal: Absence of Fall and Fall-Related Injury  Outcome: Progressing  Intervention: Identify and Manage Contributors  Recent Flowsheet Documentation  Taken 1/8/2024 1623 by Richie Nunn RN  Medication Review/Management: medications reviewed    Intervention: Promote Injury-Free Environment     safety round/check completed   clutter free environment maintained   room door open   room near nurse's station     Problem: Pain Acute  Goal: Optimal Pain Control and Function  Outcome: Progressing  Intervention: Prevent or Manage Pain    Medication Review/Management: medications reviewed   Goal Outcome Evaluation:      Plan of Care Reviewed With: patient    Overall Patient Progress: improvingOverall Patient Progress: improving            No

## 2024-01-09 NOTE — PROGRESS NOTES
Interdisciplinary Discharge Planning Note    Anticipated Discharge Date:1/10-1/11    Anticipated Discharge Location: Home    Clinical Needs Before Discharge:  stable oxygen requirement    Treatment Needs After Discharge:  homecare    Potential Barriers to Discharge: None identified at this time    HENOK Jones  1/9/2024,  2:59 PM

## 2024-01-10 ENCOUNTER — APPOINTMENT (OUTPATIENT)
Dept: PHYSICAL THERAPY | Facility: OTHER | Age: 69
DRG: 291 | End: 2024-01-10
Attending: INTERNAL MEDICINE
Payer: MEDICARE

## 2024-01-10 ENCOUNTER — APPOINTMENT (OUTPATIENT)
Dept: OCCUPATIONAL THERAPY | Facility: OTHER | Age: 69
DRG: 291 | End: 2024-01-10
Attending: INTERNAL MEDICINE
Payer: MEDICARE

## 2024-01-10 LAB
ALLEN'S TEST: NO
ANION GAP SERPL CALCULATED.3IONS-SCNC: 7 MMOL/L (ref 7–15)
BASE EXCESS BLDA CALC-SCNC: 7.7 MMOL/L (ref -9–1.8)
BUN SERPL-MCNC: 33.3 MG/DL (ref 8–23)
CALCIUM SERPL-MCNC: 8.8 MG/DL (ref 8.8–10.2)
CHLORIDE SERPL-SCNC: 100 MMOL/L (ref 98–107)
CREAT SERPL-MCNC: 0.81 MG/DL (ref 0.67–1.17)
DEPRECATED HCO3 PLAS-SCNC: 33 MMOL/L (ref 22–29)
EGFRCR SERPLBLD CKD-EPI 2021: >90 ML/MIN/1.73M2
GLUCOSE BLDC GLUCOMTR-MCNC: 147 MG/DL (ref 70–99)
GLUCOSE BLDC GLUCOMTR-MCNC: 153 MG/DL (ref 70–99)
GLUCOSE BLDC GLUCOMTR-MCNC: 167 MG/DL (ref 70–99)
GLUCOSE BLDC GLUCOMTR-MCNC: 213 MG/DL (ref 70–99)
GLUCOSE SERPL-MCNC: 188 MG/DL (ref 70–99)
HCO3 BLD-SCNC: 35 MMOL/L (ref 21–28)
HOLD SPECIMEN: NORMAL
MAGNESIUM SERPL-MCNC: 2.1 MG/DL (ref 1.7–2.3)
O2/TOTAL GAS SETTING VFR VENT: 24 %
PCO2 BLD: 61 MM HG (ref 35–45)
PH BLD: 7.37 [PH] (ref 7.35–7.45)
PO2 BLD: 101 MM HG (ref 80–105)
POTASSIUM SERPL-SCNC: 4.9 MMOL/L (ref 3.4–5.3)
SODIUM SERPL-SCNC: 140 MMOL/L (ref 135–145)

## 2024-01-10 PROCEDURE — 82803 BLOOD GASES ANY COMBINATION: CPT | Performed by: FAMILY MEDICINE

## 2024-01-10 PROCEDURE — 250N000011 HC RX IP 250 OP 636: Performed by: INTERNAL MEDICINE

## 2024-01-10 PROCEDURE — 250N000013 HC RX MED GY IP 250 OP 250 PS 637: Performed by: FAMILY MEDICINE

## 2024-01-10 PROCEDURE — 83735 ASSAY OF MAGNESIUM: CPT | Performed by: INTERNAL MEDICINE

## 2024-01-10 PROCEDURE — 80048 BASIC METABOLIC PNL TOTAL CA: CPT | Performed by: INTERNAL MEDICINE

## 2024-01-10 PROCEDURE — 250N000013 HC RX MED GY IP 250 OP 250 PS 637: Performed by: INTERNAL MEDICINE

## 2024-01-10 PROCEDURE — 97530 THERAPEUTIC ACTIVITIES: CPT | Mod: GO

## 2024-01-10 PROCEDURE — 258N000003 HC RX IP 258 OP 636: Performed by: INTERNAL MEDICINE

## 2024-01-10 PROCEDURE — 36415 COLL VENOUS BLD VENIPUNCTURE: CPT | Performed by: INTERNAL MEDICINE

## 2024-01-10 PROCEDURE — 999N000157 HC STATISTIC RCP TIME EA 10 MIN

## 2024-01-10 PROCEDURE — 97530 THERAPEUTIC ACTIVITIES: CPT | Mod: GP

## 2024-01-10 PROCEDURE — 120N000001 HC R&B MED SURG/OB

## 2024-01-10 PROCEDURE — 97165 OT EVAL LOW COMPLEX 30 MIN: CPT | Mod: GO

## 2024-01-10 PROCEDURE — 99233 SBSQ HOSP IP/OBS HIGH 50: CPT | Performed by: INTERNAL MEDICINE

## 2024-01-10 PROCEDURE — 36600 WITHDRAWAL OF ARTERIAL BLOOD: CPT | Performed by: FAMILY MEDICINE

## 2024-01-10 PROCEDURE — 97161 PT EVAL LOW COMPLEX 20 MIN: CPT | Mod: GP

## 2024-01-10 PROCEDURE — 97116 GAIT TRAINING THERAPY: CPT | Mod: GP

## 2024-01-10 RX ORDER — SPIRONOLACTONE 25 MG/1
50 TABLET ORAL DAILY
Status: DISCONTINUED | OUTPATIENT
Start: 2024-01-11 | End: 2024-01-15 | Stop reason: HOSPADM

## 2024-01-10 RX ORDER — LOSARTAN POTASSIUM 25 MG/1
25 TABLET ORAL DAILY
Status: DISCONTINUED | OUTPATIENT
Start: 2024-01-10 | End: 2024-01-15 | Stop reason: HOSPADM

## 2024-01-10 RX ORDER — HYDRALAZINE HYDROCHLORIDE 20 MG/ML
10 INJECTION INTRAMUSCULAR; INTRAVENOUS EVERY 6 HOURS PRN
Status: DISCONTINUED | OUTPATIENT
Start: 2024-01-10 | End: 2024-01-10

## 2024-01-10 RX ORDER — AMLODIPINE BESYLATE 2.5 MG/1
2.5 TABLET ORAL DAILY
Status: DISCONTINUED | OUTPATIENT
Start: 2024-01-10 | End: 2024-01-10

## 2024-01-10 RX ADMIN — FAMOTIDINE 20 MG: 10 INJECTION INTRAVENOUS at 09:27

## 2024-01-10 RX ADMIN — INSULIN ASPART 1 UNITS: 100 INJECTION, SOLUTION INTRAVENOUS; SUBCUTANEOUS at 12:08

## 2024-01-10 RX ADMIN — APIXABAN 5 MG: 5 TABLET, FILM COATED ORAL at 09:28

## 2024-01-10 RX ADMIN — APIXABAN 5 MG: 5 TABLET, FILM COATED ORAL at 22:59

## 2024-01-10 RX ADMIN — METFORMIN HYDROCHLORIDE 500 MG: 500 TABLET ORAL at 17:59

## 2024-01-10 RX ADMIN — LOSARTAN POTASSIUM 25 MG: 25 TABLET, FILM COATED ORAL at 09:28

## 2024-01-10 RX ADMIN — INSULIN ASPART 2 UNITS: 100 INJECTION, SOLUTION INTRAVENOUS; SUBCUTANEOUS at 17:02

## 2024-01-10 RX ADMIN — CARVEDILOL 12.5 MG: 12.5 TABLET, FILM COATED ORAL at 22:59

## 2024-01-10 RX ADMIN — FAMOTIDINE 20 MG: 10 INJECTION INTRAVENOUS at 22:59

## 2024-01-10 RX ADMIN — SODIUM CHLORIDE: 9 INJECTION, SOLUTION INTRAVENOUS at 01:17

## 2024-01-10 RX ADMIN — METHYLPREDNISOLONE SODIUM SUCCINATE 40 MG: 40 INJECTION, POWDER, FOR SOLUTION INTRAMUSCULAR; INTRAVENOUS at 09:27

## 2024-01-10 RX ADMIN — METHYLPREDNISOLONE SODIUM SUCCINATE 40 MG: 40 INJECTION, POWDER, FOR SOLUTION INTRAMUSCULAR; INTRAVENOUS at 20:39

## 2024-01-10 RX ADMIN — NYSTATIN: 100000 POWDER TOPICAL at 22:59

## 2024-01-10 RX ADMIN — SPIRONOLACTONE 25 MG: 25 TABLET ORAL at 09:28

## 2024-01-10 RX ADMIN — CARVEDILOL 12.5 MG: 12.5 TABLET, FILM COATED ORAL at 09:28

## 2024-01-10 RX ADMIN — ASPIRIN 81 MG: 81 TABLET, COATED ORAL at 09:28

## 2024-01-10 RX ADMIN — NYSTATIN: 100000 POWDER TOPICAL at 09:28

## 2024-01-10 RX ADMIN — METFORMIN HYDROCHLORIDE 500 MG: 500 TABLET ORAL at 09:28

## 2024-01-10 RX ADMIN — HYDRALAZINE HYDROCHLORIDE 10 MG: 20 INJECTION INTRAMUSCULAR; INTRAVENOUS at 10:48

## 2024-01-10 RX ADMIN — INSULIN ASPART 1 UNITS: 100 INJECTION, SOLUTION INTRAVENOUS; SUBCUTANEOUS at 07:32

## 2024-01-10 ASSESSMENT — ACTIVITIES OF DAILY LIVING (ADL)
ADLS_ACUITY_SCORE: 33
ADLS_ACUITY_SCORE: 35
ADLS_ACUITY_SCORE: 33
ADLS_ACUITY_SCORE: 34
ADLS_ACUITY_SCORE: 34
ADLS_ACUITY_SCORE: 33
ADLS_ACUITY_SCORE: 35
ADLS_ACUITY_SCORE: 34

## 2024-01-10 NOTE — PROGRESS NOTES
Patient has been on BIPAP most of the shift today. When not on BIPAP-ST he is on a nasal cannula at 3 LPM.    He has tolerated BIPAP and the large full face mask well. The current settings have been effective at reversing his respiratory acidosis.    Nocturnal oximetry has been ordered for home BIPAP qualification.    BIPAP-ST data is below.     01/09/24 1600   Tech Time   $Tech Time (10 minute increments) 1   Mode: CPAP/ BiPAP/ AVAPS/ AVAPS AE   CPAP/BiPAP/ AVAPS/ AVAPS AE Mode BiPAP S/T   Equipment   Device grace   CPAP/BiPAP/Settings   BIPAP/CPAP On Standby On   IPAP/EPAP (cmH2O) 22/10   Rate (breaths/min) 20   Oxygen (%) 30   CPAP/BiPAP Patient Parameters   IPAP (cm H2O) 22 cmH2O   EPAP (cm H2O) 10 cmH2O   Pressure Support (cm H2O) 12 cmH2O   RR Total (breaths/min) 20 breaths/min   Vt (mL) 520 mL   Minute Ventilation (L/min) 9.7 L/min   Peak Inspiratory Pressure (cm H2O) 22 cmH2O   Pt.  Leak (L/min) 70 L/min   CPAP/BiPAP/AVAPS/AVAPS AE Alarms   High Pressure (cm H2O) 30 cmH2O   Low Pressure Delay (sec) 15 sec   Apnea (sec) 20   Lo Min Vent 3   High Rate (breaths/min) 40 breaths/min   Audible Alarm set at (Volume of Alarm) 5   Humidifier Checked Yes

## 2024-01-10 NOTE — PROGRESS NOTES
Patient currently on 2L NC to qualify for home BiPAP.  Overnight oximeter unable to read on patient will monitor on ICU oximeter.

## 2024-01-10 NOTE — PROGRESS NOTES
:    Met with patient to discuss discharge planning needs. Patient was educated about going to an SNF for STR and homecare services. Patient wanted to think about his options tonight.      will check in with patient tomorrow.     HENOK Jones on 1/10/2024 at 3:03 PM

## 2024-01-10 NOTE — PROGRESS NOTES
"[facility]    Medicine Progress Note - Hospitalist Service    Date of Admission:  1/7/2024    Assessment & Plan   Efra Zuniga is a 68 year old male history of chronic diastolic CHF, type 2 diabetes, hypertension, paroxysmal A-fib status post pacemaker placement presenting with acute respiratory failure     Acute on Chronic Respiratory Failure with possible component of Chronic Untreated Diastolic Dysfunction, with probablybaseline  Obesity Hypoventilation Syndrome, +Rhinovirus on respiratory Viral Panel  - at admit in ED, felt to be in CHF and given lasix with rapid improvement in symptoms -> felt likely to have acute diastolic CHF.  However following morning after minimal diuresis had a creat increase and lasix stopped  - on morning of 1/8 had increase solmnolence and on ABG noted to be in acute on chronic hypercarbic respiratory failure -> transferred to ICU and placed on BiPap  - TTE 1/7/24:   Technically difficult study.Extremely difficult acoustic windows despite theuse of contrast for endcardial border definition.  Left ventricular size, wall motion and function are normal. The ejectionfraction is 55-60%.  Global right ventricular function is normal.  Pulmonary artery systolic pressure cannot be assessed.  IVC diameter <2.1 cm collapsing >50% with sniff suggests a normal RA pressure of 3 mmHg. No pericardial effusion is present.  - BiPap managed by RT on 1/9 with \"return\" of his ABG values to presumed baseline and was stable on low flow to RA oxygen over noc -> does not appear to qualify for home BiPap based on these findings  Plan:  - Transfer to floor  - PT/OT  - RCAT to continue to follow  - change to PO steroids on 1/11    DM-II - Uncontrolled at baseline  - A1c 8.5   - cont insulin coverage - increased with steroids   - will start metformin today    WADE (baseline creat 0.75)  - will recheck BMP this morning  - start low does ARB    HTN  - started on Coreg at admit, HR limits further " "increase  - start ARB today     H/O A Fib s/p Ablation  - On chronic DOAC tx - resumed this admit     Diet: Combination Diet Regular Diet Adult    DVT Prophylaxis: DOAC  Reynaga Catheter: Not present  Lines: None     Cardiac Monitoring: ACTIVE order. Indication: ICU  Code Status: Full Code      Clinically Significant Risk Factors                  # Hypertension: Noted on problem list  # Acute heart failure with preserved ejection fraction: heart failure noted on problem list, last echo with EF >50%, and receiving IV diuretics      # DMII: A1C = 8.5 % (Ref range: 4.0 - 6.2 %) within past 6 months, PRESENT ON ADMISSION  # Obesity: Estimated body mass index is 37.8 kg/m  as calculated from the following:    Height as of this encounter: 1.88 m (6' 2\").    Weight as of this encounter: 133.5 kg (294 lb 6.4 oz)., PRESENT ON ADMISSION                     Shiraz Motley MD  Hospitalist Service  [facility]  Securely message with [employer] (more info)  Text page via [employer] Paging/Directory   ______________________________________________________________________    Interval History   Complains of weakness everywhere today, but no localized symtpoms.  Does not recall meeting me.  He was off of BiPap over noc, and early morning ABG noted    Physical Exam   Vital Signs: Temp: 97.4  F (36.3  C) Temp src: Tympanic BP: (!) 145/74 Pulse: 60   Resp: 19 SpO2: 92 % O2 Device: None (Room air) Oxygen Delivery: 2 LPM  Weight: 294 lbs 6.4 oz    Constitutional: awake, NAD  Eyes: lids and lashes normal, pupils equal, round and reactive to light, sclera clear, and conjunctiva normal  Respiratory: No use of ARM, shallow resp with late scattered exp wheezing.  Very difficult lung exam due to habitus  Cardiovascular: Very distant lung sounds, regular rate and rhythm, normal S1 and S2, no S3 or S4, and no murmur noted  GI: No scars, normal bowel sounds, soft, non-distended, non-tender, no masses palpated, no hepatosplenomegally  Skin: " Bilateral stasis dermatisit, + BLE  Neuropsychiatric: General: normal, calm, and normal eye contact    Medical Decision Making         Data         Imaging results reviewed over the past 24 hrs:   No results found for this or any previous visit (from the past 24 hour(s)).

## 2024-01-10 NOTE — PROGRESS NOTES
Interdisciplinary Discharge Planning Note    Anticipated Discharge Date: 1/12    Anticipated Discharge Location: Home VS SNF    Clinical Needs Before Discharge:   Medical Stability    Treatment Needs After Discharge:  homecare and rehab (PT, OT, ST)    Potential Barriers to Discharge: None identified at this time    HENOK Jones  1/10/2024,  3:46 PM

## 2024-01-10 NOTE — PLAN OF CARE
Problem: Gas Exchange Impaired  Goal: Optimal Gas Exchange  Outcome: Progressing  Intervention: Optimize Oxygenation and Ventilation  Recent Flowsheet Documentation  Taken 1/9/2024 2000 by Chelle Degroot, RN  Head of Bed (HOB) Positioning: HOB at 20-30 degrees    Pt has stayed on 1-2 liters overnight per RT, blood gas shows a now compensated pH and pCO2 of 61. Mentation remains the same. Pt does not c/o SOB and breathing appears non-labored.     Problem: Confusion Acute  Goal: Optimal Cognitive Function  Outcome: Progressing     Pt is oriented to self and month only, occasionally he recalls he's at the hospital but very forgetful and needs frequent reorientation. Disoriented to situation.     Problem: Fall Injury Risk  Goal: Absence of Fall and Fall-Related Injury  Intervention: Identify and Manage Contributors  Recent Flowsheet Documentation  Taken 1/10/2024 0000 by Chelle Degroot, RN  Medication Review/Management: medications reviewed    Intervention: Promote Injury-Free Environment  Recent Flowsheet Documentation  Taken 1/10/2024 0000 by Chelle Degroot, RN  Safety Promotion/Fall Prevention:   activity supervised   assistive device/personal items within reach   clutter free environment maintained   increased rounding and observation   nonskid shoes/slippers when out of bed   patient and family education   room door open   room near nurse's station     Goal Outcome Evaluation:    Pt has been up to chair and stood at bedside to void several times this shift. He requires stand-by to 1 staff assist. Appears fatigued with this activity and has not ambulated outside of room.

## 2024-01-10 NOTE — PROGRESS NOTES
Pt was on 0.5l/m nc SATS 95%. Was only 90% on R.A at rest, placed back on 0.5 L. Pt was clear diminished and breathing shallow. Educated pt on purse lip breathing. Offered an albuterol neb and pt declined at this time. Will continue to monitor.  Bisi Bowling, RT

## 2024-01-10 NOTE — PROGRESS NOTES
Currently on 1L NC.  Unable to wean to room air patient on 1-2L overnight.  Off BiPAP all night.  Patient stayed above 90% on 1-2L.  PaCO2 did not worsen overnight to qualify for home BiPAP.  He appears to be resting comfortably.

## 2024-01-10 NOTE — PHARMACY
Pharmacy - Transfer Medication Reconciliation     The patient's transfer medication orders have been compared to the medication administration record and to the Prior to Admissions Medications list - any noted discrepancies were resolved with the MD.     Thank you. Pharmacy will continue to monitor.     Eugenia Moser [facility] ....................  1/10/2024   2:55 PM

## 2024-01-10 NOTE — PROGRESS NOTES
Pt transferred to med/surg room 306 via wheelchair. Report given to LANDON Mckenzie. Pt up in recliner, chair alarm in place.

## 2024-01-11 ENCOUNTER — APPOINTMENT (OUTPATIENT)
Dept: PHYSICAL THERAPY | Facility: OTHER | Age: 69
DRG: 291 | End: 2024-01-11
Payer: MEDICARE

## 2024-01-11 LAB
ANION GAP SERPL CALCULATED.3IONS-SCNC: 7 MMOL/L (ref 7–15)
ATRIAL RATE - MUSE: 63 BPM
BUN SERPL-MCNC: 32.7 MG/DL (ref 8–23)
CALCIUM SERPL-MCNC: 8.9 MG/DL (ref 8.8–10.2)
CHLORIDE SERPL-SCNC: 98 MMOL/L (ref 98–107)
CREAT SERPL-MCNC: 0.76 MG/DL (ref 0.67–1.17)
DEPRECATED HCO3 PLAS-SCNC: 34 MMOL/L (ref 22–29)
DIASTOLIC BLOOD PRESSURE - MUSE: NORMAL MMHG
EGFRCR SERPLBLD CKD-EPI 2021: >90 ML/MIN/1.73M2
GLUCOSE BLDC GLUCOMTR-MCNC: 150 MG/DL (ref 70–99)
GLUCOSE BLDC GLUCOMTR-MCNC: 165 MG/DL (ref 70–99)
GLUCOSE BLDC GLUCOMTR-MCNC: 175 MG/DL (ref 70–99)
GLUCOSE BLDC GLUCOMTR-MCNC: 187 MG/DL (ref 70–99)
GLUCOSE BLDC GLUCOMTR-MCNC: 188 MG/DL (ref 70–99)
GLUCOSE BLDC GLUCOMTR-MCNC: 201 MG/DL (ref 70–99)
GLUCOSE SERPL-MCNC: 201 MG/DL (ref 70–99)
HOLD SPECIMEN: NORMAL
INTERPRETATION ECG - MUSE: NORMAL
MAGNESIUM SERPL-MCNC: 2 MG/DL (ref 1.7–2.3)
P AXIS - MUSE: 33 DEGREES
POTASSIUM SERPL-SCNC: 4.8 MMOL/L (ref 3.4–5.3)
PR INTERVAL - MUSE: 160 MS
QRS DURATION - MUSE: 100 MS
QT - MUSE: 404 MS
QTC - MUSE: 413 MS
R AXIS - MUSE: 75 DEGREES
SARS-COV-2 RNA RESP QL NAA+PROBE: NEGATIVE
SODIUM SERPL-SCNC: 139 MMOL/L (ref 135–145)
SYSTOLIC BLOOD PRESSURE - MUSE: NORMAL MMHG
T AXIS - MUSE: 76 DEGREES
VENTRICULAR RATE- MUSE: 63 BPM

## 2024-01-11 PROCEDURE — 99233 SBSQ HOSP IP/OBS HIGH 50: CPT | Performed by: INTERNAL MEDICINE

## 2024-01-11 PROCEDURE — 83735 ASSAY OF MAGNESIUM: CPT | Performed by: INTERNAL MEDICINE

## 2024-01-11 PROCEDURE — 36415 COLL VENOUS BLD VENIPUNCTURE: CPT | Performed by: INTERNAL MEDICINE

## 2024-01-11 PROCEDURE — 120N000001 HC R&B MED SURG/OB

## 2024-01-11 PROCEDURE — 87635 SARS-COV-2 COVID-19 AMP PRB: CPT | Performed by: INTERNAL MEDICINE

## 2024-01-11 PROCEDURE — 94762 N-INVAS EAR/PLS OXIMTRY CONT: CPT

## 2024-01-11 PROCEDURE — 97530 THERAPEUTIC ACTIVITIES: CPT | Mod: GP

## 2024-01-11 PROCEDURE — 80048 BASIC METABOLIC PNL TOTAL CA: CPT | Performed by: INTERNAL MEDICINE

## 2024-01-11 PROCEDURE — 250N000013 HC RX MED GY IP 250 OP 250 PS 637: Performed by: INTERNAL MEDICINE

## 2024-01-11 PROCEDURE — 250N000012 HC RX MED GY IP 250 OP 636 PS 637: Performed by: INTERNAL MEDICINE

## 2024-01-11 RX ORDER — PREDNISONE 20 MG/1
20 TABLET ORAL DAILY
Status: DISCONTINUED | OUTPATIENT
Start: 2024-01-11 | End: 2024-01-11

## 2024-01-11 RX ORDER — PREDNISONE 20 MG/1
20 TABLET ORAL 2 TIMES DAILY WITH MEALS
Status: DISCONTINUED | OUTPATIENT
Start: 2024-01-11 | End: 2024-01-15 | Stop reason: HOSPADM

## 2024-01-11 RX ORDER — FUROSEMIDE 20 MG
20 TABLET ORAL DAILY
Status: DISCONTINUED | OUTPATIENT
Start: 2024-01-11 | End: 2024-01-15 | Stop reason: HOSPADM

## 2024-01-11 RX ADMIN — INSULIN ASPART 1 UNITS: 100 INJECTION, SOLUTION INTRAVENOUS; SUBCUTANEOUS at 16:46

## 2024-01-11 RX ADMIN — APIXABAN 5 MG: 5 TABLET, FILM COATED ORAL at 21:26

## 2024-01-11 RX ADMIN — ASPIRIN 81 MG: 81 TABLET, COATED ORAL at 11:00

## 2024-01-11 RX ADMIN — EMPAGLIFLOZIN 10 MG: 10 TABLET, FILM COATED ORAL at 10:59

## 2024-01-11 RX ADMIN — LOSARTAN POTASSIUM 25 MG: 25 TABLET, FILM COATED ORAL at 11:00

## 2024-01-11 RX ADMIN — PREDNISONE 20 MG: 20 TABLET ORAL at 08:10

## 2024-01-11 RX ADMIN — INSULIN ASPART 1 UNITS: 100 INJECTION, SOLUTION INTRAVENOUS; SUBCUTANEOUS at 12:39

## 2024-01-11 RX ADMIN — METFORMIN HYDROCHLORIDE 500 MG: 500 TABLET ORAL at 08:10

## 2024-01-11 RX ADMIN — APIXABAN 5 MG: 5 TABLET, FILM COATED ORAL at 10:59

## 2024-01-11 RX ADMIN — SPIRONOLACTONE 50 MG: 25 TABLET ORAL at 11:00

## 2024-01-11 RX ADMIN — CARVEDILOL 12.5 MG: 12.5 TABLET, FILM COATED ORAL at 11:08

## 2024-01-11 RX ADMIN — METFORMIN HYDROCHLORIDE 500 MG: 500 TABLET ORAL at 18:17

## 2024-01-11 RX ADMIN — CARVEDILOL 12.5 MG: 12.5 TABLET, FILM COATED ORAL at 21:26

## 2024-01-11 RX ADMIN — FUROSEMIDE 20 MG: 20 TABLET ORAL at 11:00

## 2024-01-11 RX ADMIN — NYSTATIN: 100000 POWDER TOPICAL at 11:02

## 2024-01-11 RX ADMIN — PREDNISONE 20 MG: 20 TABLET ORAL at 18:17

## 2024-01-11 RX ADMIN — INSULIN ASPART 1 UNITS: 100 INJECTION, SOLUTION INTRAVENOUS; SUBCUTANEOUS at 08:11

## 2024-01-11 ASSESSMENT — ACTIVITIES OF DAILY LIVING (ADL)
ADLS_ACUITY_SCORE: 29
ADLS_ACUITY_SCORE: 33
ADLS_ACUITY_SCORE: 29
ADLS_ACUITY_SCORE: 33
ADLS_ACUITY_SCORE: 29

## 2024-01-11 NOTE — PROGRESS NOTES
Patient has been assessed for Home Oxygen needs. Oxygen readings:    *Pulse oximetry (SpO2) = 87% on room air at rest while awake.    *SpO2 improved to 92% on 1liters/minute at rest.

## 2024-01-11 NOTE — PROGRESS NOTES
:    Met with patient to discuss discharge planning needs. Patient was agreeable to having a referral sent out for STR.     Pt/family was given the Medicare Compare list for SNF, with associated star ratings to assist with choice for referrals/discharge planning Yes    Education was given to pt/family that star ratings are updated/maintained by Medicare and can be reviewed by visiting www.medicare.gov Yes    Patient asked for a referral to be sent to [facility]. Referral has been sent and Christy at [facility] was notified about referral.     HENOK Jones on 1/11/2024 at 9:15 AM    Updated patient that referral has been sent. He asked that I update his sister.     Called patients sister Jessica and updated her that a referral for STR has been sent to [facility].  will keep her updated about discharge plan.     HENOK Jones on 1/11/2024 at 9:21 AM    Left message with Christy at [facility] asking for an update regarding the referral for patient to receive STR at their facility.     HENOK Jones on 1/11/2024 at 12:32 PM    Received message from Christy updating me that they can take patient. Christy will reach out to myself tomorrow to let me know if they can take patient Saturday or Monday next week depending on when they will have a bed available.     Updated patient and his sister Jessica about this. Patient was worried about his vehicle being here. Spoke to Jessica on the phone and she reported that family will make sure to get patients car home tomorrow. Patient was updated about this plan.     HENOK Jones on 1/11/2024 at 3:42 PM

## 2024-01-11 NOTE — PROGRESS NOTES
SAFETY CHECKLIST  ID Bands and Risk clasps correct and in place (DNR, Fall risk, Allergy, Latex, Limb):  Yes  All Lines Reconciled and labeled correctly: Yes  Whiteboard updated:Yes  Environmental interventions: Yes  Magaly Partida RN on 1/11/2024 at 8:14 AM

## 2024-01-11 NOTE — PLAN OF CARE
Problem: Adult Inpatient Plan of Care  Goal: Plan of Care Review  Outcome: Progressing  Goal: Patient-Specific Goal (Individualized)  Outcome: Progressing  Goal: Absence of Hospital-Acquired Illness or Injury  Outcome: Progressing  Intervention: Identify and Manage Fall Risk  Recent Flowsheet Documentation  Taken 1/11/2024 0517 by Trina Dias RN  Safety Promotion/Fall Prevention:   safety round/check completed   room near nurse's station   room door open   activity supervised   nonskid shoes/slippers when out of bed  Taken 1/10/2024 2036 by Trina Dias RN  Safety Promotion/Fall Prevention:   safety round/check completed   room near nurse's station   room door open   activity supervised   nonskid shoes/slippers when out of bed  Intervention: Prevent and Manage VTE (Venous Thromboembolism) Risk  Recent Flowsheet Documentation  Taken 1/11/2024 0517 by Trina Dias RN  VTE Prevention/Management: (pt up in chair) SCDs (sequential compression devices) off  Taken 1/10/2024 2036 by Trina Dias RN  VTE Prevention/Management: (pt up in chair) SCDs (sequential compression devices) off  Intervention: Prevent Infection  Recent Flowsheet Documentation  Taken 1/11/2024 0517 by Trina Dias RN  Infection Prevention:   rest/sleep promoted   single patient room provided   personal protective equipment utilized  Taken 1/10/2024 2036 by Trina Dias RN  Infection Prevention:   rest/sleep promoted   single patient room provided   personal protective equipment utilized  Goal: Optimal Comfort and Wellbeing  Outcome: Progressing  Intervention: Monitor Pain and Promote Comfort  Recent Flowsheet Documentation  Taken 1/10/2024 2036 by Trina Dias RN  Pain Management Interventions: declines  Intervention: Provide Person-Centered Care  Recent Flowsheet Documentation  Taken 1/11/2024 0517 by Trina Dias RN  Trust Relationship/Rapport: care explained  Taken 1/10/2024 2036 by Trina Dias RN  Trust  "Relationship/Rapport:   care explained   choices provided   questions answered  Goal: Readiness for Transition of Care  Outcome: Progressing   Goal Outcome Evaluation:    Disoriented to time and place, increased BP, O2 91-92% on 1L NC, expiratory wheezes to bilateral lower lobes- dry cough present, SBA, slight SOB with exertion, slept intermittently through the night- currently resting in bed with even respirations.     BP (!) 163/81 (BP Location: Left arm, Patient Position: Semi-Hoskins's, Cuff Size: Adult Large)   Pulse 65   Temp 97.7  F (36.5  C) (Tympanic)   Resp 18   Ht 1.88 m (6' 2\")   Wt 133.5 kg (294 lb 6.4 oz)   SpO2 91%   BMI 37.80 kg/m      Trina Dias RN on 1/11/2024 at 5:42 AM                          "

## 2024-01-11 NOTE — PROGRESS NOTES
Interdisciplinary Discharge Planning Note    Anticipated Discharge Date: 1/13    Anticipated Discharge Location: SNF     Clinical Needs Before Discharge:   Patient is medically cleared and waiting for placement    Treatment Needs After Discharge:  rehab (PT, OT, ST)    Potential Barriers to Discharge: [facility] is currently screening and if they are able to take patient the earliest would be Saturday 1/13/2024.     HENOK Jones  1/11/2024,  12:30 PM

## 2024-01-11 NOTE — PROGRESS NOTES
01/10/24 1500   Appointment Info   Signing Clinician's Name / Credentials (PT) Mata Quita MPT   Living Environment   People in Home alone   Current Living Arrangements house   Home Accessibility no concerns;stairs to enter home;stairs within home   Number of Stairs, Main Entrance 4   Stair Railings, Main Entrance railings safe and in good condition   Number of Stairs, Within Home, Primary greater than 10 stairs   Stair Railings, Within Home, Primary railings safe and in good condition   Transportation Anticipated family or friend will provide;car, drives self   Self-Care   Usual Activity Tolerance moderate   Current Activity Tolerance fair   Equipment Currently Used at Home none   Fall history within last six months no   General Information   Referring Physician Else   Patient/Family Therapy Goals Statement (PT) return home when able   Existing Precautions/Restrictions fall;other (see comments);oxygen therapy device and L/min  (droplet precautions)   Weight-Bearing Status - LLE full weight-bearing   Weight-Bearing Status - RLE full weight-bearing   Cognition   Affect/Mental Status (Cognition) WFL   Orientation Status (Cognition) oriented x 4   Follows Commands (Cognition) WFL   Cognitive Status Comments patient expresses episodes of mild confusion since admission to hospital   Pain Assessment   Patient Currently in Pain No   Integumentary/Edema   Integumentary/Edema no deficits were identifed   Posture    Posture Not impaired   Range of Motion (ROM)   Range of Motion ROM is WFL   Strength (Manual Muscle Testing)   Strength (Manual Muscle Testing) strength is WFL   Strength Comments however, patient fatigues with activity   Bed Mobility   Impairments Contributing to Impaired Bed Mobility decreased strength   Comment, (Bed Mobility) SBA   Transfers   Impairments Contributing to Impaired Transfers decreased strength   Comment, (Transfers) CGA to SBA of 1   Gait/Stairs (Locomotion)   [place] Level (Gait)  contact guard   Assistive Device (Gait) walker, front-wheeled   Distance in Feet (Gait) 40   Pattern (Gait) step-through   Balance   Balance Comments good with Fww   Sensory Examination   Sensory Perception WFL   Coordination   Coordination no deficits were identified   Muscle Tone   Muscle Tone no deficits were identified   Clinical Impression   Criteria for Skilled Therapeutic Intervention Yes, treatment indicated   PT Diagnosis (PT) impaired mobility   Influenced by the following impairments fatigue and SOB   Functional limitations due to impairments activity/gait tolerance   Clinical Presentation (PT Evaluation Complexity) stable   Clinical Decision Making (Complexity) low complexity   Risk & Benefits of therapy have been explained evaluation/treatment results reviewed;risks/benefits reviewed;patient   PT Total Evaluation Time   PT Eval, Low Complexity Minutes (06684) 15   Physical Therapy Goals   PT Frequency Daily   PT Predicted Duration/Target Date for Goal Attainment 01/14/24   PT Goals Transfers;Gait   PT: Transfers Supervision/stand-by assist   PT: Gait Supervision/stand-by assist;150 feet   PT Discharge Planning   PT Plan Continue PT   PT Discharge Recommendation (DC Rec) Transitional Care Facility;home with assist;home with home care physical therapy   PT Rationale for DC Rec to promote strength,stabilitly and safe mobility   PT Brief overview of current status Pleasant patient requiring only CGA for safety with mobility, is experiencing increased fatigue with activity limiting his activity/gait tolerance   PT Equipment Needed at Discharge   (patient may not require any assistive gait device at discharge)

## 2024-01-11 NOTE — PROGRESS NOTES
"[facility]    Medicine Progress Note - Hospitalist Service    Date of Admission:  1/7/2024    Assessment & Plan   Efra Zuniga is a 68 year old male history of chronic diastolic CHF, type 2 diabetes, hypertension, paroxysmal A-fib status post pacemaker placement who presented with acute respiratory failure.  He has been non-adherent with medical treatment and was not on any prescribed medications at the time of admissions (and likely hadn't taken medications for >1 year)    Acute on Chronic Respiratory Failure with primary etiology being acute rhinovirus URI, in setting of chronic CO2 retention (possible obesity hypoventilation syndrome)   - at admit in ED, felt to be in CHF and given lasix with rapid improvement in reported symptoms -> felt likely to have acute diastolic CHF.  However following morning (1/8) after minimal diuresis had a creat increase and lasix discontinued  - on same morning of 1/8 had increase solmnolence and on ABG noted to be in acute on chronic hypercarbic respiratory failure -> transferred to ICU and placed on BiPap  - TTE 1/8/24: 55-60%, otherwise a difficult study with no significant abnormality noted  - BiPap managed by RT on 1/9 with \"return\" of his ABG values to presumed baseline and was stable on low flow oxygen -> does not appear to qualify for home BiPap based RT overnoc evals  - at this point he appears to be near his presumed baseline, and am optimizing medical treatment for underlying disease:  Plan:  - Diastolic Dysfxn:  - MRA: Spironolactone at 50 mg/d  - ARB: Cozaar 25 mg/d  - Coreg 12.5 mg/d (further increase limited by HR)  - Add SGLP2 today  - will also start low dose oral lasix today  - Possible LOIDA / Obesity Hypoventilation Syndrome:   - RCAT testing over-noc to see if he qualifies for home O2  - Rhinovirus with Reactive Airway Disease   - Decrease pred to 20 mg bid, and further taper over next week    DM-II - Uncontrolled at baseline  - A1c 8.5, off " "therapy   - Started on Metformin 500 bid on 1/10  - Add SGLP2 today, Jardiance 10 mg/d  - As steroids are tapering, probably no further adjustments in inpatient setting     WADE (baseline creat 0.75),  creat peak of 1.31 on 1/8  - He is at his baseline now, recheck BMP 1 week after discharge given all med adjustments    HTN  - started on Coreg 12.5 bid at admit  - Cozaar 25 mg/d on 1/10  - follow today    H/O A Fib s/p Ablation  - On chronic DOAC tx - resumed this admit    Morbid Obesity    Dispo: he is ready for discharge in next 24 hours and is agreeable      Diet: Combination Diet Regular Diet Adult    DVT Prophylaxis: DOAC  Reynaga Catheter: Not present  Lines: None     Cardiac Monitoring: None  Code Status: Full Code      Clinically Significant Risk Factors                  # Hypertension: Noted on problem list  # Chronic heart failure with preserved ejection fraction: heart failure noted on problem list and last echo with EF >50%      # DMII: A1C = 8.5 % (Ref range: 4.0 - 6.2 %) within past 6 months, PRESENT ON ADMISSION  # Obesity: Estimated body mass index is 37.41 kg/m  as calculated from the following:    Height as of this encounter: 1.88 m (6' 2\").    Weight as of this encounter: 132.2 kg (291 lb 6.4 oz)., PRESENT ON ADMISSION                     Shiraz Motley MD  Hospitalist Service  [facility]  Securely message with [employer] (more info)  Text page via [employer] Paging/Directory   ______________________________________________________________________    Interval History   Feels better today.  He is agreeable to STR.  States breathing is about normal for him.  No CP.    Physical Exam   Vital Signs: Temp: 97.7  F (36.5  C) Temp src: Tympanic BP: (!) 163/81 Pulse: 65   Resp: 18 SpO2: 91 % O2 Device: Nasal cannula Oxygen Delivery: 1 LPM  Weight: 291 lbs 6.4 oz    Constitutional: awake, NAD  Eyes: lids and lashes normal, pupils equal, round and reactive to light, sclera clear, and conjunctiva " normal  Respiratory: No use of ARM, shallow resp with minimal late exp wheezing.  Very difficult lung exam due to habitus  Cardiovascular: Very distant lung sounds, regular rate and rhythm, normal S1 and S2, no S3 or S4, and no murmur noted, +LE edema  GI: No scars, normal bowel sounds, soft, non-distended, non-tender, no masses palpated, no hepatosplenomegally.  Obesity limits exam   Skin: Bilateral stasis dermatitis, + BLE  Neuropsychiatric: General: normal, calm, and normal eye contact      Data     I have personally reviewed the following data over the past 24 hrs:    N/A  \   N/A   / N/A     139 98 32.7 (H) /  201 (H)   4.8 34 (H) 0.76 \       Imaging results reviewed over the past 24 hrs:   No results found for this or any previous visit (from the past 24 hour(s)).

## 2024-01-11 NOTE — PLAN OF CARE
Goal Outcome Evaluation:      Plan of Care Reviewed With: patient    Overall Patient Progress: improvingOverall Patient Progress: improving  Pt was transferred to the floor from ICU, acute CHF, type II diabetic, A-Fib, HTN, on 1 L of O2. Orientated to self only,expiatory wheezes with no crackles, frequent cough. Afebrile, denies pain. Complaint of generalized weakness.      Magaly Partida RN on 1/10/2024 at 7:53 PM

## 2024-01-12 ENCOUNTER — APPOINTMENT (OUTPATIENT)
Dept: PHYSICAL THERAPY | Facility: OTHER | Age: 69
DRG: 291 | End: 2024-01-12
Payer: MEDICARE

## 2024-01-12 ENCOUNTER — APPOINTMENT (OUTPATIENT)
Dept: OCCUPATIONAL THERAPY | Facility: OTHER | Age: 69
DRG: 291 | End: 2024-01-12
Payer: MEDICARE

## 2024-01-12 LAB
BACTERIA BLD CULT: NO GROWTH
BACTERIA BLD CULT: NO GROWTH
GLUCOSE BLDC GLUCOMTR-MCNC: 129 MG/DL (ref 70–99)
GLUCOSE BLDC GLUCOMTR-MCNC: 180 MG/DL (ref 70–99)
GLUCOSE BLDC GLUCOMTR-MCNC: 194 MG/DL (ref 70–99)
GLUCOSE BLDC GLUCOMTR-MCNC: 197 MG/DL (ref 70–99)
MAGNESIUM SERPL-MCNC: 2.1 MG/DL (ref 1.7–2.3)
POTASSIUM SERPL-SCNC: 4.6 MMOL/L (ref 3.4–5.3)

## 2024-01-12 PROCEDURE — 999N000157 HC STATISTIC RCP TIME EA 10 MIN

## 2024-01-12 PROCEDURE — 250N000013 HC RX MED GY IP 250 OP 250 PS 637: Performed by: INTERNAL MEDICINE

## 2024-01-12 PROCEDURE — 84132 ASSAY OF SERUM POTASSIUM: CPT | Performed by: INTERNAL MEDICINE

## 2024-01-12 PROCEDURE — 120N000001 HC R&B MED SURG/OB

## 2024-01-12 PROCEDURE — 97530 THERAPEUTIC ACTIVITIES: CPT | Mod: GP

## 2024-01-12 PROCEDURE — 250N000009 HC RX 250: Performed by: INTERNAL MEDICINE

## 2024-01-12 PROCEDURE — 83735 ASSAY OF MAGNESIUM: CPT | Performed by: INTERNAL MEDICINE

## 2024-01-12 PROCEDURE — 250N000012 HC RX MED GY IP 250 OP 636 PS 637: Performed by: INTERNAL MEDICINE

## 2024-01-12 PROCEDURE — 99232 SBSQ HOSP IP/OBS MODERATE 35: CPT | Performed by: INTERNAL MEDICINE

## 2024-01-12 PROCEDURE — 97116 GAIT TRAINING THERAPY: CPT | Mod: GP

## 2024-01-12 PROCEDURE — 97535 SELF CARE MNGMENT TRAINING: CPT | Mod: GO | Performed by: OCCUPATIONAL THERAPIST

## 2024-01-12 PROCEDURE — 36415 COLL VENOUS BLD VENIPUNCTURE: CPT | Performed by: INTERNAL MEDICINE

## 2024-01-12 PROCEDURE — 94640 AIRWAY INHALATION TREATMENT: CPT

## 2024-01-12 PROCEDURE — 97530 THERAPEUTIC ACTIVITIES: CPT | Mod: GO | Performed by: OCCUPATIONAL THERAPIST

## 2024-01-12 RX ADMIN — CARVEDILOL 12.5 MG: 12.5 TABLET, FILM COATED ORAL at 10:30

## 2024-01-12 RX ADMIN — APIXABAN 5 MG: 5 TABLET, FILM COATED ORAL at 10:30

## 2024-01-12 RX ADMIN — FUROSEMIDE 20 MG: 20 TABLET ORAL at 10:29

## 2024-01-12 RX ADMIN — LOSARTAN POTASSIUM 25 MG: 25 TABLET, FILM COATED ORAL at 10:29

## 2024-01-12 RX ADMIN — PREDNISONE 20 MG: 20 TABLET ORAL at 17:11

## 2024-01-12 RX ADMIN — NYSTATIN: 100000 POWDER TOPICAL at 21:24

## 2024-01-12 RX ADMIN — INSULIN ASPART 1 UNITS: 100 INJECTION, SOLUTION INTRAVENOUS; SUBCUTANEOUS at 17:11

## 2024-01-12 RX ADMIN — ASPIRIN 81 MG: 81 TABLET, COATED ORAL at 10:30

## 2024-01-12 RX ADMIN — INSULIN ASPART 2 UNITS: 100 INJECTION, SOLUTION INTRAVENOUS; SUBCUTANEOUS at 11:09

## 2024-01-12 RX ADMIN — SPIRONOLACTONE 50 MG: 25 TABLET ORAL at 10:29

## 2024-01-12 RX ADMIN — CARVEDILOL 12.5 MG: 12.5 TABLET, FILM COATED ORAL at 21:22

## 2024-01-12 RX ADMIN — EMPAGLIFLOZIN 10 MG: 10 TABLET, FILM COATED ORAL at 10:29

## 2024-01-12 RX ADMIN — PREDNISONE 20 MG: 20 TABLET ORAL at 07:50

## 2024-01-12 RX ADMIN — ALBUTEROL SULFATE 2.5 MG: 2.5 SOLUTION RESPIRATORY (INHALATION) at 05:24

## 2024-01-12 RX ADMIN — NYSTATIN: 100000 POWDER TOPICAL at 10:30

## 2024-01-12 RX ADMIN — METFORMIN HYDROCHLORIDE 500 MG: 500 TABLET ORAL at 07:50

## 2024-01-12 RX ADMIN — METFORMIN HYDROCHLORIDE 500 MG: 500 TABLET ORAL at 17:11

## 2024-01-12 RX ADMIN — APIXABAN 5 MG: 5 TABLET, FILM COATED ORAL at 21:22

## 2024-01-12 ASSESSMENT — ACTIVITIES OF DAILY LIVING (ADL)
ADLS_ACUITY_SCORE: 29
ADLS_ACUITY_SCORE: 29
ADLS_ACUITY_SCORE: 31
ADLS_ACUITY_SCORE: 31
ADLS_ACUITY_SCORE: 29
ADLS_ACUITY_SCORE: 32
ADLS_ACUITY_SCORE: 31
ADLS_ACUITY_SCORE: 32
ADLS_ACUITY_SCORE: 32
ADLS_ACUITY_SCORE: 31
ADLS_ACUITY_SCORE: 31
ADLS_ACUITY_SCORE: 29

## 2024-01-12 NOTE — PLAN OF CARE
Assumed care at 1500.       SAFETY CHECKLIST  ID Bands and Risk clasps correct and in place (DNR, Fall risk, Allergy, Latex, Limb):  Yes  All Lines Reconciled and labeled correctly: Yes  Whiteboard updated:Yes  Environmental interventions: Yes

## 2024-01-12 NOTE — PLAN OF CARE
Problem: Adult Inpatient Plan of Care  Goal: Plan of Care Review  Outcome: Progressing  Goal: Patient-Specific Goal (Individualized)  Outcome: Progressing  Goal: Absence of Hospital-Acquired Illness or Injury  Outcome: Progressing  Intervention: Identify and Manage Fall Risk  Recent Flowsheet Documentation  Taken 1/12/2024 0500 by Trina Dias RN  Safety Promotion/Fall Prevention:   safety round/check completed   room near nurse's station   room door open   activity supervised   nonskid shoes/slippers when out of bed  Taken 1/11/2024 2121 by Trina Dias RN  Safety Promotion/Fall Prevention:   safety round/check completed   room near nurse's station   room door open   activity supervised   nonskid shoes/slippers when out of bed  Intervention: Prevent and Manage VTE (Venous Thromboembolism) Risk  Recent Flowsheet Documentation  Taken 1/12/2024 0500 by Trina Dias RN  VTE Prevention/Management: (pt up in chair) SCDs (sequential compression devices) off  Taken 1/11/2024 2121 by Trina Dias RN  VTE Prevention/Management: (pt up in chair) SCDs (sequential compression devices) off  Intervention: Prevent Infection  Recent Flowsheet Documentation  Taken 1/12/2024 0500 by Trina Dias RN  Infection Prevention:   rest/sleep promoted   single patient room provided   personal protective equipment utilized  Taken 1/11/2024 2121 by Trina Dias RN  Infection Prevention:   rest/sleep promoted   single patient room provided   personal protective equipment utilized  Goal: Optimal Comfort and Wellbeing  Outcome: Progressing  Intervention: Monitor Pain and Promote Comfort  Recent Flowsheet Documentation  Taken 1/12/2024 0500 by Trina Dias RN  Pain Management Interventions: declines  Intervention: Provide Person-Centered Care  Recent Flowsheet Documentation  Taken 1/12/2024 0500 by Trina Dias RN  Trust Relationship/Rapport:   care explained   choices provided   questions answered  Taken 1/11/2024 2121 by  "Trina Dias RN  Trust Relationship/Rapport:   care explained   choices provided   questions answered  Goal: Readiness for Transition of Care  Outcome: Progressing   Goal Outcome Evaluation:    Disoriented to time and situation, SBA, SOB with activity- patient did require 1 neb for increased SOB and wheezing- reports relief. Denies pain this shift. Patient up most of the night watching TV. Currently up in chair watching TV.     BP (!) 173/82 (BP Location: Right arm, Patient Position: Semi-Hoskins's, Cuff Size: Adult Large)   Pulse 62   Temp 98.3  F (36.8  C) (Tympanic)   Resp 20   Ht 1.88 m (6' 2\")   Wt 131.4 kg (289 lb 11.2 oz)   SpO2 95%   BMI 37.20 kg/m      Trina Dias, RN on 1/12/2024 at 6:39 AM                        "

## 2024-01-12 NOTE — PROGRESS NOTES
01/12/24 1054   Appointment Info   Signing Clinician's Name / Credentials (OT) Leena Hernandez OTR/L   Interventions   Interventions Quick Adds Therapeutic Activity;Self-Care/Home Management   Self-Care/Home Management   Self-Care/Home Mgmt/ADL, Compensatory, Meal Prep Minutes (86793) 15   Symptoms Noted During/After Treatment (Meal Preparation/Planning Training) fatigue   Treatment Detail/Skilled Intervention pt completed light hygeine, upper body cares while seated in recliner; pt declined lower body cares   [place] Level (Grooming Training) stand-by assist   Assistance (Grooming Training) supervision   [place] Level (Bathing Training) minimum assist (75% patient effort)   Assistance (Bathing Training) 1 person assist   [place] Level (Upper Body Dressing Training) stand-by assist   Assistance (Upper Body Dressing Training) 1 person assist   Therapeutic Activities   Therapeutic Activity Minutes (67054) 15   Symptoms noted during/after treatment fatigue   Treatment Detail/Skilled Intervention pt sit to stand from recliner chair with CGA and ambulated short distance in hallway with FWW.  At rest, pt was on 1 lpm oxygen but increased to 2 lpm for activity.  Upon returning to chair, sats checked and pt just below 90% with quick recovery to above 90%.  Oxygen returned to 1 lpm at end of session.   OT Discharge Planning   OT Plan Continue OT   OT Discharge Recommendation (DC Rec) home with home care occupational therapy;Transitional Care Facility   OT Rationale for DC Rec pt would benefit from continued OT due to limited activity tolerance and continued need for supplemental oxygen.  Pt is not at baseline and requires min assist overall for basic bathing, dressing, grooming.   OT Brief overview of current status Pt requires min assist for self cares, CGA with FWW for mobility with limited tolerance for activity slightly desatting with activity.

## 2024-01-12 NOTE — PROGRESS NOTES
Interdisciplinary Discharge Planning Note    Anticipated Discharge Date: 1/15-1/16    Anticipated Discharge Location: [facility]    Clinical Needs Before Discharge:   Patients is medically cleared and waiting for bed opening at [facility]    Treatment Needs After Discharge:  rehab (PT, OT, ST)    Potential Barriers to Discharge: None identified at this time. [facility] will take patient Monday or Tuesday next week depending on bed availability.     HENOK Jones  1/12/2024,  2:16 PM

## 2024-01-12 NOTE — PROGRESS NOTES
01/11/24 1048   Appointment Info   Signing Clinician's Name / Credentials (PT) Mata ROSS   Interventions   Interventions Quick Adds Gait Training;Therapeutic Activity   Therapeutic Activity   Treatment Detail/Skilled Intervention minimal assist to SBA for bed mobilities; sit to stand and stand pivot with CGA and use of Fww   Gait Training   Symptoms Noted During/After Treatment (Gait Training) fatigue   Treatment Detail/Skilled Intervention ambulation within patient's room   Distance in Feet 20   [place] Level (Gait Training) contact guard   Physical Assistance Level (Gait Training) 1 person assist   Weight Bearing (Gait Training) full weight-bearing   Assistive Device (Gait Training) rolling walker   Pattern Analysis (Gait Training) swing-to gait   Gait Analysis Deviations decreased malu;decreased velocity of limb motion;decreased step length   Impairments (Gait Analysis/Training) balance impaired;strength decreased   PT Discharge Planning   PT Plan Continue PT   PT Discharge Recommendation (DC Rec) Transitional Care Facility   PT Rationale for DC Rec to promote strength,stabilitly and safe mobility;   PT Brief overview of current status Pleasant patient continues to exhibit decreased functional activity/gait tolerance due to fatigue; Fww used for stability and energy conservation; he will benefit from continued PT in short term rehab   PT Equipment Needed at Discharge walker, rolling  (for energy conservation)        01/11/24 1048   Appointment Info   Signing Clinician's Name / Credentials (PT) Mata ROSS   Interventions   Interventions Quick Adds Gait Training;Therapeutic Activity   Therapeutic Activity   Treatment Detail/Skilled Intervention minimal assist to SBA for bed mobilities; sit to stand and stand pivot with CGA and use of Fww   Gait Training   Symptoms Noted During/After Treatment (Gait Training) fatigue   Treatment Detail/Skilled Intervention ambulation within patient's room    Distance in Feet 20   [place] Level (Gait Training) contact guard   Physical Assistance Level (Gait Training) 1 person assist   Weight Bearing (Gait Training) full weight-bearing   Assistive Device (Gait Training) rolling walker   Pattern Analysis (Gait Training) swing-to gait   Gait Analysis Deviations decreased malu;decreased velocity of limb motion;decreased step length   Impairments (Gait Analysis/Training) balance impaired;strength decreased   PT Discharge Planning   PT Plan Continue PT   PT Discharge Recommendation (DC Rec) Transitional Care Facility   PT Rationale for DC Rec to promote strength,stabilitly and safe mobility;   PT Brief overview of current status Pleasant patient continues to exhibit decreased functional activity/gait tolerance due to fatigue; Fww used for stability and energy conservation; he will benefit from continued PT in short term rehab   PT Equipment Needed at Discharge walker, rolling  (for energy conservation)

## 2024-01-12 NOTE — PROGRESS NOTES
01/12/24 0900   Appointment Info   Signing Clinician's Name / Credentials (PT) Mata Devlin MPT   Therapeutic Activity   Symptoms Noted During/After Treatment Fatigue;Shortness of breath   Treatment Detail/Skilled Intervention sit to stand with minimal assist to CGA ; stand pivot with CGA with and without use of Fww   Gait Training   Symptoms Noted During/After Treatment (Gait Training) fatigue;shortness of breath   Treatment Detail/Skilled Intervention ambulation in hallway   Distance in Feet 100   [place] Level (Gait Training) contact guard   Physical Assistance Level (Gait Training) 1 person + 1 person to manage equipment   Weight Bearing (Gait Training) full weight-bearing   Assistive Device (Gait Training) rolling walker   Pattern Analysis (Gait Training) swing-to gait   Gait Analysis Deviations decreased malu;decreased velocity of limb motion;decreased step length   Impairments (Gait Analysis/Training) balance impaired;strength decreased   PT Discharge Planning   PT Plan Continue PT   PT Discharge Recommendation (DC Rec) Transitional Care Facility   PT Rationale for DC Rec to promote strength,stabilitly and safe mobility;   PT Brief overview of current status Pleasant patient continues to exhibit decreased functional activity/gait tolerance due to fatigue; Fww used for stability and energy conservation; patient's O2 sats decreased to 88% with increased ambulation rising above 90% with 30 second seated rest; he will benefit from continued PT in short term rehab   PT Equipment Needed at Discharge walker, rolling  (for energy conservation)

## 2024-01-12 NOTE — PROGRESS NOTES
Notified RT of increased SOB and wheezing. Patient would like a neb treatment.     Trina Dias RN on 1/12/2024 at 5:23 AM

## 2024-01-12 NOTE — PLAN OF CARE
Pt is oriented to self and place. Pt worked with PT/OT and sat in chair for meals. Pt is still on 1L NC. He is receiving oral diuretics and incontinent of urine. Plan is to discharge Monday to [facility]. Will continue to monitor.        Problem: Fall Injury Risk  Goal: Absence of Fall and Fall-Related Injury  Outcome: Progressing     Problem: Gas Exchange Impaired  Goal: Optimal Gas Exchange  Outcome: Progressing     Problem: Confusion Acute  Goal: Optimal Cognitive Function  Outcome: Progressing  Intervention: Minimize Contributing Factors  Recent Flowsheet Documentation  Taken 1/12/2024 0748 by Paulo Rutherford, RN  Environment Familiarity/Consistency: daily routine followed

## 2024-01-12 NOTE — PROGRESS NOTES
Nutrition instruction:   Visited with pt and his guest today about heart healthy nutrition therapy. He does not use the salt shaker but does eat out often. He lives alone and really does not cook for himself much. His family does bring foods at times but not for all meals. Discussed meals on wheels program briefly. He does plan to go to short term rehab. Encouraged to visit with the dietitian at the facility as well for ideas when he is ready to return home for meal options. Encouraged as much scratch cooking as possible or utilizing low sodium convenience items. He does have a scale at home, encouraged him to weigh himself daily. He agreed and do expect some compliance as he is able.   Capri Tolliver RD on 1/12/2024 at 2:18 PM

## 2024-01-12 NOTE — PROGRESS NOTES
:    Spoke with Christy at [facility] who updated me that they will definitely be able to take patient on Tuesday (1/16/2024) and would update us if they end up having an opening sooner than anticipated. I updated MD, patient, and patient's sister Jessica about discharge plan.     HENOK Jones on 1/12/2024 at 12:04 PM    Received message from Christy at [facility] stating that they are able to take patient Monday morning.      will reach out to [facility] Monday morning to coordinate a time for them to pick patient up from the hospital and transport him to their facility.     HENOK Jones on 1/12/2024 at 4:24 PM

## 2024-01-12 NOTE — PLAN OF CARE
Goal Outcome Evaluation:      Plan of Care Reviewed With: patient    Overall Patient Progress: improving    Patient maintained Sp02 >92% on 1L via NC.  Patient has some shortness of breath with activity, resolves with rest.  Lungs clear, diminished in bases.  Occasionally has some expiratory wheezes.  Patient has some confusion about situation and why he is in the hospital and date but is alert to self and having logical conversations with his many visitors and family members.  Patient has not been impulsive, bed/chair alarm on at all times.  VSS and has been afebrile this shift.

## 2024-01-12 NOTE — PROGRESS NOTES
Patient currently on 1L Nasal Cannula with SP02 93-95%.  Received prn albuterol at nurses request due to wheezing and shortness of breath. No improvement of breath sounds post treatment.  Patient states he does feel like he is breathing better.  Will continue current care plan.

## 2024-01-12 NOTE — PROGRESS NOTES
"[facility]    Medicine Progress Note - Hospitalist Service    Date of Admission:  1/7/2024    Assessment & Plan   Principal Problem:    Acute on chronic respiratory failure with hypoxia and hypercapnia(H)    Acute on chronic heart failure with preserved ejection fraction (H)    Acute rhinovirus URI    Assessment: multifactorial causes for respiratory failure. Per Dr. Motley note:  \" at admit in ED, felt to be in CHF and given lasix with rapid improvement in reported symptoms -> felt likely to have acute diastolic CHF.  However following morning (1/8) after minimal diuresis had a creat increase and lasix discontinued  - on same morning of 1/8 had increase solmnolence and on ABG noted to be in acute on chronic hypercarbic respiratory failure -> transferred to ICU and placed on BiPap  - TTE 1/8/24: 55-60%, otherwise a difficult study with no significant abnormality noted  - BiPap managed by RT on 1/9 with \"return\" of his ABG values to presumed baseline and was stable on low flow oxygen -> does not appear to qualify for home BiPap based RT overnoc evals  - at this point he appears to be near his presumed baseline, and am optimizing medical treatment for underlying disease\"      Plan: - Diastolic Dysfxn:  - MRA: Spironolactone at 50 mg/d  - ARB: Cozaar 25 mg/d  - Coreg 12.5 mg/d (further increase limited by HR)  - Add SGLP2 today  - will also start low dose oral lasix today  - Possible LOIDA / Obesity Hypoventilation Syndrome:              - RCAT testing over-noc to see if he qualifies for home O2  - Rhinovirus with Reactive Airway Disease              - Decrease pred to 20 mg bid, and further taper over next week    Awaiting skilled nursing facility bed availability    Active Problems:    Type 2 diabetes mellitus with hyperglycemia, without long-term current use of insulin (H)    Assessment: A1c 8.5, improved control here with metformin and jardiance    Plan: Metformin and jardiance      Essential " "hypertension    Assessment: chronic    Plan: continue losartan and carvedilol      Moderate COPD (chronic obstructive pulmonary disease) (H)      Paroxysmal atrial fibrillation with RVR (H)    Assessment: chronic    Plan: continue DOAC      Acute kidney injury      Assessment: diuretic related    Plan: recheck in AM          Diet: Combination Diet Regular Diet Adult    DVT Prophylaxis: DOAC  Re[place]aga Catheter: Not present  Lines: None     Cardiac Monitoring: None  Code Status: Full Code      Clinically Significant Risk Factors                  # Hypertension: Noted on problem list  # Chronic heart failure with preserved ejection fraction: heart failure noted on problem list and last echo with EF >50%      # DMII: A1C = 8.5 % (Ref range: 4.0 - 6.2 %) within past 6 months   # Obesity: Estimated body mass index is 37.2 kg/m  as calculated from the following:    Height as of this encounter: 1.88 m (6' 2\").    Weight as of this encounter: 131.4 kg (289 lb 11.2 oz).             Disposition Plan    when skilled nursing facility bed available         Donte Macias MD  Hospitalist Service  [facility]  Securely message with [employer] (more info)  Text page via [employer] Paging/Directory   ______________________________________________________________________    Interval History   Feels significant dyspnea on exertion, but improving. No fevers, chills     Physical Exam   Vital Signs: Temp: 98.3  F (36.8  C) Temp src: Oral BP: 137/67 Pulse: 64   Resp: 18 SpO2: 94 % O2 Device: Nasal cannula Oxygen Delivery: 1 LPM  Weight: 289 lbs 11.2 oz    GENERAL: Comfortable, no apparent distress.  CARDIOVASCULAR: regular rate and rhythm, no murmur. No lower extremity edema   RESPIRATORY: Clear to auscultation bilaterally, no wheezes or crackles.  GI: non-tender, non-distended, normal bowel sounds.   SKIN: warm periphery, no rashes      Medical Decision Making       35 MINUTES SPENT BY ME on the date of service doing chart " review, history, exam, documentation & further activities per the note.

## 2024-01-13 ENCOUNTER — APPOINTMENT (OUTPATIENT)
Dept: PHYSICAL THERAPY | Facility: OTHER | Age: 69
DRG: 291 | End: 2024-01-13
Payer: MEDICARE

## 2024-01-13 ENCOUNTER — APPOINTMENT (OUTPATIENT)
Dept: OCCUPATIONAL THERAPY | Facility: OTHER | Age: 69
DRG: 291 | End: 2024-01-13
Payer: MEDICARE

## 2024-01-13 LAB
GLUCOSE BLDC GLUCOMTR-MCNC: 118 MG/DL (ref 70–99)
GLUCOSE BLDC GLUCOMTR-MCNC: 133 MG/DL (ref 70–99)
GLUCOSE BLDC GLUCOMTR-MCNC: 152 MG/DL (ref 70–99)
GLUCOSE BLDC GLUCOMTR-MCNC: 165 MG/DL (ref 70–99)
GLUCOSE BLDC GLUCOMTR-MCNC: 186 MG/DL (ref 70–99)
HOLD SPECIMEN: NORMAL
MAGNESIUM SERPL-MCNC: 2.3 MG/DL (ref 1.7–2.3)
POTASSIUM SERPL-SCNC: 4.8 MMOL/L (ref 3.4–5.3)

## 2024-01-13 PROCEDURE — 250N000013 HC RX MED GY IP 250 OP 250 PS 637: Performed by: INTERNAL MEDICINE

## 2024-01-13 PROCEDURE — 120N000001 HC R&B MED SURG/OB

## 2024-01-13 PROCEDURE — 250N000012 HC RX MED GY IP 250 OP 636 PS 637: Performed by: INTERNAL MEDICINE

## 2024-01-13 PROCEDURE — 83735 ASSAY OF MAGNESIUM: CPT | Performed by: INTERNAL MEDICINE

## 2024-01-13 PROCEDURE — 97116 GAIT TRAINING THERAPY: CPT | Mod: GP

## 2024-01-13 PROCEDURE — 97530 THERAPEUTIC ACTIVITIES: CPT | Mod: GO

## 2024-01-13 PROCEDURE — 99231 SBSQ HOSP IP/OBS SF/LOW 25: CPT | Performed by: INTERNAL MEDICINE

## 2024-01-13 PROCEDURE — 97530 THERAPEUTIC ACTIVITIES: CPT | Mod: GP

## 2024-01-13 PROCEDURE — 36415 COLL VENOUS BLD VENIPUNCTURE: CPT | Performed by: INTERNAL MEDICINE

## 2024-01-13 PROCEDURE — 84132 ASSAY OF SERUM POTASSIUM: CPT | Performed by: INTERNAL MEDICINE

## 2024-01-13 RX ORDER — LANOLIN ALCOHOL/MO/W.PET/CERES
3 CREAM (GRAM) TOPICAL
Status: DISCONTINUED | OUTPATIENT
Start: 2024-01-13 | End: 2024-01-15 | Stop reason: HOSPADM

## 2024-01-13 RX ADMIN — SPIRONOLACTONE 50 MG: 25 TABLET ORAL at 11:01

## 2024-01-13 RX ADMIN — NYSTATIN: 100000 POWDER TOPICAL at 11:08

## 2024-01-13 RX ADMIN — LOSARTAN POTASSIUM 25 MG: 25 TABLET, FILM COATED ORAL at 11:01

## 2024-01-13 RX ADMIN — ZOLPIDEM TARTRATE 2.5 MG: 5 TABLET ORAL at 22:15

## 2024-01-13 RX ADMIN — CARVEDILOL 12.5 MG: 12.5 TABLET, FILM COATED ORAL at 11:01

## 2024-01-13 RX ADMIN — CARVEDILOL 12.5 MG: 12.5 TABLET, FILM COATED ORAL at 22:15

## 2024-01-13 RX ADMIN — PREDNISONE 20 MG: 20 TABLET ORAL at 17:12

## 2024-01-13 RX ADMIN — FUROSEMIDE 20 MG: 20 TABLET ORAL at 11:01

## 2024-01-13 RX ADMIN — PREDNISONE 20 MG: 20 TABLET ORAL at 07:39

## 2024-01-13 RX ADMIN — ASPIRIN 81 MG: 81 TABLET, COATED ORAL at 11:01

## 2024-01-13 RX ADMIN — APIXABAN 5 MG: 5 TABLET, FILM COATED ORAL at 11:01

## 2024-01-13 RX ADMIN — METFORMIN HYDROCHLORIDE 500 MG: 500 TABLET ORAL at 17:12

## 2024-01-13 RX ADMIN — INSULIN ASPART 1 UNITS: 100 INJECTION, SOLUTION INTRAVENOUS; SUBCUTANEOUS at 17:12

## 2024-01-13 RX ADMIN — EMPAGLIFLOZIN 10 MG: 10 TABLET, FILM COATED ORAL at 11:01

## 2024-01-13 RX ADMIN — INSULIN ASPART 1 UNITS: 100 INJECTION, SOLUTION INTRAVENOUS; SUBCUTANEOUS at 11:08

## 2024-01-13 RX ADMIN — NYSTATIN: 100000 POWDER TOPICAL at 22:19

## 2024-01-13 RX ADMIN — APIXABAN 5 MG: 5 TABLET, FILM COATED ORAL at 22:15

## 2024-01-13 RX ADMIN — METFORMIN HYDROCHLORIDE 500 MG: 500 TABLET ORAL at 07:39

## 2024-01-13 ASSESSMENT — ACTIVITIES OF DAILY LIVING (ADL)
ADLS_ACUITY_SCORE: 31
ADLS_ACUITY_SCORE: 32
ADLS_ACUITY_SCORE: 31
ADLS_ACUITY_SCORE: 32
ADLS_ACUITY_SCORE: 31
ADLS_ACUITY_SCORE: 31

## 2024-01-13 NOTE — PROGRESS NOTES
01/13/24 1300   Appointment Info   Signing Clinician's Name / Credentials (OT) Agnes Farooq OTR/L   Interventions   Interventions Quick Adds Therapeutic Activity;Self-Care/Home Management   Therapeutic Activities   Therapeutic Activity Minutes (99358) 25   Symptoms noted during/after treatment fatigue   Treatment Detail/Skilled Intervention Min A supine to sit edge of bed. Patient on 0.5 L/min O2. Patient ambulated in hallway with difficulty maintaining pursed lip breathing method. Oxygen mid to upper 80% with activity. Patient having difficulty maintaining 90% at rest once in room. RN notified and O2 turned up to 2.0L/min.   OT Discharge Planning   OT Plan Continue OT   OT Discharge Recommendation (DC Rec) home with home care occupational therapy;Transitional Care Facility   OT Rationale for DC Rec pt would benefit from continued OT due to limited activity tolerance and continued need for supplemental oxygen.  Pt is not at baseline and requires min assist overall for basic bathing, dressing, grooming.   OT Brief overview of current status Reports increased fatigue today. Does not feel short of breath or dizzy, just tired.   OT Equipment Needed at Discharge   (TBD post short term rehab stay)

## 2024-01-13 NOTE — PLAN OF CARE
SAFETY CHECKLIST  ID Bands and Risk clasps correct and in place (DNR, Fall risk, Allergy, Latex, Limb):  Yes  All Lines Reconciled and labeled correctly: Yes  Whiteboard updated:Yes  Environmental interventions: Yes  Verify Tele #: N/A    Reba Lema RN .......  1/12/2024  8:11 PM

## 2024-01-13 NOTE — PROGRESS NOTES
01/13/24 1600   Appointment Info   Signing Clinician's Name / Credentials (PT) Aron Kam PT   Therapeutic Activity   Therapeutic Activities: dynamic activities to improve functional performance Minutes (73857) 15   Symptoms Noted During/After Treatment Fatigue;Shortness of breath   Treatment Detail/Skilled Intervention sit to stand with CGA ;   Gait Training   Gait Training Minutes (11004) 15   Symptoms Noted During/After Treatment (Gait Training) fatigue;shortness of breath   Treatment Detail/Skilled Intervention ambulation in hallway   Distance in Feet 150 feet   [place] Level (Gait Training) contact guard   Physical Assistance Level (Gait Training) 1 person + 1 person to manage equipment   Weight Bearing (Gait Training) full weight-bearing   Assistive Device (Gait Training) rolling walker   Pattern Analysis (Gait Training) swing-to gait   Gait Analysis Deviations decreased malu;decreased velocity of limb motion;decreased step length   Impairments (Gait Analysis/Training) balance impaired;strength decreased   PT Discharge Planning   PT Plan Continue PT   PT Discharge Recommendation (DC Rec) Transitional Care Facility   PT Rationale for DC Rec to promote strength,stabilitly and safe mobility;   PT Brief overview of current status Requires CGA for gait and transfers. O2 sats decreased to 84-90% on 0.5LPM O2. Recovered above 90% with nursing increasing O2 to 2 LPM   PT Equipment Needed at Discharge walker, rolling   Total Session Time   Timed Code Treatment Minutes 30   Total Session Time (sum of timed and untimed services) 30

## 2024-01-13 NOTE — PLAN OF CARE
Goal Outcome Evaluation:      Plan of Care Reviewed With: patient    Overall Patient Progress: improving     VSS. A/O to self and place, occasionally situation. Up in chair this afternoon. SBA with walker. LS course with exp wheeze, occasional loose cough. Sats 91% on 0.5 LPM O2 via NC. Denies pain or SOB. Does have RODRIGUEZ. BLE edema 2-3+. Groin reddened, has Nystatin BID. Incontinent of urine at times. Has had some continent episodes with staff assist and urinal. Plan to discharge Monday to [facility]

## 2024-01-13 NOTE — PLAN OF CARE
Pt admitted on 1/7/24 for acute CHF and acute respiratory failure with hypoxia. VSS, afebrile. Pt alert and oriented to self and place, somewhat time and not situation. LS inspiratory wheezes. Pt has frequent productive cough. SBA with walker. On 1/2 liter oxygen nasal cannula. Denies pain on shift. Edema to BLE 2-3+. Dry/cracked/discolored skin BLE/feet. Edematous scrotum. Nystatin applied to pannus and perineal folds. Intermittent incontinence of bladder.     Reba Lema RN .......  1/13/2024  6:58 AM    Goal Outcome Evaluation:      Plan of Care Reviewed With: patient    Overall Patient Progress: improving    Outcome Evaluation: VSS, afebrile, A&Ox2, denies pain, SBA with walker

## 2024-01-13 NOTE — PROGRESS NOTES
"[facility]    Medicine Progress Note - Hospitalist Service    Date of Admission:  1/7/2024    Assessment & Plan   Principal Problem:    Acute on chronic respiratory failure with hypoxia and hypercapnia(H)    Acute on chronic heart failure with preserved ejection fraction (H)    Acute rhinovirus URI    Assessment: multifactorial causes for respiratory failure. Per Dr. Motley note:  \" at admit in ED, felt to be in CHF and given lasix with rapid improvement in reported symptoms -> felt likely to have acute diastolic CHF.  However following morning (1/8) after minimal diuresis had a creat increase and lasix discontinued  - on same morning of 1/8 had increase solmnolence and on ABG noted to be in acute on chronic hypercarbic respiratory failure -> transferred to ICU and placed on BiPap  - TTE 1/8/24: 55-60%, otherwise a difficult study with no significant abnormality noted  - BiPap managed by RT on 1/9 with \"return\" of his ABG values to presumed baseline and was stable on low flow oxygen -> does not appear to qualify for home BiPap based RT overnoc evals  - at this point he appears to be near his presumed baseline, and am optimizing medical treatment for underlying disease\"      Plan: - Diastolic Dysfxn:  - MRA: Spironolactone at 50 mg/d  - ARB: Cozaar 25 mg/d  - Coreg 12.5 mg/d (further increase limited by HR)  - Add SGLP2 today  - will also start low dose oral lasix today  - Possible LOIDA / Obesity Hypoventilation Syndrome:              - RCAT testing over-noc to see if he qualifies for home O2  - Rhinovirus with Reactive Airway Disease              - Decrease pred to 20 mg bid, and further taper over next week    Awaiting skilled nursing facility bed availability    Active Problems:    Type 2 diabetes mellitus with hyperglycemia, without long-term current use of insulin (H)    Assessment: A1c 8.5, improved control here with metformin and jardiance    Plan: Metformin and jardiance      Essential " "hypertension    Assessment: chronic    Plan: continue losartan and carvedilol      Moderate COPD (chronic obstructive pulmonary disease) (H)      Paroxysmal atrial fibrillation with RVR (H)    Assessment: chronic    Plan: continue DOAC      Acute kidney injury      Assessment: diuretic related    Plan: recheck in AM          Diet: Combination Diet Regular Diet Adult    DVT Prophylaxis: DOAC  Reynaga Catheter: Not present  Lines: None     Cardiac Monitoring: None  Code Status: Full Code      Clinically Significant Risk Factors                  # Hypertension: Noted on problem list  # Chronic heart failure with preserved ejection fraction: heart failure noted on problem list and last echo with EF >50%      # DMII: A1C = 8.5 % (Ref range: 4.0 - 6.2 %) within past 6 months   # Obesity: Estimated body mass index is 34.82 kg/m  as calculated from the following:    Height as of this encounter: 1.88 m (6' 2\").    Weight as of this encounter: 123 kg (271 lb 3.2 oz).             Disposition Plan    when bed [facility]         Donte Macias MD  Hospitalist Service  [facility]  Securely message with [employer] (more info)  Text page via [employer] Paging/Directory   ______________________________________________________________________    Interval History   Tired, slept poorly    Physical Exam   Vital Signs: Temp: 97.5  F (36.4  C) Temp src: Tympanic BP: (!) 152/83 Pulse: 63   Resp: 18 SpO2: 93 % O2 Device: Nasal cannula Oxygen Delivery: 1/2 LPM  Weight: 271 lbs 3.2 oz    GENERAL: Comfortable, no apparent distress.  CARDIOVASCULAR: regular rate and rhythm, no murmur. No lower extremity edema   RESPIRATORY: Clear to auscultation bilaterally, no wheezes or crackles.  GI: non-tender, non-distended, normal bowel sounds.   SKIN: warm periphery, no rashes      Medical Decision Making       25 MINUTES SPENT BY ME on the date of service doing chart review, history, exam, documentation & further activities per the note.  "     Data     I have personally reviewed the following data over the past 24 hrs:    N/A  \   N/A   / N/A     N/A N/A N/A /  165 (H)   4.8 N/A N/A \

## 2024-01-13 NOTE — PLAN OF CARE
Goal Outcome Evaluation:    Patient appetite continues to be poor as patient reported not having much of a appetite. Oxygen increased to 2L via NC, patient decreased sats to 84% when ambulating with PT/OT, at rest O2 continued at 88-89%. Plan to discharge to [facility] on Monday. 1/15/2024      Plan of Care Reviewed With: patient    Overall Patient Progress: improvingOverall Patient Progress: improving    Outcome Evaluation: VSS. Afebrile. Denies pain. Up with PT/OT.    Deyanira Billings RN on 1/13/2024 at 12:40 PM

## 2024-01-14 ENCOUNTER — APPOINTMENT (OUTPATIENT)
Dept: OCCUPATIONAL THERAPY | Facility: OTHER | Age: 69
DRG: 291 | End: 2024-01-14
Payer: MEDICARE

## 2024-01-14 ENCOUNTER — APPOINTMENT (OUTPATIENT)
Dept: PHYSICAL THERAPY | Facility: OTHER | Age: 69
DRG: 291 | End: 2024-01-14
Payer: MEDICARE

## 2024-01-14 LAB
ANION GAP SERPL CALCULATED.3IONS-SCNC: 8 MMOL/L (ref 7–15)
BUN SERPL-MCNC: 40.1 MG/DL (ref 8–23)
CALCIUM SERPL-MCNC: 9.2 MG/DL (ref 8.8–10.2)
CHLORIDE SERPL-SCNC: 96 MMOL/L (ref 98–107)
CREAT SERPL-MCNC: 0.8 MG/DL (ref 0.67–1.17)
DEPRECATED HCO3 PLAS-SCNC: 36 MMOL/L (ref 22–29)
EGFRCR SERPLBLD CKD-EPI 2021: >90 ML/MIN/1.73M2
GLUCOSE BLDC GLUCOMTR-MCNC: 128 MG/DL (ref 70–99)
GLUCOSE BLDC GLUCOMTR-MCNC: 151 MG/DL (ref 70–99)
GLUCOSE BLDC GLUCOMTR-MCNC: 156 MG/DL (ref 70–99)
GLUCOSE BLDC GLUCOMTR-MCNC: 159 MG/DL (ref 70–99)
GLUCOSE SERPL-MCNC: 135 MG/DL (ref 70–99)
HOLD SPECIMEN: NORMAL
MAGNESIUM SERPL-MCNC: 2.2 MG/DL (ref 1.7–2.3)
POTASSIUM SERPL-SCNC: 4.7 MMOL/L (ref 3.4–5.3)
SODIUM SERPL-SCNC: 140 MMOL/L (ref 135–145)

## 2024-01-14 PROCEDURE — 250N000012 HC RX MED GY IP 250 OP 636 PS 637: Performed by: INTERNAL MEDICINE

## 2024-01-14 PROCEDURE — 97530 THERAPEUTIC ACTIVITIES: CPT | Mod: GP

## 2024-01-14 PROCEDURE — 250N000013 HC RX MED GY IP 250 OP 250 PS 637: Performed by: INTERNAL MEDICINE

## 2024-01-14 PROCEDURE — 36415 COLL VENOUS BLD VENIPUNCTURE: CPT | Performed by: INTERNAL MEDICINE

## 2024-01-14 PROCEDURE — 36416 COLLJ CAPILLARY BLOOD SPEC: CPT | Performed by: INTERNAL MEDICINE

## 2024-01-14 PROCEDURE — 83735 ASSAY OF MAGNESIUM: CPT | Performed by: INTERNAL MEDICINE

## 2024-01-14 PROCEDURE — 97535 SELF CARE MNGMENT TRAINING: CPT | Mod: GO

## 2024-01-14 PROCEDURE — 120N000001 HC R&B MED SURG/OB

## 2024-01-14 PROCEDURE — 999N000157 HC STATISTIC RCP TIME EA 10 MIN

## 2024-01-14 PROCEDURE — 97116 GAIT TRAINING THERAPY: CPT | Mod: GP

## 2024-01-14 PROCEDURE — 80048 BASIC METABOLIC PNL TOTAL CA: CPT | Performed by: INTERNAL MEDICINE

## 2024-01-14 PROCEDURE — 99232 SBSQ HOSP IP/OBS MODERATE 35: CPT | Performed by: INTERNAL MEDICINE

## 2024-01-14 RX ADMIN — NYSTATIN: 100000 POWDER TOPICAL at 09:48

## 2024-01-14 RX ADMIN — EMPAGLIFLOZIN 10 MG: 10 TABLET, FILM COATED ORAL at 09:43

## 2024-01-14 RX ADMIN — ZOLPIDEM TARTRATE 2.5 MG: 5 TABLET ORAL at 21:11

## 2024-01-14 RX ADMIN — CARVEDILOL 12.5 MG: 12.5 TABLET, FILM COATED ORAL at 21:10

## 2024-01-14 RX ADMIN — INSULIN ASPART 1 UNITS: 100 INJECTION, SOLUTION INTRAVENOUS; SUBCUTANEOUS at 12:09

## 2024-01-14 RX ADMIN — INSULIN ASPART 1 UNITS: 100 INJECTION, SOLUTION INTRAVENOUS; SUBCUTANEOUS at 18:02

## 2024-01-14 RX ADMIN — LOSARTAN POTASSIUM 25 MG: 25 TABLET, FILM COATED ORAL at 09:43

## 2024-01-14 RX ADMIN — FUROSEMIDE 20 MG: 20 TABLET ORAL at 09:44

## 2024-01-14 RX ADMIN — METFORMIN HYDROCHLORIDE 500 MG: 500 TABLET ORAL at 09:44

## 2024-01-14 RX ADMIN — SPIRONOLACTONE 50 MG: 25 TABLET ORAL at 09:44

## 2024-01-14 RX ADMIN — ASPIRIN 81 MG: 81 TABLET, COATED ORAL at 09:44

## 2024-01-14 RX ADMIN — PREDNISONE 20 MG: 20 TABLET ORAL at 18:02

## 2024-01-14 RX ADMIN — APIXABAN 5 MG: 5 TABLET, FILM COATED ORAL at 09:44

## 2024-01-14 RX ADMIN — NYSTATIN: 100000 POWDER TOPICAL at 21:11

## 2024-01-14 RX ADMIN — CARVEDILOL 12.5 MG: 12.5 TABLET, FILM COATED ORAL at 09:44

## 2024-01-14 RX ADMIN — APIXABAN 5 MG: 5 TABLET, FILM COATED ORAL at 21:11

## 2024-01-14 RX ADMIN — METFORMIN HYDROCHLORIDE 500 MG: 500 TABLET ORAL at 18:02

## 2024-01-14 RX ADMIN — PREDNISONE 20 MG: 20 TABLET ORAL at 09:44

## 2024-01-14 ASSESSMENT — ACTIVITIES OF DAILY LIVING (ADL)
ADLS_ACUITY_SCORE: 31

## 2024-01-14 NOTE — PROGRESS NOTES
01/14/24 1026   Appointment Info   Signing Clinician's Name / Credentials (OT) Agnes Farooq OTR/L   Interventions   Interventions Quick Adds Therapeutic Activity;Self-Care/Home Management   Self-Care/Home Management   Self-Care/Home Mgmt/ADL, Compensatory, Meal Prep Minutes (86752) 25   Symptoms Noted During/After Treatment (Meal Preparation/Planning Training) fatigue   Treatment Detail/Skilled Intervention Pt completed sponge bath standing at sink with two seated rest breaks. Oxygen dropped to 85% with standing. Verbal cues for pursed lip breathing.   [place] (Grooming Training) stand-by assist   Assistance (Grooming Training) supervision   [place] (Bathing Training) minimum assist (75% patient effort)   Assistance (Bathing Training) 1 person assist   [place] (Upper Body Dressing Training) stand-by assist   Assistance (Upper Body Dressing Training) 1 person assist   Therapeutic Activities   Symptoms noted during/after treatment fatigue   Treatment Detail/Skilled Intervention Oxygen returns to 92% once sitting with pursed lip breathing   OT Discharge Planning   OT Plan Continue OT   OT Discharge Recommendation (DC Rec) Transitional Care Facility   OT Rationale for DC Rec pt would benefit from continued OT due to limited activity tolerance and continued need for supplemental oxygen.  Pt is not at baseline and requires min assist overall for basic bathing, dressing, grooming.   OT Brief overview of current status Patient will benefit from continued daily skilled therapy services.   OT Equipment Needed at Discharge   (TBD post STR discharge)   Total Session Time   Timed Code Treatment Minutes 25   Total Session Time (sum of timed and untimed services) 25

## 2024-01-14 NOTE — PLAN OF CARE
Problem: Fall Injury Risk  Goal: Absence of Fall and Fall-Related Injury  Outcome: Progressing  Intervention: Identify and Manage Contributors  Recent Flowsheet Documentation  Taken 1/13/2024 1953 by Stiven Raymundo RN  Medication Review/Management: medications reviewed  Intervention: Promote Injury-Free Environment  Recent Flowsheet Documentation  Taken 1/13/2024 1953 by Stiven Raymundo RN  Safety Promotion/Fall Prevention:   activity supervised   assistive device/personal items within reach   clutter free environment maintained   increased rounding and observation   increase visualization of patient   lighting adjusted   mobility aid in reach   nonskid shoes/slippers when out of bed   patient and family education   room door open   room near nurse's station   room organization consistent   safety round/check completed   supervised activity   treat reversible contributory factors   treat underlying cause     Problem: Gas Exchange Impaired  Goal: Optimal Gas Exchange  Outcome: Progressing  Intervention: Optimize Oxygenation and Ventilation  Recent Flowsheet Documentation  Taken 1/13/2024 1953 by Stiven Raymundo RN  Head of Bed (HOB) Positioning: HOB at 20-30 degrees     Problem: Confusion Acute  Goal: Optimal Cognitive Function  Outcome: Progressing  Intervention: Minimize Contributing Factors  Recent Flowsheet Documentation  Taken 1/13/2024 1953 by Stiven Raymundo RN  Sensory Stimulation Regulation:   television on   quiet environment promoted  Reorientation Measures:   reorientation provided   clock in view  Communication Support Strategies:   active listening utilized   simple statements used   Goal Outcome Evaluation:

## 2024-01-14 NOTE — PROGRESS NOTES
Oxygen evaluation:    On room air at rest, oxygen SpO2 85%. Placed on a nasal cannula at 1 LPM, SpO2 improved to 91%.    Noel Holliday, RT

## 2024-01-14 NOTE — PROGRESS NOTES
"[facility]    Medicine Progress Note - Hospitalist Service    Date of Admission:  1/7/2024    Assessment & Plan   Principal Problem:    Acute on chronic respiratory failure with hypoxia and hypercapnia(H)    Acute on chronic heart failure with preserved ejection fraction (H)    Acute rhinovirus URI    Assessment: multifactorial causes for respiratory failure. Per Dr. Motley note:  \" at admit in ED, felt to be in CHF and given lasix with rapid improvement in reported symptoms -> felt likely to have acute diastolic CHF.  However following morning (1/8) after minimal diuresis had a creat increase and lasix discontinued  - on same morning of 1/8 had increase solmnolence and on ABG noted to be in acute on chronic hypercarbic respiratory failure -> transferred to ICU and placed on BiPap  - TTE 1/8/24: 55-60%, otherwise a difficult study with no significant abnormality noted  - BiPap managed by RT on 1/9 with \"return\" of his ABG values to presumed baseline and was stable on low flow oxygen -> does not appear to qualify for home BiPap based RT overnoc evals  - at this point he appears to be near his presumed baseline, and am optimizing medical treatment for underlying disease\"      Plan: - Diastolic Dysfxn:  - MRA: Spironolactone at 50 mg/d  - ARB: Cozaar 25 mg/d  - Coreg 12.5 mg/d (further increase limited by HR)  - Add SGLP2 today  - will also start low dose oral lasix today  - Possible LOIDA / Obesity Hypoventilation Syndrome:              - RCAT testing over-noc to see if he qualifies for home O2  - Rhinovirus with Reactive Airway Disease              - Decrease pred to 20 mg bid, and further taper over next week    Awaiting skilled nursing facility bed availability    Active Problems:    Type 2 diabetes mellitus with hyperglycemia, without long-term current use of insulin (H)    Assessment: A1c 8.5, improved control here with metformin and jardiance    Plan: Metformin and jardiance      Essential " "hypertension    Assessment: chronic    Plan: continue losartan and carvedilol      Moderate COPD (chronic obstructive pulmonary disease) (H)      Paroxysmal atrial fibrillation with RVR (H)    Assessment: chronic    Plan: continue DOAC      Acute kidney injury      Assessment: diuretic related, improved on labs today          Diet: Combination Diet Regular Diet Adult    DVT Prophylaxis: DOAC  Reynaga Catheter: Not present  Lines: None     Cardiac Monitoring: None  Code Status: Full Code      Clinically Significant Risk Factors                  # Hypertension: Noted on problem list  # Chronic heart failure with preserved ejection fraction: heart failure noted on problem list and last echo with EF >50%      # DMII: A1C = 8.5 % (Ref range: 4.0 - 6.2 %) within past 6 months   # Obesity: Estimated body mass index is 35.72 kg/m  as calculated from the following:    Height as of this encounter: 1.88 m (6' 2\").    Weight as of this encounter: 126.2 kg (278 lb 3.2 oz).             Disposition Plan    1-2 days         Donte aMcias MD  Hospitalist Service  [facility]  Securely message with [employer] (more info)  Text page via [employer] Paging/Directory   ______________________________________________________________________    Interval History   Slept last night with addition of ambien. Feels much better today.     Physical Exam   Vital Signs: Temp: (!) 96.7  F (35.9  C) Temp src: Tympanic BP: (!) 146/70 Pulse: 63   Resp: 18 SpO2: 94 % O2 Device: Nasal cannula Oxygen Delivery: 1 LPM  Weight: 278 lbs 3.2 oz    GENERAL: Comfortable, no apparent distress.  CARDIOVASCULAR: regular rate and rhythm, no murmur. No lower extremity edema   RESPIRATORY: Clear to auscultation bilaterally, no wheezes or crackles.  GI: non-tender, non-distended, normal bowel sounds.   SKIN: warm periphery, no rashes      Medical Decision Making       35 MINUTES SPENT BY ME on the date of service doing chart review, history, exam, " documentation & further activities per the note.      Data     I have personally reviewed the following data over the past 24 hrs:    N/A  \   N/A   / N/A     140 96 (L) 40.1 (H) /  128 (H)   4.7 36 (H) 0.80 \

## 2024-01-14 NOTE — PLAN OF CARE
Goal Outcome Evaluation:  Pt was admitted on 1/7. Is disoriented to situation this morning but oriented this evening. Pleasant with staff. No complaints of pain today. Lungs are clear. Slight dyspnea with exertion. Non-productive cough. Attempted to wean O2 to room air and dropped to 85%. Currently on 1L O2 via NC. +2 edema to BLE, cool to touch, alon. Urine sharad to color. Up SBA with walker. IV saline locked. Letty Suresh RN on 1/14/2024 at 6:19 PM      Plan of Care Reviewed With: patient    Overall Patient Progress: improving

## 2024-01-14 NOTE — PROGRESS NOTES
Patient slept most of the night and was up in the chair for a short bit. Patient remains pleasantly confused but redirectable. No complaints at this time. Stiven Raymundo RN on 1/14/2024 at 6:42 AM

## 2024-01-14 NOTE — PROGRESS NOTES
SAFETY CHECKLIST  ID Bands and Risk clasps correct and in place (DNR, Fall risk, Allergy, Latex, Limb):  Yes  All Lines Reconciled and labeled correctly: Yes  Whiteboard updated:Yes  Environmental interventions: Yes  Verify Tele #:  not on tele    Letty Suresh RN on 1/14/2024 at 7:19 AM

## 2024-01-15 VITALS
BODY MASS INDEX: 35.49 KG/M2 | HEART RATE: 63 BPM | DIASTOLIC BLOOD PRESSURE: 66 MMHG | SYSTOLIC BLOOD PRESSURE: 137 MMHG | WEIGHT: 276.5 LBS | HEIGHT: 74 IN | TEMPERATURE: 98 F | OXYGEN SATURATION: 94 % | RESPIRATION RATE: 20 BRPM

## 2024-01-15 LAB
GLUCOSE BLDC GLUCOMTR-MCNC: 121 MG/DL (ref 70–99)
MAGNESIUM SERPL-MCNC: 2.5 MG/DL (ref 1.7–2.3)

## 2024-01-15 PROCEDURE — 36416 COLLJ CAPILLARY BLOOD SPEC: CPT | Performed by: INTERNAL MEDICINE

## 2024-01-15 PROCEDURE — 83735 ASSAY OF MAGNESIUM: CPT | Performed by: INTERNAL MEDICINE

## 2024-01-15 PROCEDURE — 250N000011 HC RX IP 250 OP 636: Performed by: INTERNAL MEDICINE

## 2024-01-15 PROCEDURE — 99239 HOSP IP/OBS DSCHRG MGMT >30: CPT | Performed by: INTERNAL MEDICINE

## 2024-01-15 RX ORDER — LOSARTAN POTASSIUM 25 MG/1
25 TABLET ORAL DAILY
Status: ON HOLD | DISCHARGE
Start: 2024-01-15 | End: 2024-01-22

## 2024-01-15 RX ORDER — SPIRONOLACTONE 50 MG/1
50 TABLET, FILM COATED ORAL DAILY
Status: ON HOLD | DISCHARGE
Start: 2024-01-15 | End: 2024-01-22

## 2024-01-15 RX ORDER — ZOLPIDEM TARTRATE 5 MG/1
2.5 TABLET ORAL AT BEDTIME
Status: SHIPPED | DISCHARGE
Start: 2024-01-15 | End: 2024-01-15

## 2024-01-15 RX ORDER — AMOXICILLIN 250 MG
1 CAPSULE ORAL 2 TIMES DAILY PRN
DISCHARGE
Start: 2024-01-15 | End: 2024-05-07

## 2024-01-15 RX ORDER — PREDNISONE 20 MG/1
20 TABLET ORAL DAILY
Status: ON HOLD | DISCHARGE
Start: 2024-01-15 | End: 2024-01-22

## 2024-01-15 RX ORDER — ASPIRIN 81 MG/1
81 TABLET ORAL DAILY
Status: ON HOLD | DISCHARGE
Start: 2024-01-15 | End: 2024-01-22

## 2024-01-15 RX ORDER — LANOLIN ALCOHOL/MO/W.PET/CERES
3 CREAM (GRAM) TOPICAL
DISCHARGE
Start: 2024-01-15 | End: 2024-05-07

## 2024-01-15 RX ORDER — INSULIN ASPART 100 [IU]/ML
INJECTION, SOLUTION INTRAVENOUS; SUBCUTANEOUS
Status: ON HOLD | DISCHARGE
Start: 2024-01-15 | End: 2024-01-22

## 2024-01-15 RX ORDER — ZOLPIDEM TARTRATE 5 MG/1
2.5 TABLET ORAL AT BEDTIME
Qty: 15 TABLET | Refills: 0 | Status: SHIPPED | DISCHARGE
Start: 2024-01-15 | End: 2024-01-16

## 2024-01-15 RX ORDER — ACETAMINOPHEN 325 MG/1
650 TABLET ORAL EVERY 4 HOURS PRN
DISCHARGE
Start: 2024-01-15

## 2024-01-15 RX ORDER — CARVEDILOL 12.5 MG/1
12.5 TABLET ORAL 2 TIMES DAILY
Status: ON HOLD | DISCHARGE
Start: 2024-01-15 | End: 2024-01-22

## 2024-01-15 RX ORDER — FUROSEMIDE 20 MG
20 TABLET ORAL DAILY
Status: ON HOLD | DISCHARGE
Start: 2024-01-15 | End: 2024-01-22

## 2024-01-15 RX ADMIN — ONDANSETRON 4 MG: 4 TABLET, ORALLY DISINTEGRATING ORAL at 10:29

## 2024-01-15 ASSESSMENT — ACTIVITIES OF DAILY LIVING (ADL)
ADLS_ACUITY_SCORE: 31

## 2024-01-15 NOTE — PROGRESS NOTES
:    Spoke with Christy at [facility] who reports they are able to take patient today and can pick patient up from the hospital today at 1100. Patient, his sister, MD, and nurses have been updated about discharge plan.     HENOK Jones on 1/15/2024 at 9:25 AM    Pre-Admission Screening completed and sent to Christy at [facility].     [id number]    HENOK Jones on 1/15/2024 at 9:39 AM

## 2024-01-15 NOTE — PLAN OF CARE
Patient A/Ox4 with intermittent forgetfulness. Patient VSS, afebrile. 02 remains on 1L and satting in the low 90's. Lung sounds clear and equal. Denies SOB but has dyspnea with activity. 2+ edema BLE, scrotal edema as well.  Pleasant and cooperative. SBA with walker. Continent of bowel and bladder. Droplet precautions. Patient slept on and off t/o the night, no new concerns. Patient states he is ready to discharge, anticipation to discharge to  pending bed availability.     Goal Outcome Evaluation:      Plan of Care Reviewed With: patient    Overall Patient Progress: no change  Overall Patient Progress: no change    Outcome Evaluation: VSS, afebrile. Denies pain. Up with SBA and walker.

## 2024-01-15 NOTE — PROGRESS NOTES
[facility] DISCHARGE NOTE    Patient discharged to acute rehab at 12:06 PM via wheel chair. Accompanied by staff. Discharge instructions reviewed with other, opportunity offered to ask questions. Prescriptions sent to patients preferred pharmacy. All belongings sent with patient.    Deyanira Yi RN

## 2024-01-15 NOTE — PROGRESS NOTES
SAFETY CHECKLIST  ID Bands and Risk clasps correct and in place (DNR, Fall risk, Allergy, Latex, Limb):  Yes  All Lines Reconciled and labeled correctly: Yes  Whiteboard updated:Yes  Environmental interventions: Yes  Verify Tele #:  RAJESH Billings RN on 1/15/2024 at 7:25 AM

## 2024-01-15 NOTE — DISCHARGE SUMMARY
"[facility]  Hospitalist Discharge Summary      Date of Admission:  1/7/2024  Date of Discharge:  1/15/2024  Discharging Provider: Donte Macias MD  Discharge Service: Hospitalist Service    Discharge Diagnoses   Principal Problem:    Acute on chronic respiratory failure with hypoxia and hypercapnia (H)    Acute on chronic heart failure with preserved ejection fraction (H)  Acute rhinovirus URI  Active Problems:    Type 2 diabetes mellitus with hyperglycemia, without long-term current use of insulin (H)    Essential hypertension    Moderate COPD (chronic obstructive pulmonary disease) (H)    Paroxysmal atrial fibrillation with RVR (H)    Medical non-compliance    Acute kidney injury          Clinically Significant Risk Factors     # DMII: A1C = 8.5 % (Ref range: 4.0 - 6.2 %) within past 6 months  # Obesity: Estimated body mass index is 35.5 kg/m  as calculated from the following:    Height as of this encounter: 1.88 m (6' 2\").    Weight as of this encounter: 125.4 kg (276 lb 8 oz).       Follow-ups Needed After Discharge   Follow-up Appointments     Follow Up and recommended labs and tests      Follow up with Dr. Lukas Kramer on 1/17 at 1105            Discharge Disposition   Discharged to short-term care facility  Condition at discharge: Stable    Hospital Course   Principal Problem:    Acute on chronic respiratory failure with hypoxia and hypercapnia(H)    Acute on chronic heart failure with preserved ejection fraction (H)    Acute rhinovirus URI    Assessment: multifactorial causes for respiratory failure. Per Dr. Motley note:  \" at admit in ED, felt to be in CHF and given lasix with rapid improvement in reported symptoms -> felt likely to have acute diastolic CHF.  However following morning (1/8) after minimal diuresis had a creat increase and lasix discontinued  - on same morning of 1/8 had increase solmnolence and on ABG noted to be in acute on chronic hypercarbic respiratory failure -> " "transferred to ICU and placed on BiPap  - TTE 1/8/24: 55-60%, otherwise a difficult study with no significant abnormality noted  - BiPap managed by RT on 1/9 with \"return\" of his ABG values to presumed baseline and was stable on low flow oxygen -> does not appear to qualify for home BiPap based RT overnoc evals  - at this point he appears to be near his presumed baseline, and am optimizing medical treatment for underlying disease\"      Plan: - Diastolic Dysfxn:  - MRA: Spironolactone at 50 mg/d  - ARB: Cozaar 25 mg/d  - Coreg 12.5 mg/d (further increase limited by HR)  - Add SGLP2 today  - will also start low dose oral lasix today  - Possible LOIDA / Obesity Hypoventilation Syndrome:              - RCAT testing over-noc to see if he qualifies for home O2  - Rhinovirus with Reactive Airway Disease              - Decrease pred to 20 mg bid, and further taper over next week    Awaiting skilled nursing facility bed availability    Active Problems:    Type 2 diabetes mellitus with hyperglycemia, without long-term current use of insulin (H)    Assessment: A1c 8.5, improved control here with metformin and jardiance    Plan: Metformin and jardiance      Essential hypertension    Assessment: chronic    Plan: continue losartan and carvedilol      Moderate COPD (chronic obstructive pulmonary disease) (H)      Paroxysmal atrial fibrillation with RVR (H)    Assessment: chronic    Plan: continue DOAC      Acute kidney injury      Assessment: diuretic related, improved on labs today    Consultations This Hospital Stay   NUTRITION SERVICES ADULT IP CONSULT  CARE MANAGEMENT / SOCIAL WORK IP CONSULT  SOCIAL WORK IP CONSULT  PHYSICAL THERAPY ADULT IP CONSULT  OCCUPATIONAL THERAPY ADULT IP CONSULT  SOCIAL WORK IP CONSULT  OCCUPATIONAL THERAPY ADULT IP CONSULT  PHYSICAL THERAPY ADULT IP CONSULT    Code Status   Full Code    Time Spent on this Encounter   I, Donte Macias MD, personally saw the patient today and spent greater than 30 " minutes discharging this patient.       Donte Macias MD  [facility]  [address]  [place] [address]  Phone: [phone]  Fax: [phone]  ______________________________________________________________________    Physical Exam   Vital Signs: Temp: 97.9  F (36.6  C) Temp src: Tympanic BP: 131/76 Pulse: 65   Resp: 20 SpO2: 93 % O2 Device: None (Room air) Oxygen Delivery: 1 LPM  Weight: 276 lbs 8 oz  GENERAL: Comfortable, no apparent distress.  CARDIOVASCULAR: regular rate and rhythm, no murmur. No lower extremity edema   RESPIRATORY: Clear to auscultation bilaterally, no wheezes or crackles.  GI: non-tender, non-distended, normal bowel sounds.   SKIN: warm periphery, no rashes         Primary Care Physician   Lukas Kramer    Discharge Orders      General info for SNF    Length of Stay Estimate: Short Term Care: Estimated # of Days <30  Condition at Discharge: Improving  Level of care:skilled   Rehabilitation Potential: Excellent  Admission H&P remains valid and up-to-date: Yes  Recent Chemotherapy: N/A  Use Nursing Home Standing Orders: Yes     Mantoux instructions    Give two-step Mantoux (PPD) Per Facility Policy Yes     Follow Up and recommended labs and tests    Follow up with Dr. Lukas Kramer on 1/17 at 1105     Reason for your hospital stay    Chf exacerbation     Glucose monitor nursing POCT    Before meals and at bedtime     Daily weights    Call Provider for weight gain of more than 2 pounds per day or 5 pounds per week.     Activity - Up ad joey     Full Code     Occupational Therapy Adult Consult    Evaluate and treat as clinically indicated.    Reason:  ADLs     Physical Therapy Adult Consult    Evaluate and treat as clinically indicated.    Reason:  balance and ambulation     Diet    Follow this diet upon discharge: Orders Placed This Encounter      Combination Diet Regular Diet Adult       Significant Results and Procedures   Most Recent 3 CBC's:  Recent  Labs   Lab Test 24  1018 22  0911 22  1656   WBC 9.8 8.3 9.0   HGB 16.7 14.7 15.5   MCV 92 93 94    179 251     Most Recent 3 BMP's:  Recent Labs   Lab Test 01/15/24  0756 24  2113 24  1745 24  0731 24  0629 24  0711 24  0559 24  1106 24  1100 24  0643 24  0535 01/10/24  1120 01/10/24  0805   NA  --   --   --   --  140  --   --   --   --   --  139  --  140   POTASSIUM  --   --   --   --  4.7  --  4.8  --  4.6  --  4.8  --  4.9   CHLORIDE  --   --   --   --  96*  --   --   --   --   --  98  --  100   CO2  --   --   --   --  36*  --   --   --   --   --  34*  --  33*   BUN  --   --   --   --  40.1*  --   --   --   --   --  32.7*  --  33.3*   CR  --   --   --   --  0.80  --   --   --   --   --  0.76  --  0.81   ANIONGAP  --   --   --   --  8  --   --   --   --   --  7  --  7   SCAR  --   --   --   --  9.2  --   --   --   --   --  8.9  --  8.8   * 159* 156*   < > 135*   < >  --    < >  --    < > 201*   < > 188*    < > = values in this interval not displayed.     Most Recent 2 LFT's:  Recent Labs   Lab Test 24  1018 22  0911   AST 20 12*   ALT 22 14   ALKPHOS 79 63   BILITOTAL 2.3* 1.4*   ,   Results for orders placed or performed during the hospital encounter of 24   XR Chest Port 1 View    Narrative    PROCEDURE:  XR CHEST PORT 1 VIEW    HISTORY: Dyspnea, hypoxia. .    COMPARISON:  2022    FINDINGS:    The cardiomediastinal contours are enlarged.  No focal consolidation, effusion or pneumothorax.      Impression    IMPRESSION:  Stable chest.      VIRA GILMAN MD         SYSTEM ID:  [id number]   Echocardiogram Complete     Value    LVEF  55-60%    Narrative    [id number]  [id number]  [id number]  [id number]^MARVIN^DEA^ROCIO     [facility]  [address] [place] [address]     Name: ONUR TREJO  MRN: [id number]  : 1955  Study Date: 2024 07:17 AM  Age: 68 yrs  Gender:  Male  Patient Location: [facility]  Reason For Study: Heart Failure  Ordering Physician: DEA WONG  Performed By: Emerita Canomegganmorales     BSA: 2.6 m2  Height: 74 in  Weight: 301 lb  HR: 82  BP: 131/72 mmHg  ______________________________________________________________________________  Procedure  Complete Portable Echo Adult. Contrast Definity. Echocardiogram with two-  dimensional, color and spectral Doppler performed. Technically difficult  study.Extremely difficult acoustic windows despite the use of contrast for  endcardial border definition. Definity (NDC #91902-206-35) given  intravenously. Patient was given 4ml mixture of 1.5ml Definity and 8.5ml  saline. 6 ml wasted. Definity Lot # 1345 .  ______________________________________________________________________________  Interpretation Summary  Technically difficult study.Extremely difficult acoustic windows despite the  use of contrast for endcardial border definition.  Left ventricular size, wall motion and function are normal. The ejection  fraction is 55-60%.  Global right ventricular function is normal.  Pulmonary artery systolic pressure cannot be assessed.  IVC diameter <2.1 cm collapsing >50% with sniff suggests a normal RA pressure  of 3 mmHg.  No pericardial effusion is present.  ______________________________________________________________________________  Left Ventricle  Left ventricular size, wall motion and function are normal. The ejection  fraction is 55-60%. Left ventricular diastolic function is indeterminate.     Right Ventricle  The right ventricle is normal size. Global right ventricular function is  normal.     Atria  Both atria appear normal.     Mitral Valve  The mitral valve is normal.     Aortic Valve  The valve leaflets are not well visualized. On Doppler interrogation, there is  no significant stenosis or regurgitation.     Tricuspid Valve  The tricuspid valve is normal. Trace tricuspid insufficiency is present.  Pulmonary artery  systolic pressure cannot be assessed.     Pulmonic Valve  The pulmonic valve is normal.     Vessels  The aorta root is normal. The thoracic aorta is normal. The pulmonary artery  cannot be assessed. The inferior vena cava was normal in size with preserved  respiratory variability. IVC diameter <2.1 cm collapsing >50% with sniff  suggests a normal RA pressure of 3 mmHg.     Pericardium  No pericardial effusion is present.     Compared to Previous Study  Previous study not available for comparison.     ______________________________________________________________________________  MMode/2D Measurements & Calculations  Ao root diam: 3.1 cm  asc Aorta Diam: 3.3 cm  LVOT diam: 2.0 cm  LVOT area: 3.1 cm2     Ao root diam index Ht(cm/m): 1.6  Ao root diam index BSA (cm/m2): 1.2  Asc Ao diam index BSA (cm/m2): 1.3  Asc Ao diam index Ht(cm/m): 1.8     Doppler Measurements & Calculations  MV E max denny: 58.3 cm/sec  MV A max denny: 63.2 cm/sec  MV E/A: 0.92  MV dec time: 0.16 sec  Ao V2 max: 130.7 cm/sec  Ao max P.0 mmHg  Ao V2 mean: 87.6 cm/sec  Ao mean PG: 3.7 mmHg  Ao V2 VTI: 22.5 cm  ARMEN(I,D): 2.5 cm2  ARMEN(V,D): 2.7 cm2  LV V1 max P.0 mmHg  LV V1 max: 111.0 cm/sec  LV V1 VTI: 17.6 cm  SV(LVOT): 55.2 ml  SI(LVOT): 21.3 ml/m2  AV Denny Ratio (DI): 0.85  ARMEN Index (cm2/m2): 0.95     ______________________________________________________________________________  Report approved by: MD Say Mccarthy 2024 08:48 AM             Discharge Medications   Current Discharge Medication List        START taking these medications    Details   acetaminophen (TYLENOL) 325 MG tablet Take 2 tablets (650 mg) by mouth every 4 hours as needed for mild pain or other (and adjunct with moderate or severe pain or per patient request)    Associated Diagnoses: Acute on chronic heart failure with preserved ejection fraction (H)      apixaban ANTICOAGULANT (ELIQUIS) 5 MG tablet Take 1 tablet (5 mg) by mouth 2 times daily     Associated Diagnoses: Paroxysmal atrial fibrillation with RVR (H)      carvedilol (COREG) 12.5 MG tablet Take 1 tablet (12.5 mg) by mouth 2 times daily    Associated Diagnoses: Acute on chronic heart failure with preserved ejection fraction (H)      empagliflozin (JARDIANCE) 10 MG TABS tablet Take 1 tablet (10 mg) by mouth daily    Associated Diagnoses: Acute on chronic heart failure with preserved ejection fraction (H)      furosemide (LASIX) 20 MG tablet Take 1 tablet (20 mg) by mouth daily    Associated Diagnoses: Acute on chronic heart failure with preserved ejection fraction (H)      insulin aspart (NOVOLOG VIAL) 100 UNITS/ML vial Give before meals and before bed:  For Pre-Meal Glucose:  140-189 give 1 unit   190-239 give 2 units   240-289 give 3 units   290-339 give 4 units   = or >340 give 5 units     For Bedtime Glucose  200 - 239 give 1 units   240 - 289 give 1.5 units  290 - 339 give 2 units  = or >340 give 2.5 units    Associated Diagnoses: Type 2 diabetes mellitus with hyperglycemia, without long-term current use of insulin (H)      losartan (COZAAR) 25 MG tablet Take 1 tablet (25 mg) by mouth daily    Associated Diagnoses: Acute on chronic heart failure with preserved ejection fraction (H)      melatonin 3 MG tablet Take 1 tablet (3 mg) by mouth nightly as needed    Associated Diagnoses: Acute on chronic heart failure with preserved ejection fraction (H)      metFORMIN (GLUCOPHAGE) 500 MG tablet Take 1 tablet (500 mg) by mouth 2 times daily (with meals)    Associated Diagnoses: Type 2 diabetes mellitus with hyperglycemia, without long-term current use of insulin (H)      predniSONE (DELTASONE) 20 MG tablet Take 1 tablet (20 mg) by mouth daily for 3 days    Associated Diagnoses: Moderate COPD (chronic obstructive pulmonary disease) (H)      senna-docusate (SENOKOT-S/PERICOLACE) 8.6-50 MG tablet Take 1 tablet by mouth 2 times daily as needed for constipation    Associated Diagnoses: Acute on chronic  "heart failure with preserved ejection fraction (H)      zolpidem (AMBIEN) 5 MG tablet Take 0.5 tablets (2.5 mg) by mouth at bedtime    Associated Diagnoses: Acute on chronic heart failure with preserved ejection fraction (H)           CONTINUE these medications which have CHANGED    Details   aspirin 81 MG EC tablet Take 1 tablet (81 mg) by mouth daily    Associated Diagnoses: Acute on chronic heart failure with preserved ejection fraction (H)      spironolactone (ALDACTONE) 50 MG tablet Take 1 tablet (50 mg) by mouth daily    Associated Diagnoses: Acute on chronic heart failure with preserved ejection fraction (H)           Allergies   Allergies   Allergen Reactions    Lisinopril      Other reaction(s): Tachycardia  Felt like \"a heart attack\"; hands went numb     "

## 2024-01-15 NOTE — PROGRESS NOTES
01/10/24 1600   Appointment Info   Signing Clinician's Name / Credentials (OT) Alma Erazo OTR/L   Living Environment   People in Home alone   Current Living Arrangements house   Home Accessibility no concerns;stairs to enter home   Number of Stairs, Main Entrance 4   Stair Railings, Main Entrance railings safe and in good condition   Number of Stairs, Within Home, Primary greater than 10 stairs   Stair Railings, Within Home, Primary railings safe and in good condition   Transportation Anticipated family or friend will provide;car, drives self   Self-Care   Usual Activity Tolerance moderate   Current Activity Tolerance fair   Equipment Currently Used at Home none   Fall history within last six months no   General Information   Onset of Illness/Injury or Date of Surgery 01/07/24   Referring Physician Dr. Motley   Patient/Family Therapy Goal Statement (OT) Get healthy   Additional Occupational Profile Info/Pertinent History of Current Problem Patient is a 68 year old male who has been admitted with Rhinovirus.  He has been transferred from [facility] to Med Surg today, 1/9.  Upon approach for evaluation he was sitting up in recliner.  Pleasant and willing to participate in therapuetic activities.   Left Upper Extremity (Weight-bearing Status) full weight-bearing (FWB)   Right Upper Extremity (Weight-bearing Status) full weight-bearing (FWB)   Left Lower Extremity (Weight-bearing Status) full weight-bearing (FWB)   Right Lower Extremity (Weight-bearing Status) full weight-bearing (FWB)   Heart Disease Risk Factors Overweight;Medical history   Cognitive Status Examination   Orientation Status orientation to person, place and time   Pain Assessment   Patient Currently in Pain No   Range of Motion Comprehensive   General Range of Motion no range of motion deficits identified   Clinical Impression   Criteria for Skilled Therapeutic Interventions Met (OT) Yes, treatment indicated   OT Diagnosis decreased performance with ADLs    Influenced by the following impairments generalized weakness   OT Problem List-Impairments impacting ADL activity tolerance impaired;strength   Assessment of Occupational Performance 1-3 Performance Deficits   Identified Performance Deficits functional mobility, bathing/showering   Planned Therapy Interventions (OT) ADL retraining;transfer training   Clinical Decision Making Complexity (OT) problem focused assessment/low complexity   Risk & Benefits of therapy have been explained evaluation/treatment results reviewed   Clinical Impression Comments stable/uncomplicated   OT Total Evaluation Time   OT Eval, Low Complexity Minutes (56002) 15   OT Goals   Therapy Frequency (OT) Daily   OT Predicted Duration/Target Date for Goal Attainment 01/15/24   OT Goals Lower Body Dressing;Bed Mobility;Upper Body Bathing;Lower Body Bathing   OT: Lower Body Dressing Modified independent   OT: Upper Body Bathing Modified independent   OT: Lower Body Bathing Supervision/stand-by assist   OT: Bed Mobility Supervision/stand-by assist   Therapeutic Activities   Therapeutic Activity Minutes (49940) 15   Symptoms noted during/after treatment fatigue   Treatment Detail/Skilled Intervention minimal assist to SBA for bed mobilities; sit to stand and stand pivot with CGA and use of Fww. + 1 to manage IV pole.  Max A to donate   OT Discharge Planning   OT Plan Continue OT   OT Discharge Recommendation (DC Rec) Transitional Care Facility   OT Rationale for DC Rec Patient would benefit from conitnued therapies at [facility] to improve acivity tolerance for safety with ADLs   OT Brief overview of current status See above   Total Session Time   Timed Code Treatment Minutes 15   Total Session Time (sum of timed and untimed services) 30

## 2024-01-15 NOTE — PHARMACY - DISCHARGE MEDICATION RECONCILIATION AND EDUCATION
Pharmacy:  Discharge Counseling and Medication Reconciliation    Efra MATTHEW Hayes  [address]  [place] [address]  [phone] (home)   68 year old male  PCP: Lukas Kramer    Allergies: Lisinopril    Discharge Counseling:    Pharmacist did not meet with patient prior to discharge, as patient is being discharged to [facility], where facility staff will manage and administer his medications.    Discharge Medication Reconciliation:    It has been determined that the patient has an adequate supply of medications available or which can be obtained from [facility]'s preferred pharmacy, which has been confirmed as: [id number].    Thank you for the consult.    Jasper Holbrook [facility]........January 15, 2024 11:04 AM

## 2024-01-16 ENCOUNTER — NURSING HOME VISIT (OUTPATIENT)
Dept: GERIATRICS | Facility: OTHER | Age: 69
End: 2024-01-16
Payer: COMMERCIAL

## 2024-01-16 ENCOUNTER — APPOINTMENT (OUTPATIENT)
Dept: CT IMAGING | Facility: OTHER | Age: 69
DRG: 189 | End: 2024-01-16
Attending: PHYSICIAN ASSISTANT
Payer: MEDICARE

## 2024-01-16 ENCOUNTER — HOSPITAL ENCOUNTER (INPATIENT)
Facility: OTHER | Age: 69
LOS: 7 days | Discharge: HOME HEALTH | DRG: 189 | End: 2024-01-23
Attending: PHYSICIAN ASSISTANT | Admitting: INTERNAL MEDICINE
Payer: MEDICARE

## 2024-01-16 ENCOUNTER — TELEPHONE (OUTPATIENT)
Dept: INTERNAL MEDICINE | Facility: OTHER | Age: 69
End: 2024-01-16
Payer: COMMERCIAL

## 2024-01-16 DIAGNOSIS — J43.1 PANLOBULAR EMPHYSEMA (H): ICD-10-CM

## 2024-01-16 DIAGNOSIS — E11.65 TYPE 2 DIABETES MELLITUS WITH HYPERGLYCEMIA, WITHOUT LONG-TERM CURRENT USE OF INSULIN (H): ICD-10-CM

## 2024-01-16 DIAGNOSIS — I50.32 CHRONIC HEART FAILURE WITH PRESERVED EJECTION FRACTION (H): ICD-10-CM

## 2024-01-16 DIAGNOSIS — J18.9 HAP (HOSPITAL-ACQUIRED PNEUMONIA): ICD-10-CM

## 2024-01-16 DIAGNOSIS — I48.0 PAROXYSMAL ATRIAL FIBRILLATION WITH RVR (H): ICD-10-CM

## 2024-01-16 DIAGNOSIS — J96.01 ACUTE RESPIRATORY FAILURE WITH HYPOXIA (H): ICD-10-CM

## 2024-01-16 DIAGNOSIS — U07.1 COVID-19 VIRUS INFECTION: ICD-10-CM

## 2024-01-16 DIAGNOSIS — J96.22 ACUTE ON CHRONIC RESPIRATORY FAILURE WITH HYPOXIA AND HYPERCAPNIA (H): ICD-10-CM

## 2024-01-16 DIAGNOSIS — U07.1 INFECTION DUE TO 2019 NOVEL CORONAVIRUS: Primary | ICD-10-CM

## 2024-01-16 DIAGNOSIS — I10 ESSENTIAL HYPERTENSION: ICD-10-CM

## 2024-01-16 DIAGNOSIS — F34.1 DYSTHYMIC DISORDER: ICD-10-CM

## 2024-01-16 DIAGNOSIS — U07.1 CLINICAL DIAGNOSIS OF COVID-19: ICD-10-CM

## 2024-01-16 DIAGNOSIS — Z95.0 CARDIAC PACEMAKER IN SITU: ICD-10-CM

## 2024-01-16 DIAGNOSIS — J96.21 ACUTE ON CHRONIC RESPIRATORY FAILURE WITH HYPOXIA AND HYPERCAPNIA (H): ICD-10-CM

## 2024-01-16 DIAGNOSIS — J44.9 MODERATE COPD (CHRONIC OBSTRUCTIVE PULMONARY DISEASE) (H): ICD-10-CM

## 2024-01-16 DIAGNOSIS — R06.89 HYPOVENTILATION SYNDROME: Primary | ICD-10-CM
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
## 2024-01-16 DIAGNOSIS — E66.9 OBESITY (BMI 30-39.9): ICD-10-CM

## 2024-01-16 DIAGNOSIS — U07.1 INFECTION DUE TO 2019 NOVEL CORONAVIRUS: ICD-10-CM

## 2024-01-16 DIAGNOSIS — I50.33 ACUTE ON CHRONIC HEART FAILURE WITH PRESERVED EJECTION FRACTION (H): ICD-10-CM

## 2024-01-16 LAB
ALLEN'S TEST: NO
ALLEN'S TEST: NO
ANION GAP SERPL CALCULATED.3IONS-SCNC: 7 MMOL/L (ref 7–15)
BASE EXCESS BLDA CALC-SCNC: 7.8 MMOL/L (ref -9–1.8)
BASE EXCESS BLDA CALC-SCNC: 8.6 MMOL/L (ref -9–1.8)
BASOPHILS # BLD AUTO: 0 10E3/UL (ref 0–0.2)
BASOPHILS NFR BLD AUTO: 0 %
BUN SERPL-MCNC: 58 MG/DL (ref 8–23)
CALCIUM SERPL-MCNC: 8.8 MG/DL (ref 8.8–10.2)
CHLORIDE SERPL-SCNC: 91 MMOL/L (ref 98–107)
CREAT SERPL-MCNC: 1.63 MG/DL (ref 0.67–1.17)
DEPRECATED HCO3 PLAS-SCNC: 38 MMOL/L (ref 22–29)
EGFRCR SERPLBLD CKD-EPI 2021: 45 ML/MIN/1.73M2
EOSINOPHIL # BLD AUTO: 0 10E3/UL (ref 0–0.7)
EOSINOPHIL NFR BLD AUTO: 0 %
ERYTHROCYTE [DISTWIDTH] IN BLOOD BY AUTOMATED COUNT: 13.2 % (ref 10–15)
GLUCOSE BLDC GLUCOMTR-MCNC: 150 MG/DL (ref 70–99)
GLUCOSE SERPL-MCNC: 218 MG/DL (ref 70–99)
HCO3 BLD-SCNC: 38 MMOL/L (ref 21–28)
HCO3 BLD-SCNC: 40 MMOL/L (ref 21–28)
HCT VFR BLD AUTO: 51.8 % (ref 40–53)
HGB BLD-MCNC: 16.5 G/DL (ref 13.3–17.7)
HOLD SPECIMEN: NORMAL
IMM GRANULOCYTES # BLD: 0.1 10E3/UL
IMM GRANULOCYTES NFR BLD: 1 %
LACTATE SERPL-SCNC: 1.7 MMOL/L (ref 0.7–2)
LYMPHOCYTES # BLD AUTO: 1.2 10E3/UL (ref 0.8–5.3)
LYMPHOCYTES NFR BLD AUTO: 9 %
MCH RBC QN AUTO: 29.8 PG (ref 26.5–33)
MCHC RBC AUTO-ENTMCNC: 31.9 G/DL (ref 31.5–36.5)
MCV RBC AUTO: 94 FL (ref 78–100)
MONOCYTES # BLD AUTO: 0.5 10E3/UL (ref 0–1.3)
MONOCYTES NFR BLD AUTO: 4 %
NEUTROPHILS # BLD AUTO: 11.5 10E3/UL (ref 1.6–8.3)
NEUTROPHILS NFR BLD AUTO: 86 %
NRBC # BLD AUTO: 0 10E3/UL
NRBC BLD AUTO-RTO: 0 /100
NT-PROBNP SERPL-MCNC: 2131 PG/ML (ref 0–900)
O2/TOTAL GAS SETTING VFR VENT: 30 %
O2/TOTAL GAS SETTING VFR VENT: 36 %
OXYHGB MFR BLD: 89 % (ref 92–100)
PCO2 BLD: 79 MM HG (ref 35–45)
PCO2 BLD: 85 MM HG (ref 35–45)
PH BLD: 7.28 [PH] (ref 7.35–7.45)
PH BLD: 7.29 [PH] (ref 7.35–7.45)
PLATELET # BLD AUTO: 208 10E3/UL (ref 150–450)
PO2 BLD: 62 MM HG (ref 80–105)
PO2 BLD: 83 MM HG (ref 80–105)
POTASSIUM SERPL-SCNC: 5.5 MMOL/L (ref 3.4–5.3)
RBC # BLD AUTO: 5.54 10E6/UL (ref 4.4–5.9)
SODIUM SERPL-SCNC: 136 MMOL/L (ref 135–145)
TROPONIN T SERPL HS-MCNC: 43 NG/L
WBC # BLD AUTO: 13.2 10E3/UL (ref 4–11)

## 2024-01-16 PROCEDURE — 83605 ASSAY OF LACTIC ACID: CPT | Performed by: PHYSICIAN ASSISTANT

## 2024-01-16 PROCEDURE — 99285 EMERGENCY DEPT VISIT HI MDM: CPT | Mod: 25 | Performed by: PHYSICIAN ASSISTANT

## 2024-01-16 PROCEDURE — 82803 BLOOD GASES ANY COMBINATION: CPT | Performed by: PHYSICIAN ASSISTANT

## 2024-01-16 PROCEDURE — 258N000003 HC RX IP 258 OP 636: Performed by: PHYSICIAN ASSISTANT

## 2024-01-16 PROCEDURE — 84484 ASSAY OF TROPONIN QUANT: CPT | Performed by: PHYSICIAN ASSISTANT

## 2024-01-16 PROCEDURE — 87040 BLOOD CULTURE FOR BACTERIA: CPT | Mod: 91 | Performed by: PHYSICIAN ASSISTANT

## 2024-01-16 PROCEDURE — 71275 CT ANGIOGRAPHY CHEST: CPT | Mod: MF

## 2024-01-16 PROCEDURE — 93005 ELECTROCARDIOGRAM TRACING: CPT | Performed by: PHYSICIAN ASSISTANT

## 2024-01-16 PROCEDURE — 36415 COLL VENOUS BLD VENIPUNCTURE: CPT | Performed by: PHYSICIAN ASSISTANT

## 2024-01-16 PROCEDURE — 82805 BLOOD GASES W/O2 SATURATION: CPT | Performed by: PHYSICIAN ASSISTANT

## 2024-01-16 PROCEDURE — 96374 THER/PROPH/DIAG INJ IV PUSH: CPT | Mod: XU | Performed by: PHYSICIAN ASSISTANT

## 2024-01-16 PROCEDURE — 999N000157 HC STATISTIC RCP TIME EA 10 MIN

## 2024-01-16 PROCEDURE — 258N000003 HC RX IP 258 OP 636: Performed by: INTERNAL MEDICINE

## 2024-01-16 PROCEDURE — 5A09557 ASSISTANCE WITH RESPIRATORY VENTILATION, GREATER THAN 96 CONSECUTIVE HOURS, CONTINUOUS POSITIVE AIRWAY PRESSURE: ICD-10-PCS | Performed by: INTERNAL MEDICINE

## 2024-01-16 PROCEDURE — 250N000011 HC RX IP 250 OP 636: Mod: JZ | Performed by: INTERNAL MEDICINE

## 2024-01-16 PROCEDURE — 99306 1ST NF CARE HIGH MDM 50: CPT | Performed by: NURSE PRACTITIONER

## 2024-01-16 PROCEDURE — 83880 ASSAY OF NATRIURETIC PEPTIDE: CPT | Performed by: PHYSICIAN ASSISTANT

## 2024-01-16 PROCEDURE — 250N000011 HC RX IP 250 OP 636: Performed by: PHYSICIAN ASSISTANT

## 2024-01-16 PROCEDURE — 99291 CRITICAL CARE FIRST HOUR: CPT | Performed by: INTERNAL MEDICINE

## 2024-01-16 PROCEDURE — 99285 EMERGENCY DEPT VISIT HI MDM: CPT | Performed by: PHYSICIAN ASSISTANT

## 2024-01-16 PROCEDURE — 200N000001 HC R&B ICU

## 2024-01-16 PROCEDURE — 85004 AUTOMATED DIFF WBC COUNT: CPT | Performed by: PHYSICIAN ASSISTANT

## 2024-01-16 PROCEDURE — 93010 ELECTROCARDIOGRAM REPORT: CPT | Performed by: STUDENT IN AN ORGANIZED HEALTH CARE EDUCATION/TRAINING PROGRAM

## 2024-01-16 PROCEDURE — 94660 CPAP INITIATION&MGMT: CPT

## 2024-01-16 PROCEDURE — 80048 BASIC METABOLIC PNL TOTAL CA: CPT | Performed by: PHYSICIAN ASSISTANT

## 2024-01-16 PROCEDURE — 36600 WITHDRAWAL OF ARTERIAL BLOOD: CPT | Performed by: PHYSICIAN ASSISTANT

## 2024-01-16 RX ORDER — NALOXONE HYDROCHLORIDE 0.4 MG/ML
0.4 INJECTION, SOLUTION INTRAMUSCULAR; INTRAVENOUS; SUBCUTANEOUS
Status: DISCONTINUED | OUTPATIENT
Start: 2024-01-16 | End: 2024-01-23 | Stop reason: HOSPADM

## 2024-01-16 RX ORDER — SODIUM CHLORIDE 9 MG/ML
INJECTION, SOLUTION INTRAVENOUS CONTINUOUS
Status: DISCONTINUED | OUTPATIENT
Start: 2024-01-16 | End: 2024-01-16

## 2024-01-16 RX ORDER — ASPIRIN 81 MG/1
81 TABLET ORAL DAILY
Status: DISCONTINUED | OUTPATIENT
Start: 2024-01-16 | End: 2024-01-23 | Stop reason: HOSPADM

## 2024-01-16 RX ORDER — NICOTINE POLACRILEX 4 MG
15-30 LOZENGE BUCCAL
Status: DISCONTINUED | OUTPATIENT
Start: 2024-01-16 | End: 2024-01-23 | Stop reason: HOSPADM

## 2024-01-16 RX ORDER — AMOXICILLIN 250 MG
1 CAPSULE ORAL 2 TIMES DAILY PRN
Status: DISCONTINUED | OUTPATIENT
Start: 2024-01-16 | End: 2024-01-23 | Stop reason: HOSPADM

## 2024-01-16 RX ORDER — DEXAMETHASONE SODIUM PHOSPHATE 4 MG/ML
6 INJECTION, SOLUTION INTRA-ARTICULAR; INTRALESIONAL; INTRAMUSCULAR; INTRAVENOUS; SOFT TISSUE DAILY
Status: DISCONTINUED | OUTPATIENT
Start: 2024-01-16 | End: 2024-01-17

## 2024-01-16 RX ORDER — ONDANSETRON 2 MG/ML
4 INJECTION INTRAMUSCULAR; INTRAVENOUS EVERY 6 HOURS PRN
Status: DISCONTINUED | OUTPATIENT
Start: 2024-01-16 | End: 2024-01-23 | Stop reason: HOSPADM

## 2024-01-16 RX ORDER — NALOXONE HYDROCHLORIDE 0.4 MG/ML
0.2 INJECTION, SOLUTION INTRAMUSCULAR; INTRAVENOUS; SUBCUTANEOUS
Status: DISCONTINUED | OUTPATIENT
Start: 2024-01-16 | End: 2024-01-23 | Stop reason: HOSPADM

## 2024-01-16 RX ORDER — SODIUM CHLORIDE 9 MG/ML
INJECTION, SOLUTION INTRAVENOUS CONTINUOUS
Status: DISCONTINUED | OUTPATIENT
Start: 2024-01-16 | End: 2024-01-19

## 2024-01-16 RX ORDER — HYDROMORPHONE HYDROCHLORIDE 1 MG/ML
.3-.5 INJECTION, SOLUTION INTRAMUSCULAR; INTRAVENOUS; SUBCUTANEOUS
Status: DISCONTINUED | OUTPATIENT
Start: 2024-01-16 | End: 2024-01-23 | Stop reason: HOSPADM

## 2024-01-16 RX ORDER — CARVEDILOL 12.5 MG/1
12.5 TABLET ORAL 2 TIMES DAILY
Status: CANCELLED | OUTPATIENT
Start: 2024-01-16

## 2024-01-16 RX ORDER — AMOXICILLIN 250 MG
2 CAPSULE ORAL 2 TIMES DAILY PRN
Status: DISCONTINUED | OUTPATIENT
Start: 2024-01-16 | End: 2024-01-23 | Stop reason: HOSPADM

## 2024-01-16 RX ORDER — IOPAMIDOL 755 MG/ML
95 INJECTION, SOLUTION INTRAVASCULAR ONCE
Status: COMPLETED | OUTPATIENT
Start: 2024-01-16 | End: 2024-01-16

## 2024-01-16 RX ORDER — FUROSEMIDE 20 MG
20 TABLET ORAL DAILY
Status: CANCELLED | OUTPATIENT
Start: 2024-01-16

## 2024-01-16 RX ORDER — ONDANSETRON 4 MG/1
4 TABLET, ORALLY DISINTEGRATING ORAL EVERY 6 HOURS PRN
Status: DISCONTINUED | OUTPATIENT
Start: 2024-01-16 | End: 2024-01-23 | Stop reason: HOSPADM

## 2024-01-16 RX ORDER — PIPERACILLIN SODIUM, TAZOBACTAM SODIUM 4; .5 G/20ML; G/20ML
4.5 INJECTION, POWDER, LYOPHILIZED, FOR SOLUTION INTRAVENOUS EVERY 6 HOURS
Qty: 560 ML | Refills: 0 | Status: DISCONTINUED | OUTPATIENT
Start: 2024-01-17 | End: 2024-01-19

## 2024-01-16 RX ORDER — GUAIFENESIN 200 MG/1
400 TABLET ORAL 3 TIMES DAILY
Status: ON HOLD | COMMUNITY
Start: 2024-01-16 | End: 2024-01-17

## 2024-01-16 RX ORDER — SPIRONOLACTONE 25 MG/1
50 TABLET ORAL DAILY
Status: CANCELLED | OUTPATIENT
Start: 2024-01-16

## 2024-01-16 RX ORDER — DEXTROSE MONOHYDRATE 25 G/50ML
25-50 INJECTION, SOLUTION INTRAVENOUS
Status: DISCONTINUED | OUTPATIENT
Start: 2024-01-16 | End: 2024-01-23 | Stop reason: HOSPADM

## 2024-01-16 RX ORDER — LOSARTAN POTASSIUM 25 MG/1
25 TABLET ORAL DAILY
Status: CANCELLED | OUTPATIENT
Start: 2024-01-16

## 2024-01-16 RX ORDER — PIPERACILLIN SODIUM, TAZOBACTAM SODIUM 4; .5 G/20ML; G/20ML
4.5 INJECTION, POWDER, LYOPHILIZED, FOR SOLUTION INTRAVENOUS ONCE
Status: COMPLETED | OUTPATIENT
Start: 2024-01-16 | End: 2024-01-16

## 2024-01-16 RX ADMIN — DEXAMETHASONE SODIUM PHOSPHATE 6 MG: 4 INJECTION, SOLUTION INTRAMUSCULAR; INTRAVENOUS at 22:23

## 2024-01-16 RX ADMIN — SODIUM CHLORIDE: 9 INJECTION, SOLUTION INTRAVENOUS at 20:54

## 2024-01-16 RX ADMIN — PIPERACILLIN AND TAZOBACTAM 4.5 G: 4; .5 INJECTION, POWDER, LYOPHILIZED, FOR SOLUTION INTRAVENOUS at 19:03

## 2024-01-16 RX ADMIN — IOPAMIDOL 95 ML: 755 INJECTION, SOLUTION INTRAVENOUS at 16:58

## 2024-01-16 RX ADMIN — SODIUM CHLORIDE 250 ML: 9 INJECTION, SOLUTION INTRAVENOUS at 18:24

## 2024-01-16 RX ADMIN — REMDESIVIR 200 MG: 100 INJECTION, POWDER, LYOPHILIZED, FOR SOLUTION INTRAVENOUS at 22:23

## 2024-01-16 ASSESSMENT — ENCOUNTER SYMPTOMS
HEMATURIA: 0
CHILLS: 0
FATIGUE: 1
FEVER: 0
SHORTNESS OF BREATH: 1
COUGH: 1
ABDOMINAL PAIN: 0

## 2024-01-16 ASSESSMENT — ACTIVITIES OF DAILY LIVING (ADL)
ADLS_ACUITY_SCORE: 35
ADLS_ACUITY_SCORE: 20
ADLS_ACUITY_SCORE: 20
ADLS_ACUITY_SCORE: 35

## 2024-01-16 NOTE — H&P
[facility]    History and Physical - Hospitalist Service       Date of Admission:  1/16/2024    Assessment & Plan      Efra Zuniga is a 68 year old male admitted on 1/16/2024. He was just discharged from [facility] with acute on chronic respiratory failure triggered by rhinonovirus, readmitted on 1/16 with the same, this time being + for Covid.    Acute on Chronic Respiratory Failure, Acute on Chronic CO2 retention (baseline CO2 ~60), + Covid-19 Pneumonia, Possible HAP  - Appears to be new covid pneumonia for respiratory failure  Plan:  Admit to ICU  Dexa / Remdesivir  Zosyn for possible HAP  RCAT to follow for BiPap, follow ABG  Prognosis is guarded given his baseline disease burden    Diastolic CHF  - Recent TTE with EF 55-60%, otherwise technically difficult  - Cont meds from prior admit: Coreg, Aldactone, Jardiance, Lasix    DM  Lab Results   Component Value Date    A1C 8.5 01/07/2024   - Hadn't been on therapy prior to last admit  Plan: Cont recent added meds: Jardiance, Metformin, place on ISS    HTN  - BP low with acute illness - will hold the Cozaar and Coreg     Chronic A fib, s/p ablation  - Cont his chronic DOAC    WADE with Hyperkalemia  - will hold ARB and Lasix  - IVFs  - Recheck BMP in AM, the K should improve with hydration        Diet:  Diabetic  DVT Prophylaxis: Low Risk/Ambulatory with no VTE prophylaxis indicated and DOAC  Reynaga Catheter: Not present  Lines: None     Cardiac Monitoring: None  Code Status:  Full    Clinically Significant Risk Factors Present on Admission        # Hyperkalemia: Highest K = 5.5 mmol/L in last 2 days, will monitor as appropriate        # Drug Induced Coagulation Defect: home medication list includes an anticoagulant medication  # Drug Induced Platelet Defect: home medication list includes an antiplatelet medication  # Acute Kidney Injury, unspecified: based on a >150% or 0.3 mg/dL increase in last creatinine compared to past 90 day average,  "will monitor renal function  # Hypertension: Noted on problem list   # Acute Respiratory Failure: based on blood gas results.  Continue supplemental oxygen as needed    # DMII: A1C = 8.5 % (Ref range: 4.0 - 6.2 %) within past 6 months    # Obesity: Estimated body mass index is 35.44 kg/m  as calculated from the following:    Height as of this encounter: 1.88 m (6' 2\").    Weight as of this encounter: 125.2 kg (276 lb).              Disposition Plan      Expected Discharge Date: 01/18/2024                  Shiraz Motley MD  Hospitalist Service  [facility]  Securely message with [employer] (more info)  Text page via [facility] Paging/Directory     ______________________________________________________________________    Chief Complaint   SOB    History is obtained from the patient and ED provider    History of Present Illness   Efra Zuniga is a 68 year old male who was just discharged from [facility] for acute on chronic respiratory failure, and having been on medications for >1 year, to a SNF for STR tested + for covid yesterday and was brought back today acute on chronic respiratory failure, with a CO2 of 85 with this admission.  He was hypoxic in the SNF prior to transfer.      Past Medical History    Past Medical History:   Diagnosis Date    Family history of other specified conditions (CODE)     No Comments Provided    Hemorrhage of anus and rectum     multiple colonoscopies scheduled and canceled by patient    Irritable bowel syndrome with constipation     Functioning bowel syndrome/constipation    Pain in joint     Intermittent arthralgias       Past Surgical History   Past Surgical History:   Procedure Laterality Date    APPENDECTOMY OPEN      Coronary angiogram done by Aron Quick at [facility] in March 2006 shows patent coronary arteries    CHOLECYSTECTOMY      No Comments Provided    OTHER SURGICAL HISTORY      March 2006,[id number],SCAN-ANGIOGRAM,Coronary angiogram done by Aron Quick at [facility] in shows " patent coronary arteries       Prior to Admission Medications   Prior to Admission Medications   Prescriptions Last Dose Informant Patient Reported? Taking?   acetaminophen (TYLENOL) 325 MG tablet   No No   Sig: Take 2 tablets (650 mg) by mouth every 4 hours as needed for mild pain or other (and adjunct with moderate or severe pain or per patient request)   apixaban ANTICOAGULANT (ELIQUIS) 5 MG tablet   No No   Sig: Take 1 tablet (5 mg) by mouth 2 times daily   aspirin 81 MG EC tablet   No No   Sig: Take 1 tablet (81 mg) by mouth daily   carvedilol (COREG) 12.5 MG tablet   No No   Sig: Take 1 tablet (12.5 mg) by mouth 2 times daily   empagliflozin (JARDIANCE) 10 MG TABS tablet   No No   Sig: Take 1 tablet (10 mg) by mouth daily   furosemide (LASIX) 20 MG tablet   No No   Sig: Take 1 tablet (20 mg) by mouth daily   guaiFENesin (ORGANIDIN) 200 MG tablet   Yes No   Sig: Take 2 tablets (400 mg) by mouth 3 times daily   insulin aspart (NOVOLOG VIAL) 100 UNITS/ML vial   No No   Sig: Give before meals and before bed:  For Pre-Meal Glucose:  140-189 give 1 unit   190-239 give 2 units   240-289 give 3 units   290-339 give 4 units   = or >340 give 5 units     For Bedtime Glucose  200 - 239 give 1 units   240 - 289 give 1.5 units  290 - 339 give 2 units  = or >340 give 2.5 units   losartan (COZAAR) 25 MG tablet   No No   Sig: Take 1 tablet (25 mg) by mouth daily   melatonin 3 MG tablet   No No   Sig: Take 1 tablet (3 mg) by mouth nightly as needed   metFORMIN (GLUCOPHAGE) 500 MG tablet   No No   Sig: Take 1 tablet (500 mg) by mouth 2 times daily (with meals)   predniSONE (DELTASONE) 20 MG tablet   No No   Sig: Take 1 tablet (20 mg) by mouth daily for 3 days   senna-docusate (SENOKOT-S/PERICOLACE) 8.6-50 MG tablet   No No   Sig: Take 1 tablet by mouth 2 times daily as needed for constipation   spironolactone (ALDACTONE) 50 MG tablet   No No   Sig: Take 1 tablet (50 mg) by mouth daily      Facility-Administered Medications: None         Review of Systems    The 10 point Review of Systems is negative other than noted in the HPI or here.      Physical Exam   Vital Signs: Temp: 97.6  F (36.4  C) Temp src: Tympanic BP: 96/71 Pulse: 64   Resp: 14 SpO2: 96 % O2 Device: Nasal cannula Oxygen Delivery: 4 LPM  Weight: 276 lbs 0 oz    Constitutional: fatigued but awake  Eyes: Lids and lashes normal, pupils equal, round and reactive to light, extra ocular muscles intact, sclera clear, conjunctiva normal  ENT: Normocephalic, without obvious abnormality, atraumatic, sinuses nontender on palpation, external ears without lesions,   Hematologic / Lymphatic: no cervical lymphadenopathy  Respiratory: Mild respiratory distress, poor air exchange, and clear to auscultation but very distant  Cardiovascular: Normal apical impulse, regular rate and rhythm, normal S1 and S2, no S3 or S4, and no murmur noted  GI: No scars, normal bowel sounds, soft, non-distended, non-tender, no masses palpated, no hepatosplenomegally, obese  Skin: no bruising or bleeding, normal skin color, texture, turgor, and no redness, warmth, 2+ LE edema with mod stasis dermatitis  Neurologic: Mental Status Exam:  Level of Alertness:   lethargic  Neuropsychiatric: General: normal and calm    Medical Decision Making       60 of critical care MINUTES SPENT BY ME on the date of service doing chart review, history, exam, documentation & further activities per the note.      Data     I have personally reviewed the following data over the past 24 hrs:    13.2 (H)  \   16.5   / 208     136 91 (L) 58.0 (H) /  218 (H)   5.5 (H) 38 (H) 1.63 (H) \     Trop: 43 (H) BNP: 2,131 (H)       Imaging results reviewed over the past 24 hrs:   No results found for this or any previous visit (from the past 24 hour(s)).

## 2024-01-16 NOTE — PROGRESS NOTES
Patient placed on BIPAP-ST in the ED. Currently tolerating the large full face mask well. BIPAP-ST data below.       01/16/24 7444   Tech Time   $Tech Time (10 minute increments) 2   Mode: CPAP/ BiPAP/ AVAPS/ AVAPS AE   CPAP/BiPAP/ AVAPS/ AVAPS AE Mode BiPAP S;BiPAP S/T   Equipment   Device grace   Device Serial Number [id number]   CPAP/BiPAP/Settings   $CPAP/BiPAP Initial completed   BIPAP/CPAP On Standby On   IPAP/EPAP (cmH2O) 22/10   Rate (breaths/min) 20   Oxygen (%) 30   Timed Inspiration (sec) 1   CPAP/BiPAP Patient Parameters   IPAP (cm H2O) 22 cmH2O   EPAP (cm H2O) 10 cmH2O   Pressure Support (cm H2O) 12 cmH2O   RR Total (breaths/min) 20 breaths/min   Vt (mL) 560 mL   Minute Ventilation (L/min) 12.7 L/min   Peak Inspiratory Pressure (cm H2O) 22 cmH2O   Pt.  Leak (L/min) 66 L/min   CPAP/BiPAP/AVAPS/AVAPS AE Alarms   High Pressure (cm H2O) 30 cmH2O   Low Pressure Delay (sec) 15 sec   Lo Min Vent 3   High Rate (breaths/min) 40 breaths/min   Audible Alarm set at (Volume of Alarm) 5   Humidifier Checked Yes   RT Device Skin Assessment   Oxygen Delivery Device CPAP/BiPAP Mask   Interface Face Mask - Large   Site Appearance neck circumference Clean and dry   Site Appearance bridge of nose Clean and dry   Site Appearance occiput Clean and dry     Noel Holliday, RT

## 2024-01-16 NOTE — ED TRIAGE NOTES
"Pt here from [facility], recent discharge from [facility] for ARF. Pt was tested positive yesterday per facility testing over last 24 hrs pt declined with increased O2 needs 2L NC lowest reading 40% when staff went in room and pt took o2 off. Pt somnolent and hypotensive 99/54. 4L NC applied here for O2 sats 87% 2L NC.   /66   Pulse 65   Temp 97.6  F (36.4  C) (Tympanic)   Resp 20   Ht 1.88 m (6' 2\")   Wt 125.2 kg (276 lb)   SpO2 93%   BMI 35.44 kg/m         Triage Assessment (Adult)       Row Name 01/16/24 9813          Triage Assessment    Airway WDL X        Respiratory WDL    Respiratory WDL X;rhythm/pattern     Rhythm/Pattern, Respiratory shortness of breath;dyspnea upon exertion        Skin Circulation/Temperature WDL    Skin Circulation/Temperature WDL WDL        Cardiac WDL    Cardiac WDL WDL        Peripheral/Neurovascular WDL    Peripheral Neurovascular WDL WDL        Cognitive/Neuro/Behavioral WDL    Cognitive/Neuro/Behavioral WDL X;level of consciousness     Level of Consciousness intermittent confusion     Arousal Level opens eyes spontaneously     Orientation oriented x 4     Speech logical;clear;spontaneous     Mood/Behavior calm;cooperative        Anne-Marie Coma Scale    Best Eye Response 4-->(E4) spontaneous     Best Motor Response 6-->(M6) obeys commands     Best Verbal Response 5-->(V5) oriented     Anne-Marie Coma Scale Score 15                     "

## 2024-01-16 NOTE — ED PROVIDER NOTES
"  History     Chief Complaint   Patient presents with    Respiratory Distress     HPI  Efra Zuniga is a 68 year old male who presents to the ED for evaluation of respiratory distress. Pt here from [facility], recent discharge from [facility] for ARF. Pt was tested positive yesterday per facility testing over last 24 hrs pt declined with increased O2 needs 2L NC lowest reading 40% when staff went in room and pt took o2 off. Pt somnolent and hypotensive 99/54. 4L NC applied here for O2 sats 87% 2L NC. He denies chest pain.     Allergies:  Allergies   Allergen Reactions    Lisinopril      Other reaction(s): Tachycardia  Felt like \"a heart attack\"; hands went numb       Problem List:    Patient Active Problem List    Diagnosis Date Noted    Infection due to 2019 novel coronavirus 01/16/2024     Priority: Medium    COVID-19 01/16/2024     Priority: Medium    Acute on chronic respiratory failure with hypoxia and hypercapnia (H) 01/16/2024     Priority: Medium    COVID-19 virus infection 01/16/2024     Priority: Medium    Acute respiratory failure with hypoxia (H) 01/07/2024     Priority: Medium    Chronic heart failure with preserved ejection fraction (H) 01/07/2024     Priority: Medium    Medical non-compliance 04/24/2023     Priority: Medium    Mild cognitive impairment 09/09/2022     Priority: Medium    Adverse effect of antihyperlipidemic and antiarteriosclerotic drugs, sequela 12/30/2020     Priority: Medium    Paroxysmal atrial fibrillation with RVR (H) 10/06/2019     Priority: Medium    Low blood magnesium 10/06/2019     Priority: Medium    Cardiac pacemaker in situ 09/10/2019     Priority: Medium     Formatting of this note might be different from the original.  Implant Date 5/25/22  Implanting Physician Bong Edmonds-explanted old leads   09/10/2019 [employer] Accolade, model L331, serial# [id number]   Lead   Right Atrium  Model Ingevity + IS-1 BiPositive Fix RA/RV 52cm                                 7841 " Serial Number [id number]  Implant Date 22  Lead  Left Ventricle  Model Ingevity + IS-1 BiPositive Fix RA/RV 59cm                                 7842 Serial Number [id number]   Implant Date 22  LB area       c      Panlobular emphysema (H) 2018     Priority: Medium    Dysthymic disorder 2018     Priority: Medium     Overview:   chronic      History of disease 2018     Priority: Medium    Asbestos exposure 09/15/2017     Priority: Medium    Moderate COPD (chronic obstructive pulmonary disease) (H) 09/15/2017     Priority: Medium    Nonsmoker 09/15/2017     Priority: Medium    Simple chronic bronchitis (H) 09/15/2017     Priority: Medium    Essential hypertension 2017     Priority: Medium    Type 2 diabetes mellitus with hyperglycemia, without long-term current use of insulin (H) 2014     Priority: Medium    Moderate episode of recurrent major depressive disorder (H) 2006     Priority: Medium    Obesity (BMI 30-39.9) 2006     Priority: Medium     Formatting of this note might be different from the original.   Updated per 10/1/17 IMO import          Past Medical History:    Past Medical History:   Diagnosis Date    Family history of other specified conditions (CODE)     Hemorrhage of anus and rectum     Irritable bowel syndrome with constipation     Pain in joint        Past Surgical History:    Past Surgical History:   Procedure Laterality Date    APPENDECTOMY OPEN      Coronary angiogram done by Aron Quick at [facility] in 2006 shows patent coronary arteries    CHOLECYSTECTOMY      No Comments Provided    OTHER SURGICAL HISTORY      2006,[id number],SCAN-ANGIOGRAM,Coronary angiogram done by Aron Quick at [facility] in shows patent coronary arteries       Family History:    Family History   Problem Relation Age of Onset    Alzheimer Disease Mother         Alzheimer's disease    Cancer Father         Cancer, with a brain tumor    Heart Disease Father         Heart  "Disease,CABG x3    Heart Disease Maternal Grandmother         Heart Disease    Heart Disease Maternal Grandfather         Heart Disease    Heart Disease Paternal Grandmother         Heart Disease    Heart Disease Paternal Grandfather         Heart Disease    Other - See Comments Sister         ALS       Social History:  Marital Status:  Single [1]  Social History     Tobacco Use    Smoking status: Never    Smokeless tobacco: Never   Vaping Use    Vaping Use: Never used   Substance Use Topics    Alcohol use: No    Drug use: No        Medications:    acetaminophen (TYLENOL) 325 MG tablet  apixaban ANTICOAGULANT (ELIQUIS) 5 MG tablet  aspirin 81 MG EC tablet  carvedilol (COREG) 12.5 MG tablet  empagliflozin (JARDIANCE) 10 MG TABS tablet  furosemide (LASIX) 20 MG tablet  guaiFENesin (ORGANIDIN) 200 MG tablet  insulin aspart (NOVOLOG VIAL) 100 UNITS/ML vial  losartan (COZAAR) 25 MG tablet  melatonin 3 MG tablet  metFORMIN (GLUCOPHAGE) 500 MG tablet  predniSONE (DELTASONE) 20 MG tablet  senna-docusate (SENOKOT-S/PERICOLACE) 8.6-50 MG tablet  spironolactone (ALDACTONE) 50 MG tablet          Review of Systems   Constitutional:  Positive for fatigue. Negative for chills and fever.   HENT:  Negative for congestion.    Eyes:  Negative for visual disturbance.   Respiratory:  Positive for cough and shortness of breath.    Cardiovascular:  Negative for chest pain.   Gastrointestinal:  Negative for abdominal pain.   Genitourinary:  Negative for hematuria.   Allergic/Immunologic: Negative for immunocompromised state.   Neurological:  Negative for syncope.       Physical Exam   BP: 109/66  Pulse: 65  Temp: 97.6  F (36.4  C)  Resp: 20  Height: 188 cm (6' 2\")  Weight: 125.2 kg (276 lb)  SpO2: 93 %      Physical Exam  Constitutional:       General: He is not in acute distress.     Appearance: He is well-developed. He is ill-appearing. He is not diaphoretic.   HENT:      Head: Normocephalic and atraumatic.   Eyes:      General: No " scleral icterus.     Conjunctiva/sclera: Conjunctivae normal.   Cardiovascular:      Rate and Rhythm: Normal rate and regular rhythm.   Pulmonary:      Breath sounds: Normal breath sounds.   Abdominal:      Palpations: Abdomen is soft.      Tenderness: There is no abdominal tenderness.   Musculoskeletal:         General: No deformity.      Cervical back: Neck supple.   Lymphadenopathy:      Cervical: No cervical adenopathy.   Skin:     General: Skin is warm and dry.      Coloration: Skin is not jaundiced.      Findings: No rash.   Neurological:      General: No focal deficit present.      Mental Status: He is alert. Mental status is at baseline.   Psychiatric:         Mood and Affect: Mood normal.         Behavior: Behavior normal.         ED Course                 Procedures         EKG read at 1646. HR 64, atrial paced rhythm.     Critical Care time:  none               Results for orders placed or performed during the hospital encounter of 01/16/24 (from the past 24 hour(s))   Blood gas arterial   Result Value Ref Range    pH Arterial 7.28 (L) 7.35 - 7.45    pCO2 Arterial 85 (HH) 35 - 45 mm Hg    pO2 Arterial 83 80 - 105 mm Hg    FIO2 36     Bicarbonate Arterial 40 (H) 21 - 28 mmol/L    Base Excess/Deficit 8.6 (H) -9.0 - 1.8 mmol/L    Surya's Test No    CBC with platelets differential    Narrative    The following orders were created for panel order CBC with platelets differential.  Procedure                               Abnormality         Status                     ---------                               -----------         ------                     CBC with platelets and d...[[id number]]  Abnormal            Final result                 Please view results for these tests on the individual orders.   Basic metabolic panel   Result Value Ref Range    Sodium 136 135 - 145 mmol/L    Potassium 5.5 (H) 3.4 - 5.3 mmol/L    Chloride 91 (L) 98 - 107 mmol/L    Carbon Dioxide (CO2) 38 (H) 22 - 29 mmol/L    Anion Gap 7 7  - 15 mmol/L    Urea Nitrogen 58.0 (H) 8.0 - 23.0 mg/dL    Creatinine 1.63 (H) 0.67 - 1.17 mg/dL    GFR Estimate 45 (L) >60 mL/min/1.73m2    Calcium 8.8 8.8 - 10.2 mg/dL    Glucose 218 (H) 70 - 99 mg/dL   Troponin T, High Sensitivity   Result Value Ref Range    Troponin T, High Sensitivity 43 (H) <=22 ng/L   Nt probnp inpatient (BNP)   Result Value Ref Range    N terminal Pro BNP Inpatient 2,131 (H) 0 - 900 pg/mL   CBC with platelets and differential   Result Value Ref Range    WBC Count 13.2 (H) 4.0 - 11.0 10e3/uL    RBC Count 5.54 4.40 - 5.90 10e6/uL    Hemoglobin 16.5 13.3 - 17.7 g/dL    Hematocrit 51.8 40.0 - 53.0 %    MCV 94 78 - 100 fL    MCH 29.8 26.5 - 33.0 pg    MCHC 31.9 31.5 - 36.5 g/dL    RDW 13.2 10.0 - 15.0 %    Platelet Count 208 150 - 450 10e3/uL    % Neutrophils 86 %    % Lymphocytes 9 %    % Monocytes 4 %    % Eosinophils 0 %    % Basophils 0 %    % Immature Granulocytes 1 %    NRBCs per 100 WBC 0 <1 /100    Absolute Neutrophils 11.5 (H) 1.6 - 8.3 10e3/uL    Absolute Lymphocytes 1.2 0.8 - 5.3 10e3/uL    Absolute Monocytes 0.5 0.0 - 1.3 10e3/uL    Absolute Eosinophils 0.0 0.0 - 0.7 10e3/uL    Absolute Basophils 0.0 0.0 - 0.2 10e3/uL    Absolute Immature Granulocytes 0.1 <=0.4 10e3/uL    Absolute NRBCs 0.0 10e3/uL   Extra Tube    Narrative    The following orders were created for panel order Extra Tube.  Procedure                               Abnormality         Status                     ---------                               -----------         ------                     Extra Blue Top Tube[[id number]]                              In process                 Extra Red Top Tube[[id number]]                               In process                 Extra Green Top (Lithium...[[id number]]                      In process                   Please view results for these tests on the individual orders.   CT Chest Pulmonary Embolism w Contrast    Narrative    CT CHEST PULMONARY EMBOLISM W CONTRAST  1/16/2024  5:16 PM    CLINICAL HISTORY: Male, age 68 years,  +covid, increased sob, hypoxic.  Lungs? PE?;    Comparison:  CTA chest 9/4/2022    TECHNIQUE:  CT angiogram was performed of the chest with IV contrast.  Axial, sagittal and coronal images were reviewed. If present, MIP  and/or 3-D images were constructed by the technologist.    FINDINGS:   Good quality CTA demonstrates no evidence of pulmonary embolus.  Thoracic aorta is intact. Distention of the pulmonary arterial tree is  similar in appearance.     The lungs demonstrate peripheral areas of atelectasis. There appears  be atelectasis and consolidation the left lower lobe.    Transvenous pacer is again seen. There is no evidence of pathologic  lymph node enlargement. Thyroid gland is unremarkable. Esophagus and  visualized portions of the upper abdomen demonstrate no acute  abnormality. The gallbladder surgically absent. Bony structures  demonstrate no acute abnormality.      Impression    IMPRESSION:   Left lower lobe pneumonia and partial atelectasis of the left and  right lower lobe.    No evidence of pulmonary embolus.    Chronic appearing distention of the pulmonary arterial tree is similar  in appearance compared to prior study.          This facility minimizes radiation dose by adjusting the mA and/or kV  according to each patient size.      This CT scan was performed using one or more the following dose  reduction techniques:    -Automated exposure control,  -Adjustment of the mA and/or kV according to patient's size, and/or,  -Use of iterative reconstruction technique.    PERRI NAPOLES MD         SYSTEM ID:  [id number]       Medications   iopamidol (ISOVUE-370) solution 95 mL (95 mLs Intravenous $Given 1/16/24 7813)       Assessments & Plan (with Medical Decision Making)   Patient does become hypoxic when off of oxygen supplementation.  He is requiring 4 L to maintain saturations in the mid 90s.  He is mildly hypotensive with systolic blood pressures  around 100.    Found to be hypercapnic with similar values to his presentation requiring ICU care a week or so ago.    He will be placed on BiPAP and is accepted to the ICU by Dr. Motley.    Remainder of studies in the ED are still pending including lab work and CT imaging.    Jimbo Hampton PA-C    I have reviewed the nursing notes.    I have reviewed the findings, diagnosis, plan and need for follow up with the patient.                 New Prescriptions    No medications on file       Final diagnoses:   COVID-19 virus infection   Acute on chronic respiratory failure with hypoxia and hypercapnia (H)       1/16/2024   [facility]       Jimbo Hampton PA  01/16/24 6323

## 2024-01-16 NOTE — TELEPHONE ENCOUNTER
Transitional Care Management    Patient discharged to skilled nursing facility for short term rehab. No TCM call required per policy.    Emili Saucedo RN on 1/16/2024 at 9:05 AM

## 2024-01-16 NOTE — PROGRESS NOTES
[employer] [facility]  Hospital follow up/Initial Assessment    Patient Name: Efra Zuniga   : 1955  MRN: [id number]    Place of Service: [facility]  DOS: 2024    CC: Hospital follow up/Initial Assessment    HPI:  Efra Zuniga is a 68 year old male with PMH of multiple chronic medical concerns, who is seen today regarding pt hospitalized -1/15/24 at [facility] for acute respiratory failure/acute heart failure/ +rhinovirus URI.      Complicated stay in hospital initially though to be in acute heart failure and given IV lasix but Cr worsened without much output. He then became more somnolent and hypercarbic requiring bipap for 24 hours and then transitioned to low flow oxygen.   TTE 24: 55-60%, otherwise a difficult study with no significant abnormality noted   For diastolic dysfunction he is currently taking coreg 12.5 mg BID, cozaar 25 mg daily, spironolactone 50 mg daily and in hospital added SGLT-2 jardiance 10 mg daily and lasix 20 mg daily.     Theres was some concern for LOIDA.   Treatment for rhinovirus with prednisone taper. Had been taking 20 mg BID and was tapered down to 20 mg daily to end 24.      Diabetes type 2:last A1c 8.5, improved with metformin and addition of jardiance  Hypertension: currently taking losartan and coreg  Afib: rate control with coreg and DOAC apixaban  Moderate COPD: prednisone taper    Last recorded weight from hospital 276# and weight at rehab on 1/15 240#    Pt seen today in his room on isolation precautions for recent covid-19 pos test 1/15/24. He has already been on long taper of prednisone in hospital and will finish this 24. He is minimally interactive today, much prompting to get him to answer questions. Called sister Jessica and she reports he was living at home independently but not taking proper care of himself (not cleaning, not taking any of his medications). She reports he was in [facility] about a year ago and  "when he was discharged from there he stopped taking all his meds. He doesn't want to take pills. Sister states he is always tired. Discussed depression possible vs just doesn't care.   He tells me his last name correctly but getting his birthdate wrong and stating current month of Jan when his birth month is March. \"Oh yeah that's right.\"    Multidisciplinary notes, laboratory values, medications, vital signs, weight and orders arereviewed from nursing home records. I have reviewed the patient s medical history and updated the computerized patient record.     PMH:  Past Medical History:   Diagnosis Date    Family history of other specified conditions (CODE)     No Comments Provided    Hemorrhage of anus and rectum     multiple colonoscopies scheduled and canceled by patient    Irritable bowel syndrome with constipation     Functioning bowel syndrome/constipation    Pain in joint     Intermittent arthralgias       Medications:  Current Outpatient Medications   Medication Sig Dispense Refill    acetaminophen (TYLENOL) 325 MG tablet Take 2 tablets (650 mg) by mouth every 4 hours as needed for mild pain or other (and adjunct with moderate or severe pain or per patient request)      apixaban ANTICOAGULANT (ELIQUIS) 5 MG tablet Take 1 tablet (5 mg) by mouth 2 times daily      aspirin 81 MG EC tablet Take 1 tablet (81 mg) by mouth daily      carvedilol (COREG) 12.5 MG tablet Take 1 tablet (12.5 mg) by mouth 2 times daily      empagliflozin (JARDIANCE) 10 MG TABS tablet Take 1 tablet (10 mg) by mouth daily      furosemide (LASIX) 20 MG tablet Take 1 tablet (20 mg) by mouth daily      insulin aspart (NOVOLOG VIAL) 100 UNITS/ML vial Give before meals and before bed:  For Pre-Meal Glucose:  140-189 give 1 unit   190-239 give 2 units   240-289 give 3 units   290-339 give 4 units   = or >340 give 5 units     For Bedtime Glucose  200 - 239 give 1 units   240 - 289 give 1.5 units  290 - 339 give 2 units  = or >340 give 2.5 units   " "   losartan (COZAAR) 25 MG tablet Take 1 tablet (25 mg) by mouth daily      melatonin 3 MG tablet Take 1 tablet (3 mg) by mouth nightly as needed      metFORMIN (GLUCOPHAGE) 500 MG tablet Take 1 tablet (500 mg) by mouth 2 times daily (with meals)      predniSONE (DELTASONE) 20 MG tablet Take 1 tablet (20 mg) by mouth daily for 3 days      senna-docusate (SENOKOT-S/PERICOLACE) 8.6-50 MG tablet Take 1 tablet by mouth 2 times daily as needed for constipation      spironolactone (ALDACTONE) 50 MG tablet Take 1 tablet (50 mg) by mouth daily      zolpidem (AMBIEN) 5 MG tablet Take 0.5 tablets (2.5 mg) by mouth at bedtime 15 tablet 0      Medication reconciliation complete between Epic record and NH MAR.     Allergies:  Allergies   Allergen Reactions    Lisinopril      Other reaction(s): Tachycardia  Felt like \"a heart attack\"; hands went numb       Review of Systems:  See HPI    Vital Signs:  Temp 98.2   Pulse 78   Respirations 22   /88   Oxygen Sats 97%   Weight 240#    Physical Exam:     Somnolent, answers questions but keeps eyes closed and mumbling at times  Skin color ashen. Mucous membranes dry.   Lungs clear/dim but taking very shallow breaths throughout. No wheezes or rales noted.   Cardiovascular regular, S1, S2 auscultated. Faint murmur noted.  Abdomen  large soft and without tenderness +bowel sounds  Extremities without edema. Shins with old scabbed areas-venous stasis dermatitis  Able to move upper and lower extremities with much prompting his was able to wiggle toes, bend knees, squeeze my hands      New Labs/Diagnostics:  Magnesium   Date Value Ref Range Status   01/15/2024 2.5 (H) 1.7 - 2.3 mg/dL Final   10/06/2019 1.8 (L) 1.9 - 2.7 mg/dL Final    Last Comprehensive Metabolic Panel:  Lab Results   Component Value Date     01/14/2024    POTASSIUM 4.7 01/14/2024    CHLORIDE 96 (L) 01/14/2024    CO2 36 (H) 01/14/2024    ANIONGAP 8 01/14/2024     (H) 01/15/2024    BUN 40.1 (H) 01/14/2024    " "CR 0.80 01/14/2024    GFRESTIMATED >90 01/14/2024    SCAR 9.2 01/14/2024       Lab Results   Component Value Date    WBC 9.8 01/07/2024    WBC 8.8 10/06/2019     Lab Results   Component Value Date    RBC 5.66 01/07/2024    RBC 5.39 10/06/2019     Lab Results   Component Value Date    HGB 16.7 01/07/2024    HGB 15.7 10/06/2019     Lab Results   Component Value Date    HCT 52.1 01/07/2024    HCT 49.5 10/06/2019     No components found for: \"MCT\"  Lab Results   Component Value Date    MCV 92 01/07/2024    MCV 92 10/06/2019     Lab Results   Component Value Date    MCH 29.5 01/07/2024    MCH 29.1 10/06/2019     Lab Results   Component Value Date    MCHC 32.1 01/07/2024    MCHC 31.7 10/06/2019     Lab Results   Component Value Date    RDW 13.7 01/07/2024    RDW 12.7 10/06/2019     Lab Results   Component Value Date     01/07/2024     10/06/2019         Assessment/Plan:  (U07.1) Infection due to 2019 novel coronavirus  (primary encounter diagnosis)  Comment: Tested positive upon admission to [facility] 1/15/24  Plan: molnupiravir (LAGEVRIO) 200 MG capsule-give 800 mg q 12 hours x 5 days             Finish prednisone taper (to end on 1/19/24)  Check CBC, BMP tomorrow    (J44.9) Moderate COPD (chronic obstructive pulmonary disease) (H)  Comment: chronic  Plan: cont oxygen use as needed to keep sats over 90%, may wean as able    (J96.01) Acute respiratory failure with hypoxia (H)  Comment: shallow breaths, somnolent  Plan: molnupiravir (LAGEVRIO) 200 MG capsule,             Finish prednisone taper  Aggressive pulmonary rehab, incentive spirometer  Added guiafenesin 400 mg liquid TID with full glass of water    (E66.9) Obesity (BMI 30-39.9)  Comment: chronic  Plan: monitor weight, nutrition management    (E11.65) Type 2 diabetes mellitus with hyperglycemia, without long-term current use of insulin (H)  Comment: chronic, initially uncontrolled but stable now  Plan: cont metformin, jardiance, and novolog sliding " scale    (I48.0) Paroxysmal atrial fibrillation with RVR (H)  Comment: currently sounds regular with controlled rate  Plan: cont apixaban, coreg    (I10) Essential hypertension  (I50.32) Chronic heart failure with preserved ejection fraction (H)  Comment: has been controlled since admit 110-150s, this morning 170s  Plan: cont losartan, coreg, spironolactone, lasix  Jardiance and spironolactone were added in hospital  Check BMP, BNP tomorrow    (Z95.0) Cardiac pacemaker in situ  Comment: chronic  Plan: no changes    Called sister and discussed his baseline and goals of care, plan of care.     Addendum: Pt continues to be somnolent and not taking in adequate oral fluids. He was found to be positive Covid 1/15/24. Later in the day BP 80/46. Breathing is shallow and without oxygen he drops his sats to 40%. He was increased to 2L and sats improved to mid 90s. Pt appears ill, ashen color and somnolent. Sending to ED for further treatment/assessment due to his acuity.     A total of 87 minutes was spent with this patient, of which time spent reviewing chart from [facility] discharge summary, labs, meds, assessment, nursing update, orders written. Covid assessment and meds ordered.     LISANDRO Griffin, CNP ....................  1/16/2024   9:52 AM

## 2024-01-17 PROBLEM — J44.9 MODERATE COPD (CHRONIC OBSTRUCTIVE PULMONARY DISEASE) (H): Status: RESOLVED | Noted: 2017-09-15 | Resolved: 2024-01-17

## 2024-01-17 PROBLEM — J43.1 PANLOBULAR EMPHYSEMA (H): Status: ACTIVE | Noted: 2018-02-28

## 2024-01-17 PROBLEM — U07.1 COVID-19: Status: RESOLVED | Noted: 2024-01-16 | Resolved: 2024-01-17

## 2024-01-17 PROBLEM — J18.9 HAP (HOSPITAL-ACQUIRED PNEUMONIA): Status: ACTIVE | Noted: 2024-01-17
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
## 2024-01-17 PROBLEM — U07.1 COVID-19 VIRUS INFECTION: Status: RESOLVED | Noted: 2024-01-16 | Resolved: 2024-01-17

## 2024-01-17 LAB
ACANTHOCYTES BLD QL SMEAR: ABNORMAL
ALBUMIN SERPL BCG-MCNC: 3.2 G/DL (ref 3.5–5.2)
ALLEN'S TEST: NO
ALLEN'S TEST: NO
ALP SERPL-CCNC: 54 U/L (ref 40–150)
ALT SERPL W P-5'-P-CCNC: 37 U/L (ref 0–70)
ANION GAP SERPL CALCULATED.3IONS-SCNC: 14 MMOL/L (ref 7–15)
AST SERPL W P-5'-P-CCNC: 14 U/L (ref 0–45)
ATRIAL RATE - MUSE: 64 BPM
AUER BODIES BLD QL SMEAR: ABNORMAL
BASE EXCESS BLDA CALC-SCNC: 9.1 MMOL/L (ref -9–1.8)
BASE EXCESS BLDA CALC-SCNC: 9.9 MMOL/L (ref -3–3)
BASO STIPL BLD QL SMEAR: ABNORMAL
BILIRUB SERPL-MCNC: 1.3 MG/DL
BITE CELLS BLD QL SMEAR: ABNORMAL
BLISTER CELLS BLD QL SMEAR: ABNORMAL
BUN SERPL-MCNC: 44.4 MG/DL (ref 8–23)
BURR CELLS BLD QL SMEAR: ABNORMAL
CALCIUM SERPL-MCNC: 8.2 MG/DL (ref 8.8–10.2)
CHLORIDE SERPL-SCNC: 95 MMOL/L (ref 98–107)
COHGB MFR BLD: 95.2 % (ref 95–96)
CREAT SERPL-MCNC: 0.97 MG/DL (ref 0.67–1.17)
DACRYOCYTES BLD QL SMEAR: ABNORMAL
DEPRECATED HCO3 PLAS-SCNC: 25 MMOL/L (ref 22–29)
DIASTOLIC BLOOD PRESSURE - MUSE: NORMAL MMHG
EGFRCR SERPLBLD CKD-EPI 2021: 85 ML/MIN/1.73M2
ELLIPTOCYTES BLD QL SMEAR: ABNORMAL
ERYTHROCYTE [DISTWIDTH] IN BLOOD BY AUTOMATED COUNT: 13.2 % (ref 10–15)
FLUAV RNA SPEC QL NAA+PROBE: NEGATIVE
FLUBV RNA RESP QL NAA+PROBE: NEGATIVE
FRAGMENTS BLD QL SMEAR: ABNORMAL
GLUCOSE BLDC GLUCOMTR-MCNC: 121 MG/DL (ref 70–99)
GLUCOSE BLDC GLUCOMTR-MCNC: 128 MG/DL (ref 70–99)
GLUCOSE BLDC GLUCOMTR-MCNC: 146 MG/DL (ref 70–99)
GLUCOSE BLDC GLUCOMTR-MCNC: 182 MG/DL (ref 70–99)
GLUCOSE SERPL-MCNC: 134 MG/DL (ref 70–99)
HCO3 BLD-SCNC: 37 MMOL/L (ref 21–28)
HCO3 BLD-SCNC: 37 MMOL/L (ref 21–28)
HCT VFR BLD AUTO: 48.9 % (ref 40–53)
HGB BLD-MCNC: 15.7 G/DL (ref 13.3–17.7)
HGB C CRYSTALS: ABNORMAL
HOLD SPECIMEN: NORMAL
HOLD SPECIMEN: NORMAL
HOWELL-JOLLY BOD BLD QL SMEAR: ABNORMAL
INTERPRETATION ECG - MUSE: NORMAL
MCH RBC QN AUTO: 29.2 PG (ref 26.5–33)
MCHC RBC AUTO-ENTMCNC: 32.1 G/DL (ref 31.5–36.5)
MCV RBC AUTO: 91 FL (ref 78–100)
NEUTS HYPERSEG BLD QL SMEAR: ABNORMAL
O2/TOTAL GAS SETTING VFR VENT: 28 %
O2/TOTAL GAS SETTING VFR VENT: 35 %
OXYHGB MFR BLD: 93 % (ref 92–100)
P AXIS - MUSE: 11 DEGREES
PCO2 BLD: 58 MM HG (ref 35–45)
PCO2 BLD: 65 MM HG (ref 35–45)
PEEP: 0 CM H2O
PH BLD: 7.37 [PH] (ref 7.35–7.45)
PH BLD: 7.41 [PH] (ref 7.35–7.45)
PLAT MORPH BLD: ABNORMAL
PLATELET # BLD AUTO: 121 10E3/UL (ref 150–450)
PO2 BLD: 67 MM HG (ref 80–105)
PO2 BLD: 68 MM HG (ref 80–105)
POLYCHROMASIA BLD QL SMEAR: ABNORMAL
POTASSIUM SERPL-SCNC: 4.7 MMOL/L (ref 3.4–5.3)
PR INTERVAL - MUSE: 192 MS
PROT SERPL-MCNC: 6 G/DL (ref 6.4–8.3)
QRS DURATION - MUSE: 98 MS
QT - MUSE: 420 MS
QTC - MUSE: 433 MS
R AXIS - MUSE: 43 DEGREES
RBC # BLD AUTO: 5.37 10E6/UL (ref 4.4–5.9)
RBC AGGLUT BLD QL: ABNORMAL
RBC MORPH BLD: ABNORMAL
ROULEAUX BLD QL SMEAR: ABNORMAL
RSV RNA SPEC NAA+PROBE: NEGATIVE
SAO2 % BLDA: 95 % (ref 92–100)
SARS-COV-2 RNA RESP QL NAA+PROBE: NEGATIVE
SICKLE CELLS BLD QL SMEAR: ABNORMAL
SMUDGE CELLS BLD QL SMEAR: ABNORMAL
SODIUM SERPL-SCNC: 134 MMOL/L (ref 135–145)
SPHEROCYTES BLD QL SMEAR: ABNORMAL
STOMATOCYTES BLD QL SMEAR: ABNORMAL
SYSTOLIC BLOOD PRESSURE - MUSE: NORMAL MMHG
T AXIS - MUSE: 83 DEGREES
TARGETS BLD QL SMEAR: ABNORMAL
TOXIC GRANULES BLD QL SMEAR: ABNORMAL
VARIANT LYMPHS BLD QL SMEAR: ABNORMAL
VENTRICULAR RATE- MUSE: 64 BPM
WBC # BLD AUTO: 13 10E3/UL (ref 4–11)

## 2024-01-17 PROCEDURE — 82805 BLOOD GASES W/O2 SATURATION: CPT | Performed by: STUDENT IN AN ORGANIZED HEALTH CARE EDUCATION/TRAINING PROGRAM

## 2024-01-17 PROCEDURE — 258N000003 HC RX IP 258 OP 636: Performed by: INTERNAL MEDICINE

## 2024-01-17 PROCEDURE — 99233 SBSQ HOSP IP/OBS HIGH 50: CPT | Performed by: INTERNAL MEDICINE

## 2024-01-17 PROCEDURE — 87637 SARSCOV2&INF A&B&RSV AMP PRB: CPT | Performed by: INTERNAL MEDICINE

## 2024-01-17 PROCEDURE — 250N000012 HC RX MED GY IP 250 OP 636 PS 637: Performed by: INTERNAL MEDICINE

## 2024-01-17 PROCEDURE — 80053 COMPREHEN METABOLIC PANEL: CPT | Performed by: SURGERY

## 2024-01-17 PROCEDURE — 200N000001 HC R&B ICU

## 2024-01-17 PROCEDURE — 250N000011 HC RX IP 250 OP 636: Performed by: INTERNAL MEDICINE

## 2024-01-17 PROCEDURE — 36415 COLL VENOUS BLD VENIPUNCTURE: CPT | Performed by: SURGERY

## 2024-01-17 PROCEDURE — 36600 WITHDRAWAL OF ARTERIAL BLOOD: CPT | Performed by: INTERNAL MEDICINE

## 2024-01-17 PROCEDURE — 85027 COMPLETE CBC AUTOMATED: CPT | Performed by: SURGERY

## 2024-01-17 PROCEDURE — 258N000003 HC RX IP 258 OP 636: Performed by: STUDENT IN AN ORGANIZED HEALTH CARE EDUCATION/TRAINING PROGRAM

## 2024-01-17 PROCEDURE — 82805 BLOOD GASES W/O2 SATURATION: CPT | Performed by: INTERNAL MEDICINE

## 2024-01-17 PROCEDURE — 94010 BREATHING CAPACITY TEST: CPT

## 2024-01-17 PROCEDURE — 250N000013 HC RX MED GY IP 250 OP 250 PS 637: Performed by: INTERNAL MEDICINE

## 2024-01-17 PROCEDURE — 94660 CPAP INITIATION&MGMT: CPT

## 2024-01-17 PROCEDURE — 94150 VITAL CAPACITY TEST: CPT

## 2024-01-17 PROCEDURE — 999N000157 HC STATISTIC RCP TIME EA 10 MIN

## 2024-01-17 PROCEDURE — 87899 AGENT NOS ASSAY W/OPTIC: CPT | Performed by: INTERNAL MEDICINE

## 2024-01-17 PROCEDURE — 36600 WITHDRAWAL OF ARTERIAL BLOOD: CPT | Performed by: STUDENT IN AN ORGANIZED HEALTH CARE EDUCATION/TRAINING PROGRAM

## 2024-01-17 RX ORDER — ZOLPIDEM TARTRATE 5 MG/1
2.5 TABLET ORAL AT BEDTIME
Status: ON HOLD | COMMUNITY
End: 2024-01-22

## 2024-01-17 RX ORDER — GUAIFENESIN 200 MG/10ML
400 LIQUID ORAL 3 TIMES DAILY
Status: ON HOLD | COMMUNITY
End: 2024-01-22

## 2024-01-17 RX ORDER — PREDNISONE 20 MG/1
40 TABLET ORAL DAILY
Status: DISCONTINUED | OUTPATIENT
Start: 2024-01-18 | End: 2024-01-20

## 2024-01-17 RX ADMIN — DEXAMETHASONE SODIUM PHOSPHATE 6 MG: 4 INJECTION, SOLUTION INTRAMUSCULAR; INTRAVENOUS at 09:53

## 2024-01-17 RX ADMIN — SODIUM CHLORIDE, POTASSIUM CHLORIDE, SODIUM LACTATE AND CALCIUM CHLORIDE 500 ML: 600; 310; 30; 20 INJECTION, SOLUTION INTRAVENOUS at 05:07

## 2024-01-17 RX ADMIN — INSULIN ASPART 1 UNITS: 100 INJECTION, SOLUTION INTRAVENOUS; SUBCUTANEOUS at 17:33

## 2024-01-17 RX ADMIN — APIXABAN 5 MG: 5 TABLET, FILM COATED ORAL at 09:52

## 2024-01-17 RX ADMIN — INSULIN ASPART 2 UNITS: 100 INJECTION, SOLUTION INTRAVENOUS; SUBCUTANEOUS at 12:09

## 2024-01-17 RX ADMIN — PIPERACILLIN AND TAZOBACTAM 4.5 G: 4; .5 INJECTION, POWDER, FOR SOLUTION INTRAVENOUS at 07:25

## 2024-01-17 RX ADMIN — EMPAGLIFLOZIN 10 MG: 10 TABLET, FILM COATED ORAL at 09:53

## 2024-01-17 RX ADMIN — APIXABAN 5 MG: 5 TABLET, FILM COATED ORAL at 22:34

## 2024-01-17 RX ADMIN — SODIUM CHLORIDE: 9 INJECTION, SOLUTION INTRAVENOUS at 16:09

## 2024-01-17 RX ADMIN — PIPERACILLIN AND TAZOBACTAM 4.5 G: 4; .5 INJECTION, POWDER, FOR SOLUTION INTRAVENOUS at 01:52

## 2024-01-17 RX ADMIN — PIPERACILLIN AND TAZOBACTAM 4.5 G: 4; .5 INJECTION, POWDER, FOR SOLUTION INTRAVENOUS at 19:29

## 2024-01-17 RX ADMIN — ASPIRIN 81 MG: 81 TABLET, COATED ORAL at 09:53

## 2024-01-17 RX ADMIN — PIPERACILLIN AND TAZOBACTAM 4.5 G: 4; .5 INJECTION, POWDER, FOR SOLUTION INTRAVENOUS at 14:01

## 2024-01-17 RX ADMIN — SODIUM CHLORIDE, POTASSIUM CHLORIDE, SODIUM LACTATE AND CALCIUM CHLORIDE 500 ML: 600; 310; 30; 20 INJECTION, SOLUTION INTRAVENOUS at 03:18

## 2024-01-17 ASSESSMENT — ACTIVITIES OF DAILY LIVING (ADL)
ADLS_ACUITY_SCORE: 26
ADLS_ACUITY_SCORE: 31
ADLS_ACUITY_SCORE: 26
ADLS_ACUITY_SCORE: 22
ADLS_ACUITY_SCORE: 20
ADLS_ACUITY_SCORE: 27
ADLS_ACUITY_SCORE: 27
ADLS_ACUITY_SCORE: 20
ADLS_ACUITY_SCORE: 31
ADLS_ACUITY_SCORE: 27
ADLS_ACUITY_SCORE: 31
ADLS_ACUITY_SCORE: 27

## 2024-01-17 NOTE — PHARMACY-ADMISSION MEDICATION HISTORY
Pharmacist Admission Medication History    Admission medication history is complete. The information provided in this note is only as accurate as the sources available at the time of the update.    Information Source(s): Facility (U/NH/) medication list/MAR via  [facility] MAR, sure scripts , hospital discharge summary    Pertinent Information: Patient recently discharged from [facility] on 1/15/24.  Since hospital discharge, zolpidem was added to the patient's home medication list.  Patient was discharged on 3 additional days of prednisone.  Patient had taken 1 of the 3 days.    Changes made to PTA medication list:  Added: zolpidem at bedtime, guaifenesin solution  Deleted: guaifenesin tabs  Changed: None    Medication Affordability: not assessed    Allergies reviewed with patient and updates made in EHR: yes    Medication History Completed By: Eugenia Moser [facility] 1/17/2024 8:34 AM    PTA Med List   Medication Sig Last Dose    acetaminophen (TYLENOL) 325 MG tablet Take 2 tablets (650 mg) by mouth every 4 hours as needed for mild pain or other (and adjunct with moderate or severe pain or per patient request) Past Month    apixaban ANTICOAGULANT (ELIQUIS) 5 MG tablet Take 1 tablet (5 mg) by mouth 2 times daily 1/16/2024 at AM    aspirin 81 MG EC tablet Take 1 tablet (81 mg) by mouth daily 1/16/2024 at AM    carvedilol (COREG) 12.5 MG tablet Take 1 tablet (12.5 mg) by mouth 2 times daily 1/16/2024 at AM    empagliflozin (JARDIANCE) 10 MG TABS tablet Take 1 tablet (10 mg) by mouth daily 1/16/2024 at AM    furosemide (LASIX) 20 MG tablet Take 1 tablet (20 mg) by mouth daily 1/16/2024 at AM    guaiFENesin (ROBITUSSIN) 20 mg/mL liquid Take 400 mg by mouth 3 times daily Give with glass of water Unknown at not approved?    insulin aspart (NOVOLOG VIAL) 100 UNITS/ML vial Give before meals and before bed:  For Pre-Meal Glucose:  140-189 give 1 unit   190-239 give 2 units   240-289 give 3 units   290-339 give 4 units    = or >340 give 5 units     For Bedtime Glucose  200 - 239 give 1 units   240 - 289 give 1.5 units  290 - 339 give 2 units  = or >340 give 2.5 units 1/16/2024 at 1-2 units    losartan (COZAAR) 25 MG tablet Take 1 tablet (25 mg) by mouth daily 1/16/2024 at AM    melatonin 3 MG tablet Take 1 tablet (3 mg) by mouth nightly as needed Unknown at NO USE    metFORMIN (GLUCOPHAGE) 500 MG tablet Take 1 tablet (500 mg) by mouth 2 times daily (with meals) 1/16/2024 at AM    predniSONE (DELTASONE) 20 MG tablet Take 1 tablet (20 mg) by mouth daily for 3 days 1/16/2024 at DOSE 1 OF 3 TAKEN    senna-docusate (SENOKOT-S/PERICOLACE) 8.6-50 MG tablet Take 1 tablet by mouth 2 times daily as needed for constipation Unknown at NO USE    spironolactone (ALDACTONE) 50 MG tablet Take 1 tablet (50 mg) by mouth daily 1/16/2024 at AM    zolpidem (AMBIEN) 5 MG tablet Take 2.5 mg by mouth at bedtime 1/15/2024 at HS

## 2024-01-17 NOTE — PROGRESS NOTES
Patient sitting in chair and BP dropping steadily. Patient feet were raised and head lowered with some improvement. Tele-ICU aware of low BP and LR bolus ordered. Stiven Raymundo RN on 1/17/2024 at 3:06 AM

## 2024-01-17 NOTE — PROVIDER NOTIFICATION
01/16/24 2006   Valuables   Patient Belongings remains with patient   Patient Belongings Remaining with Patient clothing  (Watch and shoes)   Did you bring any home meds/supplements to the hospital?  No     [facility] will make every effort per our policy to help keep your items safe while in the hospital.  If you choose to keep any items at the bedside, we cannot be held responsible for any items that are lost or broken.      List items sent to safe: NA    I have reviewed my belongings list on admission and verify that it is correct.     Patient signature_______________________________  Date/Time_____________________    2nd Staff person if patient unable to sign __________________________  Date/Time ______________________      I have received all my belongings noted above at discharge.    Patient signature________________________________  Date/Time  __________________________

## 2024-01-17 NOTE — ED NOTES
Pt has been up in chair most of shift, pt has no complaints, VSS, using call light appropriately, pt has no SOB,

## 2024-01-17 NOTE — PLAN OF CARE
Problem: Gas Exchange Impaired  Goal: Optimal Gas Exchange  Outcome: Not Progressing  Intervention: Optimize Oxygenation and Ventilation  Recent Flowsheet Documentation  Taken 1/17/2024 0358 by Stiven Raymundo RN  Head of Bed (HOB) Positioning: HOB at 30-45 degrees  Taken 1/17/2024 0034 by Stiven Raymundo RN  Head of Bed (HOB) Positioning: HOB at 30-45 degrees  Taken 1/16/2024 2001 by Stiven Raymundo RN  Head of Bed (HOB) Positioning: HOB at 30-45 degrees     Problem: Comorbidity Management  Goal: Maintenance of COPD Symptom Control  Outcome: Not Progressing  Intervention: Maintain COPD Symptom Control  Recent Flowsheet Documentation  Taken 1/17/2024 0358 by Stiven Raymundo RN  Medication Review/Management: medications reviewed  Taken 1/17/2024 0034 by Stiven Raymundo RN  Medication Review/Management: medications reviewed  Taken 1/16/2024 2001 by Stiven Raymundo RN  Medication Review/Management: medications reviewed     Problem: Fall Injury Risk  Goal: Absence of Fall and Fall-Related Injury  Outcome: Not Progressing  Intervention: Identify and Manage Contributors  Recent Flowsheet Documentation  Taken 1/17/2024 0358 by Stiven Raymundo RN  Medication Review/Management: medications reviewed  Taken 1/17/2024 0034 by Stiven Raymundo RN  Medication Review/Management: medications reviewed  Taken 1/16/2024 2001 by Stiven Raymundo RN  Medication Review/Management: medications reviewed  Intervention: Promote Injury-Free Environment  Recent Flowsheet Documentation  Taken 1/17/2024 0358 by Stiven Raymundo RN  Safety Promotion/Fall Prevention:   activity supervised   assistive device/personal items within reach   clutter free environment maintained   increased rounding and observation   increase visualization of patient   lighting adjusted   nonskid shoes/slippers when out of bed   patient and family education   room near nurse's station   room organization consistent    safety round/check completed   supervised activity  Taken 1/17/2024 0034 by Stiven Raymundo, RN  Safety Promotion/Fall Prevention:   activity supervised   assistive device/personal items within reach   clutter free environment maintained   increased rounding and observation   increase visualization of patient   lighting adjusted   nonskid shoes/slippers when out of bed   patient and family education   room near nurse's station   room organization consistent   safety round/check completed   supervised activity  Taken 1/16/2024 2001 by Stiven Raymundo, RN  Safety Promotion/Fall Prevention:   activity supervised   assistive device/personal items within reach   clutter free environment maintained   increased rounding and observation   increase visualization of patient   lighting adjusted   nonskid shoes/slippers when out of bed   patient and family education   room near nurse's station   room organization consistent   safety round/check completed   supervised activity   Goal Outcome Evaluation:

## 2024-01-17 NOTE — PROGRESS NOTES
Patient has been on BiPAP overnight and has been compliant. Patient BP have been up and down with no real consistency. Patient is confused at times but answers appropriately. Patient is in bed at this time and incontinent. No significant changes for the better at this time. Stiven Raymundo RN on 1/17/2024 at 6:39 AM

## 2024-01-17 NOTE — PROGRESS NOTES
Interdisciplinary Discharge Planning Note    Anticipated Discharge Date: 1/19-1/20    Anticipated Discharge Location: [facility]    Clinical Needs Before Discharge:  stable oxygen requirement and continue zosyn    Treatment Needs After Discharge:  home oxygen- possibly life 2000 machine     Potential Barriers to Discharge: None Identified     HENOK Bowie  1/17/2024,  12:40 PM

## 2024-01-17 NOTE — PROGRESS NOTES
[facility]    Medicine Progress Note - Hospitalist Service    Date of Admission:  1/16/2024    Assessment & Plan   Principal Problem:    Acute on chronic respiratory failure with hypoxia and hypercapnia (H)    Assessment: present on admission, COPD, heart failure and pneumonia related. Improving with Bipap.    Plan: Continue intermittent bipap   Prednisone    Zosyn day 1   When kidney function improves, resume heart failure regimen    Active Problems:    Type 2 diabetes mellitus with hyperglycemia, without long-term current use of insulin (H)    Assessment: present on admission     Plan: continue jardiance and insulin      Essential hypertension    Assessment: chronic    Plan: monitor today      Moderate COPD (chronic obstructive pulmonary disease) (H)    Assessment: present on admission, exacerbation    Plan: Prednisone   Bipap   Duonebs      Mild cognitive impairment    Assessment: present on admission         Chronic heart failure with preserved ejection fraction (H)    Assessment: present on admission     Plan: monitor volume status      HAP (hospital-acquired pneumonia)    Assessment: present on admission. He is COVID negative, despite what [facility] said yesterday.    Plan: zosyn day 1    Stop dexamethasone and remdesivir          Diet: Combination Diet Moderate Consistent Carb (60 g CHO per Meal) Diet    DVT Prophylaxis: DOAC  [place] Catheter: Not present  Lines: None     Cardiac Monitoring: ACTIVE order. Indication: ICU  Code Status: Full Code      Clinically Significant Risk Factors Present on Admission        # Hyperkalemia: Highest K = 5.5 mmol/L in last 2 days, will monitor as appropriate   # Hypocalcemia: Lowest Ca = 8.2 mg/dL in last 2 days, will monitor and replace as appropriate     # Hypoalbuminemia: Lowest albumin = 3.2 g/dL at 1/17/2024  8:02 AM, will monitor as appropriate  # Drug Induced Coagulation Defect: home medication list includes an anticoagulant  "medication  # Drug Induced Platelet Defect: home medication list includes an antiplatelet medication   # Hypertension: Noted on problem list   # Non-Invasive mechanical ventilation: current O2 Device: BiPAP/CPAP  # Acute hypoxic respiratory failure: continue supplemental O2 as needed    # DMII: A1C = 8.5 % (Ref range: 4.0 - 6.2 %) within past 6 months    # Obesity: Estimated body mass index is 35.69 kg/m  as calculated from the following:    Height as of this encounter: 1.88 m (6' 2\").    Weight as of this encounter: 126.1 kg (278 lb).              Disposition Plan      Expected Discharge Date: 01/18/2024                    Donte Macias MD  Hospitalist Service  [facility]  Securely message with Yapp (more info)  Text page via [facility] Paging/Directory   ______________________________________________________________________    Interval History   Feeling better. Hungry. No fevers, chills. No pain.     Physical Exam   Vital Signs: Temp: 97.3  F (36.3  C) Temp src: Tympanic BP: 126/74 Pulse: 60   Resp: 20 SpO2: 97 % O2 Device: BiPAP/CPAP Oxygen Delivery: 4 LPM  Weight: 278 lbs 0 oz    GENERAL: Comfortable, talkative, in no apparent distress.  HEENT: Anicteric, non-injected sclera, mouth moist.   NECK: No JVD.  CARDIOVASCULAR: regular rate and rhythm, no murmur. No lower extremity edema   RESPIRATORY: Clear to auscultation bilaterally, no wheezes, no crackles.  GI: Non-distended, normal bowel sounds, soft, non-tender.  SKIN: No rashes, sores.   NEUROLOGY: Alert and oriented x3, follows commands, speech and language normal.       Medical Decision Making       60 MINUTES SPENT BY ME on the date of service doing chart review, history, exam, documentation & further activities per the note.      Data     I have personally reviewed the following data over the past 24 hrs:    13.0 (H)  \   15.7   / 121 (L)     134 (L) 95 (L) 44.4 (H) /  182 (H)   4.7 25 0.97 \     ALT: 37 AST: 14 AP: 54 TBILI: 1.3 (H) "   ALB: 3.2 (L) TOT PROTEIN: 6.0 (L) LIPASE: N/A     Trop: 43 (H) BNP: 2,131 (H)     Procal: N/A CRP: N/A Lactic Acid: 1.7         Imaging results reviewed over the past 24 hrs:   Recent Results (from the past 24 hour(s))   CT Chest Pulmonary Embolism w Contrast    Narrative    CT CHEST PULMONARY EMBOLISM W CONTRAST  1/16/2024 5:16 PM    CLINICAL HISTORY: Male, age 68 years,  +covid, increased sob, hypoxic.  Lungs? PE?;    Comparison:  CTA chest 9/4/2022    TECHNIQUE:  CT angiogram was performed of the chest with IV contrast.  Axial, sagittal and coronal images were reviewed. If present, MIP  and/or 3-D images were constructed by the technologist.    FINDINGS:   Good quality CTA demonstrates no evidence of pulmonary embolus.  Thoracic aorta is intact. Distention of the pulmonary arterial tree is  similar in appearance.     The lungs demonstrate peripheral areas of atelectasis. There appears  be atelectasis and consolidation the left lower lobe.    Transvenous pacer is again seen. There is no evidence of pathologic  lymph node enlargement. Thyroid gland is unremarkable. Esophagus and  visualized portions of the upper abdomen demonstrate no acute  abnormality. The gallbladder surgically absent. Bony structures  demonstrate no acute abnormality.      Impression    IMPRESSION:   Left lower lobe pneumonia and partial atelectasis of the left and  right lower lobe.    No evidence of pulmonary embolus.    Chronic appearing distention of the pulmonary arterial tree is similar  in appearance compared to prior study.          This facility minimizes radiation dose by adjusting the mA and/or kV  according to each patient size.      This CT scan was performed using one or more the following dose  reduction techniques:    -Automated exposure control,  -Adjustment of the mA and/or kV according to patient's size, and/or,  -Use of iterative reconstruction technique.    PERRI NAPOELS MD         SYSTEM ID:  [id number]

## 2024-01-17 NOTE — PROGRESS NOTES
:     Patient came from [facility]. Spoke with Christy at [facility] and she stated patient did not do a bed hold. Patient will need a new referral for [facility] if he would like to return to .      will continue to follow for discharge planning needs.     HENOK Bowie on 1/17/2024 at 12:38 PM

## 2024-01-17 NOTE — PROGRESS NOTES
Bedside FEV1/FVC done with the [employer] bedside machine.       FEV1 0.85L  22% predicted  FVC   1.44L  28%    FEV1/FVC  59%    80% predicted    PEF    1.95l/s   21%     Predicted   NHANES III    Unable to load a print out    Bisi Bowling, RT

## 2024-01-17 NOTE — PROGRESS NOTES
Attempting to qualify patient for home Bipap.     ABG now  Leave on nasal cannula  1 AM wake up and ABG  If elevated PCO2 or somnolent at 1 AM, start bipap, otherwise remain on nasal cannula  AM ABG    Donte Macias M.D. 1/17/2024  4:10 PM

## 2024-01-17 NOTE — PROGRESS NOTES
Patient has remained on BiPAP overnight.  Settings remain 18/10, RR 20, 35%.  BiPAP mask switch to prevent pressure sores.  Breath sounds clear and diminished.

## 2024-01-17 NOTE — PROGRESS NOTES
"Patient stated that he \"feel like I am dying and want to talk to an RN.\" Writer informed patient that he was an RN. Patient stated \"I want to talk to another RN cause I do not feel well.\" Charge nurse contacted and will talk with patient. Stiven Raymundo RN on 1/17/2024 at 3:31 AM    "

## 2024-01-17 NOTE — PROGRESS NOTES
Pt has been off Bipap since around 0730, sitting in chair on O2 at 2l/m nc 94%. Pt had ABG done while on canula @ 1624 results  7.41/58/67/37 as a baseline ABG. FEV1/FVC is 80% predicted while upright in chair and good effort.  Will check  ABG upon waking for the final test needed for Home bipap qualification.   Bisi Bowling, RT

## 2024-01-17 NOTE — PROGRESS NOTES
Patient has had some difficulty in keeping mask from leaking but is tolerating mask. BPs have increased since beginning of the shift and are more normotensive. No complaints at this time. Stiven Raymundo RN on 1/17/2024 at 1:03 AM

## 2024-01-18 LAB
ALLEN'S TEST: NO
ANION GAP SERPL CALCULATED.3IONS-SCNC: 6 MMOL/L (ref 7–15)
BASE EXCESS BLDA CALC-SCNC: 9.3 MMOL/L (ref -3–3)
BUN SERPL-MCNC: 32.2 MG/DL (ref 8–23)
CALCIUM SERPL-MCNC: 8.1 MG/DL (ref 8.8–10.2)
CHLORIDE SERPL-SCNC: 96 MMOL/L (ref 98–107)
COHGB MFR BLD: 96.7 % (ref 95–96)
CREAT SERPL-MCNC: 0.91 MG/DL (ref 0.67–1.17)
DEPRECATED HCO3 PLAS-SCNC: 33 MMOL/L (ref 22–29)
EGFRCR SERPLBLD CKD-EPI 2021: >90 ML/MIN/1.73M2
ERYTHROCYTE [DISTWIDTH] IN BLOOD BY AUTOMATED COUNT: 13.3 % (ref 10–15)
GLUCOSE BLDC GLUCOMTR-MCNC: 142 MG/DL (ref 70–99)
GLUCOSE BLDC GLUCOMTR-MCNC: 144 MG/DL (ref 70–99)
GLUCOSE BLDC GLUCOMTR-MCNC: 144 MG/DL (ref 70–99)
GLUCOSE BLDC GLUCOMTR-MCNC: 163 MG/DL (ref 70–99)
GLUCOSE SERPL-MCNC: 131 MG/DL (ref 70–99)
HCO3 BLD-SCNC: 38 MMOL/L (ref 21–28)
HCT VFR BLD AUTO: 46.1 % (ref 40–53)
HGB BLD-MCNC: 14.8 G/DL (ref 13.3–17.7)
L PNEUMO1 AG UR QL IA: NEGATIVE
MCH RBC QN AUTO: 29.4 PG (ref 26.5–33)
MCHC RBC AUTO-ENTMCNC: 32.1 G/DL (ref 31.5–36.5)
MCV RBC AUTO: 92 FL (ref 78–100)
O2/TOTAL GAS SETTING VFR VENT: 28 %
PCO2 BLD: 70 MM HG (ref 35–45)
PH BLD: 7.35 [PH] (ref 7.35–7.45)
PLATELET # BLD AUTO: 172 10E3/UL (ref 150–450)
PO2 BLD: 81 MM HG (ref 80–105)
POTASSIUM SERPL-SCNC: 4.5 MMOL/L (ref 3.4–5.3)
RBC # BLD AUTO: 5.03 10E6/UL (ref 4.4–5.9)
SAO2 % BLDA: 95 % (ref 92–100)
SODIUM SERPL-SCNC: 135 MMOL/L (ref 135–145)
WBC # BLD AUTO: 12.1 10E3/UL (ref 4–11)

## 2024-01-18 PROCEDURE — 36600 WITHDRAWAL OF ARTERIAL BLOOD: CPT | Performed by: INTERNAL MEDICINE

## 2024-01-18 PROCEDURE — 250N000013 HC RX MED GY IP 250 OP 250 PS 637: Performed by: INTERNAL MEDICINE

## 2024-01-18 PROCEDURE — 85027 COMPLETE CBC AUTOMATED: CPT | Performed by: INTERNAL MEDICINE

## 2024-01-18 PROCEDURE — 80048 BASIC METABOLIC PNL TOTAL CA: CPT | Performed by: INTERNAL MEDICINE

## 2024-01-18 PROCEDURE — 250N000011 HC RX IP 250 OP 636: Performed by: INTERNAL MEDICINE

## 2024-01-18 PROCEDURE — 82805 BLOOD GASES W/O2 SATURATION: CPT | Performed by: INTERNAL MEDICINE

## 2024-01-18 PROCEDURE — 99232 SBSQ HOSP IP/OBS MODERATE 35: CPT | Performed by: INTERNAL MEDICINE

## 2024-01-18 PROCEDURE — 258N000003 HC RX IP 258 OP 636: Performed by: INTERNAL MEDICINE

## 2024-01-18 PROCEDURE — 94660 CPAP INITIATION&MGMT: CPT

## 2024-01-18 PROCEDURE — 999N000157 HC STATISTIC RCP TIME EA 10 MIN

## 2024-01-18 PROCEDURE — 250N000012 HC RX MED GY IP 250 OP 636 PS 637: Performed by: INTERNAL MEDICINE

## 2024-01-18 PROCEDURE — 200N000001 HC R&B ICU

## 2024-01-18 RX ORDER — CARVEDILOL 12.5 MG/1
12.5 TABLET ORAL 2 TIMES DAILY WITH MEALS
Status: DISCONTINUED | OUTPATIENT
Start: 2024-01-18 | End: 2024-01-23 | Stop reason: HOSPADM

## 2024-01-18 RX ORDER — LOSARTAN POTASSIUM 25 MG/1
25 TABLET ORAL DAILY
Status: DISCONTINUED | OUTPATIENT
Start: 2024-01-18 | End: 2024-01-23 | Stop reason: HOSPADM

## 2024-01-18 RX ADMIN — CARVEDILOL 12.5 MG: 12.5 TABLET, FILM COATED ORAL at 17:31

## 2024-01-18 RX ADMIN — INSULIN ASPART 1 UNITS: 100 INJECTION, SOLUTION INTRAVENOUS; SUBCUTANEOUS at 17:32

## 2024-01-18 RX ADMIN — SODIUM CHLORIDE: 9 INJECTION, SOLUTION INTRAVENOUS at 21:15

## 2024-01-18 RX ADMIN — INSULIN ASPART 1 UNITS: 100 INJECTION, SOLUTION INTRAVENOUS; SUBCUTANEOUS at 08:28

## 2024-01-18 RX ADMIN — APIXABAN 5 MG: 5 TABLET, FILM COATED ORAL at 22:07

## 2024-01-18 RX ADMIN — ASPIRIN 81 MG: 81 TABLET, COATED ORAL at 17:31

## 2024-01-18 RX ADMIN — PREDNISONE 40 MG: 20 TABLET ORAL at 17:31

## 2024-01-18 RX ADMIN — PIPERACILLIN AND TAZOBACTAM 4.5 G: 4; .5 INJECTION, POWDER, FOR SOLUTION INTRAVENOUS at 19:47

## 2024-01-18 RX ADMIN — PIPERACILLIN AND TAZOBACTAM 4.5 G: 4; .5 INJECTION, POWDER, FOR SOLUTION INTRAVENOUS at 15:06

## 2024-01-18 RX ADMIN — SODIUM CHLORIDE: 9 INJECTION, SOLUTION INTRAVENOUS at 06:01

## 2024-01-18 RX ADMIN — PIPERACILLIN AND TAZOBACTAM 4.5 G: 4; .5 INJECTION, POWDER, FOR SOLUTION INTRAVENOUS at 01:28

## 2024-01-18 RX ADMIN — CARVEDILOL 12.5 MG: 12.5 TABLET, FILM COATED ORAL at 08:27

## 2024-01-18 RX ADMIN — PIPERACILLIN AND TAZOBACTAM 4.5 G: 4; .5 INJECTION, POWDER, FOR SOLUTION INTRAVENOUS at 08:26

## 2024-01-18 RX ADMIN — CARVEDILOL 12.5 MG: 12.5 TABLET, FILM COATED ORAL at 03:17

## 2024-01-18 RX ADMIN — INSULIN ASPART 1 UNITS: 100 INJECTION, SOLUTION INTRAVENOUS; SUBCUTANEOUS at 11:54

## 2024-01-18 ASSESSMENT — ACTIVITIES OF DAILY LIVING (ADL)
ADLS_ACUITY_SCORE: 27
ADLS_ACUITY_SCORE: 26
ADLS_ACUITY_SCORE: 27
ADLS_ACUITY_SCORE: 26
ADLS_ACUITY_SCORE: 27
ADLS_ACUITY_SCORE: 26
ADLS_ACUITY_SCORE: 27
ADLS_ACUITY_SCORE: 27
ADLS_ACUITY_SCORE: 26

## 2024-01-18 NOTE — PROGRESS NOTES
Pt asked to use urinal.  Writer assisted with that.  Writer also put pt back to bed per pt's request. Pt has no other needs at this time.

## 2024-01-18 NOTE — PLAN OF CARE
Problem: Gas Exchange Impaired  Goal: Optimal Gas Exchange  Outcome: Not Progressing  Intervention: Optimize Oxygenation and Ventilation  Recent Flowsheet Documentation  Taken 1/18/2024 0119 by Stiven Raymundo RN  Head of Bed (HOB) Positioning: HOB at 20-30 degrees     Problem: Comorbidity Management  Goal: Maintenance of COPD Symptom Control  Outcome: Not Progressing  Intervention: Maintain COPD Symptom Control  Recent Flowsheet Documentation  Taken 1/18/2024 0119 by Stiven Raymundo RN  Medication Review/Management: medications reviewed  Taken 1/17/2024 1924 by Stiven Raymundo RN  Medication Review/Management: medications reviewed     Problem: Fall Injury Risk  Goal: Absence of Fall and Fall-Related Injury  Outcome: Not Progressing  Intervention: Identify and Manage Contributors  Recent Flowsheet Documentation  Taken 1/18/2024 0119 by Stiven Raymundo RN  Medication Review/Management: medications reviewed  Taken 1/17/2024 1924 by Stiven Raymundo RN  Medication Review/Management: medications reviewed  Intervention: Promote Injury-Free Environment  Recent Flowsheet Documentation  Taken 1/18/2024 0119 by Stiven Raymundo RN  Safety Promotion/Fall Prevention:   activity supervised   assistive device/personal items within reach   clutter free environment maintained   increased rounding and observation   increase visualization of patient   lighting adjusted   mobility aid in reach   nonskid shoes/slippers when out of bed   patient and family education   room door open   room near nurse's station   room organization consistent   safety round/check completed   supervised activity   treat reversible contributory factors  Taken 1/17/2024 1924 by Zackary, Stiven, RN  Safety Promotion/Fall Prevention:   activity supervised   assistive device/personal items within reach   clutter free environment maintained   increased rounding and observation   increase visualization of patient   lighting  adjusted   mobility aid in reach   nonskid shoes/slippers when out of bed   patient and family education   room door open   room near nurse's station   room organization consistent   safety round/check completed   supervised activity   treat reversible contributory factors   Goal Outcome Evaluation:

## 2024-01-18 NOTE — PROGRESS NOTES
Patient BP elevated with no changes in activity. Patient has been sleeping. No PRNs and tele-ICU contacted. Stiven Raymundo RN on 1/18/2024 at 1:35 AM

## 2024-01-18 NOTE — PROGRESS NOTES
Patient 1 AM ABG unable to be drawn by lab and respiratory. Will attempt to draw again at 0500. Stiven Raymundo RN on 1/18/2024 at 1:00 AM

## 2024-01-18 NOTE — PROGRESS NOTES
"Patient off BiPAP all shift for home BiPAP qualification.  ABG done upon awaking.  On 2L NC, SpO2 94%.  Breath sounds clear with diminished bases.  Per patient he has no complaints of shortness of breath, and put his light on because \"I felt lonely.\"  He is alert and orientated this morning.  "

## 2024-01-18 NOTE — PROGRESS NOTES
Patient back in bed at 1045 and will attempt to sleep. Stiven Raymundo RN on 1/17/2024 at 10:58 PM

## 2024-01-18 NOTE — PROGRESS NOTES
Pt placed on the bipap with settings of 18/10 rate 16 26%. , tolerating well. Paperwork for home bipap faxed to [facility] for review.  Bisi Bowling, RT

## 2024-01-18 NOTE — PROGRESS NOTES
Tele-ICU  Patient hypertensive; will re-start BP meds including coreg and losartan.    Katya palomo  January 18, 2024

## 2024-01-18 NOTE — PROGRESS NOTES
:     Patient is from [facility]. Patient did not do a bed hold at [facility] and does not want to return there.     Patient has been approved for a home Bipap.      will continue to follow for discharge planning needs.     HENOK Bowie on 1/18/2024 at 12:29 PM

## 2024-01-18 NOTE — PLAN OF CARE
"  Problem: Adult Inpatient Plan of Care  Goal: Plan of Care Review  Description: The Plan of Care Review/Shift note should be completed every shift.  The Outcome Evaluation is a brief statement about your assessment that the patient is improving, declining, or no change.  This information will be displayed automatically on your shift  note.  Outcome: Progressing  Goal: Patient-Specific Goal (Individualized)  Description: You can add care plan individualizations to a care plan. Examples of Individualization might be:  \"Parent requests to be called daily at 9am for status\", \"I have a hard time hearing out of my right ear\", or \"Do not touch me to wake me up as it startles  me\".  Outcome: Progressing  Goal: Absence of Hospital-Acquired Illness or Injury  Outcome: Progressing  Intervention: Identify and Manage Fall Risk  Recent Flowsheet Documentation  Taken 1/18/2024 1600 by Chelle Degroot, RN  Safety Promotion/Fall Prevention:   activity supervised   assistive device/personal items within reach   clutter free environment maintained   increased rounding and observation   nonskid shoes/slippers when out of bed   patient and family education   room door open   room near nurse's station  Taken 1/18/2024 0800 by Chelle Degroot, RN  Safety Promotion/Fall Prevention:   activity supervised   assistive device/personal items within reach   clutter free environment maintained   increased rounding and observation   nonskid shoes/slippers when out of bed   patient and family education   room door open   room near nurse's station  Bed/chair alarms on just in case. Using walker for ambulation. Pt is able to reposition himself independently and he often likes to move from bed to chair and vice versa.  Intervention: Prevent Skin Injury  Recent Flowsheet Documentation  Taken 1/18/2024 1600 by Chelle Degroot, RN  Body Position: position changed independently  Taken 1/18/2024 1225 by Chelle Degroot, RN  Body " Position: position changed independently  Taken 1/18/2024 1100 by Chelle Degroot RN  Body Position:   position changed independently   supine, head elevated   lower extremity elevated  Taken 1/18/2024 1000 by Chelle Degroot RN  Body Position: supine, head elevated  Taken 1/18/2024 0800 by Chelle Degroot RN  Body Position: sitting up in bed  RT has obtained a foam pad to apply to bridge of nose when pt's wearing BiPAP mask.  Intervention: Prevent Infection  Recent Flowsheet Documentation  Taken 1/18/2024 0800 by Chelle Degroot RN  Infection Prevention:   environmental surveillance performed   hand hygiene promoted   rest/sleep promoted   single patient room provided  CHG bath completed. Encourage hand washing.  Goal: Optimal Comfort and Wellbeing  Outcome: Progressing  Intervention: Provide Person-Centered Care  Recent Flowsheet Documentation  Taken 1/18/2024 1600 by Chelle Degroot RN  Trust Relationship/Rapport:   care explained   choices provided   emotional support provided   questions answered   reassurance provided   thoughts/feelings acknowledged  Taken 1/18/2024 1225 by Chelle Degroot RN  Trust Relationship/Rapport:   care explained   choices provided   emotional support provided   questions answered   reassurance provided   thoughts/feelings acknowledged  Taken 1/18/2024 0800 by Chelle Degroot RN  Trust Relationship/Rapport:   care explained   choices provided   emotional support provided   questions answered   reassurance provided   thoughts/feelings acknowledged  Goal: Readiness for Transition of Care  Outcome: Progressing     Problem: Gas Exchange Impaired  Goal: Optimal Gas Exchange  Outcome: Progressing  Intervention: Optimize Oxygenation and Ventilation  Recent Flowsheet Documentation  Taken 1/18/2024 1000 by Chelle Degroot RN  Head of Bed (HOB) Positioning: HOB at 20-30 degrees     Pt on 1 liter nasal cannula when awake. Encouraging him to wear  BiPAP when napping today. When he's on BiPAP, FiO2 is set at 26%. Pt denies SOB. Tolerated ambulating hallway 200 feet with no dyspnea.     Problem: Comorbidity Management  Goal: Maintenance of COPD Symptom Control  Outcome: Progressing  Intervention: Maintain COPD Symptom Control  Recent Flowsheet Documentation  Taken 1/18/2024 1600 by Chelle Degroot, RN  Medication Review/Management: medications reviewed  Taken 1/18/2024 0800 by Chelle Degroot RN  Medication Review/Management: medications reviewed     Problem: Fall Injury Risk  Goal: Absence of Fall and Fall-Related Injury  Outcome: Progressing  Intervention: Identify and Manage Contributors  Recent Flowsheet Documentation  Taken 1/18/2024 1600 by Chelle Degroot RN  Medication Review/Management: medications reviewed  Taken 1/18/2024 0800 by Chelle Degroot RN  Medication Review/Management: medications reviewed  Intervention: Promote Injury-Free Environment  Recent Flowsheet Documentation  Taken 1/18/2024 1600 by Chelle Degroot RN  Safety Promotion/Fall Prevention:   activity supervised   assistive device/personal items within reach   clutter free environment maintained   increased rounding and observation   nonskid shoes/slippers when out of bed   patient and family education   room door open   room near nurse's station  Taken 1/18/2024 0800 by Chelle Degroot RN  Safety Promotion/Fall Prevention:   activity supervised   assistive device/personal items within reach   clutter free environment maintained   increased rounding and observation   nonskid shoes/slippers when out of bed   patient and family education   room door open   room near nurse's station   Goal Outcome Evaluation:    Neurologically pt seems to be doing well today. Oriented x 4. Perhaps a little forgetful but barely noticeable. He was drowsy this afternoon but he took a nap and is alert again.     VSS. Afebrile. Tele not showing and pacemaker spikes however  pt's HR is consistently 60 bpm so assume he is always atrial paced. 2+ pitting edema at ankles/feet.

## 2024-01-18 NOTE — PROGRESS NOTES
[facility]    Medicine Progress Note - Hospitalist Service    Date of Admission:  1/16/2024    Assessment & Plan   Principal Problem:    Acute on chronic respiratory failure with hypoxia and hypercapnia (H)    Assessment: present on admission, COPD, heart failure and pneumonia related. Overnight oximetry shows need for nighttime bipap. More somnolent this AM after no bipap last night, will resume with naps and at bedtime. Spoke to patient about using bipap at home, and he is willing to do so if it prevents having to come to the hospital. Unclear if pneumonia, presented with leukocytosis and multifocal pneumonia .     Plan: Continue intermittent bipap, will need nocturnal home bipap on discharge    Prednisone 40 mg    Active Problems:    Type 2 diabetes mellitus with hyperglycemia, without long-term current use of insulin (H)    Assessment: present on admission     Plan: continue jardiance and insulin      Essential hypertension    Assessment: chronic    Plan: monitor today      Moderate COPD (chronic obstructive pulmonary disease) (H)    Assessment: present on admission, exacerbation.     Plan: Prednisone 40 mg daily   Bipap   Duonebs      Mild cognitive impairment    Assessment: present on admission         Chronic heart failure with preserved ejection fraction (H)    Assessment: present on admission     Plan: monitor volume status, resumed coreg and losartan      HAP (hospital-acquired pneumonia)    Assessment: present on admission. He is COVID negative, despite what [facility] said yesterday. Chest xray shows infiltrate.     Plan: zosyn day 2   Stop dexamethasone and remdesivir          Diet: Combination Diet Moderate Consistent Carb (60 g CHO per Meal) Diet    DVT Prophylaxis: DOAC  Reynaga Catheter: Not present  Lines: None     Cardiac Monitoring: ACTIVE order. Indication: ICU  Code Status: Full Code      Clinically Significant Risk Factors        # Hyperkalemia: Highest K = 5.5  "mmol/L in last 2 days, will monitor as appropriate   # Hypocalcemia: Lowest Ca = 8.1 mg/dL in last 2 days, will monitor and replace as appropriate     # Hypoalbuminemia: Lowest albumin = 3.2 g/dL at 1/17/2024  8:02 AM, will monitor as appropriate   # Thrombocytopenia: Lowest platelets = 121 in last 2 days, will monitor for bleeding   # Hypertension: Noted on problem list       # DMII: A1C = 8.5 % (Ref range: 4.0 - 6.2 %) within past 6 months, PRESENT ON ADMISSION  # Obesity: Estimated body mass index is 34.85 kg/m  as calculated from the following:    Height as of this encounter: 1.88 m (6' 2\").    Weight as of this encounter: 123.1 kg (271 lb 6.4 oz)., PRESENT ON ADMISSION            Disposition Plan     Expected Discharge Date: 01/18/2024                    Donte Macias MD  Hospitalist Service  [facility]  Securely message with iTracs (more info)  Text page via [facility] Paging/Directory   ______________________________________________________________________    Interval History   Answered questions and alert this morning, but sleepier as the day progressed.     Physical Exam   Vital Signs: Temp: 98.1  F (36.7  C) Temp src: Tympanic BP: (!) 176/89 Pulse: 60   Resp: 15 SpO2: 96 % O2 Device: Nasal cannula Oxygen Delivery: 2 LPM  Weight: 271 lbs 6.4 oz    GENERAL: Comfortable, talkative, in no apparent distress.  HEENT: Anicteric, non-injected sclera, mouth moist.   NECK: No JVD.  CARDIOVASCULAR: regular rate and rhythm, 2/6 murmur. No lower extremity edema   RESPIRATORY: Clear to auscultation bilaterally, no wheezes, no crackles.  GI: Non-distended, normal bowel sounds, soft, non-tender.  SKIN: No rashes, sores.   NEUROLOGY: Alert and oriented x3, follows commands, speech and language normal.       Medical Decision Making       45 MINUTES SPENT BY ME on the date of service doing chart review, history, exam, documentation & further activities per the note.      Data     I have personally " reviewed the following data over the past 24 hrs:    12.1 (H)  \   14.8   / 172     135 96 (L) 32.2 (H) /  142 (H)   4.5 33 (H) 0.91 \

## 2024-01-18 NOTE — PROGRESS NOTES
Interdisciplinary Discharge Planning Note    Anticipated Discharge Date:1/19-1/20    Anticipated Discharge Location: Home VS. SNF     Clinical Needs Before Discharge:   Continue intermittent Bipap     Treatment Needs After Discharge:  homecare and rehab (PT, OT, ST)     Potential Barriers to Discharge: Some nursing homes do not accept Bipap machines     HENOK Bowie  1/18/2024,  12:30 PM

## 2024-01-19 ENCOUNTER — APPOINTMENT (OUTPATIENT)
Dept: OCCUPATIONAL THERAPY | Facility: OTHER | Age: 69
DRG: 189 | End: 2024-01-19
Payer: MEDICARE

## 2024-01-19 ENCOUNTER — APPOINTMENT (OUTPATIENT)
Dept: PHYSICAL THERAPY | Facility: OTHER | Age: 69
DRG: 189 | End: 2024-01-19
Attending: INTERNAL MEDICINE
Payer: MEDICARE

## 2024-01-19 ENCOUNTER — DOCUMENTATION ONLY (OUTPATIENT)
Dept: RESPIRATORY THERAPY | Facility: CLINIC | Age: 69
End: 2024-01-19
Payer: COMMERCIAL

## 2024-01-19 LAB
ANION GAP SERPL CALCULATED.3IONS-SCNC: 8 MMOL/L (ref 7–15)
BUN SERPL-MCNC: 26.3 MG/DL (ref 8–23)
CALCIUM SERPL-MCNC: 8.2 MG/DL (ref 8.8–10.2)
CHLORIDE SERPL-SCNC: 99 MMOL/L (ref 98–107)
CREAT SERPL-MCNC: 0.84 MG/DL (ref 0.67–1.17)
DEPRECATED HCO3 PLAS-SCNC: 31 MMOL/L (ref 22–29)
EGFRCR SERPLBLD CKD-EPI 2021: >90 ML/MIN/1.73M2
ERYTHROCYTE [DISTWIDTH] IN BLOOD BY AUTOMATED COUNT: 13.2 % (ref 10–15)
GLUCOSE BLDC GLUCOMTR-MCNC: 139 MG/DL (ref 70–99)
GLUCOSE BLDC GLUCOMTR-MCNC: 140 MG/DL (ref 70–99)
GLUCOSE BLDC GLUCOMTR-MCNC: 169 MG/DL (ref 70–99)
GLUCOSE BLDC GLUCOMTR-MCNC: 185 MG/DL (ref 70–99)
GLUCOSE SERPL-MCNC: 206 MG/DL (ref 70–99)
HCT VFR BLD AUTO: 48.4 % (ref 40–53)
HGB BLD-MCNC: 15.2 G/DL (ref 13.3–17.7)
MCH RBC QN AUTO: 29 PG (ref 26.5–33)
MCHC RBC AUTO-ENTMCNC: 31.4 G/DL (ref 31.5–36.5)
MCV RBC AUTO: 92 FL (ref 78–100)
PLATELET # BLD AUTO: 171 10E3/UL (ref 150–450)
POTASSIUM SERPL-SCNC: 4.8 MMOL/L (ref 3.4–5.3)
RBC # BLD AUTO: 5.25 10E6/UL (ref 4.4–5.9)
SODIUM SERPL-SCNC: 138 MMOL/L (ref 135–145)
WBC # BLD AUTO: 13.1 10E3/UL (ref 4–11)

## 2024-01-19 PROCEDURE — 36600 WITHDRAWAL OF ARTERIAL BLOOD: CPT | Performed by: STUDENT IN AN ORGANIZED HEALTH CARE EDUCATION/TRAINING PROGRAM

## 2024-01-19 PROCEDURE — 94660 CPAP INITIATION&MGMT: CPT

## 2024-01-19 PROCEDURE — 82805 BLOOD GASES W/O2 SATURATION: CPT | Performed by: STUDENT IN AN ORGANIZED HEALTH CARE EDUCATION/TRAINING PROGRAM

## 2024-01-19 PROCEDURE — 36415 COLL VENOUS BLD VENIPUNCTURE: CPT | Performed by: INTERNAL MEDICINE

## 2024-01-19 PROCEDURE — 97116 GAIT TRAINING THERAPY: CPT | Mod: GP

## 2024-01-19 PROCEDURE — 85027 COMPLETE CBC AUTOMATED: CPT | Performed by: INTERNAL MEDICINE

## 2024-01-19 PROCEDURE — 250N000013 HC RX MED GY IP 250 OP 250 PS 637: Performed by: INTERNAL MEDICINE

## 2024-01-19 PROCEDURE — 250N000013 HC RX MED GY IP 250 OP 250 PS 637: Performed by: STUDENT IN AN ORGANIZED HEALTH CARE EDUCATION/TRAINING PROGRAM

## 2024-01-19 PROCEDURE — 200N000001 HC R&B ICU

## 2024-01-19 PROCEDURE — 250N000011 HC RX IP 250 OP 636: Performed by: INTERNAL MEDICINE

## 2024-01-19 PROCEDURE — 999N000157 HC STATISTIC RCP TIME EA 10 MIN

## 2024-01-19 PROCEDURE — 80048 BASIC METABOLIC PNL TOTAL CA: CPT | Performed by: INTERNAL MEDICINE

## 2024-01-19 PROCEDURE — 250N000012 HC RX MED GY IP 250 OP 636 PS 637: Performed by: INTERNAL MEDICINE

## 2024-01-19 PROCEDURE — 97535 SELF CARE MNGMENT TRAINING: CPT | Mod: GO | Performed by: OCCUPATIONAL THERAPIST

## 2024-01-19 PROCEDURE — 99233 SBSQ HOSP IP/OBS HIGH 50: CPT | Performed by: STUDENT IN AN ORGANIZED HEALTH CARE EDUCATION/TRAINING PROGRAM

## 2024-01-19 PROCEDURE — 97530 THERAPEUTIC ACTIVITIES: CPT | Mod: GP

## 2024-01-19 PROCEDURE — 250N000011 HC RX IP 250 OP 636: Performed by: STUDENT IN AN ORGANIZED HEALTH CARE EDUCATION/TRAINING PROGRAM

## 2024-01-19 PROCEDURE — 97161 PT EVAL LOW COMPLEX 20 MIN: CPT | Mod: GP

## 2024-01-19 RX ORDER — LEVOFLOXACIN 5 MG/ML
750 INJECTION, SOLUTION INTRAVENOUS DAILY
Status: COMPLETED | OUTPATIENT
Start: 2024-01-19 | End: 2024-01-20

## 2024-01-19 RX ORDER — HYDRALAZINE HYDROCHLORIDE 20 MG/ML
10 INJECTION INTRAMUSCULAR; INTRAVENOUS EVERY 4 HOURS PRN
Status: DISCONTINUED | OUTPATIENT
Start: 2024-01-19 | End: 2024-01-20

## 2024-01-19 RX ADMIN — PREDNISONE 40 MG: 20 TABLET ORAL at 09:50

## 2024-01-19 RX ADMIN — PIPERACILLIN AND TAZOBACTAM 4.5 G: 4; .5 INJECTION, POWDER, FOR SOLUTION INTRAVENOUS at 07:51

## 2024-01-19 RX ADMIN — ONDANSETRON 4 MG: 4 TABLET, ORALLY DISINTEGRATING ORAL at 07:54

## 2024-01-19 RX ADMIN — QUETIAPINE FUMARATE 12.5 MG: 25 TABLET ORAL at 21:16

## 2024-01-19 RX ADMIN — FLUOXETINE 20 MG: 20 CAPSULE ORAL at 14:02

## 2024-01-19 RX ADMIN — LEVOFLOXACIN 750 MG: 5 INJECTION, SOLUTION INTRAVENOUS at 12:13

## 2024-01-19 RX ADMIN — APIXABAN 5 MG: 5 TABLET, FILM COATED ORAL at 21:15

## 2024-01-19 RX ADMIN — EMPAGLIFLOZIN 10 MG: 10 TABLET, FILM COATED ORAL at 09:50

## 2024-01-19 RX ADMIN — INSULIN ASPART 1 UNITS: 100 INJECTION, SOLUTION INTRAVENOUS; SUBCUTANEOUS at 07:50

## 2024-01-19 RX ADMIN — INSULIN ASPART 2 UNITS: 100 INJECTION, SOLUTION INTRAVENOUS; SUBCUTANEOUS at 18:25

## 2024-01-19 RX ADMIN — PIPERACILLIN AND TAZOBACTAM 4.5 G: 4; .5 INJECTION, POWDER, FOR SOLUTION INTRAVENOUS at 01:23

## 2024-01-19 RX ADMIN — APIXABAN 5 MG: 5 TABLET, FILM COATED ORAL at 09:50

## 2024-01-19 RX ADMIN — CARVEDILOL 12.5 MG: 12.5 TABLET, FILM COATED ORAL at 18:25

## 2024-01-19 ASSESSMENT — ACTIVITIES OF DAILY LIVING (ADL)
ADLS_ACUITY_SCORE: 26

## 2024-01-19 NOTE — PROGRESS NOTES
Patient is currently on a nasal cannula at 2 LPM, saturating in the mid to high 90's.    He did wear the BIPAP for approximately three hours and and tolerated it well. IPAP was increased from 18 to 22, Fio2 increased from 26% to 30%. BIPAP S/T data is below.       01/19/24 0320   Tech Time   $Tech Time (10 minute increments) 1   Mode: CPAP/ BiPAP/ AVAPS/ AVAPS AE   CPAP/BiPAP/ AVAPS/ AVAPS AE Mode BiPAP S/T   Equipment   Device grace   CPAP/BiPAP/Settings   BIPAP/CPAP On Standby On   IPAP/EPAP (cmH2O) 22/10   Rate (breaths/min) 20   Oxygen (%) 30   CPAP/BiPAP Patient Parameters   IPAP (cm H2O) 22 cmH2O   EPAP (cm H2O) 10 cmH2O   Pressure Support (cm H2O) 12 cmH2O   RR Total (breaths/min) 20 breaths/min   Vt (mL) 740 mL   Minute Ventilation (L/min) 11.3 L/min   Pt.  Leak (L/min) 67 L/min     Noel Holldiay, RT

## 2024-01-19 NOTE — PROGRESS NOTES
Interdisciplinary Discharge Planning Note    Anticipated Discharge Date:1/21-1/22    Anticipated Discharge Location: TBD    Clinical Needs Before Discharge:  stable oxygen requirement    Treatment Needs After Discharge:  rehab (PT, OT, ST)    Potential Barriers to Discharge: None Identified     HENOK Bowie  1/19/2024,  1:45 PM

## 2024-01-19 NOTE — PROGRESS NOTES
Thank you for the referral.   In order to qualify patient for a BIPAP ST/ (BIPAP with rate) patient must have a diagnosis of Hypoventilation syndrome and the following criteria must be met:   Patient must have a ABG with a PaCO2 ? 45%.  And, a Pulmonary Function test showing a FEV1/FVC ? 70% of predicted   Patient must have a Polysomnogram (PSG) that shows a 02 saturation ? 88% for ? 5 mins while on a BIPAP S (BIPAP without rate) that is not caused by obstructed airway events (AHI<5). Test must have a minimum of 2hrs.  Or, ABG done while awake on prescribed Fi02 showing a PaC02 worsened by ? 7mmHg while on BIPAP S (BIPAP without rate).  Patient will be eligible for a BIPAP ST (BIPAP with rate/ if criteria from above is met.     To qualify for patient on a BIPAP with a rate, patient would need to be a on bipap s with no rate or V60 with lowest rate (4) and obtain an abg immediately upon waking in am.       For further question, please contact.     Bhaskar Wetzel  Respiratory Therapist  [facility]  [phone]

## 2024-01-19 NOTE — PROGRESS NOTES
Clinical Nutrition / Initial Assessment     Reason for Assessment:  RN request/referral    Assessment:   Client History:  pt admitted with jona on chronic respiratory failure, was recently hospitalized with acute on chronic heart failure exacerbation. Appetite during his previous stay varied, intakes now vary some as well. Noted ~20# wt loss from previous stay admission weight, likely wt loss was related to diuresis during his previous stay. Will monitor intakes and make changes as needed. Consider supplements if intakes decline.   Diet Order:  carb controlled  Oral Intake:  50-75% most meals so far  Weight:   Wt Readings from Last 10 Encounters:   01/19/24 127.3 kg (280 lb 11.2 oz)   01/15/24 125.4 kg (276 lb 8 oz)   01/07/24 135.9 kg (299 lb 11.2 oz)   11/06/22 143.4 kg (316 lb 3.2 oz)   07/25/22 140.2 kg (309 lb 2 oz)   05/20/22 139.3 kg (307 lb 3.2 oz)   04/22/22 137.4 kg (303 lb)   02/26/22 137.8 kg (303 lb 11.2 oz)   10/06/19 137.9 kg (304 lb)   01/02/19 130.1 kg (286 lb 12.8 oz)      Malnutrition Criteria:  (Need to have 2 indicators to qualify recommendation)  Energy Intake:  Adequate   Interpretation of Weight Loss:  Acute Moderate:   1-2% in 1 week-however planned   Physical Findings:  No significant findings  Reduced  Strength:  Not Measured    Recommended Nutrition Diagnosis:   Not enough Malnutrition indicators found for medical diagnosis-does have potential for malnutrition if his intakes decline and he becomes weaker       Nutrition Education: Nutrition education will be provided as appropriate.    Intervention:  Nutrition Prescription:     Nutrition Intervention(s):Recommended general, healthful diet  Meals and Snacks: carb controlled diet  2. Medical Food Supplement: add glucerna if intakes decrease to less than 50% at meals     Nutrition Goal(s):  1. Pt will continue to consume 50% or more at meals   2. Pt will tolerate diet as ordered  3. Pt will not have unplanned wt loss during  hospitalization     Monitoring and Evaluation:   Food Intake, diet tolerance, weights    Discharge Recommendation:   Nutrition Discharge Planning  Consider supplements if intakes are less than 50% at meals    RD will reassess in within 1-5 days or sooner.  Capri Tolliver RD on 1/19/2024 at 2:17 PM

## 2024-01-19 NOTE — PROGRESS NOTES
01/19/24 1300   Living Environment   People in Home alone   Current Living Arrangements house   Home Accessibility no concerns;stairs to enter home   Number of Stairs, Main Entrance 4   Stair Railings, Main Entrance railings safe and in good condition   Number of Stairs, Within Home, Primary greater than 10 stairs   Stair Railings, Within Home, Primary railings safe and in good condition   Self-Care   Usual Activity Tolerance moderate   Current Activity Tolerance fair   Equipment Currently Used at Home none   Fall history within last six months no   Self-Care/Home Management   Self-Care/Home Mgmt/ADL, Compensatory, Meal Prep Minutes ([id number]) 45   Symptoms Noted During/After Treatment (Meal Preparation/Planning Training) fatigue   Wiseman Level (Bathing Training) moderate assist (50% patient effort)   Assistance (Bathing Training) 1 person assist   Assistive Device (Bathing Training) tub seat with back   Wiseman Level (Toilet Training) stand-by assist   Assistance (Toilet Training) 1 person assist   OT Discharge Planning   OT Plan Continue OT   OT Discharge Recommendation (DC Rec) Transitional Care Facility   OT Rationale for DC Rec Pt is weakened and deconditioned he would benefit from continued OT to improve funcioanl activity tolerance   OT Brief overview of current status Pt ambulated into bathroom fro tioletting and shower. Mod A for shower due to habitus   Total Session Time   Timed Code Treatment Minutes 45   Total Session Time (sum of timed and untimed services) 45   Psychosocial Support   Trust Relationship/Rapport care explained;choices provided;emotional support provided;empathic listening provided

## 2024-01-19 NOTE — PROGRESS NOTES
:     Spoke on the phone with patients sisterJessica. She stated that she does not feel that patient can return home safely at this time. She understands that he is his own decision maker. She stated that she is having a  come speak with patient.     HENOK Bowie on 1/19/2024 at 10:33 AM     :     Met with patient in room to discuss discharge planning needs. Discussed hospice care with patient. At this time, he feels that he would like to participate in short term rehab with the goal to return home or to an assisted living in the future. Patient would like a referral sent to [facility]. Patient also agreed to a referral being sent to [facility].     Patient was given a long term care planning folder. Spoke with patient about mental health resources in the area including [facility] for help. Patient stated that he feels he will need help setting up services and locating an assisted living facility in the future. He stated that he think his sister will help him but is also agreeable to a care coordination referral.     HENOK Bowie on 1/19/2024 at 1:14 PM

## 2024-01-19 NOTE — PROGRESS NOTES
01/19/24 0365   Appointment Info   Signing Clinician's Name / Credentials (PT) Mata Angelescassandra MPT   Living Environment   People in Home alone   Current Living Arrangements house   Home Accessibility no concerns;stairs to enter home   Number of Stairs, Main Entrance 4   Stair Railings, Main Entrance railings safe and in good condition   Number of Stairs, Within Home, Primary greater than 10 stairs   Stair Railings, Within Home, Primary railings safe and in good condition   Self-Care   Usual Activity Tolerance moderate   Current Activity Tolerance fair   Equipment Currently Used at Home none   Fall history within last six months no   General Information   Referring Physician Yun   Patient/Family Therapy Goals Statement (PT) return home   Existing Precautions/Restrictions fall;other (see comments);oxygen therapy device and L/min   Weight-Bearing Status - LUE full weight-bearing   Weight-Bearing Status - LLE full weight-bearing   Weight-Bearing Status - RLE full weight-bearing   Cognition   Affect/Mental Status (Cognition) sad/depressed affect   Orientation Status (Cognition) oriented to;person;place;time   Follows Commands (Cognition) WFL   Memory Deficit (Cognition) minimal deficit;episodic memory   Cognitive Status Comments epeisodes of decreased short term memory   Pain Assessment   Patient Currently in Pain No   Integumentary/Edema   Integumentary/Edema no deficits were identifed   Posture    Posture Not impaired   Range of Motion (ROM)   Range of Motion ROM is WFL   Strength (Manual Muscle Testing)   Strength (Manual Muscle Testing) strength is WFL   Strength Comments however, patient fatigues with activity   Bed Mobility   Comment, (Bed Mobility) minimal assist to SBA   Transfers   Impairments Contributing to Impaired Transfers decreased strength   Comment, (Transfers) CGA to SBA with use of Fww   Gait/Stairs (Locomotion)   [place] Level (Gait) contact guard   Assistive Device (Gait) walker,  front-wheeled   Distance in Feet (Gait) 150   Pattern (Gait) step-through   Balance   Balance Comments good with Fww   Sensory Examination   Sensory Perception WFL   Coordination   Coordination no deficits were identified   Muscle Tone   Muscle Tone no deficits were identified   Clinical Impression   Criteria for Skilled Therapeutic Intervention Yes, treatment indicated   PT Diagnosis (PT) impaired mobility   Influenced by the following impairments fatigue   Functional limitations due to impairments activiity/gait tolerance and stability   Clinical Presentation (PT Evaluation Complexity) stable   Clinical Decision Making (Complexity) low complexity   Planned Therapy Interventions (PT) bed mobility training;gait training;transfer training   Risk & Benefits of therapy have been explained evaluation/treatment results reviewed;risks/benefits reviewed;patient   PT Total Evaluation Time   PT Eval, Low Complexity Minutes (08816) 15   Physical Therapy Goals   PT Frequency Daily   PT Predicted Duration/Target Date for Goal Attainment 01/24/24   PT Goals Bed Mobility;Transfers;Gait   PT: Bed Mobility Supervision/stand-by assist   PT: Transfers Supervision/stand-by assist   PT: Gait Supervision/stand-by assist;Rolling walker;Greater than 200 feet   PT Discharge Planning   PT Plan Continue PT   PT Discharge Recommendation (DC Rec) home with home care physical therapy   PT Rationale for DC Rec patient wishes to return home and will benefit from continued PT to promote strength, stability and safe mobility   PT Brief overview of current status Plleasant patient demonstrating fair to good mobility, however, he does fatigue with activity; patient also displays episodes of decreased short term memory/confusion; he remains on 1(L) supplemental O2   PT Equipment Needed at Discharge walker, rolling   Total Session Time   Total Session Time (sum of timed and untimed services) 15

## 2024-01-19 NOTE — PLAN OF CARE
Problem: Comorbidity Management  Goal: Maintenance of COPD Symptom Control  Outcome: Not Progressing  Intervention: Maintain COPD Symptom Control  Recent Flowsheet Documentation  Taken 1/19/2024 0423 by Stiven Raymundo RN  Medication Review/Management: medications reviewed  Taken 1/19/2024 0123 by Stiven Raymundo RN  Medication Review/Management: medications reviewed  Taken 1/18/2024 1930 by Stiven Raymundo RN  Medication Review/Management: medications reviewed     Problem: Gas Exchange Impaired  Goal: Optimal Gas Exchange  Outcome: Progressing  Intervention: Optimize Oxygenation and Ventilation  Recent Flowsheet Documentation  Taken 1/19/2024 0123 by Stiven Raymundo RN  Head of Bed (HOB) Positioning: HOB at 20-30 degrees  Taken 1/18/2024 1930 by Stiven Raymundo RN  Head of Bed (HOB) Positioning: HOB at 20-30 degrees     Problem: Fall Injury Risk  Goal: Absence of Fall and Fall-Related Injury  Outcome: Progressing  Intervention: Identify and Manage Contributors  Recent Flowsheet Documentation  Taken 1/19/2024 0423 by Stiven Raymundo RN  Medication Review/Management: medications reviewed  Taken 1/19/2024 0123 by Stiven Raymundo RN  Medication Review/Management: medications reviewed  Taken 1/18/2024 1930 by Stiven Raymundo RN  Medication Review/Management: medications reviewed  Intervention: Promote Injury-Free Environment  Recent Flowsheet Documentation  Taken 1/19/2024 0423 by Stiven Raymundo RN  Safety Promotion/Fall Prevention:   activity supervised   assistive device/personal items within reach   clutter free environment maintained   increased rounding and observation   nonskid shoes/slippers when out of bed   patient and family education   room door open   room near nurse's station  Taken 1/19/2024 0123 by Stiven Rayumndo RN  Safety Promotion/Fall Prevention:   activity supervised   assistive device/personal items within reach   clutter free environment  maintained   increased rounding and observation   nonskid shoes/slippers when out of bed   patient and family education   room door open   room near nurse's station  Taken 1/18/2024 1930 by Stiven Raymundo RN  Safety Promotion/Fall Prevention:   activity supervised   assistive device/personal items within reach   clutter free environment maintained   increased rounding and observation   nonskid shoes/slippers when out of bed   patient and family education   room door open   room near nurse's station   Goal Outcome Evaluation:

## 2024-01-19 NOTE — PROGRESS NOTES
[facility]    Medicine Progress Note - Hospitalist Service    Date of Admission:  1/16/2024    Efra Zuniga is a 68 year old man with a past medical history of type 2 diabetes, heart failure with preserved ejection fraction, phonic respiratory failure with hypoxia, emphysema, dysthymic disorder and atrial fibrillation with RVR who was admitted to the hospital for acute on chronic respiratory failure with hypoxia and hypercapnia.    Patient was recently admitted to the hospital on 1-7-2024 for acute on chronic heart failure exacerbation.  He underwent aggressive diuresis and treatment with BiPAP, and was subsequently transferred to [facility] for rehab stay.    Unfortunately the day after discharge he was readmitted to the hospital for acute hypoxic respiratory failure and positive COVID antigen testing which was negative on PCR testing here.  Multifactorial respiratory failure with COPD, heart failure and pneumonia.  He was initiated on prednisone, sleep oximetry testing was performed to prescribe home BiPAP for him as well and he should qualify.  Broad-spectrum antibiotics for healthcare acquired pneumonia due to leukocytosis and multifocal pneumonia on imaging.  After discussion on hospital day 4 the patient states that he does not want to keep undergoing aggressive cares and is interested in pursuing hospice measures.  He will discuss this further with his family.  Will continue aggressive cares for the time being.      Assessment & Plan   Principal Problem:    Acute on chronic respiratory failure with hypoxia and hypercapnia (H)    Assessment: present on admission, COPD, heart failure and pneumonia related. Overnight oximetry shows need for nighttime bipap. More somnolent this AM after no bipap last night, will resume with naps and at bedtime. Spoke to patient about using bipap at home, and he is willing to do so if it prevents having to come to the hospital. Unclear if pneumonia,  presented with leukocytosis and multifocal pneumonia .  He is quite frustrated about having to wear a BiPAP mask and states he does not want to do this at home.  He also endorses being tired of excessive medical cares and states that he is quite ill and is ready to pass.  He states that he is not depressed or suicidal at this time.  He states that his wife is passed and he is ready to go and is almost 69 years old.  He will discuss further with his family and we will continue discussions of aggressive cares versus hospice measures tomorrow.    Plan: Continue intermittent bipap, will need nocturnal home bipap on discharge    Prednisone 40 mg  -Order for home BiPAP already placed, will need to bring to the hospital within 24 hours prior to discharge.  -Narrow zosyn to levofloxacin 750mg daily    Active Problems:    Type 2 diabetes mellitus with hyperglycemia, without long-term current use of insulin (H)    Assessment: present on admission     Plan:   -Increase Jardiance to 25 mg daily  -High sliding scale      Essential hypertension    Assessment: chronic    Plan: monitor today      Moderate COPD (chronic obstructive pulmonary disease) (H)    Assessment: present on admission, exacerbation.     Plan: Prednisone 40 mg daily   Bipap   Duonebs      Mild cognitive impairment    Assessment: present on admission         Chronic heart failure with preserved ejection fraction (H)    Assessment: present on admission     Plan: monitor volume status, resumed coreg and losartan  -Stop IV fluids      HAP (hospital-acquired pneumonia)    Assessment: present on admission. He is COVID negative, despite presumed antigen testing performed at [facility].. Chest xray shows infiltrate.  Leukocytosis could be partially related to his steroids at recent hospitalization    Plan: zosyn day 3  -convert to levofloxacin 750mg IV 1/19/2024          Diet: Combination Diet Regular Diet    DVT Prophylaxis: DOAC  Reynaga Catheter: Not present  Lines:  "None     Cardiac Monitoring: ACTIVE order. Indication: ICU  Code Status: Full Code      Clinically Significant Risk Factors              # Hypoalbuminemia: Lowest albumin = 3.2 g/dL at 2024  8:02 AM, will monitor as appropriate     # Hypertension: Noted on problem list       # DMII: A1C = 8.5 % (Ref range: 4.0 - 6.2 %) within past 6 months, PRESENT ON ADMISSION  # Obesity: Estimated body mass index is 36.04 kg/m  as calculated from the following:    Height as of this encounter: 1.88 m (6' 2\").    Weight as of this encounter: 127.3 kg (280 lb 11.2 oz)., PRESENT ON ADMISSION            Disposition Plan        Anticipate discharge in the next 48 hours, either to rehab or home with hospice.          Major Malcolm MD  Hospitalist Service  [facility]  Securely message with [employer] (more info)  Text page via [employer] Paging/Directory   ______________________________________________________________________    Interval History   Alert this morning, conversant.  On 1 L supplemental oxygen up sitting in chair.  He is fairly frustrated that the BiPAP is uncomfortable.  We discussed the importance of this and he states that ultimately he is tired of all the medical care and would rather past.  We discussed that this would include hospice and he is very open to this idea.  He was going to discuss with his family further.  We also discussed his mental health and he states that he is not down or depressed but since his wife has  and he has multiple medical needs he does not have anything to live for but does not feel depressed or suicidal at this time.    Physical Exam   Vital Signs: Temp: (!) 96.3  F (35.7  C) Temp src: Tympanic BP: 138/75 Pulse: 60   Resp: 20 SpO2: 96 % O2 Device: Nasal cannula Oxygen Delivery: 1 LPM  Weight: 280 lbs 11.2 oz    GENERAL: Comfortable, talkative, in no apparent distress.  HEENT: Anicteric, non-injected sclera, mouth moist.   NECK: No JVD.  CARDIOVASCULAR: regular rate and " rhythm, 2/6 murmur. No lower extremity edema   RESPIRATORY: Clear to auscultation bilaterally, no wheezes, no crackles.  GI: Non-distended, normal bowel sounds, soft, non-tender.  SKIN: No rashes, sores.   NEUROLOGY: Alert and oriented x3, follows commands, speech and language normal.       Medical Decision Making        52 MINUTES SPENT BY ME on the date of service doing chart review, history, exam, documentation & further activities per the note.      Data     I have personally reviewed the following data over the past 24 hrs:    13.1 (H)  \   15.2   / 171     138 99 26.3 (H) /  140 (H)   4.8 31 (H) 0.84 \

## 2024-01-19 NOTE — PROGRESS NOTES
Patient has been on 2L O2 via nasal canula most of the night with a short time on the BiPAP while sleeping. Patient has been using urinal and getting up to the chair. Patient ambulated in the halls without incident. Patient has poor appetite and increasing intake has been encouraged. Stiven Raymundo RN on 1/19/2024 at 4:41 AM

## 2024-01-20 ENCOUNTER — APPOINTMENT (OUTPATIENT)
Dept: PHYSICAL THERAPY | Facility: OTHER | Age: 69
DRG: 189 | End: 2024-01-20
Payer: MEDICARE

## 2024-01-20 ENCOUNTER — APPOINTMENT (OUTPATIENT)
Dept: OCCUPATIONAL THERAPY | Facility: OTHER | Age: 69
DRG: 189 | End: 2024-01-20
Payer: MEDICARE

## 2024-01-20 PROBLEM — F32.1 MAJOR DEPRESSIVE DISORDER, SINGLE EPISODE, MODERATE (H): Status: ACTIVE | Noted: 2024-01-20

## 2024-01-20 LAB
ALLEN'S TEST: NO
ALLEN'S TEST: YES
ANION GAP SERPL CALCULATED.3IONS-SCNC: 10 MMOL/L (ref 7–15)
BASE EXCESS BLDA CALC-SCNC: 8.3 MMOL/L (ref -3–3)
BASE EXCESS BLDA CALC-SCNC: 8.5 MMOL/L (ref -3–3)
BUN SERPL-MCNC: 19.6 MG/DL (ref 8–23)
CALCIUM SERPL-MCNC: 8.8 MG/DL (ref 8.8–10.2)
CHLORIDE SERPL-SCNC: 97 MMOL/L (ref 98–107)
COHGB MFR BLD: 89.6 % (ref 95–96)
COHGB MFR BLD: 95.4 % (ref 95–96)
CREAT SERPL-MCNC: 0.79 MG/DL (ref 0.67–1.17)
DEPRECATED HCO3 PLAS-SCNC: 31 MMOL/L (ref 22–29)
EGFRCR SERPLBLD CKD-EPI 2021: >90 ML/MIN/1.73M2
ERYTHROCYTE [DISTWIDTH] IN BLOOD BY AUTOMATED COUNT: 13.2 % (ref 10–15)
GLUCOSE BLDC GLUCOMTR-MCNC: 134 MG/DL (ref 70–99)
GLUCOSE BLDC GLUCOMTR-MCNC: 170 MG/DL (ref 70–99)
GLUCOSE BLDC GLUCOMTR-MCNC: 205 MG/DL (ref 70–99)
GLUCOSE BLDC GLUCOMTR-MCNC: 264 MG/DL (ref 70–99)
GLUCOSE SERPL-MCNC: 130 MG/DL (ref 70–99)
HCO3 BLD-SCNC: 31 MMOL/L (ref 21–28)
HCO3 BLD-SCNC: 36 MMOL/L (ref 21–28)
HCT VFR BLD AUTO: 46.6 % (ref 40–53)
HGB BLD-MCNC: 15.5 G/DL (ref 13.3–17.7)
MCH RBC QN AUTO: 29.9 PG (ref 26.5–33)
MCHC RBC AUTO-ENTMCNC: 33.3 G/DL (ref 31.5–36.5)
MCV RBC AUTO: 90 FL (ref 78–100)
O2/TOTAL GAS SETTING VFR VENT: 24 %
O2/TOTAL GAS SETTING VFR VENT: 30 %
PCO2 BLD: 37 MM HG (ref 35–45)
PCO2 BLD: 61 MM HG (ref 35–45)
PEEP: 0 CM H2O
PH BLD: 7.38 [PH] (ref 7.35–7.45)
PH BLD: 7.53 [PH] (ref 7.35–7.45)
PLATELET # BLD AUTO: 186 10E3/UL (ref 150–450)
PO2 BLD: 105 MM HG (ref 80–105)
PO2 BLD: 70 MM HG (ref 80–105)
POTASSIUM SERPL-SCNC: 4.4 MMOL/L (ref 3.4–5.3)
RBC # BLD AUTO: 5.19 10E6/UL (ref 4.4–5.9)
SAO2 % BLDA: 88 % (ref 92–100)
SAO2 % BLDA: 94 % (ref 92–100)
SODIUM SERPL-SCNC: 138 MMOL/L (ref 135–145)
WBC # BLD AUTO: 14.9 10E3/UL (ref 4–11)

## 2024-01-20 PROCEDURE — 120N000001 HC R&B MED SURG/OB

## 2024-01-20 PROCEDURE — 85027 COMPLETE CBC AUTOMATED: CPT | Performed by: STUDENT IN AN ORGANIZED HEALTH CARE EDUCATION/TRAINING PROGRAM

## 2024-01-20 PROCEDURE — 36600 WITHDRAWAL OF ARTERIAL BLOOD: CPT | Performed by: STUDENT IN AN ORGANIZED HEALTH CARE EDUCATION/TRAINING PROGRAM

## 2024-01-20 PROCEDURE — 250N000013 HC RX MED GY IP 250 OP 250 PS 637: Performed by: INTERNAL MEDICINE

## 2024-01-20 PROCEDURE — 80048 BASIC METABOLIC PNL TOTAL CA: CPT | Performed by: STUDENT IN AN ORGANIZED HEALTH CARE EDUCATION/TRAINING PROGRAM

## 2024-01-20 PROCEDURE — 250N000011 HC RX IP 250 OP 636: Performed by: INTERNAL MEDICINE

## 2024-01-20 PROCEDURE — 97116 GAIT TRAINING THERAPY: CPT | Mod: GP

## 2024-01-20 PROCEDURE — 36415 COLL VENOUS BLD VENIPUNCTURE: CPT | Performed by: STUDENT IN AN ORGANIZED HEALTH CARE EDUCATION/TRAINING PROGRAM

## 2024-01-20 PROCEDURE — 250N000011 HC RX IP 250 OP 636: Performed by: STUDENT IN AN ORGANIZED HEALTH CARE EDUCATION/TRAINING PROGRAM

## 2024-01-20 PROCEDURE — 250N000013 HC RX MED GY IP 250 OP 250 PS 637: Performed by: STUDENT IN AN ORGANIZED HEALTH CARE EDUCATION/TRAINING PROGRAM

## 2024-01-20 PROCEDURE — 999N000157 HC STATISTIC RCP TIME EA 10 MIN

## 2024-01-20 PROCEDURE — 94660 CPAP INITIATION&MGMT: CPT

## 2024-01-20 PROCEDURE — 99233 SBSQ HOSP IP/OBS HIGH 50: CPT | Performed by: STUDENT IN AN ORGANIZED HEALTH CARE EDUCATION/TRAINING PROGRAM

## 2024-01-20 PROCEDURE — 82805 BLOOD GASES W/O2 SATURATION: CPT | Performed by: STUDENT IN AN ORGANIZED HEALTH CARE EDUCATION/TRAINING PROGRAM

## 2024-01-20 PROCEDURE — 250N000012 HC RX MED GY IP 250 OP 636 PS 637: Performed by: INTERNAL MEDICINE

## 2024-01-20 PROCEDURE — 97530 THERAPEUTIC ACTIVITIES: CPT | Mod: GP

## 2024-01-20 PROCEDURE — 97530 THERAPEUTIC ACTIVITIES: CPT | Mod: GO

## 2024-01-20 RX ORDER — SPIRONOLACTONE 25 MG/1
50 TABLET ORAL DAILY
Status: DISCONTINUED | OUTPATIENT
Start: 2024-01-20 | End: 2024-01-23 | Stop reason: HOSPADM

## 2024-01-20 RX ORDER — LEVOFLOXACIN 750 MG/1
750 TABLET, FILM COATED ORAL DAILY
Status: COMPLETED | OUTPATIENT
Start: 2024-01-21 | End: 2024-01-22

## 2024-01-20 RX ORDER — FUROSEMIDE 10 MG/ML
40 INJECTION INTRAMUSCULAR; INTRAVENOUS ONCE
Qty: 4 ML | Refills: 0 | Status: COMPLETED | OUTPATIENT
Start: 2024-01-20 | End: 2024-01-20

## 2024-01-20 RX ORDER — PREDNISONE 20 MG/1
20 TABLET ORAL DAILY
Status: DISCONTINUED | OUTPATIENT
Start: 2024-01-21 | End: 2024-01-23 | Stop reason: HOSPADM

## 2024-01-20 RX ORDER — LEVOFLOXACIN 750 MG/1
750 TABLET, FILM COATED ORAL DAILY
Status: DISCONTINUED | OUTPATIENT
Start: 2024-01-21 | End: 2024-01-20

## 2024-01-20 RX ORDER — FUROSEMIDE 20 MG
20 TABLET ORAL DAILY
Status: DISCONTINUED | OUTPATIENT
Start: 2024-01-21 | End: 2024-01-21

## 2024-01-20 RX ADMIN — HYDRALAZINE HYDROCHLORIDE 10 MG: 20 INJECTION INTRAMUSCULAR; INTRAVENOUS at 00:51

## 2024-01-20 RX ADMIN — FUROSEMIDE 40 MG: 10 INJECTION, SOLUTION INTRAMUSCULAR; INTRAVENOUS at 13:05

## 2024-01-20 RX ADMIN — APIXABAN 5 MG: 5 TABLET, FILM COATED ORAL at 10:52

## 2024-01-20 RX ADMIN — QUETIAPINE FUMARATE 12.5 MG: 25 TABLET ORAL at 21:28

## 2024-01-20 RX ADMIN — ASPIRIN 81 MG: 81 TABLET, COATED ORAL at 10:52

## 2024-01-20 RX ADMIN — APIXABAN 5 MG: 5 TABLET, FILM COATED ORAL at 21:28

## 2024-01-20 RX ADMIN — CARVEDILOL 12.5 MG: 12.5 TABLET, FILM COATED ORAL at 08:29

## 2024-01-20 RX ADMIN — LEVOFLOXACIN 750 MG: 5 INJECTION, SOLUTION INTRAVENOUS at 10:54

## 2024-01-20 RX ADMIN — LOSARTAN POTASSIUM 25 MG: 25 TABLET, FILM COATED ORAL at 10:52

## 2024-01-20 RX ADMIN — INSULIN ASPART 2 UNITS: 100 INJECTION, SOLUTION INTRAVENOUS; SUBCUTANEOUS at 13:05

## 2024-01-20 RX ADMIN — EMPAGLIFLOZIN 25 MG: 25 TABLET, FILM COATED ORAL at 10:52

## 2024-01-20 RX ADMIN — PREDNISONE 40 MG: 20 TABLET ORAL at 10:52

## 2024-01-20 RX ADMIN — INSULIN ASPART 5 UNITS: 100 INJECTION, SOLUTION INTRAVENOUS; SUBCUTANEOUS at 18:11

## 2024-01-20 RX ADMIN — CARVEDILOL 12.5 MG: 12.5 TABLET, FILM COATED ORAL at 18:11

## 2024-01-20 RX ADMIN — FLUOXETINE 20 MG: 20 CAPSULE ORAL at 10:52

## 2024-01-20 RX ADMIN — SPIRONOLACTONE 50 MG: 25 TABLET ORAL at 13:58

## 2024-01-20 ASSESSMENT — ACTIVITIES OF DAILY LIVING (ADL)
ADLS_ACUITY_SCORE: 26

## 2024-01-20 NOTE — PROGRESS NOTES
Patient remained on bipap from 3089-6688 and then 3832-6699. Total time roughly 7hrs. Minimal redness noted on pts bridge of nose, Mepilex applied. Bipap qualification attempted for patient but ABG results have not shown results needed for home qualification. Will follow up.     Current settings Bipap/S 16/8 30% with no set rate. See flowsheets for the rest of the settings.       Bhaskar Marie, RRT

## 2024-01-20 NOTE — PROGRESS NOTES
Bipap qualification started at 2159. Current settings: Bipap/S 22/10 30%. Will monitor for a couple hours and then put in ABG order to check C02 level.     Bhaskar Marie, RRT

## 2024-01-20 NOTE — PLAN OF CARE
"  Mood seems much improved from yesterday. Remains on 1L during day. Uneventful morning. Diuresing well. Pleasant and cooperative.  Report called to sarah on medical.    Problem: Adult Inpatient Plan of Care  Goal: Plan of Care Review  Description: The Plan of Care Review/Shift note should be completed every shift.  The Outcome Evaluation is a brief statement about your assessment that the patient is improving, declining, or no change.  This information will be displayed automatically on your shift  note.  Outcome: Progressing  Goal: Patient-Specific Goal (Individualized)  Description: You can add care plan individualizations to a care plan. Examples of Individualization might be:  \"Parent requests to be called daily at 9am for status\", \"I have a hard time hearing out of my right ear\", or \"Do not touch me to wake me up as it startles  me\".  Outcome: Progressing  Goal: Absence of Hospital-Acquired Illness or Injury  Outcome: Progressing  Intervention: Identify and Manage Fall Risk  Recent Flowsheet Documentation  Taken 1/20/2024 1200 by Tracee Lopez, RN  Safety Promotion/Fall Prevention:   activity supervised   assistive device/personal items within reach   clutter free environment maintained   increased rounding and observation   nonskid shoes/slippers when out of bed   patient and family education   room door open   room near nurse's station  Taken 1/20/2024 0800 by Tracee Lopez, LANDON  Safety Promotion/Fall Prevention:   activity supervised   assistive device/personal items within reach   clutter free environment maintained   increased rounding and observation   nonskid shoes/slippers when out of bed   patient and family education   room door open   room near nurse's station  Goal: Optimal Comfort and Wellbeing  Outcome: Progressing  Goal: Readiness for Transition of Care  Outcome: Progressing     Problem: Gas Exchange Impaired  Goal: Optimal Gas Exchange  Outcome: Progressing     Problem: Comorbidity " Management  Goal: Maintenance of COPD Symptom Control  Outcome: Progressing     Problem: Fall Injury Risk  Goal: Absence of Fall and Fall-Related Injury  Outcome: Progressing  Intervention: Promote Injury-Free Environment  Recent Flowsheet Documentation  Taken 1/20/2024 1200 by Tracee Lopez RN  Safety Promotion/Fall Prevention:   activity supervised   assistive device/personal items within reach   clutter free environment maintained   increased rounding and observation   nonskid shoes/slippers when out of bed   patient and family education   room door open   room near nurse's station  Taken 1/20/2024 0800 by Tracee Lopez, RN  Safety Promotion/Fall Prevention:   activity supervised   assistive device/personal items within reach   clutter free environment maintained   increased rounding and observation   nonskid shoes/slippers when out of bed   patient and family education   room door open   room near nurse's station   Goal Outcome Evaluation:

## 2024-01-20 NOTE — PLAN OF CARE
"  Pt has gone through range of emotions throughout day started out expressing wanting comfort measures, visited with  from [facility] and showered with OT which was helpful for improving pt outlook. Now has added antidepressant and seroquel for sleep and to help tolerating Bipap. Pleasant and cooperative.      Problem: Adult Inpatient Plan of Care  Goal: Plan of Care Review  Description: The Plan of Care Review/Shift note should be completed every shift.  The Outcome Evaluation is a brief statement about your assessment that the patient is improving, declining, or no change.  This information will be displayed automatically on your shift  note.  Outcome: Progressing  Goal: Patient-Specific Goal (Individualized)  Description: You can add care plan individualizations to a care plan. Examples of Individualization might be:  \"Parent requests to be called daily at 9am for status\", \"I have a hard time hearing out of my right ear\", or \"Do not touch me to wake me up as it startles  me\".  Outcome: Progressing  Goal: Absence of Hospital-Acquired Illness or Injury  Outcome: Progressing  Intervention: Identify and Manage Fall Risk  Recent Flowsheet Documentation  Taken 1/19/2024 1200 by Tracee Lopez, RN  Safety Promotion/Fall Prevention:   activity supervised   assistive device/personal items within reach   clutter free environment maintained   increased rounding and observation   nonskid shoes/slippers when out of bed   patient and family education   room door open   room near nurse's station  Taken 1/19/2024 0800 by Tracee Lopez, RN  Safety Promotion/Fall Prevention:   activity supervised   assistive device/personal items within reach   clutter free environment maintained   increased rounding and observation   nonskid shoes/slippers when out of bed   patient and family education   room door open   room near nurse's station  Goal: Optimal Comfort and Wellbeing  Outcome: Progressing  Goal: Readiness for Transition of " Care  Outcome: Progressing     Problem: Gas Exchange Impaired  Goal: Optimal Gas Exchange  Outcome: Progressing     Problem: Comorbidity Management  Goal: Maintenance of COPD Symptom Control  Outcome: Progressing     Problem: Fall Injury Risk  Goal: Absence of Fall and Fall-Related Injury  Outcome: Progressing  Intervention: Promote Injury-Free Environment  Recent Flowsheet Documentation  Taken 1/19/2024 1200 by Tracee Lopez RN  Safety Promotion/Fall Prevention:   activity supervised   assistive device/personal items within reach   clutter free environment maintained   increased rounding and observation   nonskid shoes/slippers when out of bed   patient and family education   room door open   room near nurse's station  Taken 1/19/2024 0800 by Tracee Lopez, RN  Safety Promotion/Fall Prevention:   activity supervised   assistive device/personal items within reach   clutter free environment maintained   increased rounding and observation   nonskid shoes/slippers when out of bed   patient and family education   room door open   room near nurse's station   Goal Outcome Evaluation:

## 2024-01-20 NOTE — PROGRESS NOTES
Tele-Intensivist note  Patient with SBP's up to 180's in the setting of known HTN. He is on regular meds including coreg and losartan.     I have added Hydralazine 10mgms IV q4 hours as needed for SBP > 170; I will defer to daytime team as to further adjustment of standing oral BP meds.    Katya palomo  January 19, 2024

## 2024-01-20 NOTE — PROGRESS NOTES
[facility]    Medicine Progress Note - Hospitalist Service    Date of Admission:  1/16/2024    Efra Zuniga is a 68 year old man with a past medical history of type 2 diabetes, heart failure with preserved ejection fraction, phonic respiratory failure with hypoxia, emphysema, dysthymic disorder and atrial fibrillation with RVR who was admitted to the hospital for acute on chronic respiratory failure with hypoxia and hypercapnia.    Patient was recently admitted to the hospital on 1-7-2024 for acute on chronic heart failure exacerbation.  He underwent aggressive diuresis and treatment with BiPAP, and was subsequently transferred to [facility] for rehab stay.    Unfortunately the day after discharge he was readmitted to the hospital for acute hypoxic respiratory failure and positive COVID antigen testing which was negative on PCR testing here.  Multifactorial respiratory failure with COPD, heart failure and pneumonia.  He was initiated on prednisone, sleep oximetry testing was performed to prescribe home BiPAP for him as well and he should qualify.  Broad-spectrum antibiotics for healthcare acquired pneumonia due to leukocytosis and multifocal pneumonia on imaging.  After discussion on hospital day 4 the patient states that he does not want to keep undergoing aggressive cares and is interested in pursuing hospice measures, after further discussion with the patient we will aggressively treat his depression and continue treating with BiPAP.  He does not wish to go on hospice at this time.  Unfortunately he passed his qualifier for his BiPAP and does not qualify for BiPAP with fixed rate.  RT is working on additional testing to qualify him for home BiPAP.      Assessment & Plan   Principal Problem:    Acute on chronic respiratory failure with hypoxia and hypercapnia (H)    HAP (hospital-acquired pneumonia)    Assessment: present on admission, COPD, heart failure and pneumonia related. Overnight  oximetry shows need for nighttime bipap. More somnolent this AM after no bipap last night, will resume with naps and at bedtime. Spoke to patient about using bipap at home, and he is willing to do so if it prevents having to come to the hospital. Unclear if pneumonia, presented with leukocytosis and multifocal pneumonia .  He is quite frustrated about having to wear a BiPAP mask and states he does not want to do this at home.  He also endorses being tired of excessive medical cares and states that he is quite ill and is ready to pass.  He states that he is not depressed or suicidal at this time.  He states that his wife is passed and he is ready to go and is almost 69 years old.  He will discuss further with his family and we will continue discussions of aggressive cares versus hospice measures tomorrow.    Plan:   -Continue intermittent bipap, will need nocturnal home bipap on discharge   -Prednisone 40 mg, taper to 20mg tomorrow.  -Order for home BiPAP already placed, will need to bring to the hospital within 24 hours prior to discharge.  -Narrow zosyn to levofloxacin 750mg daily 1/19/24. Plan for 7 day total course  -furosemide 40mg IV one time  -resume home furosemide 20mg PO tomorrow  Transfer to m/s      Active Problems:    Type 2 diabetes mellitus with hyperglycemia, without long-term current use of insulin (H)    Assessment: present on admission     Plan:   -Increase Jardiance to 25 mg daily  -High sliding scale      Essential hypertension    Assessment: chronic, PTA on carvedilol 12.5mg BID, losartan 25mg daily, spironolactone 50mg daily.     Plan:   -resume home meds      Moderate COPD (chronic obstructive pulmonary disease) (H)  COPD exacerbation    Assessment: present on admission, exacerbation.     Plan: Prednisone 40 mg daily   Bipap   Duonebs    Major Depressive disorder  Assessment: Interested in starting selective serotonin reuptake inhibitor. No SI/HI. Lost his wife and having a difficult time  "currently.   Plan:   -initiate fluoxetine 20mg daily  -quetiapine 12.5mg at bedtime for sleep           Mild cognitive impairment    Assessment: present on admission         Chronic heart failure with preserved ejection fraction (H)    Assessment: present on admission     Plan: monitor volume status, resumed coreg and losartan  -Stop IV fluids  -furosemide 40mg IV one time  -resume home PO furosemide          Diet: Combination Diet Regular Diet    DVT Prophylaxis: DOAC  Reynaga Catheter: Not present  Lines: None     Cardiac Monitoring: ACTIVE order. Indication: ICU  Code Status: Full Code      Clinically Significant Risk Factors              # Hypoalbuminemia: Lowest albumin = 3.2 g/dL at 1/17/2024  8:02 AM, will monitor as appropriate     # Hypertension: Noted on problem list       # DMII: A1C = 8.5 % (Ref range: 4.0 - 6.2 %) within past 6 months, PRESENT ON ADMISSION  # Obesity: Estimated body mass index is 35.98 kg/m  as calculated from the following:    Height as of this encounter: 1.88 m (6' 2\").    Weight as of this encounter: 127.1 kg (280 lb 3.2 oz)., PRESENT ON ADMISSION            Disposition Plan        Anticipate discharge in the next 48 hours, either to rehab or home with hospice.          Major Malcolm MD  Hospitalist Service  [facility]  Securely message with redIT (more info)  Text page via [facility] Paging/Directory   ______________________________________________________________________    Interval History   Alert and conversant.  We discussed his goals of care again today.  He has discussed with family present  and he would like to continue aggressive cares.  He is however depressed and frustrated with his medical decline.  We discussed this at length.  Ultimately he is willing to undergo SSRI treatment for his depression.  No suicidal or homicidal ideation.  Unfortunately he passed his BiPAP trial AKA did not qualify for BiPAP with a fixed rate.  Discussed with " RT.    Physical Exam   Vital Signs: Temp: 97.9  F (36.6  C) Temp src: Tympanic BP: (!) 149/76 Pulse: 60   Resp: 15 SpO2: 93 % O2 Device: Nasal cannula Oxygen Delivery: 1 LPM  Weight: 280 lbs 3.2 oz    GENERAL: Comfortable, talkative, in no apparent distress.  HEENT: Anicteric, non-injected sclera, mouth moist.   NECK: No JVD.  CARDIOVASCULAR: regular rate and rhythm, 2/6 murmur.  Mild pitting edema to midshin bilaterally  RESPIRATORY: Clear to auscultation bilaterally, no wheezes, no crackles.  GI: Non-distended, normal bowel sounds, soft, non-tender.  SKIN: No rashes, sores.   NEUROLOGY: Alert and oriented x3, follows commands, speech and language normal.       Medical Decision Making        56 MINUTES SPENT BY ME on the date of service doing chart review, history, exam, documentation & further activities per the note.      Data     I have personally reviewed the following data over the past 24 hrs:    14.9 (H)  \   15.5   / 186     138 97 (L) 19.6 /  134 (H)   4.4 31 (H) 0.79 \

## 2024-01-20 NOTE — PROGRESS NOTES
01/20/24 1000   Appointment Info   Signing Clinician's Name / Credentials (OT) Mary Malcolm OTR/L   Living Environment   People in Home alone   Current Living Arrangements house   Home Accessibility no concerns;stairs to enter home   Number of Stairs, Main Entrance 4   Stair Railings, Main Entrance railings safe and in good condition   Number of Stairs, Within Home, Primary greater than 10 stairs   Stair Railings, Within Home, Primary railings safe and in good condition   Self-Care   Usual Activity Tolerance moderate   Current Activity Tolerance fair   Equipment Currently Used at Home none   Fall history within last six months no   Self-Care/Home Management   [place] Level (Upper Body Dressing Training) stand-by assist   Therapeutic Activities   Therapeutic Activity Minutes (71556) 18   Symptoms noted during/after treatment none   Treatment Detail/Skilled Intervention Sit to stand from bedside chair with FWW and CGA. Ambulated in the room and hallways approximately 200 feet and FWW and CGA. Patient on 1L of 02, stats remained in the 90s. Donned UB clothing with set-up. Patient reports already having breakfast and washing up.   OT Discharge Planning   OT Plan Continue OT   OT Discharge Recommendation (DC Rec) Transitional Care Facility   OT Rationale for DC Rec Pt is weakened,deconditioned and lives alone he would benefit from continued OT to improve functional activity tolerance.   OT Brief overview of current status See above   Total Session Time   Timed Code Treatment Minutes 18   Total Session Time (sum of timed and untimed services) 18   Psychosocial Support   Trust Relationship/Rapport care explained;choices provided;emotional support provided;empathic listening provided

## 2024-01-20 NOTE — PLAN OF CARE
"  Problem: Adult Inpatient Plan of Care  Goal: Plan of Care Review  Outcome: Progressing  Goal: Patient-Specific Goal (Individualized)  Outcome: Progressing  Goal: Absence of Hospital-Acquired Illness or Injury  Outcome: Progressing  Intervention: Identify and Manage Fall Risk  Recent Flowsheet Documentation  Taken 1/20/2024 1511 by Trina Dias RN  Safety Promotion/Fall Prevention:   activity supervised   assistive device/personal items within reach   nonskid shoes/slippers when out of bed   room door open   room near nurse's station   safety round/check completed  Intervention: Prevent and Manage VTE (Venous Thromboembolism) Risk  Recent Flowsheet Documentation  Taken 1/20/2024 1511 by Trina Dias RN  VTE Prevention/Management: SCDs (sequential compression devices) off  Intervention: Prevent Infection  Recent Flowsheet Documentation  Taken 1/20/2024 1511 by Trina Dias RN  Infection Prevention:   single patient room provided   equipment surfaces disinfected   hand hygiene promoted  Goal: Optimal Comfort and Wellbeing  Outcome: Progressing  Intervention: Provide Person-Centered Care  Recent Flowsheet Documentation  Taken 1/20/2024 1511 by Trina Dias RN  Trust Relationship/Rapport: care explained  Goal: Readiness for Transition of Care  Outcome: Progressing   Goal Outcome Evaluation:    A&O- with forgetfulness, SBA, on Tele 5, VSS- on 1L O2 NC, using urinal with good output, needs to be encouraged to eat protein. No dyspnea with exertion noted this shift.     /71 (BP Location: Right arm, Patient Position: Chair, Cuff Size: Adult Regular)   Pulse 66   Temp 98.7  F (37.1  C) (Tympanic)   Resp 22   Ht 1.88 m (6' 2\")   Wt 127.1 kg (280 lb 3.2 oz)   SpO2 95%   BMI 35.98 kg/m      Trina Dias RN on 1/20/2024 at 5:51 PM                        "

## 2024-01-20 NOTE — PROGRESS NOTES
"[facility] ADMISSION NOTE    Patient admitted to room 301 at approximately 2:52pm via wheel chair from ICU. Patient was accompanied by transport tech.     Verbal SBAR report received from LANDON Easley prior to patient arrival.     Patient ambulated to bed with stand-by assist. Patient alert and oriented X 3. The patient is not having any pain.  . Admission vital signs: Blood pressure 136/87, pulse 66, temperature 98.2  F (36.8  C), temperature source Tympanic, resp. rate 23, height 1.88 m (6' 2\"), weight 127.1 kg (280 lb 3.2 oz), SpO2 93%. Patient was oriented to plan of care, call light, bed controls, tv, telephone, bathroom, and visiting hours.     Risk Assessment    The following safety risks were identified during admission: fall. Yellow risk band applied: YES.     Skin Initial Assessment    This writer admitted this patient and completed a full skin assessment and Joselo score in the Adult PCS flowsheet. Appropriate interventions initiated as needed.     Joselo Risk Assessment  Sensory Perception: 4-->no impairment  Moisture: 3-->occasionally moist  Activity: 3-->walks occasionally  Mobility: 3-->slightly limited  Nutrition: 3-->adequate  Friction and Shear: 3-->no apparent problem  Joselo Score: 19  Moisture Interventions: Encourage regular toileting  Nutrition Interventions: Encourage protein intake  Friction/Shear Interventions: HOB 30 degrees or less  Mattress: Low air loss (MAICO pump, Isolibrium, Skin Guard, Pulsate, Isoair, etc.)  Bed Frame: Standard width and length    Education    Patient has a [place] to Observation order: No  Observation education completed and documented: N/A      Trina Dias RN      "

## 2024-01-20 NOTE — PROGRESS NOTES
01/20/24 1035   Appointment Info   Signing Clinician's Name / Credentials (PT) Tyesha Bowling, PT   Rehab Comments (PT) Pt is on 1 LPM of O2   Therapeutic Activity   Therapeutic Activities: dynamic activities to improve functional performance Minutes (91528) 10   Symptoms Noted During/After Treatment Fatigue   Treatment Detail/Skilled Intervention sit to stand with CGA with v.c for hand placement   Gait Training   Gait Training Minutes (93591) 15   Symptoms Noted During/After Treatment (Gait Training) fatigue   Treatment Detail/Skilled Intervention Pt ambualted to end of [place] with FWW with c/o mild faitgue   Distance in Feet 150 feet   [place] Level (Gait Training) contact guard   Physical Assistance Level (Gait Training) 1 person assist   Gait Analysis Deviations decreased malu;decreased stride length   PT Discharge Planning   PT Plan Continue PT   PT Discharge Recommendation (DC Rec) home with home care physical therapy   PT Rationale for DC Rec patient wishes to return home and will benefit from continued PT to promote strength, stability and safe mobility   PT Brief overview of current status Pt fatigues with activity.  pt requires SBA to CGA for mobility   PT Equipment Needed at Discharge walker, rolling

## 2024-01-20 NOTE — PROGRESS NOTES
Pt remains on 1L NC. BiPAP on stand by in room. Plan to wear at [facility] and qualify pt for a home unit. No further respiratory interventions. Pina Pastrana RRT

## 2024-01-21 ENCOUNTER — APPOINTMENT (OUTPATIENT)
Dept: OCCUPATIONAL THERAPY | Facility: OTHER | Age: 69
DRG: 189 | End: 2024-01-21
Payer: MEDICARE

## 2024-01-21 ENCOUNTER — APPOINTMENT (OUTPATIENT)
Dept: PHYSICAL THERAPY | Facility: OTHER | Age: 69
DRG: 189 | End: 2024-01-21
Payer: MEDICARE

## 2024-01-21 LAB
ANION GAP SERPL CALCULATED.3IONS-SCNC: 11 MMOL/L (ref 7–15)
BACTERIA BLD CULT: NO GROWTH
BACTERIA BLD CULT: NO GROWTH
BUN SERPL-MCNC: 19.7 MG/DL (ref 8–23)
CALCIUM SERPL-MCNC: 8.6 MG/DL (ref 8.8–10.2)
CHLORIDE SERPL-SCNC: 93 MMOL/L (ref 98–107)
CREAT SERPL-MCNC: 0.85 MG/DL (ref 0.67–1.17)
DEPRECATED HCO3 PLAS-SCNC: 34 MMOL/L (ref 22–29)
EGFRCR SERPLBLD CKD-EPI 2021: >90 ML/MIN/1.73M2
ERYTHROCYTE [DISTWIDTH] IN BLOOD BY AUTOMATED COUNT: 13.2 % (ref 10–15)
GLUCOSE BLDC GLUCOMTR-MCNC: 121 MG/DL (ref 70–99)
GLUCOSE BLDC GLUCOMTR-MCNC: 195 MG/DL (ref 70–99)
GLUCOSE BLDC GLUCOMTR-MCNC: 216 MG/DL (ref 70–99)
GLUCOSE BLDC GLUCOMTR-MCNC: 248 MG/DL (ref 70–99)
GLUCOSE SERPL-MCNC: 129 MG/DL (ref 70–99)
HCT VFR BLD AUTO: 47.3 % (ref 40–53)
HGB BLD-MCNC: 15.5 G/DL (ref 13.3–17.7)
HOLD SPECIMEN: NORMAL
HOLD SPECIMEN: NORMAL
MAGNESIUM SERPL-MCNC: 2.1 MG/DL (ref 1.7–2.3)
MCH RBC QN AUTO: 29.4 PG (ref 26.5–33)
MCHC RBC AUTO-ENTMCNC: 32.8 G/DL (ref 31.5–36.5)
MCV RBC AUTO: 90 FL (ref 78–100)
PLATELET # BLD AUTO: 192 10E3/UL (ref 150–450)
POTASSIUM SERPL-SCNC: 3.7 MMOL/L (ref 3.4–5.3)
RBC # BLD AUTO: 5.27 10E6/UL (ref 4.4–5.9)
SODIUM SERPL-SCNC: 138 MMOL/L (ref 135–145)
WBC # BLD AUTO: 13.4 10E3/UL (ref 4–11)

## 2024-01-21 PROCEDURE — 97530 THERAPEUTIC ACTIVITIES: CPT | Mod: GO

## 2024-01-21 PROCEDURE — 250N000013 HC RX MED GY IP 250 OP 250 PS 637: Performed by: INTERNAL MEDICINE

## 2024-01-21 PROCEDURE — 250N000012 HC RX MED GY IP 250 OP 636 PS 637: Performed by: STUDENT IN AN ORGANIZED HEALTH CARE EDUCATION/TRAINING PROGRAM

## 2024-01-21 PROCEDURE — 85027 COMPLETE CBC AUTOMATED: CPT | Performed by: STUDENT IN AN ORGANIZED HEALTH CARE EDUCATION/TRAINING PROGRAM

## 2024-01-21 PROCEDURE — 94660 CPAP INITIATION&MGMT: CPT

## 2024-01-21 PROCEDURE — 97535 SELF CARE MNGMENT TRAINING: CPT | Mod: GO

## 2024-01-21 PROCEDURE — 83735 ASSAY OF MAGNESIUM: CPT | Performed by: STUDENT IN AN ORGANIZED HEALTH CARE EDUCATION/TRAINING PROGRAM

## 2024-01-21 PROCEDURE — 999N000157 HC STATISTIC RCP TIME EA 10 MIN

## 2024-01-21 PROCEDURE — 250N000013 HC RX MED GY IP 250 OP 250 PS 637: Performed by: STUDENT IN AN ORGANIZED HEALTH CARE EDUCATION/TRAINING PROGRAM

## 2024-01-21 PROCEDURE — 97116 GAIT TRAINING THERAPY: CPT | Mod: GP

## 2024-01-21 PROCEDURE — 80048 BASIC METABOLIC PNL TOTAL CA: CPT | Performed by: STUDENT IN AN ORGANIZED HEALTH CARE EDUCATION/TRAINING PROGRAM

## 2024-01-21 PROCEDURE — 250N000011 HC RX IP 250 OP 636: Performed by: STUDENT IN AN ORGANIZED HEALTH CARE EDUCATION/TRAINING PROGRAM

## 2024-01-21 PROCEDURE — 36415 COLL VENOUS BLD VENIPUNCTURE: CPT | Performed by: STUDENT IN AN ORGANIZED HEALTH CARE EDUCATION/TRAINING PROGRAM

## 2024-01-21 PROCEDURE — 120N000001 HC R&B MED SURG/OB

## 2024-01-21 PROCEDURE — 99233 SBSQ HOSP IP/OBS HIGH 50: CPT | Performed by: STUDENT IN AN ORGANIZED HEALTH CARE EDUCATION/TRAINING PROGRAM

## 2024-01-21 PROCEDURE — 97530 THERAPEUTIC ACTIVITIES: CPT | Mod: GP

## 2024-01-21 RX ORDER — FUROSEMIDE 20 MG
20 TABLET ORAL DAILY
Status: DISCONTINUED | OUTPATIENT
Start: 2024-01-22 | End: 2024-01-23 | Stop reason: HOSPADM

## 2024-01-21 RX ORDER — FUROSEMIDE 10 MG/ML
40 INJECTION INTRAMUSCULAR; INTRAVENOUS ONCE
Status: COMPLETED | OUTPATIENT
Start: 2024-01-21 | End: 2024-01-21

## 2024-01-21 RX ADMIN — LEVOFLOXACIN 750 MG: 750 TABLET, FILM COATED ORAL at 09:10

## 2024-01-21 RX ADMIN — FUROSEMIDE 40 MG: 10 INJECTION, SOLUTION INTRAMUSCULAR; INTRAVENOUS at 08:43

## 2024-01-21 RX ADMIN — INSULIN ASPART 4 UNITS: 100 INJECTION, SOLUTION INTRAVENOUS; SUBCUTANEOUS at 11:44

## 2024-01-21 RX ADMIN — APIXABAN 5 MG: 5 TABLET, FILM COATED ORAL at 22:03

## 2024-01-21 RX ADMIN — INSULIN ASPART 3 UNITS: 100 INJECTION, SOLUTION INTRAVENOUS; SUBCUTANEOUS at 17:06

## 2024-01-21 RX ADMIN — APIXABAN 5 MG: 5 TABLET, FILM COATED ORAL at 09:10

## 2024-01-21 RX ADMIN — FLUOXETINE 20 MG: 20 CAPSULE ORAL at 09:10

## 2024-01-21 RX ADMIN — CARVEDILOL 12.5 MG: 12.5 TABLET, FILM COATED ORAL at 08:44

## 2024-01-21 RX ADMIN — ASPIRIN 81 MG: 81 TABLET, COATED ORAL at 09:10

## 2024-01-21 RX ADMIN — EMPAGLIFLOZIN 25 MG: 25 TABLET, FILM COATED ORAL at 09:10

## 2024-01-21 RX ADMIN — SPIRONOLACTONE 50 MG: 25 TABLET ORAL at 09:10

## 2024-01-21 RX ADMIN — PREDNISONE 20 MG: 20 TABLET ORAL at 09:10

## 2024-01-21 RX ADMIN — LOSARTAN POTASSIUM 25 MG: 25 TABLET, FILM COATED ORAL at 09:10

## 2024-01-21 RX ADMIN — CARVEDILOL 12.5 MG: 12.5 TABLET, FILM COATED ORAL at 17:06

## 2024-01-21 ASSESSMENT — ACTIVITIES OF DAILY LIVING (ADL)
ADLS_ACUITY_SCORE: 26
ADLS_ACUITY_SCORE: 22
ADLS_ACUITY_SCORE: 26
ADLS_ACUITY_SCORE: 26
ADLS_ACUITY_SCORE: 22
ADLS_ACUITY_SCORE: 26
ADLS_ACUITY_SCORE: 22
ADLS_ACUITY_SCORE: 22
ADLS_ACUITY_SCORE: 26

## 2024-01-21 NOTE — PROGRESS NOTES
Brief progress note for BiPAP qualification.    I attest that I have seen and evaluated Efra MATTHEW Zuniga face-to-face 1/21/2024.  He has a diagnosis of hypoventilation syndrome.  Patient would greatly benefit from use of BiPAP to improve pulmonary status and decreased work of breathing.  BiPAP therapy necessary to improve future outcomes and decrease hospital readmissions.        Majro Malcolm MD on 1/21/2024 at 10:49 AM

## 2024-01-21 NOTE — PROGRESS NOTES
01/21/24 1100   Appointment Info   Signing Clinician's Name / Credentials (PT) Tyesha Bowling, PT   Therapeutic Activity   Therapeutic Activities: dynamic activities to improve functional performance Minutes (43428) 10   Symptoms Noted During/After Treatment Fatigue   Treatment Detail/Skilled Intervention sit to stand with CGA to SBA with walker, stand to sit with v.c to back up with walker all the way to chair   Gait Training   Gait Training Minutes (56809) 20   Symptoms Noted During/After Treatment (Gait Training) fatigue   Treatment Detail/Skilled Intervention Worked on walking with walker with SBA and without walker with CGA with mild sway side to side, instructed pt that he is still safer with walker   Distance in Feet 175   [place] Level (Gait Training) contact guard   Physical Assistance Level (Gait Training) 1 person + 1 person to manage equipment   Assistive Device (Gait Training) rolling walker;other (see comments)  (no AD with CGA)   Gait Analysis Deviations decreased malu   Impairments (Gait Analysis/Training) balance impaired   PT Discharge Planning   PT Plan Continue PT   PT Discharge Recommendation (DC Rec) home with home care physical therapy   PT Rationale for DC Rec patient wishes to return home and will benefit from continued PT to promote strength, stability and safe mobility   PT Brief overview of current status Pt fatigues with activity.  pt requires SBA with use of walker to CGA for mobility without AD

## 2024-01-21 NOTE — PROGRESS NOTES
01/21/24 1100   Appointment Info   Signing Clinician's Name / Credentials (OT) Mary Malcolm OTR/L   Living Environment   People in Home alone   Current Living Arrangements house   Home Accessibility no concerns;stairs to enter home   Number of Stairs, Main Entrance 4   Stair Railings, Main Entrance railings safe and in good condition   Number of Stairs, Within Home, Primary greater than 10 stairs   Stair Railings, Within Home, Primary railings safe and in good condition   Self-Care   Usual Activity Tolerance moderate   Current Activity Tolerance fair   Equipment Currently Used at Home none   Fall history within last six months no   Self-Care/Home Management   Self-Care/Home Mgmt/ADL, Compensatory, Meal Prep Minutes (38839) 17   Symptoms Noted During/After Treatment (Meal Preparation/Planning Training) fatigue   Assistance (Grooming Training) set-up required  (Patient was too fatigued after his walk to  the bathroom and complete grooming and hygiene. Completed while seated in the bedside chair with set-up. Brushed teeth and washed up.)   [place] Level (Upper Body Dressing Training) stand-by assist   Assistance (Upper Body Dressing Training) set-up required   Assistance (Eating/Self-Feeding Training) set-up required   Lower Body Dressing Training Assistance moderate assist (50% patient effort)  (Difficulty reaching feet today, Mod assist to don socks.)   Therapeutic Activities   Therapeutic Activity Minutes (46662) 8   Symptoms noted during/after treatment none;fatigue   Treatment Detail/Skilled Intervention Sit to stand from bedside chair with FWW and CGA. Ambulated in the room and hallways approximately 100 feet with FWW and CGA. Then 100 feet back to his room using the handrail and no FWW, fatigued quickly with this was looking unsteady by the end of the walk.   OT Discharge Planning   OT Plan Continue OT   OT Discharge Recommendation (DC Rec) Transitional Care Facility   OT Rationale for DC Rec  Patient is improving but still fatigues quickly and requires some assistance with ADLs.   Total Session Time   Timed Code Treatment Minutes 25   Total Session Time (sum of timed and untimed services) 25   Psychosocial Support   Trust Relationship/Rapport care explained;choices provided;emotional support provided;empathic listening provided

## 2024-01-21 NOTE — PROGRESS NOTES
SAFETY CHECKLIST  ID Bands and Risk clasps correct and in place (DNR, Fall risk, Allergy, Latex, Limb):  Yes  All Lines Reconciled and labeled correctly: Yes  Whiteboard updated:Yes  Environmental interventions: Yes  Verify Tele #:  [phone]

## 2024-01-21 NOTE — PROGRESS NOTES
Pt remained on 1L NC throughout shift. Pt to wear BiPAP at noc. No further respiratory interventions. Pina Pastrana RRT

## 2024-01-21 NOTE — PLAN OF CARE
Goal Outcome Evaluation:       Assumed patient care at 2300.    Patient alert and oriented to self, VSS, on 1 L of O2, afebrile.     The patient slept most of this shift, at 0400 hrs he woke up and was placed back on 1L of O2. Patient was pleasant and cooperative.

## 2024-01-21 NOTE — PROGRESS NOTES
Patient wore bipap from [id number]. Otherwise on 1lpm. Bipap settings- 18/8 30%. Patient still has mepilex on bridge of nose. RT checked prior to placing mask that it was clear and not red. No further interventions.     Bhaskar Marie, RRT

## 2024-01-21 NOTE — PROGRESS NOTES
[facility]    Medicine Progress Note - Hospitalist Service    Date of Admission:  1/16/2024    Efra Zuniga is a 68 year old man with a past medical history of type 2 diabetes, heart failure with preserved ejection fraction, phonic respiratory failure with hypoxia, emphysema, dysthymic disorder and atrial fibrillation with RVR who was admitted to the hospital for acute on chronic respiratory failure with hypoxia and hypercapnia.    Patient was recently admitted to the hospital on 1-7-2024 for acute on chronic heart failure exacerbation.  He underwent aggressive diuresis and treatment with BiPAP, and was subsequently transferred to [facility] for rehab stay.    Unfortunately the day after discharge he was readmitted to the hospital for acute hypoxic respiratory failure and positive COVID antigen testing which was negative on PCR testing here.  Multifactorial respiratory failure with COPD, heart failure and pneumonia.  He was initiated on prednisone, sleep oximetry testing was performed to prescribe home BiPAP for him as well and he should qualify.  Broad-spectrum antibiotics for healthcare acquired pneumonia due to leukocytosis and multifocal pneumonia on imaging.  After discussion on hospital day 4 the patient states that he does not want to keep undergoing aggressive cares and is interested in pursuing hospice measures, after further discussion with the patient we will aggressively treat his depression and continue treating with BiPAP.  He does not wish to go on hospice at this time.  Unfortunately he passed his qualifier for his BiPAP and does not qualify for BiPAP with fixed rate.  Will discharge with bipap without fixed rate on discharge. Orders have been placed but will need home O2 qualifier on discharge yet.       Assessment & Plan   Principal Problem:    Acute on chronic respiratory failure with hypoxia and hypercapnia (H)    HAP (hospital-acquired pneumonia)    Assessment: present  on admission, COPD, heart failure and pneumonia related. Overnight oximetry shows need for nighttime bipap. Unclear if pneumonia, presented with leukocytosis and multifocal pneumonia.  Plan for a 7 day total course of antibiotics (complete 1/22/24). Order has been placed for bipap on discharge.     Plan:   -Continue at bedtime bipap  -Prednisone 40 mg, taper to 20mg today.  -Order for home BiPAP already placed, will need to bring to the hospital within 24 hours prior to discharge.  -Narrow zosyn to levofloxacin 750mg daily 1/19/24. Plan for 7 day total course  -furosemide 40mg IV one time  -resume home furosemide 20mg PO tomorrow      Active Problems:    Type 2 diabetes mellitus with hyperglycemia, without long-term current use of insulin (H)    Assessment: present on admission     Plan:   -Increase Jardiance to 25 mg daily  -High sliding scale      Essential hypertension    Assessment: chronic, PTA on carvedilol 12.5mg BID, losartan 25mg daily, spironolactone 50mg daily.     Plan:   -resume home meds      Moderate COPD (chronic obstructive pulmonary disease) (H)  COPD exacerbation    Assessment: present on admission, exacerbation.     Plan: Prednisone 40 mg daily   Bipap   Duonebs    Major Depressive disorder  Assessment: Interested in starting selective serotonin reuptake inhibitor. No SI/HI. Lost his wife and having a difficult time currently.   Plan:   -initiate fluoxetine 20mg daily      Mild cognitive impairment    Assessment: present on admission         Chronic heart failure with preserved ejection fraction (H)    Assessment: present on admission     Plan: monitor volume status, resumed coreg and losartan  -furosemide 40mg IV one time  -resume home PO furosemide          Diet: Combination Diet Regular Diet    DVT Prophylaxis: DOAC  Reynaga Catheter: Not present  Lines: None     Cardiac Monitoring: None  Code Status: Full Code      Clinically Significant Risk Factors              # Hypoalbuminemia: Lowest  "albumin = 3.2 g/dL at 1/17/2024  8:02 AM, will monitor as appropriate     # Hypertension: Noted on problem list       # DMII: A1C = 8.5 % (Ref range: 4.0 - 6.2 %) within past 6 months   # Obesity: Estimated body mass index is 35.68 kg/m  as calculated from the following:    Height as of this encounter: 1.88 m (6' 2\").    Weight as of this encounter: 126.1 kg (277 lb 14.4 oz).             Disposition Plan        Anticipate discharge in the next 48 hours, patient will need rehab on discharge         Major Malcolm MD  Hospitalist Service  [facility]  Securely message with [employer] (more info)  Text page via [facility] Paging/Directory   ______________________________________________________________________    Interval History   Alert and conversant. Tolerated bipap better last night. Hopeful to discharge soon. Concerns about discharging home.   No fever or chills.   1L of oxygen during day.   Bleed in oxygen 3L at bedtime.     Physical Exam   Vital Signs: Temp: 97.6  F (36.4  C) Temp src: Tympanic BP: 121/67 Pulse: 63   Resp: 18 SpO2: 97 % O2 Device: None (Room air) Oxygen Delivery: 1 LPM (weaned to RA)  Weight: 277 lbs 14.4 oz    GENERAL: Comfortable, talkative, in no apparent distress.  HEENT: Anicteric, non-injected sclera, mouth moist.   NECK: No JVD.  CARDIOVASCULAR: regular rate and rhythm, 2/6 murmur.  Mild pitting edema to midshin bilaterally  RESPIRATORY: Clear to auscultation bilaterally, no wheezes, no crackles.  GI: Non-distended, normal bowel sounds, soft, non-tender.  SKIN: No rashes, sores.   NEUROLOGY: Alert and oriented x3, follows commands, speech and language normal.       Medical Decision Making        52 MINUTES SPENT BY ME on the date of service doing chart review, history, exam, documentation & further activities per the note.      Data     I have personally reviewed the following data over the past 24 hrs:    13.4 (H)  \   15.5   / 192     138 93 (L) 19.7 /  121 (H)   3.7 34 (H) " 0.85 \

## 2024-01-21 NOTE — PLAN OF CARE
VSS, afebrile, denies pain.  Up SBA with walker.  Steady on feet. Alert and oriented.  Urinating without issue.  Tolerating food and oral fluids.  Blood sugars needing coverage twice today.  Nalini Lechuga RN............................ 1/21/2024 5:53 PM

## 2024-01-21 NOTE — PHARMACY
Pharmacy - Transfer Medication Reconciliation     The patient's transfer medication orders have been compared to the medication administration record and to the Prior to Admissions Medications list - any noted discrepancies were resolved with the MD.     Thank you. Pharmacy will continue to monitor.     Robinson Humphreys [facility] ....................  1/20/2024   7:49 PM

## 2024-01-22 ENCOUNTER — APPOINTMENT (OUTPATIENT)
Dept: PHYSICAL THERAPY | Facility: OTHER | Age: 69
DRG: 189 | End: 2024-01-22
Payer: MEDICARE

## 2024-01-22 ENCOUNTER — APPOINTMENT (OUTPATIENT)
Dept: OCCUPATIONAL THERAPY | Facility: OTHER | Age: 69
DRG: 189 | End: 2024-01-22
Payer: MEDICARE

## 2024-01-22 LAB
ANION GAP SERPL CALCULATED.3IONS-SCNC: 14 MMOL/L (ref 7–15)
BUN SERPL-MCNC: 25.8 MG/DL (ref 8–23)
CALCIUM SERPL-MCNC: 8.8 MG/DL (ref 8.8–10.2)
CHLORIDE SERPL-SCNC: 91 MMOL/L (ref 98–107)
CREAT SERPL-MCNC: 0.97 MG/DL (ref 0.67–1.17)
DEPRECATED HCO3 PLAS-SCNC: 29 MMOL/L (ref 22–29)
EGFRCR SERPLBLD CKD-EPI 2021: 85 ML/MIN/1.73M2
GLUCOSE BLDC GLUCOMTR-MCNC: 115 MG/DL (ref 70–99)
GLUCOSE BLDC GLUCOMTR-MCNC: 155 MG/DL (ref 70–99)
GLUCOSE BLDC GLUCOMTR-MCNC: 165 MG/DL (ref 70–99)
GLUCOSE BLDC GLUCOMTR-MCNC: 172 MG/DL (ref 70–99)
GLUCOSE BLDC GLUCOMTR-MCNC: 222 MG/DL (ref 70–99)
GLUCOSE SERPL-MCNC: 169 MG/DL (ref 70–99)
HOLD SPECIMEN: NORMAL
MAGNESIUM SERPL-MCNC: 2.1 MG/DL (ref 1.7–2.3)
POTASSIUM SERPL-SCNC: 5.1 MMOL/L (ref 3.4–5.3)
SODIUM SERPL-SCNC: 134 MMOL/L (ref 135–145)

## 2024-01-22 PROCEDURE — 83735 ASSAY OF MAGNESIUM: CPT | Performed by: STUDENT IN AN ORGANIZED HEALTH CARE EDUCATION/TRAINING PROGRAM

## 2024-01-22 PROCEDURE — 120N000001 HC R&B MED SURG/OB

## 2024-01-22 PROCEDURE — 80048 BASIC METABOLIC PNL TOTAL CA: CPT | Performed by: STUDENT IN AN ORGANIZED HEALTH CARE EDUCATION/TRAINING PROGRAM

## 2024-01-22 PROCEDURE — 97116 GAIT TRAINING THERAPY: CPT | Mod: GP

## 2024-01-22 PROCEDURE — 250N000012 HC RX MED GY IP 250 OP 636 PS 637: Performed by: STUDENT IN AN ORGANIZED HEALTH CARE EDUCATION/TRAINING PROGRAM

## 2024-01-22 PROCEDURE — 97530 THERAPEUTIC ACTIVITIES: CPT | Mod: GO | Performed by: OCCUPATIONAL THERAPIST

## 2024-01-22 PROCEDURE — 250N000013 HC RX MED GY IP 250 OP 250 PS 637: Performed by: INTERNAL MEDICINE

## 2024-01-22 PROCEDURE — 999N000157 HC STATISTIC RCP TIME EA 10 MIN

## 2024-01-22 PROCEDURE — 99232 SBSQ HOSP IP/OBS MODERATE 35: CPT | Performed by: FAMILY MEDICINE

## 2024-01-22 PROCEDURE — 250N000013 HC RX MED GY IP 250 OP 250 PS 637: Performed by: STUDENT IN AN ORGANIZED HEALTH CARE EDUCATION/TRAINING PROGRAM

## 2024-01-22 PROCEDURE — 36416 COLLJ CAPILLARY BLOOD SPEC: CPT | Performed by: STUDENT IN AN ORGANIZED HEALTH CARE EDUCATION/TRAINING PROGRAM

## 2024-01-22 PROCEDURE — 97530 THERAPEUTIC ACTIVITIES: CPT | Mod: GP

## 2024-01-22 RX ORDER — INSULIN ASPART 100 [IU]/ML
INJECTION, SOLUTION INTRAVENOUS; SUBCUTANEOUS
Qty: 15 ML | Refills: 0 | Status: SHIPPED | OUTPATIENT
Start: 2024-01-22 | End: 2024-01-22

## 2024-01-22 RX ORDER — ASPIRIN 81 MG/1
81 TABLET ORAL DAILY
Qty: 30 TABLET | Refills: 0 | Status: SHIPPED | OUTPATIENT
Start: 2024-01-22 | End: 2024-05-07

## 2024-01-22 RX ORDER — GUAIFENESIN 200 MG/10ML
400 LIQUID ORAL EVERY 4 HOURS PRN
COMMUNITY
Start: 2024-01-22 | End: 2024-05-07

## 2024-01-22 RX ORDER — SPIRONOLACTONE 50 MG/1
50 TABLET, FILM COATED ORAL DAILY
Qty: 30 TABLET | Refills: 0 | Status: SHIPPED | OUTPATIENT
Start: 2024-01-22 | End: 2024-04-16

## 2024-01-22 RX ORDER — FUROSEMIDE 20 MG
20 TABLET ORAL DAILY
Qty: 30 TABLET | Refills: 0 | Status: SHIPPED | OUTPATIENT
Start: 2024-01-22 | End: 2024-02-27

## 2024-01-22 RX ORDER — CARVEDILOL 12.5 MG/1
12.5 TABLET ORAL 2 TIMES DAILY
Qty: 60 TABLET | Refills: 0 | Status: SHIPPED | OUTPATIENT
Start: 2024-01-22 | End: 2024-03-08 | Stop reason: ALTCHOICE

## 2024-01-22 RX ORDER — LOSARTAN POTASSIUM 25 MG/1
25 TABLET ORAL DAILY
Qty: 30 TABLET | Refills: 0 | Status: SHIPPED | OUTPATIENT
Start: 2024-01-22 | End: 2024-04-16

## 2024-01-22 RX ORDER — PREDNISONE 20 MG/1
TABLET ORAL
Qty: 5 TABLET | Refills: 0 | Status: ON HOLD | OUTPATIENT
Start: 2024-01-22 | End: 2024-01-26

## 2024-01-22 RX ADMIN — FUROSEMIDE 20 MG: 20 TABLET ORAL at 09:29

## 2024-01-22 RX ADMIN — CARVEDILOL 12.5 MG: 12.5 TABLET, FILM COATED ORAL at 17:50

## 2024-01-22 RX ADMIN — FLUOXETINE 20 MG: 20 CAPSULE ORAL at 09:28

## 2024-01-22 RX ADMIN — APIXABAN 5 MG: 5 TABLET, FILM COATED ORAL at 09:29

## 2024-01-22 RX ADMIN — CARVEDILOL 12.5 MG: 12.5 TABLET, FILM COATED ORAL at 07:34

## 2024-01-22 RX ADMIN — INSULIN ASPART 2 UNITS: 100 INJECTION, SOLUTION INTRAVENOUS; SUBCUTANEOUS at 17:50

## 2024-01-22 RX ADMIN — LEVOFLOXACIN 750 MG: 750 TABLET, FILM COATED ORAL at 09:29

## 2024-01-22 RX ADMIN — EMPAGLIFLOZIN 25 MG: 25 TABLET, FILM COATED ORAL at 09:29

## 2024-01-22 RX ADMIN — PREDNISONE 20 MG: 20 TABLET ORAL at 09:29

## 2024-01-22 RX ADMIN — LOSARTAN POTASSIUM 25 MG: 25 TABLET, FILM COATED ORAL at 09:29

## 2024-01-22 RX ADMIN — ASPIRIN 81 MG: 81 TABLET, COATED ORAL at 09:29

## 2024-01-22 RX ADMIN — SPIRONOLACTONE 50 MG: 25 TABLET ORAL at 09:29

## 2024-01-22 RX ADMIN — APIXABAN 5 MG: 5 TABLET, FILM COATED ORAL at 22:07

## 2024-01-22 RX ADMIN — INSULIN ASPART 4 UNITS: 100 INJECTION, SOLUTION INTRAVENOUS; SUBCUTANEOUS at 12:09

## 2024-01-22 RX ADMIN — INSULIN ASPART 1 UNITS: 100 INJECTION, SOLUTION INTRAVENOUS; SUBCUTANEOUS at 07:34

## 2024-01-22 ASSESSMENT — ACTIVITIES OF DAILY LIVING (ADL)
ADLS_ACUITY_SCORE: 22
ADLS_ACUITY_SCORE: 23
ADLS_ACUITY_SCORE: 22
ADLS_ACUITY_SCORE: 23

## 2024-01-22 NOTE — PLAN OF CARE
Goal Outcome Evaluation:      Plan of Care Reviewed With: patient    Overall Patient Progress: improving    VSS. Up with SBA and walker. Denies pain. Tolerating regular diet, good intake. Receiving sliding scale insulin with meals. Was going to discharge today, however, needs to qualify for home O2 via Bipap at night. PT/OT worked with pt today and provided a walker for home. Pt needs reminding to use.

## 2024-01-22 NOTE — PROGRESS NOTES
Interdisciplinary Discharge Planning Note    Anticipated Discharge Date:1/23    Anticipated Discharge Location: Home     Clinical Needs Before Discharge:   Overnight oximetry test     Treatment Needs After Discharge:  home oxygen, home care (SN/PT/OT)     Potential Barriers to Discharge: None Identified     HENOK Bowie  1/22/2024,  11:51 AM

## 2024-01-22 NOTE — PROGRESS NOTES
[facility]    Medicine Progress Note - Hospitalist Service    Date of Admission:  1/16/2024    Assessment & Plan   This 69 yo male with a PMH significant for DM-2 not controlled, HFpEF, chronic respiratory failure with hypoxia, COPD, atrial fib with RVR on Eliquis and dysthymic disorder was admitted to the hospital with acute on chronic respiratory failure with hypoxia and hypercapnia.  He had been admitted from 1/7-15/2024 with the same diagnosis.  This was felt to be due to acute on chronic HFpEF and URI.  He underwent aggressive diuresis and was treated with BiPAP and discharged to [facility] for rehab.  He quickly returned to the hospital for the same reason.  He has been qualified for home BiPAP, met therapy goals and is ready for discharge with home health and continued therapies.  However he needs to complete an overnight oximetry study tonight to see if he needs O2 bled into his BiPAP.  Discharge delayed until tomorrow.     Principal Problem:    Acute on chronic respiratory failure with hypoxia and hypercapnia (H)    Assessment: Improved    Plan: Requires BiPAP to maintain adequate ventilation when sleeping.  Encourage activity throughout the day.  Home health to follow to assure understanding of the equipment and monitor status.  Will check overnight oximetry to see if he needs additional O2 bled into his BiPAP when he is sleeping.     Active Problems:    Type 2 diabetes mellitus with hyperglycemia, without long-term current use of insulin (H)    Assessment: Not at goal    Plan: Increased dose of Januvia from 10 mg to 25 mg daily.  Stop metformin to allow for increased dose while maintaining renal function.  Follow-up with PCP for continued monitoring of dosage changes.       Essential hypertension    Assessment: Stable    Plan: Continue carvedilol, furosemide, losartan and spironolactone.       Panlobular emphysema (H)    Assessment: Stable    Plan: Will benefit from BiPAP for both  "oxygenation and prevention of hypercarbia.  Encourage activity as able.  Will be beneficial to have home health and others checking on the patient frequently.       Mild cognitive impairment    Assessment: Stable    Plan: Still safe to be at home but will add home health, meals on wheels, visitor to provide frequent checks on the patient.       Chronic heart failure with preserved ejection fraction (H)    Assessment: Improved    Plan: Continue current medications as outlined above.  Home health will be following compliance.  Expect the BiPAP will help with overall cardiopulmonary function.       HAP (hospital-acquired pneumonia)    Assessment: Improving    Plan: Completed his course of abx today.  Continue prednisone taper for another 7 days.  Will be followed by his PCP.       Major depressive disorder, single episode, moderate (H)    Assessment: Improving    Plan: Started on Prozac.  Will benefit from frequent contacts from people to be sure he is doing well, staying active and taking his medications as prescribed.  Plan discharge tomorrow.          Diet: Combination Diet Regular Diet  Diet    DVT Prophylaxis: DOAC  Reynaga Catheter: Not present  Lines: None     Cardiac Monitoring: None  Code Status: Full Code      Clinically Significant Risk Factors              # Hypoalbuminemia: Lowest albumin = 3.2 g/dL at 1/17/2024  8:02 AM, will monitor as appropriate     # Hypertension: Noted on problem list       # DMII: A1C = 8.5 % (Ref range: 4.0 - 6.2 %) within past 6 months   # Obesity: Estimated body mass index is 35.55 kg/m  as calculated from the following:    Height as of this encounter: 1.88 m (6' 2\").    Weight as of this encounter: 125.6 kg (276 lb 14.4 oz).             Disposition Plan      Expected Discharge Date: 01/22/2024,  3:00 PM                  Ifeoma Fox MD  Hospitalist Service  [facility]  Securely message with [employer] (more info)  Text page via [facility] Paging/Directory "   ______________________________________________________________________    Interval History   Feeling good and wants to go home.  Was able to work with PT and OT and he is able to ambulate with a walker, did well even on stairs.  He is safe to return home.   has arranged home health care including therapies and there will be further assistance from elder care services including meals on wheels, home cleaning, visitors.  Patient feels he will do well at home.  Sister very concerned that he will not take his medications as prescribed and will not eat right but the patient is able to make his own decisions and will be ready for discharge after overnight oximetry study completed.    Physical Exam   Vital Signs: Temp: 97.5  F (36.4  C) Temp src: Tympanic BP: 122/59 Pulse: 63   Resp: 18 SpO2: 94 % O2 Device: None (Room air)    Weight: 276 lbs 14.4 oz    Constitutional: awake, alert, cooperative, no apparent distress, appears older than stated age, and morbidly obese  Eyes: pupils equal, round and reactive to light, extra-ocular muscles intact, and conjunctiva normal  ENT: normocephalic, without obvious abnormality, atraumatic  Respiratory: no increased work of breathing, decreased breath sounds throughout and clear to auscultation  Cardiovascular: regular rate and rhythm, normal S1 and S2, and no murmur noted  GI: normal bowel sounds, soft, non-distended, and non-tender  Skin: no redness, warmth, or swelling and skin of lower legs thickened from chronic venous stasis and edema  Musculoskeletal: 3+ pitting edema lower legs and feet  there is no redness, warmth, or swelling of the joints  tone is normal  Neurologic: Mental Status Exam:  Level of Alertness:   awake  Orientation:   person, place, time  Memory:   normal  Fund of Knowledge:  normal  Attention/Concentration:  normal  Language:  normal  Cranial Nerves:  cranial nerves II-XII are grossly intact  Motor Exam:  moves all extremities well and  symmetrically    Medical Decision Making             Data     I have personally reviewed the following data over the past 24 hrs:    N/A  \   N/A   / N/A     134 (L) 91 (L) 25.8 (H) /  222 (H)   5.1 29 0.97 \

## 2024-01-22 NOTE — PROGRESS NOTES
01/22/24 1124   Appointment Info   Signing Clinician's Name / Credentials (PT) Mata Devlin MPT   Therapeutic Activity   Symptoms Noted During/After Treatment Fatigue   Treatment Detail/Skilled Intervention sit to stand and stand pivot with SBA with and without use of Fww   Gait Training   Symptoms Noted During/After Treatment (Gait Training) fatigue   Treatment Detail/Skilled Intervention ambulation in hallway   Distance in Feet 225   [place] Level (Gait Training) stand-by assist   Physical Assistance Level (Gait Training) supervision   Weight Bearing (Gait Training) full weight-bearing   Assistive Device (Gait Training) rolling walker   Pattern Analysis (Gait Training) swing-through gait   Gait Analysis Deviations decreased malu   Impairments (Gait Analysis/Training) balance impaired   Stair Railings present at both sides   Physical Assist/Nonphysical Assist (Stairs) supervision   Level of [place] (Stairs) stand-by assist   PT Discharge Planning   PT Plan Continue PT   PT Discharge Recommendation (DC Rec) home with outpatient physical therapy   PT Rationale for DC Rec patient wishes to return home and will benefit from continued PT to promote strength, stability and safe mobility   PT Brief overview of current status Pt fatigues with activity.  pt requires SBA with use of walker (energy conservation) CGA to SBA for mobility without AD   PT Equipment Needed at Discharge walker, rolling

## 2024-01-22 NOTE — PROGRESS NOTES
Pt weaned to RA today. BiPAP in room on stand by, pt will wear tonight. Talked to [place] in the [facility] about pt Home BiPAP at discharge. They stated all was in order and they would reach out to [facility] in [place] on Monday to get BiPAP brought to pt in the hospital. No further respiratory interventions. Pina Pastrana RRT

## 2024-01-22 NOTE — DISCHARGE SUMMARY
"[facility]  Hospitalist Discharge Summary      Date of Admission:  1/16/2024  Date of Discharge:  1/23/2024  Discharging Provider: Ifeoma Fox MD  Discharge Service: Hospitalist Service    Discharge Diagnoses   Principal Problem:    Acute on chronic respiratory failure with hypoxia and hypercapnia (H) (1/16/2024)  Active Problems:    Type 2 diabetes mellitus with hyperglycemia, without long-term current use of insulin (H) (5/2/2014)    Essential hypertension (4/27/2017)    Panlobular emphysema (H) (2/28/2018)    Mild cognitive impairment (9/9/2022)    Chronic heart failure with preserved ejection fraction (H) (1/7/2024)    HAP (hospital-acquired pneumonia) (1/17/2024)    Major depressive disorder, single episode, moderate (H) (1/20/2024)      Clinically Significant Risk Factors     # DMII: A1C = 8.5 % (Ref range: 4.0 - 6.2 %) within past 6 months    # Obesity: Estimated body mass index is 35.55 kg/m  as calculated from the following:    Height as of this encounter: 1.88 m (6' 2\").    Weight as of this encounter: 125.6 kg (276 lb 14.4 oz).       Follow-ups Needed After Discharge   Follow-up Appointments     Follow Up and recommended labs and tests      Follow up with primary care provider in 1 week.  The following labs/tests   are recommended: BMP, CBC.        Follow depression.    Unresulted Labs Ordered in the Past 30 Days of this Admission       No orders found from 12/17/2023 to 1/17/2024.        These results will be followed up by PCP    Discharge Disposition   Admited to home care:   Agency:   Discharged to home  Condition at discharge: Satisfactory    Hospital Course   This 69 yo male with a PMH significant for DM-2 not controlled, HFpEF, chronic respiratory failure with hypoxia, COPD, atrial fib with RVR on Eliquis and dysthymic disorder was admitted to the hospital with acute on chronic respiratory failure with hypoxia and hypercapnia.  He had been admitted from 1/7-15/2024 with the " same diagnosis.  This was felt to be due to acute on chronic HFpEF and URI.  He underwent aggressive diuresis and was treated with BiPAP and discharged to [facility] for rehab.  He quickly returned to the hospital for the same reason.  He has been qualified for home BiPAP, met therapy goals and is ready for discharge with home health and continued therapies.    Principal Problem:    Acute on chronic respiratory failure with hypoxia and hypercapnia (H)    Assessment: Improved    Plan: Requires BiPAP to maintain adequate ventilation when sleeping and overnight oximetry study indicates he needs 3 liters of supplemental O2 bled into his machine to maintain sats above 90%.  Encourage activity throughout the day.  Home health to follow to assure understanding of the equipment and monitor status.    Active Problems:    Type 2 diabetes mellitus with hyperglycemia, without long-term current use of insulin (H)    Assessment: Not at goal    Plan: Increased dose of Januvia from 10 mg to 25 mg daily.  Stop metformin to allow for increased dose while maintaining renal function.  Follow-up with PCP for continued monitoring of dosage changes.      Essential hypertension    Assessment: Stable    Plan: Continue carvedilol, furosemide, losartan and spironolactone.      Panlobular emphysema (H)    Assessment: Stable    Plan: Will benefit from BiPAP with supplemental O2 bled in for both oxygenation and prevention of hypercarbia.  Encourage activity as able.  Will be beneficial to have home health and others checking on the patient frequently.      Mild cognitive impairment    Assessment: Stable    Plan: Still safe to be at home but will add home health, meals on wheels, visitor to provide frequent checks on the patient.      Chronic heart failure with preserved ejection fraction (H)    Assessment: Improved    Plan: Continue current medications as outlined above.  Home health will be following compliance.  Expect the BiPAP with  supplemental O2 bled in will help with overall cardiopulmonary function.      HAP (hospital-acquired pneumonia)    Assessment: Improving    Plan: Completed his course of abx today.  Continue prednisone taper for another 7 days.  Will be followed by his PCP.      Major depressive disorder, single episode, moderate (H)    Assessment: Improving    Plan: Started on Prozac.  Will benefit from frequent contacts from people to be sure he is doing well, staying active and taking his medications as prescribed.                Documentation of a Face-to-Face Physician Encounter January 22, 2024    Efra Zuniga  1955  [id number]    I certify that this patient is under my care and that I, or a nurse practitioner or physician's assistant working with me, had a face-to-face encounter that meets the physician face-to-face encounter requirements with this patient on: 1/23/2024.    The encounter with the patient was in whole, or in part, for the following medical condition, which is the primary reason for home health care:  Patient Active Problem List   Diagnosis    Dysthymic disorder    Asbestos exposure    History of disease    Type 2 diabetes mellitus with hyperglycemia, without long-term current use of insulin (H)    Essential hypertension    Nonsmoker    Simple chronic bronchitis (H)    Paroxysmal atrial fibrillation with RVR (H)    Low blood magnesium    Cardiac pacemaker in situ    Panlobular emphysema (H)    Adverse effect of antihyperlipidemic and antiarteriosclerotic drugs, sequela    Medical non-compliance    Mild cognitive impairment    Moderate episode of recurrent major depressive disorder (H)    Obesity (BMI 30-39.9)    Acute respiratory failure with hypoxia (H)    Chronic heart failure with preserved ejection fraction (H)    Acute on chronic respiratory failure with hypoxia and hypercapnia (H)    HAP (hospital-acquired pneumonia)    Major depressive disorder, single episode, moderate (H)       I certify that,  based on my findings, the following services are medically necessary home health services: Nursing, Occupational Therapy, Physical Therapy, and Social Work    My clinical findings support the need for the above services because: needs medication education and to follow compliance, will have new equipment to learn and monitor use, assess home safety, be alert for early signs of needing more assistance    Further, I certify that my clinical findings support that this patient is homebound (i.e. absences from home require considerable and taxing effort and are for medical reasons or Jehovah's witness services or infrequently or of short duration when for other reasons) because: dyspnea, pedal edema, needs a walker      Physician signature ___________________________________   January 23, 2024  Physician name: Ifeoma Fox MD    Fax ([phone]) or scan/email ([email or web address]) this completed document to [facility] within 24 hours of the referral date.  Questions: [phone].    I attest that I have seen and evaluated Efra Zuniga face to face.  He has a diagnosis of hypoventilation syndrome.  Patient would greatly benefit from use of BiPAP to improve pulmonary status and decrease work of breathing.  BiPAP therapy is necessary to improve future outcomes and decrease hospital readmissions.      Consultations This Hospital Stay   RESPIRATORY CARE IP CONSULT  SOCIAL WORK IP CONSULT  PHYSICAL THERAPY ADULT IP CONSULT  OCCUPATIONAL THERAPY ADULT IP CONSULT  NUTRITION SERVICES ADULT IP CONSULT  SOCIAL WORK IP CONSULT  PHYSICAL THERAPY ADULT IP CONSULT  OCCUPATIONAL THERAPY ADULT IP CONSULT    Code Status   Full Code    Time Spent on this Encounter   I, Ifeoma Fox MD, personally saw the patient today and spent greater than 30 minutes discharging this patient.       Ifeoma Fox MD  [facility]  [address]  [place] [address]  Phone: [phone]  Fax:  [phone]  ______________________________________________________________________    Physical Exam   Vital Signs: Temp: 98.7  F (37.1  C) Temp src: Tympanic BP: 102/54 Pulse: 63   Resp: 20 SpO2: 94 % O2 Device: None (Room air)    Weight: 276 lbs 14.4 oz  Constitutional: awake, alert, cooperative, no apparent distress, appears older than stated age, and morbidly obese  Eyes: pupils equal, round and reactive to light, extra-ocular muscles intact, and conjunctiva normal  ENT: normocephalic, without obvious abnormality, atraumatic  Respiratory: no increased work of breathing, decreased breath sounds throughout and clear to auscultation  Cardiovascular: regular rate and rhythm, normal S1 and S2, and no murmur noted  GI: normal bowel sounds, soft, non-distended, and non-tender  Skin: no redness, warmth, or swelling and skin of lower legs thickened from chronic venous stasis and edema  Musculoskeletal: 3+ pitting edema lower legs and feet  there is no redness, warmth, or swelling of the joints  tone is normal  Neurologic: Mental Status Exam:  Level of Alertness:   awake  Orientation:   person, place, time  Memory:   normal  Fund of Knowledge:  normal  Attention/Concentration:  normal  Language:  normal  Cranial Nerves:  cranial nerves II-XII are grossly intact  Motor Exam:  moves all extremities well and symmetrically       Primary Care Physician   Lukas Kramer    Discharge Orders      Home CPAP Order    If providing a ResMed device for this [place] patient, please add [facility] as an Integrator in [facility] along with patient's MRN: [id number] and CSN: [id number].  Needs 3 liters O2 bled into bilevel.     Reason for your hospital stay    Respiratory failure     Glucose monitor nursing POCT    Before meals and at bedtime     Follow Up and recommended labs and tests    Follow up with primary care provider in 1 week.  The following labs/tests are recommended: BMP, CBC.     Activity - Up ad joey     Activity -  Up with assistive device     Full Code     Physical Therapy Adult Consult    Evaluate and treat as clinically indicated.    Reason:  Impaired mobility and balance, needs rehab for self-cares.     Occupational Therapy Adult Consult    Evaluate and treat as clinically indicated.    Reason:  Impaired mobility and balance, needs rehab for self-cares.     Oxygen (SNF/TCU) Discharge     Oxygen Order    Oxygen Documentation  I certify that this patient, Efra Zuniga has been under my care (or a nurse practitioner or physican's assistant working with me). This is the face-to-face encounter for oxygen medical necessity.      At the time of this encounter, I have reviewed the qualifying testing and have determined that supplemental oxygen is reasonable and necessary and is expected to improve the patient's condition in a home setting.       Patient has continued oxygen desaturation due to chronic respiratory failure with hypoxia and hypercapnia, obesity hypoventilation syndrome.     Diet    Follow this diet upon discharge: Orders Placed This Encounter      Combination Diet Regular Diet       Significant Results and Procedures   Most Recent 3 CBC's:  Recent Labs   Lab Test 01/23/24  0530 01/21/24  0558 01/20/24  0532   WBC 15.0* 13.4* 14.9*   HGB 15.8 15.5 15.5   MCV 90 90 90    192 186     Most Recent 3 BMP's:  Recent Labs   Lab Test 01/23/24  0751 01/23/24  0530 01/22/24  2216 01/22/24  0731 01/22/24  0548 01/21/24  0740 01/21/24  0558   NA  --  137  --   --  134*  --  138   POTASSIUM  --  3.9  --   --  5.1  --  3.7   CHLORIDE  --  95*  --   --  91*  --  93*   CO2  --  34*  --   --  29  --  34*   BUN  --  23.1*  --   --  25.8*  --  19.7   CR  --  0.98  --   --  0.97  --  0.85   ANIONGAP  --  8  --   --  14  --  11   SCAR  --  8.5*  --   --  8.8  --  8.6*   * 123* 165*   < > 169*   < > 129*    < > = values in this interval not displayed.     Most Recent 3 BNP's:  Recent Labs   Lab Test 01/16/24  2951  01/07/24  1018 11/07/22  0911   NTBNPI 2,131* 46 30     Most Recent Cholesterol Panel:  Recent Labs   Lab Test 04/27/17  0904   *   HDL 36   TRIG 117     Most Recent Hemoglobin A1c:  Recent Labs   Lab Test 01/07/24  1018   A1C 8.5*     Most Recent 6 glucoses:  Recent Labs   Lab Test 01/23/24  0751 01/23/24  0530 01/22/24  2216 01/22/24  1723 01/22/24  1206 01/22/24  0731   * 123* 165* 172* 222* 155*   ,   Results for orders placed or performed during the hospital encounter of 01/16/24   CT Chest Pulmonary Embolism w Contrast    Narrative    CT CHEST PULMONARY EMBOLISM W CONTRAST  1/16/2024 5:16 PM    CLINICAL HISTORY: Male, age 68 years,  +covid, increased sob, hypoxic.  Lungs? PE?;    Comparison:  CTA chest 9/4/2022    TECHNIQUE:  CT angiogram was performed of the chest with IV contrast.  Axial, sagittal and coronal images were reviewed. If present, MIP  and/or 3-D images were constructed by the technologist.    FINDINGS:   Good quality CTA demonstrates no evidence of pulmonary embolus.  Thoracic aorta is intact. Distention of the pulmonary arterial tree is  similar in appearance.     The lungs demonstrate peripheral areas of atelectasis. There appears  be atelectasis and consolidation the left lower lobe.    Transvenous pacer is again seen. There is no evidence of pathologic  lymph node enlargement. Thyroid gland is unremarkable. Esophagus and  visualized portions of the upper abdomen demonstrate no acute  abnormality. The gallbladder surgically absent. Bony structures  demonstrate no acute abnormality.      Impression    IMPRESSION:   Left lower lobe pneumonia and partial atelectasis of the left and  right lower lobe.    No evidence of pulmonary embolus.    Chronic appearing distention of the pulmonary arterial tree is similar  in appearance compared to prior study.          This facility minimizes radiation dose by adjusting the mA and/or kV  according to each patient size.      This CT scan was  performed using one or more the following dose  reduction techniques:    -Automated exposure control,  -Adjustment of the mA and/or kV according to patient's size, and/or,  -Use of iterative reconstruction technique.    PERRI NAPOLES MD         SYSTEM ID:  [id number]       Discharge Medications   Current Discharge Medication List        START taking these medications    Details   FLUoxetine (PROZAC) 20 MG capsule Take 1 capsule (20 mg) by mouth daily  Qty: 30 capsule, Refills: 0    Associated Diagnoses: Acute on chronic respiratory failure with hypoxia and hypercapnia (H); Hypoventilation syndrome; Dysthymic disorder; Type 2 diabetes mellitus with hyperglycemia, without long-term current use of insulin (H); Essential hypertension; Paroxysmal atrial fibrillation with RVR (H); Panlobular emphysema (H)           CONTINUE these medications which have CHANGED    Details   apixaban ANTICOAGULANT (ELIQUIS) 5 MG tablet Take 1 tablet (5 mg) by mouth 2 times daily  Qty: 60 tablet, Refills: 0    Associated Diagnoses: Paroxysmal atrial fibrillation with RVR (H)      aspirin 81 MG EC tablet Take 1 tablet (81 mg) by mouth daily  Qty: 30 tablet, Refills: 0    Associated Diagnoses: Acute on chronic heart failure with preserved ejection fraction (H)      carvedilol (COREG) 12.5 MG tablet Take 1 tablet (12.5 mg) by mouth 2 times daily  Qty: 60 tablet, Refills: 0    Associated Diagnoses: Acute on chronic heart failure with preserved ejection fraction (H)      empagliflozin (JARDIANCE) 25 MG TABS tablet Take 1 tablet (25 mg) by mouth daily  Qty: 90 tablet, Refills: 1    Associated Diagnoses: Acute on chronic respiratory failure with hypoxia and hypercapnia (H); Hypoventilation syndrome; Dysthymic disorder; Type 2 diabetes mellitus with hyperglycemia, without long-term current use of insulin (H); Essential hypertension; Paroxysmal atrial fibrillation with RVR (H); Panlobular emphysema (H)      furosemide (LASIX) 20 MG tablet Take 1  tablet (20 mg) by mouth daily  Qty: 30 tablet, Refills: 0    Associated Diagnoses: Acute on chronic heart failure with preserved ejection fraction (H)      guaiFENesin (ROBITUSSIN) 20 mg/mL liquid Take 20 mLs (400 mg) by mouth every 4 hours as needed for cough Give with glass of water      losartan (COZAAR) 25 MG tablet Take 1 tablet (25 mg) by mouth daily  Qty: 30 tablet, Refills: 0    Associated Diagnoses: Acute on chronic heart failure with preserved ejection fraction (H)      predniSONE (DELTASONE) 20 MG tablet Take 1 tablet (20 mg) by mouth daily for 3 days, THEN 0.5 tablets (10 mg) daily for 4 days.  Qty: 5 tablet, Refills: 0    Associated Diagnoses: HAP (hospital-acquired pneumonia)      spironolactone (ALDACTONE) 50 MG tablet Take 1 tablet (50 mg) by mouth daily  Qty: 30 tablet, Refills: 0    Associated Diagnoses: Acute on chronic heart failure with preserved ejection fraction (H)           CONTINUE these medications which have NOT CHANGED    Details   acetaminophen (TYLENOL) 325 MG tablet Take 2 tablets (650 mg) by mouth every 4 hours as needed for mild pain or other (and adjunct with moderate or severe pain or per patient request)    Associated Diagnoses: Acute on chronic heart failure with preserved ejection fraction (H)      melatonin 3 MG tablet Take 1 tablet (3 mg) by mouth nightly as needed    Associated Diagnoses: Acute on chronic heart failure with preserved ejection fraction (H)      senna-docusate (SENOKOT-S/PERICOLACE) 8.6-50 MG tablet Take 1 tablet by mouth 2 times daily as needed for constipation    Associated Diagnoses: Acute on chronic heart failure with preserved ejection fraction (H)           STOP taking these medications       insulin aspart (NOVOLOG VIAL) 100 UNITS/ML vial Comments:   Reason for Stopping:         metFORMIN (GLUCOPHAGE) 500 MG tablet Comments:   Reason for Stopping:         zolpidem (AMBIEN) 5 MG tablet Comments:   Reason for Stopping:             Allergies   Allergies  "  Allergen Reactions    Lisinopril      Other reaction(s): Tachycardia  Felt like \"a heart attack\"; hands went numb     "

## 2024-01-22 NOTE — PROGRESS NOTES
SAFETY CHECKLIST  ID Bands and Risk clasps correct and in place (DNR, Fall risk, Allergy, Latex, Limb):  Yes  All Lines Reconciled and labeled correctly: Yes  Whiteboard updated:Yes  Environmental interventions: Yes  Verify Tele #:  NA      Assumed care at [id number]

## 2024-01-22 NOTE — PROGRESS NOTES
Patient ambulated to bathroom with walker a gait belt SBA. Needed no assistance to get up from chair, ambulate, and return.

## 2024-01-22 NOTE — PROGRESS NOTES
Will place over night oximetry on pt while on his home settings on the bipap but on room air. [facility] medical will come tomorrow with all the supplies pt needs after we check his need for oxygen at night.   Bisi Bowling, RT

## 2024-01-22 NOTE — PROGRESS NOTES
:     Met with patient in room to discuss discharge planning needs. Patient stated that he would like to go home with home care services in place. He stated that he feels safe returning home at this time. Spoke with patient about Meals on Wheels and provided him with this information. A call was placed to [facility] to make a referral for the  program. Voicemail left.     Spoke on the phone with sister, Jessica. She stated that patient cannot return home. She requested to speak with patients MD. MD updated.     Patient stated that he will need a ride home at the time of discharge. He mentioned that his niece may be able to provide transportation.     Pt/family was given the Medicare Compare list for Home Care, with associated star ratings to assist with choice for referrals/discharge planning Yes    Education was given to pt/family that star ratings are updated/maintained by Medicare and can be reviewed by visiting www.medicare.gov Yes    Patient stated he would like a referral sent to [facility].     A referral has been sent to [facility].     HENOK Bowie on 1/22/2024 at 9:54 AM     :     Spoke with Juma at [facility] ([phone]) and she stated that she can see patient on Wednesday if he discharges home. She stated that she will help patient set up meals on wheels and in home services for cleaning and laundry. If patient discharges to a SNF, please provide update to Juma.     HENOK Bowie on 1/22/2024 at 10:53 AM     :     Spoke with Vipul at [facility] in [place]. She stated that they cannot accept patient due to full capacity at their facility.     HENOK Bowie on 1/22/2024 at 11:09 AM     :     Home Care orders have been faxed for PT/OT/RN services.     HENOK Bowie on 1/22/2024 at 11:55 AM

## 2024-01-22 NOTE — PROGRESS NOTES
01/22/24 2823   Appointment Info   Signing Clinician's Name / Credentials (OT) Nalini Cruz, OTR/L   Therapeutic Activities   Therapeutic Activity Minutes (58155) 30   Symptoms noted during/after treatment fatigue   Treatment Detail/Skilled Intervention Pt ambulated in room and hallway with SBA and FWW, navigated up and down 8 steps with SBA, declined addressing any self cares as he states he wants t complete them at home.   OT Discharge Planning   OT Plan Continue OT   OT Discharge Recommendation (DC Rec) home   OT Rationale for DC Rec Pt has no further OT needs at time of D/C, is planning to have outpatient PT for strengthening   OT Brief overview of current status see above, pt has progressed well, eager to D/C home, discussed benefits of using a shower chair at home at pt states he will consider   OT Equipment Needed at Discharge walker, rolling;shower chair   Total Session Time   Timed Code Treatment Minutes 30   Total Session Time (sum of timed and untimed services) 30

## 2024-01-23 ENCOUNTER — APPOINTMENT (OUTPATIENT)
Dept: PHYSICAL THERAPY | Facility: OTHER | Age: 69
DRG: 189 | End: 2024-01-23
Payer: MEDICARE

## 2024-01-23 ENCOUNTER — APPOINTMENT (OUTPATIENT)
Dept: OCCUPATIONAL THERAPY | Facility: OTHER | Age: 69
DRG: 189 | End: 2024-01-23
Payer: MEDICARE

## 2024-01-23 VITALS
RESPIRATION RATE: 20 BRPM | TEMPERATURE: 98.7 F | BODY MASS INDEX: 35.54 KG/M2 | HEIGHT: 74 IN | HEART RATE: 63 BPM | OXYGEN SATURATION: 94 % | DIASTOLIC BLOOD PRESSURE: 54 MMHG | WEIGHT: 276.9 LBS | SYSTOLIC BLOOD PRESSURE: 102 MMHG

## 2024-01-23 LAB
ANION GAP SERPL CALCULATED.3IONS-SCNC: 8 MMOL/L (ref 7–15)
BUN SERPL-MCNC: 23.1 MG/DL (ref 8–23)
CALCIUM SERPL-MCNC: 8.5 MG/DL (ref 8.8–10.2)
CHLORIDE SERPL-SCNC: 95 MMOL/L (ref 98–107)
CREAT SERPL-MCNC: 0.98 MG/DL (ref 0.67–1.17)
DEPRECATED HCO3 PLAS-SCNC: 34 MMOL/L (ref 22–29)
EGFRCR SERPLBLD CKD-EPI 2021: 83 ML/MIN/1.73M2
ERYTHROCYTE [DISTWIDTH] IN BLOOD BY AUTOMATED COUNT: 14 % (ref 10–15)
GLUCOSE BLDC GLUCOMTR-MCNC: 140 MG/DL (ref 70–99)
GLUCOSE SERPL-MCNC: 123 MG/DL (ref 70–99)
HCT VFR BLD AUTO: 48.8 % (ref 40–53)
HGB BLD-MCNC: 15.8 G/DL (ref 13.3–17.7)
HOLD SPECIMEN: NORMAL
HOLD SPECIMEN: NORMAL
MCH RBC QN AUTO: 29.2 PG (ref 26.5–33)
MCHC RBC AUTO-ENTMCNC: 32.4 G/DL (ref 31.5–36.5)
MCV RBC AUTO: 90 FL (ref 78–100)
PLATELET # BLD AUTO: 224 10E3/UL (ref 150–450)
POTASSIUM SERPL-SCNC: 3.9 MMOL/L (ref 3.4–5.3)
RBC # BLD AUTO: 5.42 10E6/UL (ref 4.4–5.9)
SODIUM SERPL-SCNC: 137 MMOL/L (ref 135–145)
WBC # BLD AUTO: 15 10E3/UL (ref 4–11)

## 2024-01-23 PROCEDURE — 97116 GAIT TRAINING THERAPY: CPT | Mod: GP

## 2024-01-23 PROCEDURE — 94762 N-INVAS EAR/PLS OXIMTRY CONT: CPT

## 2024-01-23 PROCEDURE — 80048 BASIC METABOLIC PNL TOTAL CA: CPT | Performed by: FAMILY MEDICINE

## 2024-01-23 PROCEDURE — 999N000157 HC STATISTIC RCP TIME EA 10 MIN

## 2024-01-23 PROCEDURE — 97530 THERAPEUTIC ACTIVITIES: CPT | Mod: GP

## 2024-01-23 PROCEDURE — 99239 HOSP IP/OBS DSCHRG MGMT >30: CPT | Performed by: FAMILY MEDICINE

## 2024-01-23 PROCEDURE — 36415 COLL VENOUS BLD VENIPUNCTURE: CPT | Performed by: FAMILY MEDICINE

## 2024-01-23 PROCEDURE — 250N000013 HC RX MED GY IP 250 OP 250 PS 637: Performed by: INTERNAL MEDICINE

## 2024-01-23 PROCEDURE — 97535 SELF CARE MNGMENT TRAINING: CPT | Mod: GO | Performed by: OCCUPATIONAL THERAPIST

## 2024-01-23 PROCEDURE — 85027 COMPLETE CBC AUTOMATED: CPT | Performed by: FAMILY MEDICINE

## 2024-01-23 PROCEDURE — 250N000013 HC RX MED GY IP 250 OP 250 PS 637: Performed by: STUDENT IN AN ORGANIZED HEALTH CARE EDUCATION/TRAINING PROGRAM

## 2024-01-23 PROCEDURE — 250N000012 HC RX MED GY IP 250 OP 636 PS 637: Performed by: STUDENT IN AN ORGANIZED HEALTH CARE EDUCATION/TRAINING PROGRAM

## 2024-01-23 RX ADMIN — INSULIN ASPART 1 UNITS: 100 INJECTION, SOLUTION INTRAVENOUS; SUBCUTANEOUS at 08:04

## 2024-01-23 RX ADMIN — PREDNISONE 20 MG: 20 TABLET ORAL at 09:51

## 2024-01-23 RX ADMIN — CARVEDILOL 12.5 MG: 12.5 TABLET, FILM COATED ORAL at 07:52

## 2024-01-23 RX ADMIN — SPIRONOLACTONE 50 MG: 25 TABLET ORAL at 09:52

## 2024-01-23 RX ADMIN — LOSARTAN POTASSIUM 25 MG: 25 TABLET, FILM COATED ORAL at 09:52

## 2024-01-23 RX ADMIN — FLUOXETINE 20 MG: 20 CAPSULE ORAL at 09:52

## 2024-01-23 RX ADMIN — ASPIRIN 81 MG: 81 TABLET, COATED ORAL at 09:52

## 2024-01-23 RX ADMIN — EMPAGLIFLOZIN 25 MG: 25 TABLET, FILM COATED ORAL at 09:52

## 2024-01-23 RX ADMIN — FUROSEMIDE 20 MG: 20 TABLET ORAL at 09:52

## 2024-01-23 RX ADMIN — APIXABAN 5 MG: 5 TABLET, FILM COATED ORAL at 09:52

## 2024-01-23 ASSESSMENT — ACTIVITIES OF DAILY LIVING (ADL)
ADLS_ACUITY_SCORE: 23

## 2024-01-23 NOTE — PHARMACY
[facility]  Part of [facility]  [address] [place] [id number]    January 23, 2024    Dear Pharmacist,    Your customer, Efra Zuniga, born on 1955, was recently discharged from [facility].  We have updated his medication list and want to alert you to the following:       Review of your medicines        START taking        Dose / Directions   FLUoxetine 20 MG capsule  Commonly known as: PROzac  Used for: Acute on chronic respiratory failure with hypoxia and hypercapnia (H), Hypoventilation syndrome, Dysthymic disorder, Type 2 diabetes mellitus with hyperglycemia, without long-term current use of insulin (H), Essential hypertension, Paroxysmal atrial fibrillation with RVR (H), Panlobular emphysema (H)      Dose: 20 mg  Take 1 capsule (20 mg) by mouth daily  Quantity: 30 capsule  Refills: 0            CONTINUE these medicines which may have CHANGED, or have new prescriptions. If we are uncertain of the size of tablets/capsules you have at home, strength may be listed as something that might have changed.        Dose / Directions   empagliflozin 25 MG Tabs tablet  Commonly known as: JARDIANCE  This may have changed:   medication strength  how much to take  Used for: Acute on chronic respiratory failure with hypoxia and hypercapnia (H), Hypoventilation syndrome, Dysthymic disorder, Type 2 diabetes mellitus with hyperglycemia, without long-term current use of insulin (H), Essential hypertension, Paroxysmal atrial fibrillation with RVR (H), Panlobular emphysema (H)      Dose: 25 mg  Take 1 tablet (25 mg) by mouth daily  Quantity: 90 tablet  Refills: 1     guaiFENesin 20 mg/mL liquid  Commonly known as: ROBITUSSIN  This may have changed:   when to take this  reasons to take this      Dose: 400 mg  Take 20 mLs (400 mg) by mouth every 4 hours as needed for cough Give with glass of water  Refills: 0     predniSONE 20 MG tablet  Commonly known as:  DELTASONE  This may have changed: See the new instructions.  Used for: HAP (hospital-acquired pneumonia)      Start taking on: January 22, 2024  Take 1 tablet (20 mg) by mouth daily for 3 days, THEN 0.5 tablets (10 mg) daily for 4 days.  Quantity: 5 tablet  Refills: 0            CONTINUE these medicines which have NOT CHANGED        Dose / Directions   acetaminophen 325 MG tablet  Commonly known as: TYLENOL  Used for: Acute on chronic heart failure with preserved ejection fraction (H)      Dose: 650 mg  Take 2 tablets (650 mg) by mouth every 4 hours as needed for mild pain or other (and adjunct with moderate or severe pain or per patient request)  Refills: 0     apixaban ANTICOAGULANT 5 MG tablet  Commonly known as: ELIQUIS  Indication: Atrial Fibrillation Not Caused By A Heart Valve Problem  Used for: Paroxysmal atrial fibrillation with RVR (H)      Dose: 5 mg  Take 1 tablet (5 mg) by mouth 2 times daily  Quantity: 60 tablet  Refills: 0     aspirin 81 MG EC tablet  Used for: Acute on chronic heart failure with preserved ejection fraction (H)      Dose: 81 mg  Take 1 tablet (81 mg) by mouth daily  Quantity: 30 tablet  Refills: 0     carvedilol 12.5 MG tablet  Commonly known as: COREG  Used for: Acute on chronic heart failure with preserved ejection fraction (H)      Dose: 12.5 mg  Take 1 tablet (12.5 mg) by mouth 2 times daily  Quantity: 60 tablet  Refills: 0     furosemide 20 MG tablet  Commonly known as: LASIX  Used for: Acute on chronic heart failure with preserved ejection fraction (H)      Dose: 20 mg  Take 1 tablet (20 mg) by mouth daily  Quantity: 30 tablet  Refills: 0     losartan 25 MG tablet  Commonly known as: COZAAR  Used for: Acute on chronic heart failure with preserved ejection fraction (H)      Dose: 25 mg  Take 1 tablet (25 mg) by mouth daily  Quantity: 30 tablet  Refills: 0     melatonin 3 MG tablet  Used for: Acute on chronic heart failure with preserved ejection fraction (H)      Dose: 3 mg  Take  1 tablet (3 mg) by mouth nightly as needed  Refills: 0     senna-docusate 8.6-50 MG tablet  Commonly known as: SENOKOT-S/PERICOLACE  Used for: Acute on chronic heart failure with preserved ejection fraction (H)      Dose: 1 tablet  Take 1 tablet by mouth 2 times daily as needed for constipation  Refills: 0     spironolactone 50 MG tablet  Commonly known as: ALDACTONE  Used for: Acute on chronic heart failure with preserved ejection fraction (H)      Dose: 50 mg  Take 1 tablet (50 mg) by mouth daily  Quantity: 30 tablet  Refills: 0            STOP taking      insulin aspart 100 UNITS/ML vial  Commonly known as: NovoLOG VIAL        metFORMIN 500 MG tablet  Commonly known as: GLUCOPHAGE        zolpidem 5 MG tablet  Commonly known as: AMBIEN                  Where to get your medicines        These medications were sent to [id number] - [place], [place] - [address]  [address] [facility] [address]      Phone: [phone]   apixaban ANTICOAGULANT 5 MG tablet  aspirin 81 MG EC tablet  carvedilol 12.5 MG tablet  empagliflozin 25 MG Tabs tablet  FLUoxetine 20 MG capsule  furosemide 20 MG tablet  losartan 25 MG tablet  predniSONE 20 MG tablet  spironolactone 50 MG tablet         We also reviewed Efra Zuniga's allergy list and updated it as needed:  Allergies: Lisinopril    Thank you for continuing to care for Efra Zuniga.  We look forward to working together with you in the future.    Sincerely,  Jasper Holbrook [facility]

## 2024-01-23 NOTE — PROGRESS NOTES
Patient was on BIPAP-S most of the shift and tolerated it well.    Settings: IPAP 18. EPAP. 8, Fio2 21%, Large full face mask.    [place] oximetry done on BIPAP-S and room air. See oximetry report for details.    Noel Holliday, RT         Acitretin Counseling:  I discussed with the patient the risks of acitretin including but not limited to hair loss, dry lips/skin/eyes, liver damage, hyperlipidemia, depression/suicidal ideation, photosensitivity.  Serious rare side effects can include but are not limited to pancreatitis, pseudotumor cerebri, bony changes, clot formation/stroke/heart attack.  Patient understands that alcohol is contraindicated since it can result in liver toxicity and significantly prolong the elimination of the drug by many years.

## 2024-01-23 NOTE — PROGRESS NOTES
01/23/24 1400   Appointment Info   Signing Clinician's Name / Credentials (PT) Mata Devlin MPT   Therapeutic Activity   Treatment Detail/Skilled Intervention bed mobilities and transfers with SBA using a Fww for gait activities   Gait Training   Treatment Detail/Skilled Intervention ambulation in hallway   Distance in Feet 250   [place] Level (Gait Training) stand-by assist   Physical Assistance Level (Gait Training) supervision   Weight Bearing (Gait Training) full weight-bearing   Assistive Device (Gait Training) rolling walker   Pattern Analysis (Gait Training) swing-through gait   Gait Analysis Deviations decreased malu   Impairments (Gait Analysis/Training) balance impaired;strength decreased   PT Discharge Planning   PT Plan patient discharging   PT Discharge Recommendation (DC Rec) home with outpatient physical therapy   PT Rationale for DC Rec to promote strength, stability and safe mobility   PT Brief overview of current status Fatigue remains to limit activity/gait tolerance; he will benefit from continued PT through outpatient services   PT Equipment Needed at Discharge walker, rolling

## 2024-01-23 NOTE — PROGRESS NOTES
SAFETY CHECKLIST  ID Bands and Risk clasps correct and in place (DNR, Fall risk, Allergy, Latex, Limb):  Yes  All Lines Reconciled and labeled correctly: Yes  Whiteboard updated:Yes  Environmental interventions: Yes  Verify Tele #:  not on tele

## 2024-01-23 NOTE — PROGRESS NOTES
Discharge Note    Data:  Efra Zuniga discharged to home at 1130 via wheelchair. Accompanied by staff and picked up by [employer].    Action:  Written discharge/follow-up instructions were provided to patient. Prescriptions sent to patients preferred pharmacy. All belongings sent with patient.    Response:  Patient verbalized understanding of discharge instructions, reason for discharge, and necessary follow-up appointments.     Letty Suresh RN on 1/23/2024 at 11:43 AM

## 2024-01-23 NOTE — PROGRESS NOTES
01/23/24 1548   Appointment Info   Signing Clinician's Name / Credentials (OT) Nalini Cruz OTR/L   Self-Care/Home Management   Self-Care/Home Mgmt/ADL, Compensatory, Meal Prep Minutes (56934) 45   Symptoms Noted During/After Treatment (Meal Preparation/Planning Training) fatigue   [place] Level (Grooming Training) stand-by assist   Assistance (Grooming Training) supervision   [place] Level (Bathing Training) stand-by assist   Assistance (Bathing Training) supervision   [place] Level (Upper Body Dressing Training) stand-by assist   Lower Body Dressing Training Assistance minimum assist (75% patient effort)   OT Discharge Planning   OT Plan D/C home today   OT Discharge Recommendation (DC Rec) home   OT Rationale for DC Rec Pt has no further OT needs at time of D/C, is planning to have outpatient PT for strengthening   OT Brief overview of current status see above for details   OT Equipment Needed at Discharge walker, rolling;shower chair   Total Session Time   Timed Code Treatment Minutes 45   Total Session Time (sum of timed and untimed services) 45

## 2024-01-23 NOTE — PLAN OF CARE
Goal Outcome Evaluation:       Patient alert and oriented, VSS, room air, patient denied pain.     The patient slept since writer assumed care. Respiratory therapy maintained BIPAP.       0630: Patient was awake, denied pain.

## 2024-01-23 NOTE — PHARMACY - DISCHARGE MEDICATION RECONCILIATION AND EDUCATION
Pharmacy:  Discharge Counseling and Medication Reconciliation    Efra MATTHEW Delano  [address] [address] [phone] (home)   68 year old male  PCP: Lukas Kramer    Allergies: Lisinopril    Discharge Counseling:    Pharmacist met with patient today to review the medication portion of the After Visit Summary (with an emphasis on NEW medications) and to address patient's questions/concerns.    Summary of Education: Counseled patient on plan in regard to medications at discharge- prior to last hospital admission he had stopped taking all of his home medications for a few months due to not feeling well- I discussed this with patient and he reports the issues were more gastrointestinal-related; I counseled him on importance of taking medications as prescribed and discussed taking medications with food and also spacing out administration if GI upset still occurs. Patient also counseled that updated prescriptions were sent in to [facility]- he should compare carefully to what he already has at home and confiscate Rx's he no longer takes. Patient is going home with home care and they will also assist in setting up his medications- unclear when Agustina is first meeting with patient, but patient reports he will be able to manage his medications for the next few days.    Prednisone: reviewed taper schedule with patient.    Fluoxetine: reviewed indication and possible common side effects of Fluoxetine. Counseled patient that it may take up to two months to see full benefits of this medication.    Also advised patient to stop taking Metformin and Zolpidem at discharge; Novolog sliding scale had been prescribed after last discharge for when patient was going to [facility]- patient had not been on previously at home. He reports he is not confident in self-administering insulin. Discussed this with Dr. Fox, and order for Novolog discontinued.    Patient advised to review discharge medication  list and medications carefully. If there are any perceived discrepancies about what he should be taking he should reach out to a health care provider to develop a strategy for discontinuing/adjusting medications rather than stopping cold turkey as he had done previously.    Materials Provided:  MedCounselor sheets printed from Clinical Pharmacology on: Fluoxetine, Prednisone    Discharge Medication Reconciliation:    It has been determined that the patient has an adequate supply of medications available or which can be obtained from the patient's preferred pharmacy, which he has confirmed as: [id number] [An updated medication list will be faxed to the patient's pharmacy.]    Thank you for the consult.    Jasper Holbrook [facility]........January 23, 2024 12:37 PM

## 2024-01-23 NOTE — PROGRESS NOTES
:     Patient is anticipated to discharge home today. [facility] care is set up for pt/ot/sn services. [facility] is also set up to met with patient and set up services. Spoke with patient in his room and he is agreeable to discharge plan. Patient states that his niece is not able to transport him but her  might be able to. Patient requested  to call patient's sister to help set up the ride. Called patient's sister and left voicemail. Waiting for a return call.     MAN Styles on 1/23/2024 at 8:45 AM    Spoke with patient's sister, Jessica, about discharge planning. The earliest they could pick patient up would be 15:30. Spoke with patient in his room and he stated he would feel comfortable taking [employer] home and has his wallet to pay for it.     Waiting for patient's home bi-pap to arrive to set up transportation.     MAN Styles on 1/23/2024 at 9:15 AM    Per patient's nurse patient is nearly ready to go. Patient' bi-pap will be delivered to patient's house. Called [employer] and they will have someone at the clinic doors at 11:30. Called Juma at [facility] and left voicemail updating her that patient is discharging today. Called patient's sister a updated her on patient's discharge plan. Called and left a voicemail with Leena at [facility] updating them that patient is discharging home today.     No further needs from  at this time.     MAN Styles on 1/23/2024 at 11:06 AM

## 2024-01-23 NOTE — PLAN OF CARE
Goal Outcome Evaluation:  Pt was admitted on 1/16. Was on BIPAP overnight, stable now on RA while awake. Lungs are clear throughout posterior/anterior. Dry, non-productive cough present. No SOB noted. HRR stable. +2 edema to BLE. BLE are dry with scabs. Blanchable redness noted to sacrum. Education provided to pt on repositioning. Barrier cream. Pt ambulated assist of 1 with walker. Showered this morning. Plans to discharge home today with home care. Letty Surseh RN on 1/23/2024 at 10:34 AM      Plan of Care Reviewed With: patient    Overall Patient Progress: improving

## 2024-01-24 ENCOUNTER — PATIENT OUTREACH (OUTPATIENT)
Dept: INTERNAL MEDICINE | Facility: OTHER | Age: 69
End: 2024-01-24
Payer: COMMERCIAL

## 2024-01-24 PROCEDURE — G0180 MD CERTIFICATION HHA PATIENT: HCPCS | Performed by: INTERNAL MEDICINE

## 2024-01-24 NOTE — TELEPHONE ENCOUNTER
First Transitional Care Management phone call attempted at 8:11 AM 1/24/2024.    Emili Saucedo RN on 1/24/2024 at 8:12 AM

## 2024-01-25 ENCOUNTER — DOCUMENTATION ONLY (OUTPATIENT)
Dept: OTHER | Facility: CLINIC | Age: 69
End: 2024-01-25
Payer: COMMERCIAL

## 2024-01-25 ENCOUNTER — HOSPITAL ENCOUNTER (OUTPATIENT)
Facility: OTHER | Age: 69
Setting detail: OBSERVATION
Discharge: NURSING, REHAB OR LONG-TERM CARE | End: 2024-01-26
Attending: FAMILY MEDICINE | Admitting: FAMILY MEDICINE
Payer: MEDICARE

## 2024-01-25 DIAGNOSIS — R62.7 FAILURE TO THRIVE IN ADULT: ICD-10-CM

## 2024-01-25 DIAGNOSIS — I48.0 PAROXYSMAL ATRIAL FIBRILLATION WITH RVR (H): ICD-10-CM

## 2024-01-25 DIAGNOSIS — J96.22 ACUTE ON CHRONIC RESPIRATORY FAILURE WITH HYPOXIA AND HYPERCAPNIA (H): ICD-10-CM
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
## 2024-01-25 DIAGNOSIS — J96.21 ACUTE ON CHRONIC RESPIRATORY FAILURE WITH HYPOXIA AND HYPERCAPNIA (H): ICD-10-CM

## 2024-01-25 DIAGNOSIS — E11.65 TYPE 2 DIABETES MELLITUS WITH HYPERGLYCEMIA, WITHOUT LONG-TERM CURRENT USE OF INSULIN (H): ICD-10-CM

## 2024-01-25 DIAGNOSIS — J18.9 HAP (HOSPITAL-ACQUIRED PNEUMONIA): ICD-10-CM

## 2024-01-25 DIAGNOSIS — I50.32 CHRONIC HEART FAILURE WITH PRESERVED EJECTION FRACTION (H): Primary | ICD-10-CM

## 2024-01-25 PROCEDURE — 99284 EMERGENCY DEPT VISIT MOD MDM: CPT | Performed by: FAMILY MEDICINE

## 2024-01-25 PROCEDURE — 99285 EMERGENCY DEPT VISIT HI MDM: CPT | Performed by: FAMILY MEDICINE

## 2024-01-25 ASSESSMENT — ENCOUNTER SYMPTOMS
WEAKNESS: 1
DIARRHEA: 0
CONSTIPATION: 0
NAUSEA: 0
FATIGUE: 0
VOMITING: 0
CHILLS: 0
ABDOMINAL DISTENTION: 0
DIAPHORESIS: 0

## 2024-01-25 ASSESSMENT — ACTIVITIES OF DAILY LIVING (ADL): ADLS_ACUITY_SCORE: 37

## 2024-01-25 NOTE — TELEPHONE ENCOUNTER
Transitional Care Management Phone Call      Summary of hospitalization:  [facility] discharge summary reviewed    DISCHARGE DIAGNOSIS: Principal Problem:    Acute on chronic respiratory failure with hypoxia and hypercapnia (H) (1/16/2024)  Active Problems:    Type 2 diabetes mellitus with hyperglycemia, without long-term current use of insulin (H) (5/2/2014)    Essential hypertension (4/27/2017)    Panlobular emphysema (H) (2/28/2018)    Mild cognitive impairment (9/9/2022)    Chronic heart failure with preserved ejection fraction (H) (1/7/2024)    HAP (hospital-acquired pneumonia) (1/17/2024)    Major depressive disorder, single episode, moderate (H) (1/20/2024)    DATE OF DISCHARGE: 1/23/2024    Diagnostic Tests/Treatments reviewed.  Follow up needed: BMP, CBC    Post Discharge Medication Reconciliation: discharge medications reconciled and changed, per note/orders      Medications reviewed by: by myself    Problems taking medications regularly:  None    Problems adhering to non-medication therapy:  None    Other Healthcare Providers Involved in Patient's Care:         Homecare    Update since discharge: fluctuating course.     Plan of care communicated with: patient    Just a friendly reminder that you appointment is:  Next 5 appointments (look out 90 days)      Jan 30, 2024  9:20 AM  (Arrive by 9:05 AM)  Office Visit with Lukas Kramer MD  [facility] [address]  [place] [address]  [phone]     Feb 15, 2024 10:20 AM  (Arrive by 10:05 AM)  Office Visit with Lukas Kramer MD  [facility] [address]  [place] [address]  [phone]              We encourage you to keep this appointment.    Please remember to bring all of your pills in their bottles (including any vitamins or over the counter pills) with  you to your appointment.     The patient indicates understanding of these issues and agrees with the plan of care.   Yes    Was the patient contacted within the 2 business days or other approved timeframe?  Yes    Was the Medication reconciliation and management done since the patient was discharged? Yes      Rajni Robledo RN  1/25/2024 11:28 AM

## 2024-01-25 NOTE — TELEPHONE ENCOUNTER
Called and spoke to Patient after verifying last name and date of birth. Patient states they are wanting to go to short term rehab as they do not think that they are strong enough to take care of themselves quite yet at home. Discussed will route to PCP for review of this patient's request. Patient states no one has shown him how to use his BIPAP machine.      Rajni Robledo RN on 1/25/2024 at 11:41 AM

## 2024-01-26 ENCOUNTER — APPOINTMENT (OUTPATIENT)
Dept: OCCUPATIONAL THERAPY | Facility: OTHER | Age: 69
End: 2024-01-26
Payer: MEDICARE

## 2024-01-26 ENCOUNTER — APPOINTMENT (OUTPATIENT)
Dept: PHYSICAL THERAPY | Facility: OTHER | Age: 69
End: 2024-01-26
Payer: MEDICARE

## 2024-01-26 VITALS
RESPIRATION RATE: 18 BRPM | WEIGHT: 276 LBS | BODY MASS INDEX: 35.42 KG/M2 | TEMPERATURE: 97.9 F | HEIGHT: 74 IN | DIASTOLIC BLOOD PRESSURE: 77 MMHG | SYSTOLIC BLOOD PRESSURE: 143 MMHG | HEART RATE: 67 BPM | OXYGEN SATURATION: 95 %

## 2024-01-26 LAB
ANION GAP SERPL CALCULATED.3IONS-SCNC: 14 MMOL/L (ref 7–15)
ANION GAP SERPL CALCULATED.3IONS-SCNC: 7 MMOL/L (ref 7–15)
APTT PPP: 25 SECONDS (ref 22–38)
BASOPHILS # BLD AUTO: 0.1 10E3/UL (ref 0–0.2)
BASOPHILS NFR BLD AUTO: 0 %
BUN SERPL-MCNC: 26 MG/DL (ref 8–23)
BUN SERPL-MCNC: 29.1 MG/DL (ref 8–23)
CALCIUM SERPL-MCNC: 8.6 MG/DL (ref 8.8–10.2)
CALCIUM SERPL-MCNC: 8.7 MG/DL (ref 8.8–10.2)
CHLORIDE SERPL-SCNC: 95 MMOL/L (ref 98–107)
CHLORIDE SERPL-SCNC: 97 MMOL/L (ref 98–107)
CREAT SERPL-MCNC: 0.94 MG/DL (ref 0.67–1.17)
CREAT SERPL-MCNC: 1.01 MG/DL (ref 0.67–1.17)
DEPRECATED HCO3 PLAS-SCNC: 25 MMOL/L (ref 22–29)
DEPRECATED HCO3 PLAS-SCNC: 33 MMOL/L (ref 22–29)
EGFRCR SERPLBLD CKD-EPI 2021: 81 ML/MIN/1.73M2
EGFRCR SERPLBLD CKD-EPI 2021: 88 ML/MIN/1.73M2
EOSINOPHIL # BLD AUTO: 0.2 10E3/UL (ref 0–0.7)
EOSINOPHIL NFR BLD AUTO: 2 %
ERYTHROCYTE [DISTWIDTH] IN BLOOD BY AUTOMATED COUNT: 14.3 % (ref 10–15)
GLUCOSE SERPL-MCNC: 120 MG/DL (ref 70–99)
GLUCOSE SERPL-MCNC: 128 MG/DL (ref 70–99)
HCT VFR BLD AUTO: 49.9 % (ref 40–53)
HGB BLD-MCNC: 15.8 G/DL (ref 13.3–17.7)
IMM GRANULOCYTES # BLD: 0.2 10E3/UL
IMM GRANULOCYTES NFR BLD: 1 %
INR PPP: 1 (ref 0.85–1.15)
LACTATE SERPL-SCNC: 1.5 MMOL/L (ref 0.7–2)
LYMPHOCYTES # BLD AUTO: 3.3 10E3/UL (ref 0.8–5.3)
LYMPHOCYTES NFR BLD AUTO: 21 %
MCH RBC QN AUTO: 29 PG (ref 26.5–33)
MCHC RBC AUTO-ENTMCNC: 31.7 G/DL (ref 31.5–36.5)
MCV RBC AUTO: 92 FL (ref 78–100)
MONOCYTES # BLD AUTO: 1 10E3/UL (ref 0–1.3)
MONOCYTES NFR BLD AUTO: 6 %
NEUTROPHILS # BLD AUTO: 10.9 10E3/UL (ref 1.6–8.3)
NEUTROPHILS NFR BLD AUTO: 70 %
NRBC # BLD AUTO: 0 10E3/UL
NRBC BLD AUTO-RTO: 0 /100
PLATELET # BLD AUTO: 214 10E3/UL (ref 150–450)
POTASSIUM SERPL-SCNC: 4.2 MMOL/L (ref 3.4–5.3)
POTASSIUM SERPL-SCNC: 5 MMOL/L (ref 3.4–5.3)
RBC # BLD AUTO: 5.45 10E6/UL (ref 4.4–5.9)
SODIUM SERPL-SCNC: 134 MMOL/L (ref 135–145)
SODIUM SERPL-SCNC: 137 MMOL/L (ref 135–145)
WBC # BLD AUTO: 15.6 10E3/UL (ref 4–11)

## 2024-01-26 PROCEDURE — G0378 HOSPITAL OBSERVATION PER HR: HCPCS

## 2024-01-26 PROCEDURE — 85730 THROMBOPLASTIN TIME PARTIAL: CPT | Performed by: INTERNAL MEDICINE

## 2024-01-26 PROCEDURE — 83605 ASSAY OF LACTIC ACID: CPT | Performed by: INTERNAL MEDICINE

## 2024-01-26 PROCEDURE — 85025 COMPLETE CBC W/AUTO DIFF WBC: CPT | Performed by: INTERNAL MEDICINE

## 2024-01-26 PROCEDURE — 99207 PR NO CHARGE LOS: CPT | Performed by: FAMILY MEDICINE

## 2024-01-26 PROCEDURE — 97165 OT EVAL LOW COMPLEX 30 MIN: CPT | Mod: GO | Performed by: OCCUPATIONAL THERAPIST

## 2024-01-26 PROCEDURE — 97535 SELF CARE MNGMENT TRAINING: CPT | Mod: GO | Performed by: OCCUPATIONAL THERAPIST

## 2024-01-26 PROCEDURE — 97530 THERAPEUTIC ACTIVITIES: CPT | Mod: GO | Performed by: OCCUPATIONAL THERAPIST

## 2024-01-26 PROCEDURE — 250N000012 HC RX MED GY IP 250 OP 636 PS 637: Performed by: INTERNAL MEDICINE

## 2024-01-26 PROCEDURE — 97530 THERAPEUTIC ACTIVITIES: CPT | Mod: GP

## 2024-01-26 PROCEDURE — 999N000157 HC STATISTIC RCP TIME EA 10 MIN

## 2024-01-26 PROCEDURE — 97116 GAIT TRAINING THERAPY: CPT | Mod: GP

## 2024-01-26 PROCEDURE — 97161 PT EVAL LOW COMPLEX 20 MIN: CPT | Mod: GP

## 2024-01-26 PROCEDURE — 36415 COLL VENOUS BLD VENIPUNCTURE: CPT | Performed by: FAMILY MEDICINE

## 2024-01-26 PROCEDURE — 85610 PROTHROMBIN TIME: CPT | Performed by: INTERNAL MEDICINE

## 2024-01-26 PROCEDURE — 80048 BASIC METABOLIC PNL TOTAL CA: CPT | Mod: 91 | Performed by: INTERNAL MEDICINE

## 2024-01-26 PROCEDURE — 36415 COLL VENOUS BLD VENIPUNCTURE: CPT | Performed by: INTERNAL MEDICINE

## 2024-01-26 PROCEDURE — 250N000013 HC RX MED GY IP 250 OP 250 PS 637: Performed by: INTERNAL MEDICINE

## 2024-01-26 PROCEDURE — 99222 1ST HOSP IP/OBS MODERATE 55: CPT | Performed by: INTERNAL MEDICINE

## 2024-01-26 PROCEDURE — 80048 BASIC METABOLIC PNL TOTAL CA: CPT | Performed by: FAMILY MEDICINE

## 2024-01-26 RX ORDER — PREDNISONE 10 MG/1
10 TABLET ORAL DAILY
Qty: 2 TABLET | Refills: 0 | Status: SHIPPED | OUTPATIENT
Start: 2024-01-26 | End: 2024-01-28

## 2024-01-26 RX ORDER — NALOXONE HYDROCHLORIDE 0.4 MG/ML
0.2 INJECTION, SOLUTION INTRAMUSCULAR; INTRAVENOUS; SUBCUTANEOUS
Status: DISCONTINUED | OUTPATIENT
Start: 2024-01-26 | End: 2024-01-26 | Stop reason: HOSPADM

## 2024-01-26 RX ORDER — AMOXICILLIN 250 MG
1 CAPSULE ORAL 2 TIMES DAILY PRN
Status: DISCONTINUED | OUTPATIENT
Start: 2024-01-26 | End: 2024-01-26

## 2024-01-26 RX ORDER — AMOXICILLIN 250 MG
1 CAPSULE ORAL 2 TIMES DAILY PRN
Status: DISCONTINUED | OUTPATIENT
Start: 2024-01-26 | End: 2024-01-26 | Stop reason: HOSPADM

## 2024-01-26 RX ORDER — NALOXONE HYDROCHLORIDE 0.4 MG/ML
0.4 INJECTION, SOLUTION INTRAMUSCULAR; INTRAVENOUS; SUBCUTANEOUS
Status: DISCONTINUED | OUTPATIENT
Start: 2024-01-26 | End: 2024-01-26 | Stop reason: HOSPADM

## 2024-01-26 RX ORDER — LANOLIN ALCOHOL/MO/W.PET/CERES
3 CREAM (GRAM) TOPICAL
Status: DISCONTINUED | OUTPATIENT
Start: 2024-01-26 | End: 2024-01-26 | Stop reason: HOSPADM

## 2024-01-26 RX ORDER — AMOXICILLIN 250 MG
2 CAPSULE ORAL 2 TIMES DAILY PRN
Status: DISCONTINUED | OUTPATIENT
Start: 2024-01-26 | End: 2024-01-26 | Stop reason: HOSPADM

## 2024-01-26 RX ORDER — GUAIFENESIN/DEXTROMETHORPHAN 100-10MG/5
10 SYRUP ORAL EVERY 4 HOURS PRN
Status: DISCONTINUED | OUTPATIENT
Start: 2024-01-26 | End: 2024-01-26 | Stop reason: HOSPADM

## 2024-01-26 RX ORDER — ACETAMINOPHEN 650 MG/1
650 SUPPOSITORY RECTAL EVERY 4 HOURS PRN
Status: DISCONTINUED | OUTPATIENT
Start: 2024-01-26 | End: 2024-01-26 | Stop reason: HOSPADM

## 2024-01-26 RX ORDER — ACETAMINOPHEN 325 MG/1
650 TABLET ORAL EVERY 4 HOURS PRN
Status: DISCONTINUED | OUTPATIENT
Start: 2024-01-26 | End: 2024-01-26 | Stop reason: HOSPADM

## 2024-01-26 RX ORDER — ASPIRIN 81 MG/1
81 TABLET ORAL DAILY
Status: DISCONTINUED | OUTPATIENT
Start: 2024-01-26 | End: 2024-01-26 | Stop reason: HOSPADM

## 2024-01-26 RX ORDER — ONDANSETRON 4 MG/1
4 TABLET, ORALLY DISINTEGRATING ORAL EVERY 6 HOURS PRN
Status: DISCONTINUED | OUTPATIENT
Start: 2024-01-26 | End: 2024-01-26 | Stop reason: HOSPADM

## 2024-01-26 RX ORDER — SPIRONOLACTONE 25 MG/1
50 TABLET ORAL DAILY
Status: DISCONTINUED | OUTPATIENT
Start: 2024-01-26 | End: 2024-01-26 | Stop reason: HOSPADM

## 2024-01-26 RX ORDER — ONDANSETRON 2 MG/ML
4 INJECTION INTRAMUSCULAR; INTRAVENOUS EVERY 6 HOURS PRN
Status: DISCONTINUED | OUTPATIENT
Start: 2024-01-26 | End: 2024-01-26 | Stop reason: HOSPADM

## 2024-01-26 RX ORDER — FUROSEMIDE 20 MG
20 TABLET ORAL DAILY
Status: DISCONTINUED | OUTPATIENT
Start: 2024-01-26 | End: 2024-01-26 | Stop reason: HOSPADM

## 2024-01-26 RX ORDER — CARVEDILOL 12.5 MG/1
12.5 TABLET ORAL 2 TIMES DAILY
Status: DISCONTINUED | OUTPATIENT
Start: 2024-01-26 | End: 2024-01-26 | Stop reason: HOSPADM

## 2024-01-26 RX ORDER — LOSARTAN POTASSIUM 25 MG/1
25 TABLET ORAL DAILY
Status: DISCONTINUED | OUTPATIENT
Start: 2024-01-26 | End: 2024-01-26 | Stop reason: HOSPADM

## 2024-01-26 RX ORDER — OXYCODONE HYDROCHLORIDE 5 MG/1
5 TABLET ORAL EVERY 4 HOURS PRN
Status: DISCONTINUED | OUTPATIENT
Start: 2024-01-26 | End: 2024-01-26 | Stop reason: HOSPADM

## 2024-01-26 RX ORDER — ACETAMINOPHEN 325 MG/1
650 TABLET ORAL EVERY 4 HOURS PRN
Status: DISCONTINUED | OUTPATIENT
Start: 2024-01-26 | End: 2024-01-26

## 2024-01-26 RX ORDER — MELATONIN 3 MG
1.5 TABLET ORAL
Status: DISCONTINUED | OUTPATIENT
Start: 2024-01-26 | End: 2024-01-26

## 2024-01-26 RX ORDER — PREDNISONE 10 MG/1
10 TABLET ORAL DAILY
Status: DISCONTINUED | OUTPATIENT
Start: 2024-01-26 | End: 2024-01-26 | Stop reason: HOSPADM

## 2024-01-26 RX ADMIN — ASPIRIN 81 MG: 81 TABLET, COATED ORAL at 12:04

## 2024-01-26 RX ADMIN — SPIRONOLACTONE 50 MG: 25 TABLET ORAL at 12:04

## 2024-01-26 RX ADMIN — APIXABAN 5 MG: 5 TABLET, FILM COATED ORAL at 12:04

## 2024-01-26 RX ADMIN — LOSARTAN POTASSIUM 25 MG: 25 TABLET, FILM COATED ORAL at 12:03

## 2024-01-26 RX ADMIN — FUROSEMIDE 20 MG: 20 TABLET ORAL at 12:05

## 2024-01-26 RX ADMIN — CARVEDILOL 12.5 MG: 12.5 TABLET, FILM COATED ORAL at 12:04

## 2024-01-26 RX ADMIN — PREDNISONE 10 MG: 10 TABLET ORAL at 12:04

## 2024-01-26 RX ADMIN — FLUOXETINE 20 MG: 20 CAPSULE ORAL at 12:04

## 2024-01-26 ASSESSMENT — ACTIVITIES OF DAILY LIVING (ADL)
ADLS_ACUITY_SCORE: 20
ADLS_ACUITY_SCORE: 20
ADLS_ACUITY_SCORE: 37
ADLS_ACUITY_SCORE: 20
ADLS_ACUITY_SCORE: 20
ADLS_ACUITY_SCORE: 37
ADLS_ACUITY_SCORE: 20

## 2024-01-26 NOTE — PHARMACY
[facility]  Part of [address] [address]    January 26, 2024    Dear Pharmacist,    Your customer, Efra Zuniga, born on 1955, was recently discharged from [facility].  We have updated his medication list and want to alert you to the following:       Review of your medicines        CONTINUE these medicines which may have CHANGED, or have new prescriptions. If we are uncertain of the size of tablets/capsules you have at home, strength may be listed as something that might have changed.        Dose / Directions   predniSONE 10 MG tablet  Commonly known as: DELTASONE  This may have changed:   medication strength  See the new instructions.  Used for: HAP (hospital-acquired pneumonia)      Dose: 10 mg  Take 1 tablet (10 mg) by mouth daily for 2 days  Quantity: 2 tablet  Refills: 0            CONTINUE these medicines which have NOT CHANGED        Dose / Directions   acetaminophen 325 MG tablet  Commonly known as: TYLENOL  Used for: Acute on chronic heart failure with preserved ejection fraction (H)      Dose: 650 mg  Take 2 tablets (650 mg) by mouth every 4 hours as needed for mild pain or other (and adjunct with moderate or severe pain or per patient request)  Refills: 0     apixaban ANTICOAGULANT 5 MG tablet  Commonly known as: ELIQUIS  Indication: Atrial Fibrillation Not Caused By A Heart Valve Problem  Used for: Paroxysmal atrial fibrillation with RVR (H)      Dose: 5 mg  Take 1 tablet (5 mg) by mouth 2 times daily  Quantity: 60 tablet  Refills: 0     aspirin 81 MG EC tablet  Used for: Acute on chronic heart failure with preserved ejection fraction (H)      Dose: 81 mg  Take 1 tablet (81 mg) by mouth daily  Quantity: 30 tablet  Refills: 0     carvedilol 12.5 MG tablet  Commonly known as: COREG  Used for: Acute on chronic heart failure with preserved ejection fraction (H)      Dose: 12.5 mg  Take 1 tablet (12.5 mg) by mouth 2 times  daily  Quantity: 60 tablet  Refills: 0     empagliflozin 25 MG Tabs tablet  Commonly known as: JARDIANCE  Used for: Acute on chronic respiratory failure with hypoxia and hypercapnia (H), Hypoventilation syndrome, Dysthymic disorder, Type 2 diabetes mellitus with hyperglycemia, without long-term current use of insulin (H), Essential hypertension, Paroxysmal atrial fibrillation with RVR (H), Panlobular emphysema (H)      Dose: 25 mg  Take 1 tablet (25 mg) by mouth daily  Quantity: 90 tablet  Refills: 1     FLUoxetine 20 MG capsule  Commonly known as: PROzac  Used for: Acute on chronic respiratory failure with hypoxia and hypercapnia (H), Hypoventilation syndrome, Dysthymic disorder, Type 2 diabetes mellitus with hyperglycemia, without long-term current use of insulin (H), Essential hypertension, Paroxysmal atrial fibrillation with RVR (H), Panlobular emphysema (H)      Dose: 20 mg  Take 1 capsule (20 mg) by mouth daily  Quantity: 30 capsule  Refills: 0     furosemide 20 MG tablet  Commonly known as: LASIX  Used for: Acute on chronic heart failure with preserved ejection fraction (H)      Dose: 20 mg  Take 1 tablet (20 mg) by mouth daily  Quantity: 30 tablet  Refills: 0     guaiFENesin 20 mg/mL liquid  Commonly known as: ROBITUSSIN      Dose: 400 mg  Take 20 mLs (400 mg) by mouth every 4 hours as needed for cough Give with glass of water  Refills: 0     losartan 25 MG tablet  Commonly known as: COZAAR  Used for: Acute on chronic heart failure with preserved ejection fraction (H)      Dose: 25 mg  Take 1 tablet (25 mg) by mouth daily  Quantity: 30 tablet  Refills: 0     melatonin 3 MG tablet  Used for: Acute on chronic heart failure with preserved ejection fraction (H)      Dose: 3 mg  Take 1 tablet (3 mg) by mouth nightly as needed  Refills: 0     senna-docusate 8.6-50 MG tablet  Commonly known as: SENOKOT-S/PERICOLACE  Used for: Acute on chronic heart failure with preserved ejection fraction (H)      Dose: 1  tablet  Take 1 tablet by mouth 2 times daily as needed for constipation  Refills: 0     spironolactone 50 MG tablet  Commonly known as: ALDACTONE  Used for: Acute on chronic heart failure with preserved ejection fraction (H)      Dose: 50 mg  Take 1 tablet (50 mg) by mouth daily  Quantity: 30 tablet  Refills: 0               Where to get your medicines        These medications were sent to [facility] [address] [address]      Phone: [phone]   predniSONE 10 MG tablet         We also reviewed Efra Zuniga's allergy list and updated it as needed:  Allergies: Lisinopril    Thank you for continuing to care for Efra Zuniga.  We look forward to working together with you in the future.    Sincerely,  Mary Gandhi [facility]

## 2024-01-26 NOTE — PROGRESS NOTES
01/26/24 1244   Appointment Info   Signing Clinician's Name / Credentials (OT) Nalini Cruz, OTR/L   Living Environment   People in Home alone   Current Living Arrangements house   Home Accessibility no concerns;stairs to enter home   Transportation Anticipated car, drives self   Self-Care   Usual Activity Tolerance moderate   Current Activity Tolerance moderate   Equipment Currently Used at Home walker, rolling   Cognitive Status Examination   Orientation Status person;place   Affect/Mental Status (Cognitive) WFL   Follows Commands WFL   Pain Assessment   Patient Currently in Pain No   Range of Motion Comprehensive   General Range of Motion no range of motion deficits identified   Coordination   Upper Extremity Coordination No deficits were identified   Bed Mobility   Bed Mobility supine-sit   Supine-Sit [place] (Bed Mobility) moderate assist (50% patient effort)   Transfers   Transfers sit-stand transfer   Sit-Stand Transfer   Sit-Stand [place] (Transfers) minimum assist (75% patient effort);1 person to manage equipment   Activities of Daily Living   BADL Assessment/Intervention toileting   Toileting   Position (Toileting) unsupported standing   Assistive Devices (Toileting) grab bar, toilet   [place] Level (Toileting) contact guard assist   Clinical Impression   Criteria for Skilled Therapeutic Interventions Met (OT) Yes, treatment indicated   OT Diagnosis Failure to thrive   Influenced by the following impairments Pt has decreased endurance for functional tasks and flat affect at times, does brighten with conversation   OT Problem List-Impairments impacting ADL activity tolerance impaired   Assessment of Occupational Performance 1-3 Performance Deficits   Identified Performance Deficits mobility and self care   Planned Therapy Interventions (OT) ADL retraining;transfer training   Clinical Decision Making Complexity (OT) problem focused assessment/low complexity   Risk & Benefits of therapy  have been explained evaluation/treatment results reviewed;patient   OT Total Evaluation Time   OT Eval, Low Complexity Minutes (95863) 15   OT Goals   Therapy Frequency (OT) Daily   OT Predicted Duration/Target Date for Goal Attainment 01/28/24   OT Goals Upper Body Bathing;Lower Body Bathing;Transfers;Toilet Transfer/Toileting;Upper Body Dressing;Lower Body Dressing   OT: Upper Body Dressing Supervision/stand-by assist   OT: Lower Body Dressing Minimal assist   OT: Upper Body Bathing Supervision/stand-by assist   OT: Lower Body Bathing Minimal assist   OT: Bed Mobility Supervision/stand-by assist   OT: Transfer Supervision/stand-by assist   OT: Toilet Transfer/Toileting Supervision/stand-by assist   Self-Care/Home Management   Self-Care/Home Mgmt/ADL, Compensatory, Meal Prep Minutes (37704) 15   [place] Level (Toilet Training) stand-by assist   Therapeutic Activities   Therapeutic Activity Minutes (14459) 30   Symptoms noted during/after treatment fatigue   Treatment Detail/Skilled Intervention Pt ambulated in room and hallway with use of FWW and CGA of 1, pt had some fatigue and mild SOB after   OT Discharge Planning   OT Plan cont OT   OT Discharge Recommendation (DC Rec) Transitional Care Facility   OT Rationale for DC Rec Pt states he can't take care of himself at home right now and is wanting [facility]   OT Brief overview of current status see above   Total Session Time   Timed Code Treatment Minutes 45   Total Session Time (sum of timed and untimed services) 60

## 2024-01-26 NOTE — PROGRESS NOTES
NS[facility] DISCHARGE NOTE    Patient discharged to nursing home at 2:28 PM via wheel chair. Accompanied by other:medical transport and staff. Discharge instructions reviewed with  nurse to nurse given to Vera , opportunity offered to ask questions. Prescriptions sent to patients preferred pharmacy. All belongings sent with patient.    Sonal Ramos RN

## 2024-01-26 NOTE — PHARMACY - DISCHARGE MEDICATION RECONCILIATION AND EDUCATION
Pharmacy:  Discharge Counseling and Medication Reconciliation    Efra MATTHEW [place]sett  [address] [address]  [phone] (home)   68 year old male  PCP: Lukas Kramer    Allergies: Lisinopril    Discharge Counseling:    Pharmacist did not meet with patient/family today as patient is discharging to [facility] where all medications will be handled/administered for patient.     Discharge Medication Reconciliation:    It has been determined that the patient has an adequate supply of medications available or which can be obtained from the patient's preferred pharmacy, which HE/SHE has confirmed as: [facility] [An updated medication list will be faxed to the patient's pharmacy.]    Thank you for the consult.    Mary Gandhi RP........January 26, 2024 2:04 PM

## 2024-01-26 NOTE — PROGRESS NOTES
Pt transferred from ER late in the shift. Upon seeing pt in room alert and oriented eating ice cream. Pt stated he hated wearing the bipap and did not want to wear it this evening. Upon talking to the MD she was ok with pt wearing O2 this evening. Pt stated he has someone to bring his home unit in today.

## 2024-01-26 NOTE — DISCHARGE SUMMARY
"[facility]  Hospitalist Discharge Summary      Date of Admission:  1/25/2024  Date of Discharge:  1/26/2024  Discharging Provider: Ifeoma Fox MD  Discharge Service: Hospitalist Service    Discharge Diagnoses   Principal Problem:    Failure to thrive in adult (1/25/2024)  Active Problems:    Type 2 diabetes mellitus with hyperglycemia, without long-term current use of insulin (H) (5/2/2014)    Essential hypertension (4/27/2017)    Paroxysmal atrial fibrillation with RVR (H) (10/6/2019)    Panlobular emphysema (H) (2/28/2018)    Chronic heart failure with preserved ejection fraction (H) (1/7/2024)    Acute on chronic respiratory failure with hypoxia and hypercapnia (H) (1/16/2024)      Clinically Significant Risk Factors     # DMII: A1C = 8.5 % (Ref range: 4.0 - 6.2 %) within past 6 months  # Obesity: Estimated body mass index is 35.44 kg/m  as calculated from the following:    Height as of this encounter: 1.88 m (6' 2\").    Weight as of this encounter: 125.2 kg (276 lb).       Follow-ups Needed After Discharge   Follow-up Appointments     Follow Up and recommended labs and tests      Follow up with [facility] physician.  The following labs/tests are   recommended: BMP, CBC.            Unresulted Labs Ordered in the Past 30 Days of this Admission       No orders found for last 31 day(s).        These results will be followed up by NH provider    Discharge Disposition   Discharged to short-term care facility  Condition at discharge: Satisfactory    Hospital Course   This 67 yo male with a PMH significant for DM-2 not controlled, HFpEF, chronic respiratory failure with hypoxia, COPD, atrial fib with RVR on Eliquis and dysthymic disorder was admitted to the hospital with acute on chronic respiratory failure with hypoxia and hypercapnia.  He had been admitted from 1/7-15/2024 and 1/16-23/2024 with the same diagnosis.  This was felt to be due to acute on chronic HFpEF.  He underwent aggressive " diuresis and was treated with BiPAP and discharged to [facility] for rehab the first time and home the second time.  He quickly returned to the hospital for the same reason.  He has been qualified for home BiPAP and reports he was using this.  However he just cannot manage at home so will go for more rehab and may benefit from [facility] after discharge.     Principal Problem:    Acute on chronic respiratory failure with hypoxia and hypercapnia (H)    Assessment: Improved    Plan: Requires BiPAP to maintain adequate ventilation when sleeping and overnight oximetry study indicates he needs 3 liters of supplemental O2 bled into his machine to maintain sats above 90%.  Encourage activity throughout the day.     Active Problems:    Type 2 diabetes mellitus with hyperglycemia, without long-term current use of insulin (H)    Assessment: Not at goal    Plan: Increased dose of Januvia from 10 mg to 25 mg daily during his last hospitalization.  Stopped metformin to allow for increased dose of Januvia while maintaining renal function.  Follow-up with NH provider for continued monitoring of dosage changes.       Essential hypertension    Assessment: Stable    Plan: Continue carvedilol, furosemide, losartan and spironolactone.       Panlobular emphysema (H)    Assessment: Stable    Plan: Will benefit from BiPAP with supplemental O2 bled in for both oxygenation and prevention of hypercarbia.  Encourage activity as able.  Will be beneficial to have additional rehab prior to returning home or transitioning to [facility] which would probably be safer for him.       Mild cognitive impairment    Assessment: Stable    Plan: Had planned to add home health, meals on wheels, visitors to provide frequent checks on the patient but this was not sufficient at this time.  He will go back to rehab and when ready will return home or would benefit from [facility].       Chronic heart failure with preserved ejection fraction (H)    Assessment: Improved    Plan:  Continue current medications as outlined above.  Expect the BiPAP with supplemental O2 bled in will help with overall cardiopulmonary function.        Major depressive disorder, single episode, moderate (H)    Assessment: Improving    Plan: Started on Prozac.  Will benefit from frequent contacts from people to be sure he is doing well, staying active and taking his medications as prescribed.    I attest that I have seen and evaluated Efra Zuniga face to face. He has a diagnosis of hypoventilation syndrome. Patient would greatly benefit from use of BiPAP to improve pulmonary status and decrease work of breathing. BiPAP therapy is necessary to improve future outcomes and decrease hospital readmissions.     Consultations This Hospital Stay   PHYSICAL THERAPY ADULT IP CONSULT  OCCUPATIONAL THERAPY ADULT IP CONSULT  CARE MANAGEMENT / SOCIAL WORK IP CONSULT  SOCIAL WORK IP CONSULT  PHYSICAL THERAPY ADULT IP CONSULT  OCCUPATIONAL THERAPY ADULT IP CONSULT    Code Status   No CPR- Do NOT Intubate    Time Spent on this Encounter   I, Ifeoma Fox MD, personally saw the patient today and spent less than or equal to 30 minutes discharging this patient.       Ifeoma Fox MD  [facility]  [address]  [place] [address]  Phone: [phone]  Fax: [phone]  ______________________________________________________________________    Physical Exam   Vital Signs: Temp: 97.9  F (36.6  C) Temp src: Tympanic BP: (!) 143/77 Pulse: 67   Resp: 18 SpO2: 95 % O2 Device: None (Room air) Oxygen Delivery: 1 LPM (decreased to RA)  Weight: 276 lbs 0 oz  Constitutional: awake, alert, cooperative, no apparent distress, appears stated age, and morbidly obese  Eyes: pupils equal, round and reactive to light, extra-ocular muscles intact, and conjunctiva normal  ENT: normocephalic, without obvious abnormality, atraumatic  Respiratory: no increased work of breathing, decreased air exchange but clear to  auscultation  Cardiovascular: regular rate and rhythm, normal S1 and S2, and no murmur noted, distant heart sounds  GI: normal bowel sounds, soft, non-distended, and non-tender  Skin: normal skin color, texture, turgor other than chronic venous stasis changes, no redness or warmth  Musculoskeletal: 2+ ankle and foot edema and 1+ lower leg pitting edema present  there is no redness, warmth, or swelling of the joints  full range of motion noted except as limited by body habitus  Neurologic: Mental Status Exam:  Level of Alertness:   awake  Orientation:   person, place, time  Memory:   normal  Fund of Knowledge:  normal  Attention/Concentration:  normal  Language:  normal  Cranial Nerves:  cranial nerves II-XII are grossly intact  Motor Exam:  moves all extremities well and symmetrically       Primary Care Physician   Lukas Kramer    Discharge Orders      General info for SNF    Length of Stay Estimate: Short Term Care: Estimated # of Days 31-90  Condition at Discharge: Improving  Level of care:skilled   Rehabilitation Potential: Excellent  Admission H&P remains valid and up-to-date: Yes  Recent Chemotherapy: N/A  Use Nursing Home Standing Orders: Yes     Follow Up and recommended labs and tests    Follow up with longterm physician.  The following labs/tests are recommended: BMP, CBC.     Reason for your hospital stay    Unable to manage chronic medical problems at home.     Daily weights    Call Provider for weight gain of more than 2 pounds per day or 5 pounds per week.     Activity - Up ad joey     Activity - Up with assistive device     No CPR- Do NOT Intubate     Physical Therapy Adult Consult    Evaluate and treat as clinically indicated.    Reason:  weakness, deconditioning, wants to return home     Occupational Therapy Adult Consult    Evaluate and treat as clinically indicated.    Reason:  weakness, deconditioning, wants to return home     Oxygen (SNF/TCU) Discharge     Non Invasive Ventilator     Non-Invasive Vent Documentation  I attest that I have seen and evaluated Efra Zuniga face to face. He has a chronic illness of E66.2 - Morbid (severe) obesity with alveolar hypoventilation. This patient would greatly benefit from a home Non Invasive Ventilator device for nocturnal and daytime use with portability for recovery breaths. This device would aide in ventilator support to improve pulmonary status and decrease work of breathing. I strongly believe therapy is necessary to improve future outcomes and decreased hospital readmissions.     I, the undersigned, certify that the above prescribed supplies are medically necessary for this patient and is both reasonable and necessary in reference to accepted standards of medical and necessary in reference to accepted standards of medical practice in the treatment of this patient's condition and is not prescribed as a convenience.     Diet    Follow this diet upon discharge: Orders Placed This Encounter      Combination Diet General 2 gm sodium       Significant Results and Procedures   Most Recent 3 CBC's:  Recent Labs   Lab Test 01/26/24  0652 01/23/24  0530 01/21/24  0558   WBC 15.6* 15.0* 13.4*   HGB 15.8 15.8 15.5   MCV 92 90 90    224 192     Most Recent 3 BMP's:  Recent Labs   Lab Test 01/26/24  0652 01/26/24  0010 01/23/24  0751 01/23/24  0530    134*  --  137   POTASSIUM 4.2 5.0  --  3.9   CHLORIDE 97* 95*  --  95*   CO2 33* 25  --  34*   BUN 26.0* 29.1*  --  23.1*   CR 0.94 1.01  --  0.98   ANIONGAP 7 14  --  8   SCAR 8.6* 8.7*  --  8.5*   * 128* 140* 123*     Most Recent 3 BNP's:  Recent Labs   Lab Test 01/16/24  1647 01/07/24  1018 11/07/22  0911   NTBNPI 2,131* 46 30   ,   Results for orders placed or performed during the hospital encounter of 01/16/24   CT Chest Pulmonary Embolism w Contrast    Narrative    CT CHEST PULMONARY EMBOLISM W CONTRAST  1/16/2024 5:16 PM    CLINICAL HISTORY: Male, age 68 years,  +covid, increased sob,  hypoxic.  Lungs? PE?;    Comparison:  CTA chest 9/4/2022    TECHNIQUE:  CT angiogram was performed of the chest with IV contrast.  Axial, sagittal and coronal images were reviewed. If present, MIP  and/or 3-D images were constructed by the technologist.    FINDINGS:   Good quality CTA demonstrates no evidence of pulmonary embolus.  Thoracic aorta is intact. Distention of the pulmonary arterial tree is  similar in appearance.     The lungs demonstrate peripheral areas of atelectasis. There appears  be atelectasis and consolidation the left lower lobe.    Transvenous pacer is again seen. There is no evidence of pathologic  lymph node enlargement. Thyroid gland is unremarkable. Esophagus and  visualized portions of the upper abdomen demonstrate no acute  abnormality. The gallbladder surgically absent. Bony structures  demonstrate no acute abnormality.      Impression    IMPRESSION:   Left lower lobe pneumonia and partial atelectasis of the left and  right lower lobe.    No evidence of pulmonary embolus.    Chronic appearing distention of the pulmonary arterial tree is similar  in appearance compared to prior study.          This facility minimizes radiation dose by adjusting the mA and/or kV  according to each patient size.      This CT scan was performed using one or more the following dose  reduction techniques:    -Automated exposure control,  -Adjustment of the mA and/or kV according to patient's size, and/or,  -Use of iterative reconstruction technique.    PERRI NAPOLES MD         SYSTEM ID:  [id number]       Discharge Medications   Current Discharge Medication List        CONTINUE these medications which have CHANGED    Details   predniSONE (DELTASONE) 10 MG tablet Take 1 tablet (10 mg) by mouth daily for 2 days  Qty: 2 tablet, Refills: 0    Comments: Just finishing a taper.  Associated Diagnoses: HAP (hospital-acquired pneumonia)           CONTINUE these medications which have NOT CHANGED    Details    acetaminophen (TYLENOL) 325 MG tablet Take 2 tablets (650 mg) by mouth every 4 hours as needed for mild pain or other (and adjunct with moderate or severe pain or per patient request)    Associated Diagnoses: Acute on chronic heart failure with preserved ejection fraction (H)      apixaban ANTICOAGULANT (ELIQUIS) 5 MG tablet Take 1 tablet (5 mg) by mouth 2 times daily  Qty: 60 tablet, Refills: 0    Associated Diagnoses: Paroxysmal atrial fibrillation with RVR (H)      aspirin 81 MG EC tablet Take 1 tablet (81 mg) by mouth daily  Qty: 30 tablet, Refills: 0    Associated Diagnoses: Acute on chronic heart failure with preserved ejection fraction (H)      carvedilol (COREG) 12.5 MG tablet Take 1 tablet (12.5 mg) by mouth 2 times daily  Qty: 60 tablet, Refills: 0    Associated Diagnoses: Acute on chronic heart failure with preserved ejection fraction (H)      empagliflozin (JARDIANCE) 25 MG TABS tablet Take 1 tablet (25 mg) by mouth daily  Qty: 90 tablet, Refills: 1    Associated Diagnoses: Acute on chronic respiratory failure with hypoxia and hypercapnia (H); Hypoventilation syndrome; Dysthymic disorder; Type 2 diabetes mellitus with hyperglycemia, without long-term current use of insulin (H); Essential hypertension; Paroxysmal atrial fibrillation with RVR (H); Panlobular emphysema (H)      FLUoxetine (PROZAC) 20 MG capsule Take 1 capsule (20 mg) by mouth daily  Qty: 30 capsule, Refills: 0    Associated Diagnoses: Acute on chronic respiratory failure with hypoxia and hypercapnia (H); Hypoventilation syndrome; Dysthymic disorder; Type 2 diabetes mellitus with hyperglycemia, without long-term current use of insulin (H); Essential hypertension; Paroxysmal atrial fibrillation with RVR (H); Panlobular emphysema (H)      furosemide (LASIX) 20 MG tablet Take 1 tablet (20 mg) by mouth daily  Qty: 30 tablet, Refills: 0    Associated Diagnoses: Acute on chronic heart failure with preserved ejection fraction (H)      guaiFENesin  "(ROBITUSSIN) 20 mg/mL liquid Take 20 mLs (400 mg) by mouth every 4 hours as needed for cough Give with glass of water      losartan (COZAAR) 25 MG tablet Take 1 tablet (25 mg) by mouth daily  Qty: 30 tablet, Refills: 0    Associated Diagnoses: Acute on chronic heart failure with preserved ejection fraction (H)      melatonin 3 MG tablet Take 1 tablet (3 mg) by mouth nightly as needed    Associated Diagnoses: Acute on chronic heart failure with preserved ejection fraction (H)      senna-docusate (SENOKOT-S/PERICOLACE) 8.6-50 MG tablet Take 1 tablet by mouth 2 times daily as needed for constipation    Associated Diagnoses: Acute on chronic heart failure with preserved ejection fraction (H)      spironolactone (ALDACTONE) 50 MG tablet Take 1 tablet (50 mg) by mouth daily  Qty: 30 tablet, Refills: 0    Associated Diagnoses: Acute on chronic heart failure with preserved ejection fraction (H)           Allergies   Allergies   Allergen Reactions    Lisinopril      Other reaction(s): Tachycardia  Felt like \"a heart attack\"; hands went numb     "

## 2024-01-26 NOTE — PROVIDER NOTIFICATION
01/26/24 0321   Valuables   Patient Belongings remains with patient   Patient Belongings Remaining with Patient other (see comments)  (clothes, shoes, watch)   Did you bring any home meds/supplements to the hospital?  No     A               Admission:  I am responsible for any personal items that are not sent to the safe or pharmacy.  [facility] is not responsible for loss, theft or damage of any property in my possession.    Signature:  _________________________________ Date: _______  Time: _____                                              Staff Signature:  ____________________________ Date: ________  Time: _____      2nd Staff person, if patient is unable/unwilling to sign:    Signature: ________________________________ Date: ________  Time: _____     Discharge:  [facility] has returned all of my personal belongings:    Signature: _________________________________ Date: ________  Time: _____                                          Staff Signature:  ____________________________ Date: ________  Time: _____

## 2024-01-26 NOTE — ED TRIAGE NOTES
"Pt presents to ED via EMS from home with c/o generalized weakness. Pt states he fell this evening with no injury and feels he's too weak to be at home alone. Pt was discharged yesterday from [facility].  /74   Pulse 75   Temp 97.7  F (36.5  C)   Resp 18   Ht 1.88 m (6' 2\")   Wt 125.2 kg (276 lb)   SpO2 94%   BMI 35.44 kg/m         Triage Assessment (Adult)       Row Name 01/25/24 2017          Triage Assessment    Airway WDL WDL        Respiratory WDL    Respiratory WDL WDL        Skin Circulation/Temperature WDL    Skin Circulation/Temperature WDL WDL        Cardiac WDL    Cardiac WDL WDL        Peripheral/Neurovascular WDL    Peripheral Neurovascular WDL WDL        Cognitive/Neuro/Behavioral WDL    Cognitive/Neuro/Behavioral WDL WDL                     "

## 2024-01-26 NOTE — PROGRESS NOTES
Admission Note    Data:  Efra Zuniga admitted to 318 from emergency room at 0150.      Action:  Dr. Mcelroy has been notified of admission. Pt oriented to unit, call light in reach.     Response:  Patient tolerated transfer well .

## 2024-01-26 NOTE — ED NOTES
Unable to draw labs off of the IV. Lab was here and attempted x3 times, unable to get full set of labs. MD updated and per MD, Hold labs for now.  
Writer went out to waiting room to bring pt into a room. Pt was in the bathroom. Pt was able to independently take himself  to and from the bathroom.   
on unit

## 2024-01-26 NOTE — H&P
HOSPITALIST TELEMED ADMISSION H&P    Service Date : 1/26/2024  Dr. Navi VIGIL, am located in [place].   Efra Zuniga is located in [place] at [facility].   The RN or tech on duty in the ED is assisting me today with this patient.    chief complaint: Weakness    History of Present Illness:  Efra Zuniga is a 68 year old male presenting to ED today with failure to thrive.  Patient has been hospitalized twice this year already.  He was admitted to the hospital with heart failure and respiratory failure and was initially discharged to a local skilled nurse facility but he did not like the facility and he subsequently went home.  Patient then had to return to the ED and was readmitted for several days and at that time home BiPAP was arranged.  He was discharged home with home health.  He did have a BiPAP available but was unclear how to use it and generally was just too weak to get up and move around.  He denies any fevers or chills.  He denies any significant shortness of breath.  No chest pain.  No nausea or vomiting.  His really only complaint is significant weakness.  He is agreeable to go to a different rehab facility.    Patient reports he does have a living will.  He states he is a DNR and his sister is a surrogate decision-maker.  This was clarified with the patient with nurse present     Emergency Room Course -see emergency department note    Past Medical History  Past Medical History:   Diagnosis Date    Family history of other specified conditions (CODE)     No Comments Provided    Hemorrhage of anus and rectum     multiple colonoscopies scheduled and canceled by patient    Irritable bowel syndrome with constipation     Functioning bowel syndrome/constipation    Pain in joint     Intermittent arthralgias      Patient Active Problem List   Diagnosis    Dysthymic disorder    Asbestos exposure    History of disease    Type 2 diabetes mellitus with hyperglycemia, without long-term current use of insulin  (H)    Essential hypertension    Nonsmoker    Simple chronic bronchitis (H)    Paroxysmal atrial fibrillation with RVR (H)    Low blood magnesium    Cardiac pacemaker in situ    Panlobular emphysema (H)    Adverse effect of antihyperlipidemic and antiarteriosclerotic drugs, sequela    Medical non-compliance    Mild cognitive impairment    Moderate episode of recurrent major depressive disorder (H)    Obesity (BMI 30-39.9)    Acute respiratory failure with hypoxia (H)    Chronic heart failure with preserved ejection fraction (H)    Acute on chronic respiratory failure with hypoxia and hypercapnia (H)    HAP (hospital-acquired pneumonia)    Major depressive disorder, single episode, moderate (H)    Failure to thrive in adult         Past Surgical History  Past Surgical History:   Procedure Laterality Date    APPENDECTOMY OPEN      Coronary angiogram done by Aron Quick at [facility] in March 2006 shows patent coronary arteries    CHOLECYSTECTOMY      No Comments Provided    OTHER SURGICAL HISTORY      March 2006,[id number],SCAN-ANGIOGRAM,Coronary angiogram done by Aron Quick at [facility] in shows patent coronary arteries      Social History  Efra  reports that he has never smoked. He has never used smokeless tobacco. He reports that he does not drink alcohol and does not use drugs.    Family History  Efra's family history includes Alzheimer Disease in his mother; Cancer in his father; Heart Disease in his father, maternal grandfather, maternal grandmother, paternal grandfather, and paternal grandmother; Other - See Comments in his sister.    Home Medications  Current Outpatient Medications   Medication Instructions    acetaminophen (TYLENOL) 650 mg, Oral, EVERY 4 HOURS PRN    apixaban ANTICOAGULANT (ELIQUIS) 5 mg, Oral, 2 TIMES DAILY    aspirin 81 mg, Oral, DAILY    carvedilol (COREG) 12.5 mg, Oral, 2 TIMES DAILY    empagliflozin (JARDIANCE) 25 mg, Oral, DAILY    FLUoxetine (PROZAC) 20 mg, Oral, DAILY    furosemide (LASIX) 20  "mg, Oral, DAILY    guaiFENesin (ROBITUSSIN) 400 mg, Oral, EVERY 4 HOURS PRN, Give with glass of water    losartan (COZAAR) 25 mg, Oral, DAILY    melatonin 3 mg, Oral, AT BEDTIME PRN    predniSONE (DELTASONE) 20 MG tablet Take 1 tablet (20 mg) by mouth daily for 3 days, THEN 0.5 tablets (10 mg) daily for 4 days.    senna-docusate (SENOKOT-S/PERICOLACE) 8.6-50 MG tablet 1 tablet, Oral, 2 TIMES DAILY PRN    spironolactone (ALDACTONE) 50 mg, Oral, DAILY       Allergies  Allergies   Allergen Reactions    Lisinopril      Other reaction(s): Tachycardia  Felt like \"a heart attack\"; hands went numb        10 pt ROS neg except as noted in HPI    Physical Exam:  I performed all aspects of the physical examination via Telemedicine associated with two way audio and video communication.    Vital Signs: Blood pressure (!) 157/102, pulse 69, temperature (!) 96.6  F (35.9  C), temperature source Tympanic, resp. rate 18, height 1.88 m (6' 2\"), weight 125.2 kg (276 lb), SpO2 95%.    Physical Exam    Gen:  Well-developed, well-nourished, in no acute distress, lying semi-supine in hospital stretcher  HEENT:  Anicteric sclera, PER, hearing intact to voice  Resp:  No accessory muscle use, breath sounds clear; no wheezes no rales no rhonchi  Card:  No murmur, normal S1, S2.  Mild peripheral edema  Abd:  Soft per RN exam, no TTP, non-distended, normoactive bowel sounds are present.  Obese  Musc: Generalized weakness  Psych:  Good insight, oriented to person, place and time, not anxious, not agitated    DATA  Labs:  I have personally reviewed the following studies:  Recent Labs   Lab 01/26/24  0010   POTASSIUM 5.0   CHLORIDE 95*   CO2 25   BUN 29.1*      Recent Labs   Lab 01/23/24  0530   WBC 15.0*   HGB 15.8   HCT 48.8          Imaging: ER imaging reviewed    ASSESSMENT/PLAN:   1.  Failure to thrive.  Patient did not do well when he went home on his own.  He will need to do rehab at skilled nurse facility.  Place PPD.  Consult case " management for placement.  PT, OT.  2.  Acute on chronic respiratory failure with hypoxia and hypercarbia.  Continue with nocturnal BiPAP and supplemental oxygen.  Wean as tolerated.  Does not appear to be in any sort of exacerbation.  3.  Hypertension.  Continue baseline treatment  4.  Paroxysmal atrial fibrillation.  Continue present treatment including anticoagulation  5.  Chronic heart failure with preserved EF.  Continue baseline treatment including beta-blocker and ARB.  Will continue with diuretics.  Does not appear significantly volume overloaded.  6.  Type 2 diabetes.  Accu-Cheks, sliding scale insulin        Patient be placed in observation for placement      Misc  DVT prophylaxis: SCDs  Code Status: DNR    The plan was discussed with - Patient  Time: 30 min

## 2024-01-26 NOTE — PROGRESS NOTES
Pt friend brought in home BiPAP machine but it was missing the cord. Friend state she would bring it in this afternoon. Pina Pastrana RRT

## 2024-01-26 NOTE — PROGRESS NOTES
01/26/24 1126   Appointment Info   Signing Clinician's Name / Credentials (PT) Mata Quita MPT   Living Environment   People in Home alone   Current Living Arrangements house   Home Accessibility no concerns;stairs to enter home   Number of Stairs, Main Entrance 4   Stair Railings, Main Entrance railings safe and in good condition   Number of Stairs, Within Home, Primary greater than 10 stairs   Stair Railings, Within Home, Primary railings safe and in good condition   Transportation Anticipated health plan transportation;family or friend will provide   Self-Care   Usual Activity Tolerance moderate   Current Activity Tolerance fair   Equipment Currently Used at Home walker, rolling   Fall history within last six months yes   Number of times patient has fallen within last six months 1   General Information   Referring Physician Ruddy   Patient/Family Therapy Goals Statement (PT) return to home environment when able   Existing Precautions/Restrictions fall;oxygen therapy device and L/min   Weight-Bearing Status - LLE full weight-bearing   Weight-Bearing Status - RLE full weight-bearing   Cognition   Affect/Mental Status (Cognition) flat/blunted affect   Orientation Status (Cognition) oriented to;person;place;time   Follows Commands (Cognition) WFL   Memory Deficit (Cognition) minimal deficit;episodic memory   Pain Assessment   Patient Currently in Pain No   Integumentary/Edema   Integumentary/Edema no deficits were identifed   Posture    Posture Not impaired   Range of Motion (ROM)   Range of Motion ROM is WFL   Strength (Manual Muscle Testing)   Strength (Manual Muscle Testing) strength is WFL   Strength Comments however, patient fatigues with activity   Bed Mobility   Impairments Contributing to Impaired Bed Mobility decreased strength   Comment, (Bed Mobility) minimal assist to SBA   Transfers   Impairments Contributing to Impaired Transfers decreased strength   Comment, (Transfers) minimal assist to CGA with  Fww   Gait/Stairs (Locomotion)   [place] Level (Gait) contact guard   Assistive Device (Gait) walker, front-wheeled   Distance in Feet (Gait) 175   Pattern (Gait) step-through   Balance   Balance Comments good with Fww   Sensory Examination   Sensory Perception WFL   Coordination   Coordination no deficits were identified   Muscle Tone   Muscle Tone no deficits were identified   Clinical Impression   Criteria for Skilled Therapeutic Intervention Yes, treatment indicated   PT Diagnosis (PT) impairedmobility   Influenced by the following impairments fatigue, weakness episodes of SOB   Functional limitations due to impairments activity/gait tolerance   Clinical Presentation (PT Evaluation Complexity) stable   Clinical Decision Making (Complexity) low complexity   Planned Therapy Interventions (PT) bed mobility training;gait training;transfer training   Risk & Benefits of therapy have been explained evaluation/treatment results reviewed;risks/benefits reviewed;patient   PT Total Evaluation Time   PT Rj Low Complexity Minutes (79725) 15   Physical Therapy Goals   PT Frequency Daily   PT Predicted Duration/Target Date for Goal Attainment 01/30/24   PT Goals Bed Mobility;Transfers;Gait   PT: Bed Mobility Supervision/stand-by assist   PT: Transfers Supervision/stand-by assist;Assistive device   PT: Gait Supervision/stand-by assist;Rolling walker;Greater than 200 feet   PT Discharge Planning   PT Plan Continue PT   PT Discharge Recommendation (DC Rec) Transitional Care Facility   PT Rationale for DC Rec to promote strength, stability and safe mobility   PT Brief overview of current status Pleasant patient experiencing fatigue which is limiting his functional activity/gait tolerance; minimal assist to CGA for mobilities using a Fww for gait acitivities; he will benefit from continued PT   PT Equipment Needed at Discharge walker, rolling

## 2024-01-26 NOTE — PLAN OF CARE
Patient is alert and orientated x4. VSS. BP is running high. LS clear. Edema in lower extremities. Generalized weakness. SBA in room.     Goal Outcome Evaluation:      Plan of Care Reviewed With: patient    Overall Patient Progress: no changeOverall Patient Progress: no change

## 2024-01-26 NOTE — PROGRESS NOTES
:    Met with patient in room to discuss discharge planning needs.  It was mentioned patient may benefit from SNF placement at discharge.  Patient is in agreement with SNF placement and stated he has been to [facility] in the past for only a couple of hours.  Patient mentioned he didn't like the food there, but was not there long enough to participate in any rehab therapy.  Discussed SNF placement options with patient.  Patient states he would like to go back to [facility] for rehab.  Referral sent to Christy, Admissions Coordinator at [facility].  GV to screen patient.        MAN Alonso on 1/26/2024 at 11:14 AM    Spoke with Christy at [facility] and they do not have any openings until next week Wednesday.  Spoke with patient in room and re-discussed SNF options with patient.  Patient chose the [facility] as a second choice from list.  Referral sent to Thais at the [facility].      Pt/family was given the Medicare Compare list for SNF, with associated star ratings to assist with choice for referrals/discharge planning Yes    Education was given to pt/family that star ratings are updated/maintained by Medicare and can be reviewed by visiting www.medicare.gov Yes        MAN Alonso on 1/26/2024 at 12:08 PM    Spoke with Thais at the [facility] and they accepted patient.  Called [facility] and spoke with Jonelle and they are able to transport patient to the [facility] at 1430 this date.  The cost of transportation is $46.00.  [facility] will borrow a wheelchair for transport.  Spoke with patient about transportation cost and patient does not have any money with him to pay for the ride.  Spoke with Renetta, House Supervisor and patient was approved for the ride to be charged to [facility].  Documented in taxi log.  Thais at the [facility] is aware and in agreement with discharge plan.  Spoke with patient and patient's sister and they are aware and in agreement with discharge plan.   Patient's sister will bring some clothing items to the [facility].  Gave her the [facility] address and phone number.  Patient mentioned a friend, Loni, brought in his CPAP and it is missing the cord.  Called and spoke with Loni to let her know discharge plan and to bring the cord to the [facility].  No further  needs at this time.    PAS completed.  Confirmation #[id number].    MAN Alonso on 1/26/2024 at 2:13 PM

## 2024-01-26 NOTE — ED PROVIDER NOTES
"  History     Chief Complaint   Patient presents with    Generalized Weakness     HPI  Efra Zuniga is a 68 year old male who presents for failure to thrive at home.  Essentially he was recently admitted to the hospital for several different issues mostly respiratory including ongoing heart failure and chronic respiratory failure.  He was initially discharged to [facility] but did not like it there and so he went home.  Home health care was ordered but he was not able to care for himself there.  He had the BiPAP available to him last night but he was unclear how to use it and was not sure if he had the mask on correctly.  He denies any change in his symptoms.  He denies new chest pain shortness of breath dizziness confusion fevers chills.  10 point review of systems is negative.  Essentially he is looking for placement again as he realizes he cannot care for himself at home.    Allergies:  Allergies   Allergen Reactions    Lisinopril      Other reaction(s): Tachycardia  Felt like \"a heart attack\"; hands went numb       Problem List:    Patient Active Problem List    Diagnosis Date Noted    Failure to thrive in adult 01/25/2024     Priority: Medium    Major depressive disorder, single episode, moderate (H) 01/20/2024     Priority: Medium    HAP (hospital-acquired pneumonia) 01/17/2024     Priority: Medium    Acute on chronic respiratory failure with hypoxia and hypercapnia (H) 01/16/2024     Priority: Medium    Acute respiratory failure with hypoxia (H) 01/07/2024     Priority: Medium    Chronic heart failure with preserved ejection fraction (H) 01/07/2024     Priority: Medium    Medical non-compliance 04/24/2023     Priority: Medium    Mild cognitive impairment 09/09/2022     Priority: Medium    Adverse effect of antihyperlipidemic and antiarteriosclerotic drugs, sequela 12/30/2020     Priority: Medium    Paroxysmal atrial fibrillation with RVR (H) 10/06/2019     Priority: Medium    Low blood magnesium 10/06/2019 "     Priority: Medium    Cardiac pacemaker in situ 09/10/2019     Priority: Medium     Formatting of this note might be different from the original.  Implant Date 5/25/22  Implanting Physician Bong Edmonds-explanted old leads   09/10/2019 [employer] Accolade, model L331, serial# [id number]   Lead   Right Atrium  Model Ingevity + IS-1 BiPositive Fix RA/RV 52cm                                 7841 Serial Number [id number]  Implant Date 05/25/22  Lead  Left Ventricle  Model Ingevity + IS-1 BiPositive Fix RA/RV 59cm                                 7842 Serial Number [id number]   Implant Date 05/25/22  LB area       c      Panlobular emphysema (H) 02/28/2018     Priority: Medium    Dysthymic disorder 01/16/2018     Priority: Medium     Overview:   chronic      History of disease 01/16/2018     Priority: Medium    Asbestos exposure 09/15/2017     Priority: Medium    Nonsmoker 09/15/2017     Priority: Medium    Simple chronic bronchitis (H) 09/15/2017     Priority: Medium    Essential hypertension 04/27/2017     Priority: Medium    Type 2 diabetes mellitus with hyperglycemia, without long-term current use of insulin (H) 05/02/2014     Priority: Medium    Moderate episode of recurrent major depressive disorder (H) 01/17/2006     Priority: Medium    Obesity (BMI 30-39.9) 01/17/2006     Priority: Medium     Formatting of this note might be different from the original.   Updated per 10/1/17 IMO import          Past Medical History:    Past Medical History:   Diagnosis Date    Family history of other specified conditions (CODE)     Hemorrhage of anus and rectum     Irritable bowel syndrome with constipation     Pain in joint        Past Surgical History:    Past Surgical History:   Procedure Laterality Date    APPENDECTOMY OPEN      Coronary angiogram done by Aron Quick at [facility] in March 2006 shows patent coronary arteries    CHOLECYSTECTOMY      No Comments Provided    OTHER SURGICAL HISTORY      March  ",[id number],SCAN-ANGIOGRAM,Coronary angiogram done by Aron Quick at [facility] in shows patent coronary arteries       Family History:    Family History   Problem Relation Age of Onset    Alzheimer Disease Mother         Alzheimer's disease    Cancer Father         Cancer, with a brain tumor    Heart Disease Father         Heart Disease,CABG x3    Heart Disease Maternal Grandmother         Heart Disease    Heart Disease Maternal Grandfather         Heart Disease    Heart Disease Paternal Grandmother         Heart Disease    Heart Disease Paternal Grandfather         Heart Disease    Other - See Comments Sister         ALS       Social History:  Marital Status:  Single [1]  Social History     Tobacco Use    Smoking status: Never    Smokeless tobacco: Never   Vaping Use    Vaping Use: Never used   Substance Use Topics    Alcohol use: No    Drug use: No        Medications:    acetaminophen (TYLENOL) 325 MG tablet  apixaban ANTICOAGULANT (ELIQUIS) 5 MG tablet  aspirin 81 MG EC tablet  carvedilol (COREG) 12.5 MG tablet  empagliflozin (JARDIANCE) 25 MG TABS tablet  FLUoxetine (PROZAC) 20 MG capsule  furosemide (LASIX) 20 MG tablet  guaiFENesin (ROBITUSSIN) 20 mg/mL liquid  losartan (COZAAR) 25 MG tablet  melatonin 3 MG tablet  predniSONE (DELTASONE) 20 MG tablet  senna-docusate (SENOKOT-S/PERICOLACE) 8.6-50 MG tablet  spironolactone (ALDACTONE) 50 MG tablet          Review of Systems   Constitutional:  Negative for chills, diaphoresis and fatigue.   Gastrointestinal:  Negative for abdominal distention, constipation, diarrhea, nausea and vomiting.   Neurological:  Positive for weakness.   All other systems reviewed and are negative.      Physical Exam   BP: 127/74  Pulse: 75  Temp: 97.7  F (36.5  C)  Resp: 18  Height: 188 cm (6' 2\")  Weight: 125.2 kg (276 lb)  SpO2: 94 %      Physical Exam  Constitutional:       General: He is not in acute distress.     Appearance: Normal appearance. He is obese. He is not " toxic-appearing.   HENT:      Head: Normocephalic and atraumatic.      Nose: Nose normal.      Mouth/Throat:      Mouth: Mucous membranes are moist.   Eyes:      General: No scleral icterus.     Conjunctiva/sclera: Conjunctivae normal.   Cardiovascular:      Rate and Rhythm: Normal rate and regular rhythm.      Pulses: Normal pulses.      Heart sounds: Normal heart sounds.   Pulmonary:      Effort: Pulmonary effort is normal. No respiratory distress.      Breath sounds: Normal breath sounds.   Musculoskeletal:         General: No deformity.      Cervical back: Neck supple.   Skin:     General: Skin is warm and dry.      Comments: Bilateral chronic venous stasis changes.  Mild bilateral edema.   Neurological:      General: No focal deficit present.      Mental Status: He is alert.         ED Course              ED Course as of 01/25/24 2342   Thu Jan 25, 2024   2314 Patient has been in the hospital twice this month.  Was initially at [facility] then sent home yesterday with home health care.  Does not feel he is able to care for himself at home at this time.     Procedures              Critical Care time:  none               Results for orders placed or performed during the hospital encounter of 01/25/24 (from the past 24 hour(s))   Extra Tube    Narrative    The following orders were created for panel order Extra Tube.  Procedure                               Abnormality         Status                     ---------                               -----------         ------                     Extra Red Top Tube[[id number]]                                                          Extra Green Top (Lithium...[[id number]]                                                   Please view results for these tests on the individual orders.       Medications - No data to display    Assessments & Plan (with Medical Decision Making)     I have reviewed the nursing notes.    I have reviewed the findings, diagnosis, plan and need for  follow up with the patient.           Medical Decision Making  The patient's presentation was of moderate complexity (a chronic illness mild to moderate exacerbation, progression, or side effect of treatment).    The patient's evaluation involved:  history and exam without other MDM data elements    The patient's management necessitated only low risk treatment.        New Prescriptions    No medications on file       Final diagnoses:   Failure to thrive in adult   Patient with several comorbidities.  Essentially he left the nursing home came back here and was sent home and could not care for himself so he came back because he realizes now he needs to be in a care facility.  Discussed with him at length the need for him to stay at the care facility that is available for him.  He is agreeable to going to whichever facility in town would be willing to take him.  He had confusion and questions about how to wear his BiPAP.  Will have respiratory come and see him to put the mask on overnight.  He has been accepted to observation by the hospitalist.  Appreciate ongoing care.  No new concerns today.    1/25/2024   [facility]       Licha Katz DO  01/25/24 1600

## 2024-01-26 NOTE — PLAN OF CARE
"PRIMARY DIAGNOSIS: \"GENERIC\" NURSING  OUTPATIENT/OBSERVATION GOALS TO BE MET BEFORE DISCHARGE:  ADLs back to baseline: No    Activity and level of assistance: walking with walker and staff.    Pain status: Pain free.    Return to near baseline physical activity: Yes     Discharge Planner Nurse   Safe discharge environment identified: Yes  Barriers to discharge: Yes  needs snf placement.       Entered by: Sonal Ramos RN 01/26/2024 10:55 AM     Please review provider order for any additional goals.   Nurse to notify provider when observation goals have been met and patient is ready for discharge.Goal Outcome Evaluation:                        "

## 2024-01-26 NOTE — PHARMACY-ADMISSION MEDICATION HISTORY
Pharmacy Intern Admission Medication History    Admission medication history is complete. The information provided in this note is only as accurate as the sources available at the time of the update.    Information Source(s): Patient, Caregiver, Facility (U/NH/) medication list/MAR, and CareEverywhere/SureScripts via in-person and phone    Pertinent Information: Patient admits to not knowing medications and forgetting to take medications ~3 times a week. Last time he remembers taking his medications was yesterday 1/25/24 AM. Patient was recently started 1/24/24 with once weekly home health visits. Received Medlist from [facility]. Nurse stated that she had setup his medications on 1/24/24. Medlist accurately reflects surescripts and 1/23/24 AVS. With the exception of newly started fluoxetine missing from [facility] medlist. [facility] nurse states he had everything on their medlist except aspirin (not at home).     Patient stated that one of his new medications cost him 500$ (Jardiance 25mg? Eliquis?) and he was wondering if that could be changed. Called [facility] and Eliquis had no copay and jardiance cost $120. Patient picked up all Rxs.     Changes made to PTA medication list:  Added: None  Deleted: None  Changed: None    Medication Affordability: Has not missed any medications d/t cost but explains he cannot afford his jardiance $120 per [facility].        Allergies reviewed with patient: yes    Medication History Completed By: Denis Inman [facility] 1/26/2024 1:08 PM    PTA Med List   Medication Sig Last Dose    acetaminophen (TYLENOL) 325 MG tablet Take 2 tablets (650 mg) by mouth every 4 hours as needed for mild pain or other (and adjunct with moderate or severe pain or per patient request) Unknown    apixaban ANTICOAGULANT (ELIQUIS) 5 MG tablet Take 1 tablet (5 mg) by mouth 2 times daily 1/25/2024 at AM    aspirin 81 MG EC tablet Take 1 tablet (81 mg) by mouth daily Unknown    carvedilol (COREG) 12.5 MG tablet  Take 1 tablet (12.5 mg) by mouth 2 times daily 1/25/2024 at AM    empagliflozin (JARDIANCE) 25 MG TABS tablet Take 1 tablet (25 mg) by mouth daily 1/25/2024 at AM    FLUoxetine (PROZAC) 20 MG capsule Take 1 capsule (20 mg) by mouth daily 1/25/2024 at AM    furosemide (LASIX) 20 MG tablet Take 1 tablet (20 mg) by mouth daily 1/25/2024 at AM    guaiFENesin (ROBITUSSIN) 20 mg/mL liquid Take 20 mLs (400 mg) by mouth every 4 hours as needed for cough Give with glass of water Unknown    losartan (COZAAR) 25 MG tablet Take 1 tablet (25 mg) by mouth daily 1/25/2024 at AM    melatonin 3 MG tablet Take 1 tablet (3 mg) by mouth nightly as needed Unknown    predniSONE (DELTASONE) 20 MG tablet Take 1 tablet (20 mg) by mouth daily for 3 days, THEN 0.5 tablets (10 mg) daily for 4 days. 1/25/2024 at AM    senna-docusate (SENOKOT-S/PERICOLACE) 8.6-50 MG tablet Take 1 tablet by mouth 2 times daily as needed for constipation 1/25/2024 at AM    spironolactone (ALDACTONE) 50 MG tablet Take 1 tablet (50 mg) by mouth daily 1/25/2024 at AM

## 2024-01-29 ENCOUNTER — PATIENT OUTREACH (OUTPATIENT)
Dept: INTERNAL MEDICINE | Facility: OTHER | Age: 69
End: 2024-01-29
Payer: COMMERCIAL

## 2024-01-29 NOTE — TELEPHONE ENCOUNTER
Transitional Care Management    Patient discharged to skilled nursing facility for short term rehab. No TCM call required per policy.        Rajni Robledo RN on 1/29/2024 at 7:39 AM

## 2024-02-06 ENCOUNTER — TELEPHONE (OUTPATIENT)
Dept: INTERNAL MEDICINE | Facility: OTHER | Age: 69
End: 2024-02-06
Payer: COMMERCIAL

## 2024-02-06 DIAGNOSIS — J96.21 ACUTE AND CHRONIC RESPIRATORY FAILURE WITH HYPOXIA (H): Primary | ICD-10-CM

## 2024-02-06 DIAGNOSIS — J96.22 ACUTE AND CHRONIC RESPIRATORY FAILURE WITH HYPERCAPNIA (H): ICD-10-CM

## 2024-02-06 NOTE — TELEPHONE ENCOUNTER
Lukas Kramer reviewed and completed the following home care or hospice form(s) for Efra Zuniga on 1/24/24   This covers the certification period effective 1/24/24 to 3/23/24.    Kelli Keane LPN on 2/6/2024 at 4:40 PM

## 2024-02-12 ENCOUNTER — MEDICAL CORRESPONDENCE (OUTPATIENT)
Dept: HEALTH INFORMATION MANAGEMENT | Facility: OTHER | Age: 69
End: 2024-02-12
Payer: COMMERCIAL

## 2024-02-12 ENCOUNTER — TELEPHONE (OUTPATIENT)
Dept: INTERNAL MEDICINE | Facility: OTHER | Age: 69
End: 2024-02-12
Payer: COMMERCIAL

## 2024-02-13 ENCOUNTER — MEDICAL CORRESPONDENCE (OUTPATIENT)
Dept: HEALTH INFORMATION MANAGEMENT | Facility: OTHER | Age: 69
End: 2024-02-13
Payer: COMMERCIAL

## 2024-02-16 ENCOUNTER — TELEPHONE (OUTPATIENT)
Dept: INTERNAL MEDICINE | Facility: OTHER | Age: 69
End: 2024-02-16
Payer: COMMERCIAL

## 2024-02-16 NOTE — TELEPHONE ENCOUNTER
Willow Berrios is requesting verbal orders from [facility] for the following:  Move OT eval to next week.    Tomasa Roth on 2/16/2024 at 1:46 PM

## 2024-02-16 NOTE — TELEPHONE ENCOUNTER
I agree with the Home Care order requests.    Electronically signed by:  Lukas Kramer MD  on February 16, 2024 12:17 PM

## 2024-02-16 NOTE — TELEPHONE ENCOUNTER
Date of birth and last name verified.  Dr. Kramer's comment relayed to Mary.  She states understanding.  López Wing LPN  Ext: [phone]

## 2024-02-16 NOTE — TELEPHONE ENCOUNTER
Please call with verbal orders for skilled nursing for 1 time a week for 9 weeks and 2 PRNs               Celia Man on 2/16/2024 at 11:35 AM

## 2024-02-19 NOTE — TELEPHONE ENCOUNTER
I agree with the Home Care order requests.    Electronically signed by:  Lukas Kramer MD  on February 19, 2024 5:48 PM

## 2024-02-20 ENCOUNTER — MEDICAL CORRESPONDENCE (OUTPATIENT)
Dept: HEALTH INFORMATION MANAGEMENT | Facility: OTHER | Age: 69
End: 2024-02-20
Payer: COMMERCIAL

## 2024-02-20 NOTE — TELEPHONE ENCOUNTER
After verifying patient's name and date of birth, Willow was given the below information.  Norma J. Gosselin, LPN....2/20/2024 8:03 AM

## 2024-02-21 ENCOUNTER — TELEPHONE (OUTPATIENT)
Dept: INTERNAL MEDICINE | Facility: OTHER | Age: 69
End: 2024-02-21
Payer: COMMERCIAL

## 2024-02-21 NOTE — TELEPHONE ENCOUNTER
Need verbal for [facility] for 2 times a week for 2 wks and then 1 time a week for 4 weeks.      Chloe Dias on 2/21/2024 at 10:50 AM

## 2024-02-21 NOTE — TELEPHONE ENCOUNTER
I agree with the Home Care order requests.    Electronically signed by:  Lukas Kramer MD  on February 21, 2024 12:14 PM

## 2024-02-21 NOTE — TELEPHONE ENCOUNTER
Left message on Raisa's voicemail informing her of provider response    Letty Kim, CMA on 2/21/2024 at 12:32 PM

## 2024-02-22 ENCOUNTER — MEDICAL CORRESPONDENCE (OUTPATIENT)
Dept: HEALTH INFORMATION MANAGEMENT | Facility: OTHER | Age: 69
End: 2024-02-22
Payer: COMMERCIAL

## 2024-02-23 ENCOUNTER — TELEPHONE (OUTPATIENT)
Dept: INTERNAL MEDICINE | Facility: OTHER | Age: 69
End: 2024-02-23
Payer: COMMERCIAL

## 2024-02-23 DIAGNOSIS — I11.0 HYPERTENSIVE HEART DISEASE WITH CHRONIC DIASTOLIC CONGESTIVE HEART FAILURE (H): ICD-10-CM

## 2024-02-23 DIAGNOSIS — I50.32 HEART FAILURE, DIASTOLIC, CHRONIC (H): Primary | ICD-10-CM

## 2024-02-23 DIAGNOSIS — I50.32 HYPERTENSIVE HEART DISEASE WITH CHRONIC DIASTOLIC CONGESTIVE HEART FAILURE (H): ICD-10-CM

## 2024-02-23 NOTE — TELEPHONE ENCOUNTER
Dr Lukas Kramer reviewed and completed the following home care or hospice form(s) for Efra on 02/23/2024   This covers the certification period effective 01/24/2024 to 03/23/2024.  López Agrawal on 2/23/2024 at 4:02 PM

## 2024-02-27 ENCOUNTER — TELEPHONE (OUTPATIENT)
Dept: INTERNAL MEDICINE | Facility: OTHER | Age: 69
End: 2024-02-27
Payer: COMMERCIAL

## 2024-02-27 ENCOUNTER — NURSE TRIAGE (OUTPATIENT)
Dept: NURSING | Facility: CLINIC | Age: 69
End: 2024-02-27

## 2024-02-27 ENCOUNTER — HOSPITAL ENCOUNTER (EMERGENCY)
Facility: OTHER | Age: 69
Discharge: HOME | End: 2024-02-27
Attending: FAMILY MEDICINE | Admitting: FAMILY MEDICINE
Payer: MEDICARE

## 2024-02-27 ENCOUNTER — MEDICAL CORRESPONDENCE (OUTPATIENT)
Dept: HEALTH INFORMATION MANAGEMENT | Facility: OTHER | Age: 69
End: 2024-02-27

## 2024-02-27 ENCOUNTER — APPOINTMENT (OUTPATIENT)
Dept: GENERAL RADIOLOGY | Facility: OTHER | Age: 69
End: 2024-02-27
Attending: FAMILY MEDICINE
Payer: MEDICARE

## 2024-02-27 VITALS
OXYGEN SATURATION: 91 % | TEMPERATURE: 98.4 F | DIASTOLIC BLOOD PRESSURE: 81 MMHG | RESPIRATION RATE: 22 BRPM | HEIGHT: 74 IN | HEART RATE: 77 BPM | SYSTOLIC BLOOD PRESSURE: 164 MMHG | BODY MASS INDEX: 34.65 KG/M2 | WEIGHT: 270 LBS

## 2024-02-27 DIAGNOSIS — R62.7 FAILURE TO THRIVE IN ADULT: ICD-10-CM

## 2024-02-27 DIAGNOSIS — I50.33 ACUTE ON CHRONIC HEART FAILURE WITH PRESERVED EJECTION FRACTION (H): ICD-10-CM

## 2024-02-27 DIAGNOSIS — J96.01 ACUTE RESPIRATORY FAILURE WITH HYPOXIA (H): ICD-10-CM

## 2024-02-27 DIAGNOSIS — J96.21 ACUTE ON CHRONIC RESPIRATORY FAILURE WITH HYPOXIA AND HYPERCAPNIA (H): Primary | ICD-10-CM

## 2024-02-27 DIAGNOSIS — I50.9 ACUTE ON CHRONIC CONGESTIVE HEART FAILURE, UNSPECIFIED HEART FAILURE TYPE (H): ICD-10-CM

## 2024-02-27 DIAGNOSIS — J96.22 ACUTE ON CHRONIC RESPIRATORY FAILURE WITH HYPOXIA AND HYPERCAPNIA (H): Primary | ICD-10-CM

## 2024-02-27 LAB
ALBUMIN SERPL BCG-MCNC: 4.1 G/DL (ref 3.5–5.2)
ALP SERPL-CCNC: 82 U/L (ref 40–150)
ALT SERPL W P-5'-P-CCNC: 15 U/L (ref 0–70)
ANION GAP SERPL CALCULATED.3IONS-SCNC: 10 MMOL/L (ref 7–15)
AST SERPL W P-5'-P-CCNC: 12 U/L (ref 0–45)
BASE EXCESS BLDV CALC-SCNC: 6.3 MMOL/L (ref -3–3)
BASOPHILS # BLD AUTO: 0.1 10E3/UL (ref 0–0.2)
BASOPHILS NFR BLD AUTO: 0 %
BILIRUB SERPL-MCNC: 0.8 MG/DL
BUN SERPL-MCNC: 18.5 MG/DL (ref 8–23)
CALCIUM SERPL-MCNC: 10.2 MG/DL (ref 8.8–10.2)
CHLORIDE SERPL-SCNC: 98 MMOL/L (ref 98–107)
CREAT SERPL-MCNC: 0.89 MG/DL (ref 0.67–1.17)
DEPRECATED HCO3 PLAS-SCNC: 31 MMOL/L (ref 22–29)
EGFRCR SERPLBLD CKD-EPI 2021: >90 ML/MIN/1.73M2
EOSINOPHIL # BLD AUTO: 0.2 10E3/UL (ref 0–0.7)
EOSINOPHIL NFR BLD AUTO: 2 %
ERYTHROCYTE [DISTWIDTH] IN BLOOD BY AUTOMATED COUNT: 14.6 % (ref 10–15)
GLUCOSE SERPL-MCNC: 129 MG/DL (ref 70–99)
HCO3 BLDV-SCNC: 34 MMOL/L (ref 21–28)
HCT VFR BLD AUTO: 43.3 % (ref 40–53)
HGB BLD-MCNC: 14.2 G/DL (ref 13.3–17.7)
HOLD SPECIMEN: NORMAL
HOLD SPECIMEN: NORMAL
IMM GRANULOCYTES # BLD: 0.1 10E3/UL
IMM GRANULOCYTES NFR BLD: 1 %
LYMPHOCYTES # BLD AUTO: 2.6 10E3/UL (ref 0–5.3)
LYMPHOCYTES NFR BLD AUTO: 23 %
MCH RBC QN AUTO: 29.6 PG (ref 26.5–33)
MCHC RBC AUTO-ENTMCNC: 32.8 G/DL (ref 31.5–36.5)
MCV RBC AUTO: 90 FL (ref 78–100)
MONOCYTES # BLD AUTO: 0.8 10E3/UL (ref 0–1.3)
MONOCYTES NFR BLD AUTO: 7 %
NEUTROPHILS # BLD AUTO: 7.6 10E3/UL (ref 1.6–8.3)
NEUTROPHILS NFR BLD AUTO: 67 %
NRBC # BLD AUTO: 0 10E3/UL
NRBC BLD AUTO-RTO: 0 /100
NT-PROBNP SERPL-MCNC: 144 PG/ML (ref 0–900)
O2/TOTAL GAS SETTING VFR VENT: 24 %
OXYHGB MFR BLDV: 53 % (ref 70–75)
PCO2 BLDV: 58 MM HG (ref 40–50)
PH BLDV: 7.37 [PH] (ref 7.32–7.43)
PLATELET # BLD AUTO: 249 10E3/UL (ref 150–450)
PO2 BLDV: 30 MM HG (ref 25–47)
POTASSIUM SERPL-SCNC: 4.3 MMOL/L (ref 3.4–5.3)
PROT SERPL-MCNC: 7.8 G/DL (ref 6.4–8.3)
RBC # BLD AUTO: 4.8 10E6/UL (ref 4.4–5.9)
SAO2 % BLDV: 54 % (ref 70–75)
SODIUM SERPL-SCNC: 139 MMOL/L (ref 135–145)
TROPONIN T SERPL HS-MCNC: 13 NG/L
WBC # BLD AUTO: 11.3 10E3/UL (ref 4–11)

## 2024-02-27 PROCEDURE — 93005 ELECTROCARDIOGRAM TRACING: CPT | Performed by: FAMILY MEDICINE

## 2024-02-27 PROCEDURE — 93010 ELECTROCARDIOGRAM REPORT: CPT | Performed by: INTERNAL MEDICINE

## 2024-02-27 PROCEDURE — 82247 BILIRUBIN TOTAL: CPT | Performed by: FAMILY MEDICINE

## 2024-02-27 PROCEDURE — 99291 CRITICAL CARE FIRST HOUR: CPT | Mod: 25 | Performed by: FAMILY MEDICINE

## 2024-02-27 PROCEDURE — 85025 COMPLETE CBC W/AUTO DIFF WBC: CPT | Performed by: FAMILY MEDICINE

## 2024-02-27 PROCEDURE — 83880 ASSAY OF NATRIURETIC PEPTIDE: CPT | Performed by: FAMILY MEDICINE

## 2024-02-27 PROCEDURE — 82805 BLOOD GASES W/O2 SATURATION: CPT | Performed by: FAMILY MEDICINE

## 2024-02-27 PROCEDURE — 96374 THER/PROPH/DIAG INJ IV PUSH: CPT | Performed by: FAMILY MEDICINE

## 2024-02-27 PROCEDURE — 84484 ASSAY OF TROPONIN QUANT: CPT | Performed by: FAMILY MEDICINE

## 2024-02-27 PROCEDURE — 36415 COLL VENOUS BLD VENIPUNCTURE: CPT | Performed by: FAMILY MEDICINE

## 2024-02-27 PROCEDURE — 250N000011 HC RX IP 250 OP 636: Performed by: FAMILY MEDICINE

## 2024-02-27 PROCEDURE — 71046 X-RAY EXAM CHEST 2 VIEWS: CPT | Mod: TC

## 2024-02-27 PROCEDURE — 99284 EMERGENCY DEPT VISIT MOD MDM: CPT | Performed by: FAMILY MEDICINE

## 2024-02-27 RX ORDER — FUROSEMIDE 20 MG
20 TABLET ORAL 2 TIMES DAILY
Qty: 30 TABLET | Refills: 0 | Status: SHIPPED | OUTPATIENT
Start: 2024-02-27 | End: 2024-03-12

## 2024-02-27 RX ORDER — NITROGLYCERIN 0.4 MG/1
0.4 TABLET SUBLINGUAL EVERY 5 MIN PRN
Status: DISCONTINUED | OUTPATIENT
Start: 2024-02-27 | End: 2024-02-27 | Stop reason: HOSPADM

## 2024-02-27 RX ORDER — FUROSEMIDE 10 MG/ML
20 INJECTION INTRAMUSCULAR; INTRAVENOUS ONCE
Status: COMPLETED | OUTPATIENT
Start: 2024-02-27 | End: 2024-02-27

## 2024-02-27 RX ADMIN — FUROSEMIDE 20 MG: 10 INJECTION, SOLUTION INTRAMUSCULAR; INTRAVENOUS at 19:54

## 2024-02-27 ASSESSMENT — ENCOUNTER SYMPTOMS
WHEEZING: 0
DYSURIA: 0
SHORTNESS OF BREATH: 1
CHEST TIGHTNESS: 1
STRIDOR: 0
PALPITATIONS: 0
COUGH: 0
CHILLS: 0
FEVER: 0

## 2024-02-27 ASSESSMENT — ACTIVITIES OF DAILY LIVING (ADL)
ADLS_ACUITY_SCORE: 38
ADLS_ACUITY_SCORE: 38

## 2024-02-27 ASSESSMENT — COLUMBIA-SUICIDE SEVERITY RATING SCALE - C-SSRS
1. IN THE PAST MONTH, HAVE YOU WISHED YOU WERE DEAD OR WISHED YOU COULD GO TO SLEEP AND NOT WAKE UP?: NO
6. HAVE YOU EVER DONE ANYTHING, STARTED TO DO ANYTHING, OR PREPARED TO DO ANYTHING TO END YOUR LIFE?: NO
2. HAVE YOU ACTUALLY HAD ANY THOUGHTS OF KILLING YOURSELF IN THE PAST MONTH?: NO

## 2024-02-27 NOTE — TELEPHONE ENCOUNTER
Writer spoke to Krysten from [facility] regarding patient. She stated that she had just met patient and he reported his weight, it was a gain of 7 lbs from what they had listed but unsure how accurate it is. They are planning on weighing him again on Thursday and will let us know if there is any more weight gain.   Paige Kelley LPN on 2/27/2024 at 1:02 PM  EXT. [phone]

## 2024-02-27 NOTE — TELEPHONE ENCOUNTER
Condition update - 7 lb weight gain in last week and has not been taking medications.  Please call    Xu Pickett on 2/27/2024 at 10:39 AM     From: Danielle Lucas  To: Derik Chinchilla  Sent: 9/29/2022 7:21 AM CDT  Subject: Labs    I have an appointment with Dr. Leon on October 24th. Could I have my cholesterol checked while I’m there that morning since I haven’t had it checked for awhile?

## 2024-02-27 NOTE — TELEPHONE ENCOUNTER
I approve of requested home care orders.    Bessy Del Rosario, LISANDRO CNP     Regarding: F [place] 17 fever 103 headache and back pain  ----- Message from Neelima Mcgovern sent at 11/26/2023  8:59 AM CST -----  Patient Name: Norris Franco    Specialist or PCP Name: Elenita Duncan MD    Symptoms: F IL fever 103 headache and back pain     Pregnant (females aged 13-60. If Yes, how long?) : no    Call Back # : [phone]    Which State are you currently located in?: [place]    Name of Clinic Site / Acct# : [place]    Use following scripting for patients waiting for a callback:   \"Nurse callback times vary based on call volumes; please be aware the return phone call may come from an unidentified or out of state phone number. If your symptoms worsen or become life threatening while waiting, you should seek immediate assistance by calling 911 or going to the ER for evaluation.\"

## 2024-02-27 NOTE — TELEPHONE ENCOUNTER
Writer spoke to [facility] and relayed message from below.  Paige Kelley LPN on 2/27/2024 at 3:15 PM  EXT. [phone]

## 2024-02-27 NOTE — TELEPHONE ENCOUNTER
Patient needs a DME for Alarm system for medication and a PALS Alert. Orders are teed up      [facility] needs verbal orders for home health aide once a week to do some      monitoring and home exercise program for strength.  Okay to leave a message .     Please advise   Paige Kelley LPN on 2/27/2024 at 1:14 PM  EXT. [phone]

## 2024-02-27 NOTE — TELEPHONE ENCOUNTER
Triage Call:    Caller:  Home care OT  Patient has a B/P that have been going up and 172/81 and new swelling in hands and legs.  Patient did miss morning and evening meds yesterday.  O2 at rest is 90%.      Protocol Recommended Disposition: ED.    Patient stated he will go in the morning.     OT requested that note also be routed to PCP and team to follow-up.      Caller verbalized understanding of instructions and questions answered.      Celia Capps RN on 2/27/2024 at 5:58 PM      Reason for Disposition   [1] Systolic BP  >= 160 OR Diastolic >= 100 AND [2] cardiac (e.g., breathing difficulty, chest pain) or neurologic symptoms (e.g., new-onset blurred or double vision, unsteady gait)    Additional Information   Negative: Difficult to awaken or acting confused (e.g., disoriented, slurred speech)   Negative: SEVERE difficulty breathing (e.g., struggling for each breath, speaks in single words)   Negative: [1] Weakness of the face, arm or leg on one side of the body AND [2] new-onset   Negative: [1] Numbness (i.e., loss of sensation) of the face, arm or leg on one side of the body AND [2] new-onset   Negative: [1] Chest pain lasts > 5 minutes AND [2] history of heart disease (i.e., heart attack, bypass surgery, angina, angioplasty, CHF)   Negative: [1] Chest pain AND [2] took nitrogylcerin AND [3] pain was not relieved   Negative: Sounds like a life-threatening emergency to the triager   Negative: Symptom is main concern (e.g., headache, chest pain)   Negative: Low blood pressure is main concern    Protocols used: Blood Pressure - High-A-

## 2024-02-27 NOTE — TELEPHONE ENCOUNTER
[facility] needs verbal orders for home health aide once a week to do some monitoring and home exercise program for strength.  Okay to leave a message .     The patient needs a PAL alert. They need his physician to fax the order over to [facility].  Phone number is [phone]    The patient also needs a medicine alarm system to help him remember to take his meds.  (Med alarm box)    May need a neuropsyche referral.

## 2024-02-28 LAB
ATRIAL RATE - MUSE: 73 BPM
DIASTOLIC BLOOD PRESSURE - MUSE: NORMAL MMHG
INTERPRETATION ECG - MUSE: NORMAL
P AXIS - MUSE: 15 DEGREES
PR INTERVAL - MUSE: 160 MS
QRS DURATION - MUSE: 88 MS
QT - MUSE: 390 MS
QTC - MUSE: 429 MS
R AXIS - MUSE: 46 DEGREES
SYSTOLIC BLOOD PRESSURE - MUSE: NORMAL MMHG
T AXIS - MUSE: 68 DEGREES
VENTRICULAR RATE- MUSE: 73 BPM

## 2024-02-28 NOTE — ED TRIAGE NOTES
"Pt here from home via private car. Pt was advised by Home health RN to be evaluated for increased bilateral leg edema and increasing SOB with walking. Pt LS clear but dim 91-92% RA in triage, +3 bilateral leg/foot  edema noted. Denies pain. EKG in triage.  BP (!) 164/81   Pulse 77   Temp 98.4  F (36.9  C) (Tympanic)   Resp 22   Ht 1.88 m (6' 2\")   Wt 122.5 kg (270 lb)   SpO2 91%   BMI 34.67 kg/m         Triage Assessment (Adult)       Row Name 02/27/24 5114          Triage Assessment    Airway WDL WDL        Respiratory WDL    Respiratory WDL X;rhythm/pattern     Rhythm/Pattern, Respiratory dyspnea upon exertion        Skin Circulation/Temperature WDL    Skin Circulation/Temperature WDL WDL        Cardiac WDL    Cardiac WDL WDL        Peripheral/Neurovascular WDL    Peripheral Neurovascular WDL WDL        Cognitive/Neuro/Behavioral WDL    Cognitive/Neuro/Behavioral WDL WDL                     "

## 2024-02-28 NOTE — ED PROVIDER NOTES
"  History     Chief Complaint   Patient presents with    Leg Swelling    Shortness of Breath   7:11 PM--we currently do not have a bed for this patient in the ER, the nurse showed me the EKG.  EKG shows normal sinus rhythm with a normal rate no ST-T segment deviation.  Multiple hospitalizations for CHF exacerbation.  Orders placed.  I reviewed records, sounds like subacute CHF exacerbation.  I instructed nurse to place patient in the hallway until we have a bed available.  We do currently have ambulances and bound as well.  Will see patient in hallway soon as possible.      HPI  Efra Zuniga is a 68 year old male with history of CHF, hypertension, paroxysmal A-fib, chronic bronchitis, type 2 diabetes, emphysema, pacemaker who presents to the emergency department with increasing swelling and shortness of breath.  Symptoms have been worsening over the last 3 days.  Patient has no chest pain now, when specifically asked he said may be some mild chest tightness over the last 3 days but nothing significant.  No fevers or chills.  No recent med changes, no recent diet changes.  Patient states he does not think he needs to be here but he was sent in by his home health aide.    Reviewed nurses notes below, similar history is related to me  Pt here from home via private car. Pt was advised by Home health RN to be evaluated for increased bilateral leg edema and increasing SOB with walking. Pt LS clear but dim 91-92% RA in triage, +3 bilateral leg/foot  edema noted. Denies pain. EKG in triage.   Allergies:  Allergies   Allergen Reactions    Lisinopril      Other reaction(s): Tachycardia  Felt like \"a heart attack\"; hands went numb       Problem List:    Patient Active Problem List    Diagnosis Date Noted    Failure to thrive in adult 01/25/2024     Priority: Medium    Major depressive disorder, single episode, moderate (H) 01/20/2024     Priority: Medium    HAP (hospital-acquired pneumonia) 01/17/2024     Priority: Medium    " Acute on chronic respiratory failure with hypoxia and hypercapnia (H) 01/16/2024     Priority: Medium    Acute respiratory failure with hypoxia (H) 01/07/2024     Priority: Medium    Chronic heart failure with preserved ejection fraction (H) 01/07/2024     Priority: Medium    Medical non-compliance 04/24/2023     Priority: Medium    Mild cognitive impairment 09/09/2022     Priority: Medium    Adverse effect of antihyperlipidemic and antiarteriosclerotic drugs, sequela 12/30/2020     Priority: Medium    Paroxysmal atrial fibrillation with RVR (H) 10/06/2019     Priority: Medium    Low blood magnesium 10/06/2019     Priority: Medium    Cardiac pacemaker in situ 09/10/2019     Priority: Medium     Formatting of this note might be different from the original.  Implant Date 5/25/22  Implanting Physician Bong Edmonds-explanted old leads   09/10/2019 [employer] Accolade, model L331, serial# [id number]   Lead   Right Atrium  Model Ingevity + IS-1 BiPositive Fix RA/RV 52cm                                 [id number] Serial Number [id number]  Implant Date 05/25/22  Lead  Left Ventricle  Model Ingevity + IS-1 BiPositive Fix RA/RV 59cm                                 [id number] Serial Number [id number]   Implant Date 05/25/22  LB area       c      Panlobular emphysema (H) 02/28/2018     Priority: Medium    Dysthymic disorder 01/16/2018     Priority: Medium     Overview:   chronic      History of disease 01/16/2018     Priority: Medium    Asbestos exposure 09/15/2017     Priority: Medium    Nonsmoker 09/15/2017     Priority: Medium    Simple chronic bronchitis (H) 09/15/2017     Priority: Medium    Essential hypertension 04/27/2017     Priority: Medium    Type 2 diabetes mellitus with hyperglycemia, without long-term current use of insulin (H) 05/02/2014     Priority: Medium    Moderate episode of recurrent major depressive disorder (H) 01/17/2006     Priority: Medium    Obesity (BMI 30-39.9) 01/17/2006     Priority: Medium     Formatting of  this note might be different from the original.   Updated per 10/1/17 IMO import          Past Medical History:    Past Medical History:   Diagnosis Date    Family history of other specified conditions (CODE)     Hemorrhage of anus and rectum     Irritable bowel syndrome with constipation     Pain in joint        Past Surgical History:    Past Surgical History:   Procedure Laterality Date    APPENDECTOMY OPEN      Coronary angiogram done by Aron Quick at [facility] in 2006 shows patent coronary arteries    CHOLECYSTECTOMY      No Comments Provided    OTHER SURGICAL HISTORY      2006,[id number],SCAN-ANGIOGRAM,Coronary angiogram done by Aron Quick at [facility] in shows patent coronary arteries       Family History:    Family History   Problem Relation Age of Onset    Alzheimer Disease Mother         Alzheimer's disease    Cancer Father         Cancer, with a brain tumor    Heart Disease Father         Heart Disease,CABG x3    Heart Disease Maternal Grandmother         Heart Disease    Heart Disease Maternal Grandfather         Heart Disease    Heart Disease Paternal Grandmother         Heart Disease    Heart Disease Paternal Grandfather         Heart Disease    Other - See Comments Sister         ALS       Social History:  Marital Status:  Single [1]  Social History     Tobacco Use    Smoking status: Never    Smokeless tobacco: Never   Vaping Use    Vaping Use: Never used   Substance Use Topics    Alcohol use: No    Drug use: No        Medications:    furosemide (LASIX) 20 MG tablet  acetaminophen (TYLENOL) 325 MG tablet  apixaban ANTICOAGULANT (ELIQUIS) 5 MG tablet  aspirin 81 MG EC tablet  carvedilol (COREG) 12.5 MG tablet  empagliflozin (JARDIANCE) 25 MG TABS tablet  FLUoxetine (PROZAC) 20 MG capsule  guaiFENesin (ROBITUSSIN) 20 mg/mL liquid  losartan (COZAAR) 25 MG tablet  melatonin 3 MG tablet  senna-docusate (SENOKOT-S/PERICOLACE) 8.6-50 MG tablet  spironolactone (ALDACTONE) 50 MG tablet          Review  "of Systems   Constitutional:  Negative for chills and fever.   HENT:  Negative for congestion.    Respiratory:  Positive for chest tightness and shortness of breath. Negative for cough, wheezing and stridor.    Cardiovascular:  Positive for leg swelling. Negative for chest pain and palpitations.   Genitourinary:  Negative for dysuria.       Physical Exam   BP: (!) 164/81  Pulse: 77  Temp: 98.4  F (36.9  C)  Resp: 22  Height: 188 cm (6' 2\")  Weight: 122.5 kg (270 lb)  SpO2: 91 %      Physical Exam  Vitals and nursing note reviewed.   Constitutional:       Appearance: He is well-developed.   Cardiovascular:      Rate and Rhythm: Normal rate and regular rhythm.   Pulmonary:      Effort: Pulmonary effort is normal. No respiratory distress.      Breath sounds: Rales present.   Chest:      Chest wall: No mass, tenderness or edema.   Musculoskeletal:      Cervical back: Normal range of motion.      Right lower leg: Edema present.      Left lower leg: Edema present.   Skin:     General: Skin is warm.      Capillary Refill: Capillary refill takes less than 2 seconds.   Neurological:      General: No focal deficit present.      Mental Status: He is alert.       EKG: NSR,  rate 73, no stt ABNL    Immediate informal bedside lung ultrasound performed, minimal B-lines artifact, intact lung slide, no pericardial effusion appreciated.       Results for orders placed or performed during the hospital encounter of 02/27/24 (from the past 24 hour(s))   CBC with platelets differential    Narrative    The following orders were created for panel order CBC with platelets differential.  Procedure                               Abnormality         Status                     ---------                               -----------         ------                     CBC with platelets and d...[[id number]]  Abnormal            Final result                 Please view results for these tests on the individual orders.   Comprehensive metabolic panel "   Result Value Ref Range    Sodium 139 135 - 145 mmol/L    Potassium 4.3 3.4 - 5.3 mmol/L    Carbon Dioxide (CO2) 31 (H) 22 - 29 mmol/L    Anion Gap 10 7 - 15 mmol/L    Urea Nitrogen 18.5 8.0 - 23.0 mg/dL    Creatinine 0.89 0.67 - 1.17 mg/dL    GFR Estimate >90 >60 mL/min/1.73m2    Calcium 10.2 8.8 - 10.2 mg/dL    Chloride 98 98 - 107 mmol/L    Glucose 129 (H) 70 - 99 mg/dL    Alkaline Phosphatase 82 40 - 150 U/L    AST 12 0 - 45 U/L    ALT 15 0 - 70 U/L    Protein Total 7.8 6.4 - 8.3 g/dL    Albumin 4.1 3.5 - 5.2 g/dL    Bilirubin Total 0.8 <=1.2 mg/dL   Troponin T, High Sensitivity   Result Value Ref Range    Troponin T, High Sensitivity 13 <=22 ng/L   Nt probnp inpatient (BNP)   Result Value Ref Range    N terminal Pro BNP Inpatient 144 0 - 900 pg/mL   Blood gas venous   Result Value Ref Range    pH Venous 7.37 7.32 - 7.43    pCO2 Venous 58 (H) 40 - 50 mm Hg    pO2 Venous 30 25 - 47 mm Hg    Bicarbonate Venous 34 (H) 21 - 28 mmol/L    Base Excess/Deficit Venous 6.3 (H) -3.0 - 3.0 mmol/L    FIO2 24     Oxyhemoglobin Venous 53 (L) 70 - 75 %    O2 Sat, Venous 54.0 (L) 70.0 - 75.0 %    Narrative    In healthy individuals, oxyhemoglobin (O2Hb) and oxygen saturation (SO2) are approximately equal. In the presence of dyshemoglobins, oxyhemoglobin can be considerably lower than oxygen saturation.   CBC with platelets and differential   Result Value Ref Range    WBC Count 11.3 (H) 4.0 - 11.0 10e3/uL    RBC Count 4.80 4.40 - 5.90 10e6/uL    Hemoglobin 14.2 13.3 - 17.7 g/dL    Hematocrit 43.3 40.0 - 53.0 %    MCV 90 78 - 100 fL    MCH 29.6 26.5 - 33.0 pg    MCHC 32.8 31.5 - 36.5 g/dL    RDW 14.6 10.0 - 15.0 %    Platelet Count 249 150 - 450 10e3/uL    % Neutrophils 67 %    % Lymphocytes 23 %    % Monocytes 7 %    % Eosinophils 2 %    % Basophils 0 %    % Immature Granulocytes 1 %    NRBCs per 100 WBC 0 <1 /100    Absolute Neutrophils 7.6 1.6 - 8.3 10e3/uL    Absolute Lymphocytes 2.6 0.0 - 5.3 10e3/uL    Absolute Monocytes  0.8 0.0 - 1.3 10e3/uL    Absolute Eosinophils 0.2 0.0 - 0.7 10e3/uL    Absolute Basophils 0.1 0.0 - 0.2 10e3/uL    Absolute Immature Granulocytes 0.1 <=0.4 10e3/uL    Absolute NRBCs 0.0 10e3/uL   Extra Tube    Narrative    The following orders were created for panel order Extra Tube.  Procedure                               Abnormality         Status                     ---------                               -----------         ------                     Extra Blue Top Tube[[id number]]                              Final result               Extra Red Top Tube[[id number]]                               Final result                 Please view results for these tests on the individual orders.   Extra Blue Top Tube   Result Value Ref Range    Hold Specimen JIC    Extra Red Top Tube   Result Value Ref Range    Hold Specimen hold        Medications   nitroGLYcerin (NITROSTAT) sublingual tablet 0.4 mg (has no administration in time range)   furosemide (LASIX) injection 20 mg (20 mg Intravenous $Given 2/1955)       Assessments & Plan (with Medical Decision Making)     I have reviewed the nursing notes.    I have reviewed the findings, diagnosis, plan and need for follow up with the patient.      Medical Decision Making  The patient's presentation was of moderate complexity (a chronic illness mild to moderate exacerbation, progression, or side effect of treatment).    The patient's evaluation involved:  history and exam without other MDM data elements    The patient's management necessitated high risk (a decision regarding hospitalization).    Reviewed echo from January 24: Ejection fraction 55 to 60%.  Indeterminate diastolic function.  Normal wall motion.    I looked at patient's chest x-ray looks unremarkable regarding significant pulmonary edema.  Patient really wants to leave, there are a lot of images being done in the ED right now and it could be hours before this plain film is read.  I did a ultrasound as well,  no significant B-lines artifact, I thus have low suspicion for significant pulmonary edema.  Patient is fluid up peripherally however.    9:16 PM--I just reevaluated the patient, he is feeling much better.  He is off oxygen.  Patient would like to go home.  Discussed admitting him but he states that he does not want to be admitted, moreover does not have any specific definitive indications for admission.  It is my impression that this is more a subacute CHF exacerbation and just needs some tweaking of his outpatient medications.  He did have a good diuresis here in the ED.  Does feel better, no chest tightness or chest pain or shortness of breath.  Ambulated well prior to discharge.  Will modify his Lasix up to twice daily for 2 weeks and then go back to his baseline.  Follow-up with Farooq so soon as possible, return to ED with worsening symptoms.  Patient states he has not seen cardiology in a while.  Referral placed to discuss heart failure management.    New Prescriptions    No medications on file       Final diagnoses:   Acute on chronic congestive heart failure, unspecified heart failure type (H)       2/27/2024   [facility] HospitalDamian MD  02/27/24 5477

## 2024-02-29 DIAGNOSIS — I50.33 ACUTE ON CHRONIC HEART FAILURE WITH PRESERVED EJECTION FRACTION (H): ICD-10-CM

## 2024-03-06 ENCOUNTER — TELEPHONE (OUTPATIENT)
Dept: INTERNAL MEDICINE | Facility: OTHER | Age: 69
End: 2024-03-06
Payer: COMMERCIAL

## 2024-03-06 DIAGNOSIS — Z95.0 CARDIAC PACEMAKER IN SITU: Primary | ICD-10-CM

## 2024-03-06 DIAGNOSIS — I50.32 HEART FAILURE, DIASTOLIC, CHRONIC (H): ICD-10-CM

## 2024-03-06 NOTE — TELEPHONE ENCOUNTER
Staff from [facility] called regarding patient having a 9 lb. Weight gain in the past 6 days, b/p at 173/99 and patient refusing to take meds for the past week. Patient finally took meds a few minutes ago and stated he will not take them in the future. Please call.    Na Flood on 3/6/2024 at 4:56 PM     Alert and interactive, no focal deficits

## 2024-03-07 NOTE — TELEPHONE ENCOUNTER
Writer to call patient later today to discuss further directly. Janiya Granda RN on 3/7/2024 at 8:04 AM

## 2024-03-07 NOTE — TELEPHONE ENCOUNTER
Agustina called and they saw patient this morning and weight was up another 4 pounds from yesterday.  The patient is not taking his medicine for water retention.  Please advise.     Janiya:  # [phone]

## 2024-03-07 NOTE — TELEPHONE ENCOUNTER
"Patient states he does not like pills and does not think medications are good to take. \"I have a bad attitude towards medical.\" Patient does not want to present to clinic for a visit with Dr. Kramer. Patient said he recently went to [facility] to  two prescriptions. Both of them were >$100, so he essentially had the pharmacist choose which one was the most important because he would only pay for one of them. Discussed this with the pharmacist who states he was willing to purchase Eliquis but she does not think he will even take it regularly. The only alternative to Jardiance would be Jantoven but he would likely be noncompliant with blood checks; he was on it in the past. Pharmacy has offered blister packs to assist with compliance. Apparently he was compliant with medications up until his wife passed away a few years ago. Discussed with patient; he does not like home care coming to his house three times a week because he does not want to have to stay home and wait for them. Patient states he does not think he has heart failure or lung disease and when told these are diagnoses in his chart he replied, \"How do they know that? I think one doctor just wrote something down and the rest of them followed.\" Routing to self to follow-up with patient regularly. Janiya Granda RN on 3/7/2024 at 10:57 AM    "

## 2024-03-07 NOTE — TELEPHONE ENCOUNTER
"Routing to PCP as GRACIELA.     Discussed below with Janiya at [facility]. In the last week the patient gained a total of 11 pounds. Called patient to discuss further. Patient states, \"I just started taking my medications today. I wasn't taking them.\" Provided patient education on heart failure and weight gain describing the complications that it leads to. Patient states he has mild edema in his ankles \"but it's not too bad.\" He states he has been hospitalized for this condition before, but denies that his current condition could lead to another hospitalization. Patient states, \"I don't think it's going to go that way.\" Writer encouraged patient to present to clinic for an appointment. Patient declines/refuses office visit. He again reiterated that he does not like medication and does not want to take it. Writer stressed that the fluid gains need to be reversed to prevent this worsening and that he needs to take his medications regularly. It appears patient is in denial, but he did verbalize that he intends to take his medications regularly by the end of the call. Janiya Granda RN on 3/7/2024 at 3:32 PM    "

## 2024-03-08 RX ORDER — METOPROLOL SUCCINATE 50 MG/1
50 TABLET, EXTENDED RELEASE ORAL DAILY
Qty: 90 TABLET | Refills: 4 | Status: SHIPPED | OUTPATIENT
Start: 2024-03-08 | End: 2024-05-07

## 2024-03-08 NOTE — TELEPHONE ENCOUNTER
Lukas Kramer  To encourage medication compliance, Mickalan is wondering if patient may take his PM doses of Eliquis and Carvedilol along with the AM doses since he is willing to take AM medications but not PM.   --------------------------------------------------  Home care RN calls to report the patient took his morning medication Wednesday, Thursday, and today. His weight went down from 289# yesterday to 283# today and BP is WNL at 151/89. He refuses to take his evening doses of Eliquis and Carvedilol but is taking the morning doses.   Janiya Granda RN on 3/8/2024 at 12:34 PM

## 2024-03-08 NOTE — TELEPHONE ENCOUNTER
I sent in a prescription for Xarelto for him to take once a day along with Toprol-XL to take once a day in place of carvedilol and Eliquis.  Please let me know if you have any questions.

## 2024-03-11 ENCOUNTER — TELEPHONE (OUTPATIENT)
Dept: PEDIATRICS | Facility: OTHER | Age: 69
End: 2024-03-11
Payer: COMMERCIAL

## 2024-03-11 NOTE — TELEPHONE ENCOUNTER
Jessie notified of medication changes. Patient has an appointment with Dr. Chen tomorrow. He missed one day of taking medication and is up 5 pounds. Janiya Granda RN on 3/11/2024 at 12:27 PM

## 2024-03-11 NOTE — TELEPHONE ENCOUNTER
Pt has been non compliant with medication his weight is up 5 lbs in 3 days.  Oxygen was 88%.  Please call with questions.    Xu Pickett on 3/11/2024 at 1:10 PM

## 2024-03-11 NOTE — TELEPHONE ENCOUNTER
I don't know this person. This should be triaged to see if he should be coming to ER today.    Signed, Fish Chen MD, FAAP, FACP  Internal Medicine & Pediatrics

## 2024-03-11 NOTE — TELEPHONE ENCOUNTER
Patient is on the schedule to see Dr Chen tomorrow 3/12/24.  Mariaa needed to let you know what vital signs are.  Norma J. Gosselin, LPN .......  3/11/2024  2:05 PM

## 2024-03-11 NOTE — TELEPHONE ENCOUNTER
S-(situation): Patient has gained 5 pounds over the weekend per report. Patient has history of CHF, cognitive deficits, and has not been regularly taking his medications. Staff reports 2+ edema, oxygen sats of 88%. Per report, patient is declining presenting to [facility]/ED at this time. Patient has clinic appointment on 3/12/24.    B-(background): Patient was in hospital on 2/27/24 for acute on chronic CHF, leg swelling, and shortness of breath.    A-(assessment): Weight gain, medication non-compliance.    R-(recommendations): After speaking with [facility] staff, patient refuses to go to the [facility]/ED despite repeated attempts to encourage him. Will route to PCP.     Erendira Falcon RN on 3/11/2024 at 2:43 PM

## 2024-03-12 ENCOUNTER — OFFICE VISIT (OUTPATIENT)
Dept: PEDIATRICS | Facility: OTHER | Age: 69
End: 2024-03-12
Attending: INTERNAL MEDICINE
Payer: COMMERCIAL

## 2024-03-12 VITALS
BODY MASS INDEX: 37.4 KG/M2 | WEIGHT: 291.4 LBS | TEMPERATURE: 97.5 F | DIASTOLIC BLOOD PRESSURE: 74 MMHG | RESPIRATION RATE: 16 BRPM | SYSTOLIC BLOOD PRESSURE: 130 MMHG | HEIGHT: 74 IN | HEART RATE: 72 BPM | OXYGEN SATURATION: 95 %

## 2024-03-12 DIAGNOSIS — I87.8 VENOUS STASIS OF LOWER EXTREMITY: Primary | ICD-10-CM

## 2024-03-12 DIAGNOSIS — E66.01 CLASS 2 SEVERE OBESITY DUE TO EXCESS CALORIES WITH SERIOUS COMORBIDITY AND BODY MASS INDEX (BMI) OF 37.0 TO 37.9 IN ADULT (H): ICD-10-CM

## 2024-03-12 DIAGNOSIS — Z95.0 S/P PLACEMENT OF CARDIAC PACEMAKER: ICD-10-CM

## 2024-03-12 DIAGNOSIS — E66.812 CLASS 2 SEVERE OBESITY DUE TO EXCESS CALORIES WITH SERIOUS COMORBIDITY AND BODY MASS INDEX (BMI) OF 37.0 TO 37.9 IN ADULT (H): ICD-10-CM

## 2024-03-12 DIAGNOSIS — I50.32 CHRONIC HEART FAILURE WITH PRESERVED EJECTION FRACTION (H): ICD-10-CM

## 2024-03-12 DIAGNOSIS — E11.65 UNCONTROLLED TYPE 2 DIABETES MELLITUS WITH HYPERGLYCEMIA (H): ICD-10-CM

## 2024-03-12 DIAGNOSIS — Z23 NEED FOR VACCINATION: ICD-10-CM

## 2024-03-12 DIAGNOSIS — I48.0 PAROXYSMAL ATRIAL FIBRILLATION WITH RVR (H): ICD-10-CM

## 2024-03-12 PROBLEM — F32.1 MAJOR DEPRESSIVE DISORDER, SINGLE EPISODE, MODERATE (H): Status: RESOLVED | Noted: 2024-01-20 | Resolved: 2024-03-12

## 2024-03-12 PROCEDURE — G0463 HOSPITAL OUTPT CLINIC VISIT: HCPCS

## 2024-03-12 PROCEDURE — 99214 OFFICE O/P EST MOD 30 MIN: CPT | Performed by: INTERNAL MEDICINE

## 2024-03-12 ASSESSMENT — PATIENT HEALTH QUESTIONNAIRE - PHQ9
SUM OF ALL RESPONSES TO PHQ QUESTIONS 1-9: 0
10. IF YOU CHECKED OFF ANY PROBLEMS, HOW DIFFICULT HAVE THESE PROBLEMS MADE IT FOR YOU TO DO YOUR WORK, TAKE CARE OF THINGS AT HOME, OR GET ALONG WITH OTHER PEOPLE: NOT DIFFICULT AT ALL
SUM OF ALL RESPONSES TO PHQ QUESTIONS 1-9: 0

## 2024-03-12 ASSESSMENT — PAIN SCALES - GENERAL: PAINLEVEL: NO PAIN (0)

## 2024-03-12 NOTE — PROGRESS NOTES
Assessment & Plan   1. Venous stasis of lower extremity  His clinical picture is 1 that is most consistent with venous stasis given his normal BNP, no significant findings or symptoms of shortness of breath.  He does have a history of heart failure with preserved ejection fraction however does not appear to be in exacerbation of this at this time.  Lifestyle modification recommended including weight loss, reduced intake of salt and water, increased intake of protein, compression stockings.  Close follow-up is recommended.  - Miscellaneous Order for DME - ONLY FOR DME    2. Class 2 severe obesity due to excess calories with serious comorbidity and body mass index (BMI) of 37.0 to 37.9 in adult (H)  I recommend weight loss    3. Chronic heart failure with preserved ejection fraction (H)  4. S/P placement of cardiac pacemaker  5. Paroxysmal atrial fibrillation with RVR (H)  Follows with cardiology    6. Uncontrolled type 2 diabetes mellitus with hyperglycemia (H)  Diabetes has been uncontrolled.  Discussed options and decided to increase metformin.  Continue Jardiance.  Close follow-up recommended.  - metFORMIN (GLUCOPHAGE) 500 MG tablet; Take 2 tablets (1,000 mg) by mouth 2 times daily (with meals)  Dispense: 360 tablet; Refill: 3    7. Need for vaccination  I recommended COVID-19, influenza and pneumonia vaccinations today all declined by the patient.    Efra was set up with a Bipap on discharge for a hospital admission on 1/23/2024. Insurance requires a Follow up after 31 days. He is 100% with usage. He is using and getting a benefit from the machine     Patient Instructions    -- Low salt diet, under 2000 mg/day   -- Fluid restrict, under 2000 mL/day aka 8.5 cups/day   -- Eat healthy protein   -- Learn about DASH Diet for dietary ways to reduce blood pressure  Google: [facility] DASH diet  [facility] site: https://www.nhlbi.nih.gov/health-topics/dash-eating-plan  PDF from [facility]:  https://www.nhlbi.nih.gov/files/docs/public/heart/new_dash.pdf   -- Elevate feet above your hips for 20-30 minutes, 4 times per day   -- Compression stockings from morning to night   -- Work on 5-10% weight loss     -- Increase metformin to 1000 mg twice daily    Your BMI is Body mass index is 37.41 kg/m .  (BMI ranges: Normal 18.5 - 25, Overweight 25 - 30, Obesity greater than 30,     Morbid Obesity greater than 40 or greater than 35 with associated conditions.)    Facts about losing weight:   -- Overweight and Obesity increase your risk for developing diabetes, high blood pressure and stroke, and shorten your life.   -- 90% of weight loss comes from dietary changes, only 10% from exercise    What should I do?   -- Work on 5-10% weight loss   -- Focus on a few healthy dietary changes   -- Eat more fresh fruits and vegetables, and fewer carbohydrates   -- Cut out all calorie-containing beverages (milk, juice, alcohol, etc)   -- Exercise every day   -- Weigh yourself once a week   -- Learn about DASH Diet for dietary ways to reduce blood pressure  Google: [facility] DASH diet  [facility] site: https://www.nhlbi.nih.gov/health-topics/dash-eating-plan  PDF from [facility]: https://www.nhlbi.nih.gov/files/docs/public/heart/new_dash.pdf   -- Consider Weight Watchers (http://www.weightwatchers.com)   -- Consider My Fitness Pal (iOS, Android, http://www.Nebel.TVpal.com)   -- Consider time-restricted eating (eg. eating between noon and 8pm only)                Return in about 2 months (around 5/12/2024), or if symptoms worsen or fail to improve, for medicare wellness visit, diabetes.    Signed, Fish Chen MD, FAAP, FACP  Internal Medicine & Pediatrics    Subjective   Efra Zuniga is a 69 year old male who presents for excess fluid, weight up 5 pounds.  He has been battling fluid retention for over a month.  He saw the ER.  Yesterday the nurse came to check on him and his sat was 88%.  He feels more shortness of breath with exertion.  He has  "a 1 acre property but cannot walk to the back of the property anymore.  His feet have been swollen worse in the evening and better in the morning.  No orthopnea.  No paroxysmal nocturnal dyspnea.  He has not seen a physician in primary care for many years.    Objective   Vitals: /74   Pulse 72   Temp 97.5  F (36.4  C) (Tympanic)   Resp 16   Ht 1.88 m (6' 2\")   Wt 132.2 kg (291 lb 6.4 oz)   SpO2 95%   BMI 37.41 kg/m      Cardiovascular: Regular, distant  Pulmonary: Clear, distant  MSK: Lower extremity edema 2+ pitting to the shins bilaterally.    Review and Analysis of Data   I personally reviewed the following:  External notes: Yes [facility] cardiology notes  Results: Yes local lab work, local EKG and CT reports, chest x-ray images, echocardiogram report  Use of an independent historian: No  Independent review of a test performed by another physician: No  Discussion of management with another physician: No  Moderate risk of morbidity from additional diagnostic testing and/or treatment.    "

## 2024-03-12 NOTE — PATIENT INSTRUCTIONS
-- Low salt diet, under 2000 mg/day   -- Fluid restrict, under 2000 mL/day aka 8.5 cups/day   -- Eat healthy protein   -- Learn about DASH Diet for dietary ways to reduce blood pressure  Google: [facility] DASH diet  [facility] site: https://www.[facility].nih.gov/health-topics/dash-eating-plan  PDF from [facility]: https://www.nhlbi.nih.gov/files/docs/public/heart/new_dash.pdf   -- Elevate feet above your hips for 20-30 minutes, 4 times per day   -- Compression stockings from morning to night   -- Work on 5-10% weight loss     -- Increase metformin to 1000 mg twice daily    Your BMI is Body mass index is 37.41 kg/m .  (BMI ranges: Normal 18.5 - 25, Overweight 25 - 30, Obesity greater than 30,     Morbid Obesity greater than 40 or greater than 35 with associated conditions.)    Facts about losing weight:   -- Overweight and Obesity increase your risk for developing diabetes, high blood pressure and stroke, and shorten your life.   -- 90% of weight loss comes from dietary changes, only 10% from exercise    What should I do?   -- Work on 5-10% weight loss   -- Focus on a few healthy dietary changes   -- Eat more fresh fruits and vegetables, and fewer carbohydrates   -- Cut out all calorie-containing beverages (milk, juice, alcohol, etc)   -- Exercise every day   -- Weigh yourself once a week   -- Learn about DASH Diet for dietary ways to reduce blood pressure  Google: [facility] DASH diet  [facility] site: https://www.[facility].nih.gov/health-topics/dash-eating-plan  PDF from [facility]: https://www.nhlbi.nih.gov/files/docs/public/heart/new_dash.pdf   -- Consider Weight Watchers (http://www.weightwatchers.com)   -- Consider My Fitness Pal (iOS, Android, http://www.[employer]ACOpal.[facility])   -- Consider time-restricted eating (eg. eating between noon and 8pm only)

## 2024-03-12 NOTE — NURSING NOTE
"Chief Complaint   Patient presents with    Heart Failure     Excess fluid and weight gain up 5 pounds       Initial /74   Pulse 72   Temp 97.5  F (36.4  C) (Tympanic)   Resp 16   Ht 1.88 m (6' 2\")   Wt 132.2 kg (291 lb 6.4 oz)   SpO2 95%   BMI 37.41 kg/m   Estimated body mass index is 37.41 kg/m  as calculated from the following:    Height as of this encounter: 1.88 m (6' 2\").    Weight as of this encounter: 132.2 kg (291 lb 6.4 oz).  Medication Review: complete    The next two questions are to help us understand your food security.  If you are feeling you need any assistance in this area, we have resources available to support you today.          3/12/2024   SDOH- Food Insecurity   Within the past 12 months, did you worry that your food would run out before you got money to buy more? N   Within the past 12 months, did the food you bought just not last and you didn t have money to get more? N         Health Care Directive:  Patient has a Health Care Directive on file      Norma J. Gosselin, LPN      "

## 2024-03-19 ENCOUNTER — TELEPHONE (OUTPATIENT)
Dept: INTERNAL MEDICINE | Facility: OTHER | Age: 69
End: 2024-03-19
Payer: COMMERCIAL

## 2024-03-19 NOTE — TELEPHONE ENCOUNTER
Staff Jessie wanted Dr. Kramer to know that pt has had a 4 pound weight gain in a week. She is wondering if he would like something to be done about this. Please call.    Na Flood on 3/19/2024 at 12:32 PM

## 2024-03-19 NOTE — TELEPHONE ENCOUNTER
I agree with the Home Care order requests below.  Fish Chen MD on 3/19/2024 at 2:47 PM    Signed, Fish Chen MD, FAAP, FACP  Internal Medicine & Pediatrics

## 2024-03-19 NOTE — TELEPHONE ENCOUNTER
I saw him last week.  He did not appear to be in heart failure.  He had venous stasis.      As long as he is not having a significant increase in dyspnea nor development of paroxysmal nocturnal dyspnea or orthopnea:     -- Low salt diet, under 2000 mg/day   -- Fluid restrict, under 2000 mL/day aka 8.5 cups/day   -- Eat healthy protein   -- Learn about DASH Diet for dietary ways to reduce blood pressure  Google: [facility] DASH diet  [facility] site: https://www.nhlbi.nih.gov/health-topics/dash-eating-plan  PDF from [facility]: https://www.nhlbi.nih.gov/files/docs/public/heart/new_dash.pdf   -- Elevate feet above your hips for 20-30 minutes, 4 times per day   -- Compression stockings from morning to night    Fish Sharp MD, FAAP, FACP  Internal Medicine & Pediatrics

## 2024-03-19 NOTE — TELEPHONE ENCOUNTER
Writer spoke to Staff Jessie about patient. She stated that he has had a 4 pound weight gain in a week. He has +1 edema in bilateral legs and trace 1 edema in bilateral hands and feet. Patient does currently use meals on Wheels and stated that the meals have a lot of salt in them. He has been taking his morning medication every day for the last week . She is wondering if you wanted to address this at this time.   Please Advise.  Paige Kelley LPN on 3/19/2024 at 12:54 PM  EXT. [phone]

## 2024-03-19 NOTE — TELEPHONE ENCOUNTER
Left message with Jessie to call back regarding patient.      EXT: [phone]      Rajni Robledo RN on 3/19/2024 at 2:15 PM

## 2024-03-19 NOTE — TELEPHONE ENCOUNTER
Called and spoke to Jessie and verified patient's last name and date of birth. Relayed message from patient's PCP, verbalized understanding.      Jessie verbalized needing a verbal order for another visit out to the patient this week to continue to monitor patient's breathing and weight. Will route to PCP for verbal.        Rajni Robledo RN on 3/19/2024 at 2:42 PM

## 2024-03-20 ENCOUNTER — MEDICAL CORRESPONDENCE (OUTPATIENT)
Dept: HEALTH INFORMATION MANAGEMENT | Facility: OTHER | Age: 69
End: 2024-03-20
Payer: COMMERCIAL

## 2024-03-20 ENCOUNTER — TELEPHONE (OUTPATIENT)
Dept: PEDIATRICS | Facility: OTHER | Age: 69
End: 2024-03-20
Payer: COMMERCIAL

## 2024-03-20 DIAGNOSIS — E11.65 UNCONTROLLED TYPE 2 DIABETES MELLITUS WITH HYPERGLYCEMIA (H): ICD-10-CM

## 2024-03-20 RX ORDER — METFORMIN HCL 500 MG
2000 TABLET, EXTENDED RELEASE 24 HR ORAL DAILY
Qty: 360 TABLET | Refills: 4 | Status: SHIPPED | OUTPATIENT
Start: 2024-03-20 | End: 2024-05-07

## 2024-03-20 NOTE — TELEPHONE ENCOUNTER
Needing guidance with Metformin dosing noted below, thank you.     Tomasa Ruffin LPN on 3/20/2024 at 10:15 AM

## 2024-03-20 NOTE — TELEPHONE ENCOUNTER
Verbal orders requested:    PRN skilled nurse visit for 3/21/24.    Starting week of 3/25, skilled nurse visits twice a week x1 week,    Once a week x3 weeks,    And then every other week x4 weeks.    Also inquiring if his metformin  can be switched to once daily.    Tomasa Roth on 3/20/2024 at 9:17 AM

## 2024-03-20 NOTE — TELEPHONE ENCOUNTER
"Spoke with Jessie in regards to question to discontinue his PM dosing of Metformin 1,000 mgs. She stated that he is compliant with his morning pills, and refuses his bedtime medication. Has not taken his Metformin evening dose for \"months\" now. Please advise if a medication change is needed as his refusal to the PM Metformin. Thank you.     Tomasa Ruffin LPN on 3/20/2024 at 9:42 AM    "

## 2024-03-20 NOTE — TELEPHONE ENCOUNTER
LM with Jessie RN on her secure  new order for Metformin 2,000 mg's daily. New Rx was sent to pharmacy. MAR has been updated.     Tomasa Ruffin LPN on 3/20/2024 at 11:33 AM

## 2024-03-21 ENCOUNTER — MEDICAL CORRESPONDENCE (OUTPATIENT)
Dept: HEALTH INFORMATION MANAGEMENT | Facility: OTHER | Age: 69
End: 2024-03-21
Payer: COMMERCIAL

## 2024-03-24 PROCEDURE — G0179 MD RECERTIFICATION HHA PT: HCPCS | Performed by: INTERNAL MEDICINE

## 2024-03-27 ENCOUNTER — TELEPHONE (OUTPATIENT)
Dept: PEDIATRICS | Facility: OTHER | Age: 69
End: 2024-03-27
Payer: COMMERCIAL

## 2024-03-27 NOTE — TELEPHONE ENCOUNTER
States pt had a weight gain of 6 lbs since Monday. Pt had been noncompliant with medication for a few days but did take his medication yesterday and today

## 2024-04-04 ENCOUNTER — TELEPHONE (OUTPATIENT)
Dept: PEDIATRICS | Facility: OTHER | Age: 69
End: 2024-04-04
Payer: COMMERCIAL

## 2024-04-04 DIAGNOSIS — I48.0 PAROXYSMAL ATRIAL FIBRILLATION WITH RVR (H): Primary | Chronic | ICD-10-CM

## 2024-04-04 DIAGNOSIS — E11.65 UNCONTROLLED TYPE 2 DIABETES MELLITUS WITH HYPERGLYCEMIA (H): ICD-10-CM

## 2024-04-04 DIAGNOSIS — I11.0 HYPERTENSIVE HEART DISEASE WITH CONGESTIVE HEART FAILURE, UNSPECIFIED HEART FAILURE TYPE (H): ICD-10-CM

## 2024-04-04 NOTE — TELEPHONE ENCOUNTER
Fish Chen reviewed and completed the following home care form(s) for Efra Zuniga on 4/4/24   This covers the certification period effective 3/24/24 to 5/22/24.  Norma J. Gosselin, LPN on 4/4/2024 at 10:32 AM

## 2024-04-05 ENCOUNTER — TELEPHONE (OUTPATIENT)
Dept: INTERNAL MEDICINE | Facility: OTHER | Age: 69
End: 2024-04-05
Payer: COMMERCIAL

## 2024-04-05 DIAGNOSIS — I11.0 HYPERTENSIVE HEART DISEASE WITH CHRONIC DIASTOLIC CONGESTIVE HEART FAILURE (H): Primary | ICD-10-CM

## 2024-04-05 DIAGNOSIS — I50.32 HYPERTENSIVE HEART DISEASE WITH CHRONIC DIASTOLIC CONGESTIVE HEART FAILURE (H): Primary | ICD-10-CM

## 2024-04-05 NOTE — TELEPHONE ENCOUNTER
Dr Kramer reviewed and completed the following home care or hospice form(s) for Efra Zuniga on 04/05/24   This covers the certification period effective 03/24/24 to   05/22/24.  Paige Kelley LPN on 4/5/2024 at 3:22 PM

## 2024-04-06 PROBLEM — I50.32 HYPERTENSIVE HEART DISEASE WITH CHRONIC DIASTOLIC CONGESTIVE HEART FAILURE (H): Status: ACTIVE | Noted: 2024-04-06

## 2024-04-06 PROBLEM — I11.0 HYPERTENSIVE HEART DISEASE WITH CHRONIC DIASTOLIC CONGESTIVE HEART FAILURE (H): Status: ACTIVE | Noted: 2024-04-06

## 2024-04-08 ENCOUNTER — HOSPITAL ENCOUNTER (OUTPATIENT)
Dept: GENERAL RADIOLOGY | Facility: OTHER | Age: 69
Discharge: HOME | End: 2024-04-08
Payer: MEDICARE

## 2024-04-08 ENCOUNTER — TELEPHONE (OUTPATIENT)
Dept: INTERNAL MEDICINE | Facility: OTHER | Age: 69
End: 2024-04-08
Payer: COMMERCIAL

## 2024-04-08 ENCOUNTER — OFFICE VISIT (OUTPATIENT)
Dept: FAMILY MEDICINE | Facility: OTHER | Age: 69
End: 2024-04-08
Attending: STUDENT IN AN ORGANIZED HEALTH CARE EDUCATION/TRAINING PROGRAM
Payer: MEDICARE

## 2024-04-08 VITALS
RESPIRATION RATE: 18 BRPM | SYSTOLIC BLOOD PRESSURE: 136 MMHG | HEIGHT: 74 IN | TEMPERATURE: 97.1 F | OXYGEN SATURATION: 96 % | WEIGHT: 300.9 LBS | DIASTOLIC BLOOD PRESSURE: 80 MMHG | HEART RATE: 60 BPM | BODY MASS INDEX: 38.62 KG/M2

## 2024-04-08 DIAGNOSIS — R06.02 SHORTNESS OF BREATH: Primary | ICD-10-CM

## 2024-04-08 DIAGNOSIS — I50.9 CONGESTIVE HEART FAILURE, UNSPECIFIED HF CHRONICITY, UNSPECIFIED HEART FAILURE TYPE (H): ICD-10-CM

## 2024-04-08 LAB
ANION GAP SERPL CALCULATED.3IONS-SCNC: 15 MMOL/L (ref 7–15)
BASOPHILS # BLD AUTO: 0.1 10E3/UL (ref 0–0.2)
BASOPHILS NFR BLD AUTO: 1 %
BUN SERPL-MCNC: 18.1 MG/DL (ref 8–23)
CALCIUM SERPL-MCNC: 9.6 MG/DL (ref 8.8–10.2)
CHLORIDE SERPL-SCNC: 101 MMOL/L (ref 98–107)
CREAT SERPL-MCNC: 0.73 MG/DL (ref 0.67–1.17)
DEPRECATED HCO3 PLAS-SCNC: 23 MMOL/L (ref 22–29)
EGFRCR SERPLBLD CKD-EPI 2021: >90 ML/MIN/1.73M2
EOSINOPHIL # BLD AUTO: 0.4 10E3/UL (ref 0–0.7)
EOSINOPHIL NFR BLD AUTO: 3 %
ERYTHROCYTE [DISTWIDTH] IN BLOOD BY AUTOMATED COUNT: 13.8 % (ref 10–15)
GLUCOSE SERPL-MCNC: 152 MG/DL (ref 70–99)
HCT VFR BLD AUTO: 44.9 % (ref 40–53)
HGB BLD-MCNC: 14.6 G/DL (ref 13.3–17.7)
IMM GRANULOCYTES # BLD: 0.1 10E3/UL
IMM GRANULOCYTES NFR BLD: 1 %
LYMPHOCYTES # BLD AUTO: 2.7 10E3/UL (ref 0.8–5.3)
LYMPHOCYTES NFR BLD AUTO: 20 %
MCH RBC QN AUTO: 30.1 PG (ref 26.5–33)
MCHC RBC AUTO-ENTMCNC: 32.5 G/DL (ref 31.5–36.5)
MCV RBC AUTO: 93 FL (ref 78–100)
MONOCYTES # BLD AUTO: 1 10E3/UL (ref 0–1.3)
MONOCYTES NFR BLD AUTO: 8 %
NEUTROPHILS # BLD AUTO: 9 10E3/UL (ref 1.6–8.3)
NEUTROPHILS NFR BLD AUTO: 67 %
NRBC # BLD AUTO: 0 10E3/UL
NRBC BLD AUTO-RTO: 0 /100
NT-PROBNP SERPL-MCNC: 129 PG/ML (ref 0–900)
PLATELET # BLD AUTO: 226 10E3/UL (ref 150–450)
POTASSIUM SERPL-SCNC: 4.5 MMOL/L (ref 3.4–5.3)
RBC # BLD AUTO: 4.85 10E6/UL (ref 4.4–5.9)
SODIUM SERPL-SCNC: 139 MMOL/L (ref 135–145)
WBC # BLD AUTO: 13.3 10E3/UL (ref 4–11)

## 2024-04-08 PROCEDURE — 83880 ASSAY OF NATRIURETIC PEPTIDE: CPT | Mod: ZL

## 2024-04-08 PROCEDURE — 80048 BASIC METABOLIC PNL TOTAL CA: CPT | Mod: ZL

## 2024-04-08 PROCEDURE — 71046 X-RAY EXAM CHEST 2 VIEWS: CPT

## 2024-04-08 PROCEDURE — G0463 HOSPITAL OUTPT CLINIC VISIT: HCPCS | Mod: 25

## 2024-04-08 PROCEDURE — 36415 COLL VENOUS BLD VENIPUNCTURE: CPT | Mod: ZL

## 2024-04-08 PROCEDURE — G0463 HOSPITAL OUTPT CLINIC VISIT: HCPCS

## 2024-04-08 PROCEDURE — 99214 OFFICE O/P EST MOD 30 MIN: CPT

## 2024-04-08 PROCEDURE — 85025 COMPLETE CBC W/AUTO DIFF WBC: CPT | Mod: ZL

## 2024-04-08 RX ORDER — ALBUTEROL SULFATE 90 UG/1
2 AEROSOL, METERED RESPIRATORY (INHALATION) EVERY 6 HOURS PRN
Qty: 18 G | Refills: 0 | Status: SHIPPED | OUTPATIENT
Start: 2024-04-08 | End: 2024-04-08

## 2024-04-08 RX ORDER — AZITHROMYCIN 250 MG/1
TABLET, FILM COATED ORAL
Qty: 6 TABLET | Refills: 0 | Status: SHIPPED | OUTPATIENT
Start: 2024-04-08 | End: 2024-04-08

## 2024-04-08 RX ORDER — FUROSEMIDE 20 MG
TABLET ORAL
Qty: 44 TABLET | Refills: 0 | Status: SHIPPED | OUTPATIENT
Start: 2024-04-08 | End: 2024-05-21

## 2024-04-08 RX ORDER — PREDNISONE 20 MG/1
40 TABLET ORAL DAILY
Qty: 10 TABLET | Refills: 0 | Status: SHIPPED | OUTPATIENT
Start: 2024-04-08 | End: 2024-04-08

## 2024-04-08 ASSESSMENT — PAIN SCALES - GENERAL: PAINLEVEL: NO PAIN (0)

## 2024-04-08 NOTE — PROGRESS NOTES
ASSESSMENT/PLAN:    (I50.9) Congestive heart failure, unspecified HF chronicity, unspecified heart failure type (H);(R06.02) Shortness of breath  (primary encounter diagnosis)  Comment: Patient presents with concerns of shortness of breath and weight gain.  Patient has a history of CHF.  He notes that he missed 3 doses of his medication over the past week, he believes he may be on a water pill but is unsure.  He notes that the weight gain began over the past week.  He notes that he does have swelling to his lower extremities which is ongoing, but it has been worse over the past few days.  The shortness of breath is mild with activity and began yesterday.  He has been hospitalized for CHF exacerbation multiple times over the past few months.  Today on exam bilateral breath sounds are clear, vital signs are stable, SpO2 at 96%, he does have lower extremity edema bilaterally.  Patient initially resistant to allowing for lab work, however he was agreeable to have his BNP and BMP checked today.  In addition he was agreeable to a chest x-ray.  Chest x-ray showed mild cardiomegaly, the lungs were otherwise clear.  BNP was 129 today.  Metabolic panel was stable with GFR over 90, potassium was within normal limits.  CBC did show an elevated white count, however, this appears to be chronic.  At this time we will opt to increase Lasix from 20 mg daily to 40 mg daily for 1 week.  I do recommend he follow-up in primary care in the next 1 to 2 weeks.  We were able to schedule him an appointment for follow-up.  In the meantime I recommend he follow-up in ER if symptoms are worsening or he develops any concerns.  Plan: CBC and Differential, Basic Metabolic Panel,         Brain Natriuretic Peptide (BNP), XR Chest 2         Views        furosemide (LASIX) 20 MG tablet  Increase Lasix to 40 mg daily for one week, then back to base dose of 20 mg daily. Follow up in primary care in 1-2 weeks recommended. Sooner if symptoms worsen or you  have concerns.     Discussed warning signs/symptoms indicative of need to f/u    Follow up if symptoms persist or worsen or concerns    I have reviewed the nursing notes.  I have reviewed the findings, diagnosis, plan and need for follow up with the patient.    I explained my diagnostic considerations and recommendations to the patient, who voiced understanding and agreement with the treatment plan. All questions were answered. We discussed potential side effects of any prescribed or recommended therapies, as well as expectations for response to treatments.    RANDI HAMMER, LISANDRO CNP  4/8/2024  11:38 AM    HPI:    Efra Zuniga is a 69 year old male  who [facility] for concerns of shortness of breath.     Patient reports he has a history of CHF. He notes that over the past week he missed 3 doses of his medication.  He believes he is on a water pill but does not recall if it is Aldactone or Lasix.  . He has gained 12 pounds over the past week. He started getting short of breath yesterday. Swelling in his legs is ongoing but has been getting worse. He does not see cardiology currently but has in the past at [facility]. He denies any chest pain.  Denies any other associated symptoms.    Allergy to Lisinopril    PCP: He does not known who is PCP is.     Past Medical History:   Diagnosis Date    Family history of other specified conditions (CODE)     No Comments Provided    Hemorrhage of anus and rectum     multiple colonoscopies scheduled and canceled by patient    Irritable bowel syndrome with constipation     Functioning bowel syndrome/constipation    Pain in joint     Intermittent arthralgias     Past Surgical History:   Procedure Laterality Date    APPENDECTOMY OPEN      Coronary angiogram done by Aron Quick at [facility] in March 2006 shows patent coronary arteries    CHOLECYSTECTOMY      No Comments Provided    OTHER SURGICAL HISTORY      March 2006,[id number],SCAN-ANGIOGRAM,Coronary angiogram  done by Aron Quick at [facility] in shows patent coronary arteries     Social History     Tobacco Use    Smoking status: Never    Smokeless tobacco: Never   Substance Use Topics    Alcohol use: No     Current Outpatient Medications   Medication Sig Dispense Refill    furosemide (LASIX) 20 MG tablet Take 2 tablets (40 mg) by mouth daily for 7 days, THEN 1 tablet (20 mg) daily for 30 days. 44 tablet 0    acetaminophen (TYLENOL) 325 MG tablet Take 2 tablets (650 mg) by mouth every 4 hours as needed for mild pain or other (and adjunct with moderate or severe pain or per patient request) (Patient not taking: Reported on 4/8/2024)      aspirin 81 MG EC tablet Take 1 tablet (81 mg) by mouth daily (Patient not taking: Reported on 4/8/2024) 30 tablet 0    empagliflozin (JARDIANCE) 25 MG TABS tablet Take 1 tablet (25 mg) by mouth daily (Patient not taking: Reported on 4/8/2024) 90 tablet 1    FLUoxetine (PROZAC) 20 MG capsule Take 1 capsule (20 mg) by mouth daily (Patient not taking: Reported on 4/8/2024) 30 capsule 0    furosemide (LASIX) 20 MG tablet Take 1 tablet (20 mg) by mouth 2 times daily for 14 days 30 tablet 0    guaiFENesin (ROBITUSSIN) 20 mg/mL liquid Take 20 mLs (400 mg) by mouth every 4 hours as needed for cough Give with glass of water (Patient not taking: Reported on 4/8/2024)      losartan (COZAAR) 25 MG tablet Take 1 tablet (25 mg) by mouth daily (Patient not taking: Reported on 4/8/2024) 30 tablet 0    melatonin 3 MG tablet Take 1 tablet (3 mg) by mouth nightly as needed (Patient not taking: Reported on 4/8/2024)      metFORMIN (GLUCOPHAGE XR) 500 MG 24 hr tablet Take 4 tablets (2,000 mg) by mouth daily (Patient not taking: Reported on 4/8/2024) 360 tablet 4    metoprolol succinate ER (TOPROL XL) 50 MG 24 hr tablet Take 1 tablet (50 mg) by mouth daily (Patient not taking: Reported on 4/8/2024) 90 tablet 4    rivaroxaban ANTICOAGULANT (XARELTO) 20 MG TABS tablet Take 1 tablet (20 mg) by mouth daily (with dinner)  "(Patient not taking: Reported on 4/8/2024) 90 tablet 4    senna-docusate (SENOKOT-S/PERICOLACE) 8.6-50 MG tablet Take 1 tablet by mouth 2 times daily as needed for constipation (Patient not taking: Reported on 4/8/2024)      spironolactone (ALDACTONE) 50 MG tablet Take 1 tablet (50 mg) by mouth daily (Patient not taking: Reported on 4/8/2024) 30 tablet 0     Allergies   Allergen Reactions    Lisinopril      Other reaction(s): Tachycardia  Felt like \"a heart attack\"; hands went numb     Past medical history, past surgical history, current medications and allergies reviewed and accurate to the best of my knowledge.      ROS:  Refer to HPI    /80   Pulse 60   Temp 97.1  F (36.2  C) (Tympanic)   Resp 18   Ht 1.88 m (6' 2\")   Wt 136.5 kg (300 lb 14.4 oz)   SpO2 96%   BMI 38.63 kg/m      EXAM:  General Appearance: Well appearing 69 year old male, appropriate appearance for age. No acute distress   Eyes: conjunctivae normal without erythema or irritation, corneas clear, no drainage or crusting, no eyelid swelling, pupils equal   Oropharynx: moist mucous membranes, voice clear.    Sinuses:  No sinus tenderness upon palpation of the frontal or maxillary sinuses  Nose:  Bilateral nares: no erythema, no edema, no drainage or congestion   Neck: supple   Respiratory: normal chest wall and respirations.  Normal effort.  Clear to auscultation bilaterally, no wheezing, crackles or rhonchi.  No increased work of breathing.  No cough appreciated.  Cardiac: RRR with no murmurs, bilateral lower extremity edema appreciated.  Abdomen: soft, nontender, no rigidity, no rebound tenderness or guarding, normal bowel sounds present  Musculoskeletal:  Equal movement of bilateral upper extremities.  Equal movement of bilateral lower extremities.  Normal gait.    Dermatological: no rashes noted of exposed skin  Neuro: Alert and oriented to person, place, and time.  Cranial nerves II-XII grossly intact with no focal or lateralizing " deficits.  Muscle tone normal.  Gait normal. No tremor.   Psychological: normal affect, alert, oriented, and pleasant.     Labs/Xray:  Results for orders placed or performed in visit on 04/08/24   XR Chest 2 Views     Status: None    Narrative    XR CHEST 2 VIEWS    HISTORY: 69 years Male Shortness of breath    COMPARISON: 2/27/2024    TECHNIQUE: 2 views of the chest were obtained.    FINDINGS: A cardiac pacemaker is unchanged. There is mild  cardiomegaly. Lungs otherwise clear.    Pulmonary vascularity is normal to mildly prominent. Overall there is  no significant change from the prior study.          Impression    IMPRESSION: No acute interval change.    AIDA ZHAO MD         SYSTEM ID:  [id number]   Basic Metabolic Panel     Status: Abnormal   Result Value Ref Range    Sodium 139 135 - 145 mmol/L    Potassium 4.5 3.4 - 5.3 mmol/L    Chloride 101 98 - 107 mmol/L    Carbon Dioxide (CO2) 23 22 - 29 mmol/L    Anion Gap 15 7 - 15 mmol/L    Urea Nitrogen 18.1 8.0 - 23.0 mg/dL    Creatinine 0.73 0.67 - 1.17 mg/dL    GFR Estimate >90 >60 mL/min/1.73m2    Calcium 9.6 8.8 - 10.2 mg/dL    Glucose 152 (H) 70 - 99 mg/dL   Brain Natriuretic Peptide (BNP)     Status: Normal   Result Value Ref Range    N Terminal Pro BNP Outpatient 129 0 - 900 pg/mL   CBC with platelets and differential     Status: Abnormal   Result Value Ref Range    WBC Count 13.3 (H) 4.0 - 11.0 10e3/uL    RBC Count 4.85 4.40 - 5.90 10e6/uL    Hemoglobin 14.6 13.3 - 17.7 g/dL    Hematocrit 44.9 40.0 - 53.0 %    MCV 93 78 - 100 fL    MCH 30.1 26.5 - 33.0 pg    MCHC 32.5 31.5 - 36.5 g/dL    RDW 13.8 10.0 - 15.0 %    Platelet Count 226 150 - 450 10e3/uL    % Neutrophils 67 %    % Lymphocytes 20 %    % Monocytes 8 %    % Eosinophils 3 %    % Basophils 1 %    % Immature Granulocytes 1 %    NRBCs per 100 WBC 0 <1 /100    Absolute Neutrophils 9.0 (H) 1.6 - 8.3 10e3/uL    Absolute Lymphocytes 2.7 0.8 - 5.3 10e3/uL    Absolute Monocytes 1.0 0.0 - 1.3 10e3/uL     Absolute Eosinophils 0.4 0.0 - 0.7 10e3/uL    Absolute Basophils 0.1 0.0 - 0.2 10e3/uL    Absolute Immature Granulocytes 0.1 <=0.4 10e3/uL    Absolute NRBCs 0.0 10e3/uL   CBC and Differential     Status: Abnormal    Narrative    The following orders were created for panel order CBC and Differential.  Procedure                               Abnormality         Status                     ---------                               -----------         ------                     CBC with platelets and d...[[id number]]  Abnormal            Final result                 Please view results for these tests on the individual orders.

## 2024-04-08 NOTE — TELEPHONE ENCOUNTER
Weight is 295 today vs 288 last week  Slight increase in swelling on legs and hands and some more SOB than usual.  He did miss 3 days of his medications last week.  Please advise.

## 2024-04-08 NOTE — PATIENT INSTRUCTIONS
Increase Lasix to 40 mg daily for one week, then back to base dose of 20 mg daily. Follow up in primary care in 1-2 weeks recommended. Sooner if symptoms worsen or you have concerns.

## 2024-04-08 NOTE — TELEPHONE ENCOUNTER
After verifying patient's name and date of birth, Jessie was given the below information.  Norma J. Gosselin, LPN....4/8/2024 11:37 AM    Patient went to  today.

## 2024-04-08 NOTE — NURSING NOTE
"Chief Complaint   Patient presents with    Edema     Hands and feet     Breathing Problem     SOB x3 days     Patient here under advisement of home health nurse for increased edema of legs and hands over the past week and SOB with excretion  x3 days. Patient states he has not been taking hid meds regularly the past week. His weight has increased from 288lbs on 4/1/24 to 300.9lb today.     Initial /80   Pulse 60   Temp 97.1  F (36.2  C) (Tympanic)   Resp 18   Ht 1.88 m (6' 2\")   Wt 136.5 kg (300 lb 14.4 oz)   SpO2 91%   BMI 38.63 kg/m   Estimated body mass index is 38.63 kg/m  as calculated from the following:    Height as of this encounter: 1.88 m (6' 2\").    Weight as of this encounter: 136.5 kg (300 lb 14.4 oz).  Medication Review: complete    The next two questions are to help us understand your food security.  If you are feeling you need any assistance in this area, we have resources available to support you today.          4/8/2024   SDOH- Food Insecurity   Within the past 12 months, did you worry that your food would run out before you got money to buy more? N   Within the past 12 months, did the food you bought just not last and you didn t have money to get more? N         Health Care Directive:  Patient has a Health Care Directive on file      Chana Neal LPN      "

## 2024-04-08 NOTE — TELEPHONE ENCOUNTER
Resume taking medications as ordered- If he is having difficulty breathing he needs to be evaluated.

## 2024-04-10 ENCOUNTER — TELEPHONE (OUTPATIENT)
Dept: PEDIATRICS | Facility: OTHER | Age: 69
End: 2024-04-10
Payer: COMMERCIAL

## 2024-04-10 ENCOUNTER — MEDICAL CORRESPONDENCE (OUTPATIENT)
Dept: HEALTH INFORMATION MANAGEMENT | Facility: OTHER | Age: 69
End: 2024-04-10
Payer: COMMERCIAL

## 2024-04-10 NOTE — TELEPHONE ENCOUNTER
I agree with the Home Care order requests below.  Fish Chen MD on 4/10/2024 at 1:35 PM    Signed, Fish Chen MD, FAAP, FACP  Internal Medicine & Pediatrics

## 2024-04-10 NOTE — TELEPHONE ENCOUNTER
Need verbal orders for PRN for nurse visit for today or tomorrow for medication set up. Please call.      Na Flood on 4/10/2024 at 1:18 PM

## 2024-04-11 DIAGNOSIS — J43.1 PANLOBULAR EMPHYSEMA (H): ICD-10-CM

## 2024-04-11 DIAGNOSIS — J96.21 ACUTE ON CHRONIC RESPIRATORY FAILURE WITH HYPOXIA AND HYPERCAPNIA (H): ICD-10-CM

## 2024-04-11 DIAGNOSIS — I10 ESSENTIAL HYPERTENSION: ICD-10-CM

## 2024-04-11 DIAGNOSIS — I50.33 ACUTE ON CHRONIC HEART FAILURE WITH PRESERVED EJECTION FRACTION (H): ICD-10-CM

## 2024-04-11 DIAGNOSIS — I48.0 PAROXYSMAL ATRIAL FIBRILLATION WITH RVR (H): ICD-10-CM

## 2024-04-11 DIAGNOSIS — F34.1 DYSTHYMIC DISORDER: ICD-10-CM

## 2024-04-11 DIAGNOSIS — E11.65 TYPE 2 DIABETES MELLITUS WITH HYPERGLYCEMIA, WITHOUT LONG-TERM CURRENT USE OF INSULIN (H): ICD-10-CM

## 2024-04-11 DIAGNOSIS — J96.22 ACUTE ON CHRONIC RESPIRATORY FAILURE WITH HYPOXIA AND HYPERCAPNIA (H): ICD-10-CM

## 2024-04-11 DIAGNOSIS — R06.89 HYPOVENTILATION SYNDROME: ICD-10-CM

## 2024-04-12 ENCOUNTER — TELEPHONE (OUTPATIENT)
Dept: PEDIATRICS | Facility: OTHER | Age: 69
End: 2024-04-12
Payer: COMMERCIAL

## 2024-04-12 ENCOUNTER — NURSE TRIAGE (OUTPATIENT)
Dept: CARDIOLOGY | Facility: OTHER | Age: 69
End: 2024-04-12
Payer: COMMERCIAL

## 2024-04-12 NOTE — TELEPHONE ENCOUNTER
Caller is Nayeli from [facility] , phone [phone], rescheduled patients new consult with IRAIDA Saini , but is requesting to know if IRAIDA Saini will see patient sooner , based on Ed referral on 02/27/24  and missed his appointment on 03/05/24 when it was originally scheduled , patient is now retaining fluid . Nayeli did state she will also call IRAIDA Saini herself to discuss the matter.     Lupe Aguilar on 4/12/2024 at 9:31 AM

## 2024-04-12 NOTE — TELEPHONE ENCOUNTER
Patient reports his weight is 298# just now. Patient states he does not want to be seen at this time and states his current swelling does not seem to be any different from what he normally experiences. He does admit that the shortness of breath with activity is new, but reiterated that he does not think he needs to be seen yet. Encouraged patient to keep his follow-up appointment with Dr. Caldwell 4/25 as well as his appointment with SHERLEY Saini NP on 5/29/24.   Message left on [employer] voicemail to return call. Janiya Granda RN on 4/12/2024 at 12:10 PM    Reason for Disposition   Mild swelling of both ankles and chronic (unchanged)    Additional Information   Negative: Chest pain   Negative: Followed an insect bite and has localized swelling (e.g., small area of puffy or swollen skin)   Negative: Followed a knee injury   Negative: Ankle or foot injury   Negative: Pregnant with leg swelling or edema   Negative: Sounds like a life-threatening emergency to the triager   Negative: Entire foot is cool or blue in comparison to other side   Negative: SEVERE swelling (e.g., swelling extends above knee, entire leg is swollen, weeping fluid)   Negative: Cast on leg or ankle and has increasing pain   Negative: Can't walk or can barely stand (new-onset)   Negative: Fever and red area (or area very tender to touch)   Negative: Patient sounds very sick or weak to the triager   Negative: Swelling of face, arm or hands  (Exception: Slight puffiness of fingers during hot weather.)   Negative: Pregnant 20 or more weeks and sudden weight gain (i.e., > 2 lbs, 1 kg in one week)   Negative: Thigh or calf pain and only 1 side and present > 1 hour   Negative: Thigh, calf, or ankle swelling in only one leg   Negative: Thigh, calf, or ankle swelling in both legs, but one side is definitely more swollen (Exception: Longstanding difference between legs.)   Negative: MODERATE swelling of both ankles (e.g., swelling extends up to the knees) AND  "new-onset or worsening   Negative: Difficulty breathing with exertion AND worsening or new-onset   Negative: Looks like a boil, infected sore, deep ulcer, or other infected rash (spreading redness, pus)   Negative: Patient wants to be seen   Negative: MILD swelling of both ankles (i.e., pedal edema) AND new-onset or worsening   Negative: MILD swelling of both ankles (i.e., pedal edema) and caused by hot weather   Negative: Mild swelling of both ankles and varicose veins   Negative: Difficulty breathing at rest    Answer Assessment - Initial Assessment Questions  1. ONSET: \"When did the swelling start?\" (e.g., minutes, hours, days)      Patient was seen 4/8/24 for SOB and lower extremity swelling     2. LOCATION: \"What part of the leg is swollen?\"  \"Are both legs swollen or just one leg?\"      Just above the ankles, worse on left side     3. SEVERITY: \"How bad is the swelling?\" (e.g., localized; mild, moderate, severe)    - Localized: Small area of swelling localized to one leg.    - MILD pedal edema: Swelling limited to foot and ankle, pitting edema < 1/4 inch (6 mm) deep, rest and elevation eliminate most or all swelling.    - MODERATE edema: Swelling of lower leg to knee, pitting edema > 1/4 inch (6 mm) deep, rest and elevation only partially reduce swelling.    - SEVERE edema: Swelling extends above knee, facial or hand swelling present.       2+ per homecare nurse     4. REDNESS: \"Does the swelling look red or infected?\"      Both extremities have some redness     5. PAIN: \"Is the swelling painful to touch?\" If Yes, ask: \"How painful is it?\"   (Scale 1-10; mild, moderate or severe)      Denies     6. FEVER: \"Do you have a fever?\" If Yes, ask: \"What is it, how was it measured, and when did it start?\"       Denies     7. CAUSE: \"What do you think is causing the leg swelling?\"      Water retention     8. MEDICAL HISTORY: \"Do you have a history of blood clots (e.g., DVT), cancer, heart failure, kidney disease, or " "liver failure?\"      Heart failure     9. RECURRENT SYMPTOM: \"Have you had leg swelling before?\" If Yes, ask: \"When was the last time?\" \"What happened that time?\"      Chronic issue     10. OTHER SYMPTOMS: \"Do you have any other symptoms?\" (e.g., chest pain, difficulty breathing)        Mild SOB with activity     11. PREGNANCY: \"Is there any chance you are pregnant?\" \"When was your last menstrual period?\"        no    Protocols used: Leg Swelling and Edema-A-OH    "

## 2024-04-12 NOTE — TELEPHONE ENCOUNTER
, [facility] is requesting max and min weight parameters for this patient due to starting lasix and CHF.

## 2024-04-12 NOTE — TELEPHONE ENCOUNTER
Patient was seen in  4/8/24. Patient is scheduled with Dr Caldwell 4/25/24 for  follow up. [facility] home care is looking for   Norma J. Gosselin, LPN .......  4/12/2024  10:10 AM

## 2024-04-12 NOTE — TELEPHONE ENCOUNTER
This is something that requires a visit.  If he has new concerns he should be seen sooner.    he may need to see one of my partners.  If no one available, he may need to present to RC/ER.    Fish Sharp MD, FAAP, FACP  Internal Medicine & Pediatrics

## 2024-04-12 NOTE — TELEPHONE ENCOUNTER
If they're concerned he should be seen.    This is something that requires a visit.  If he has new concerns he should be seen sooner.    he may need to see one of my partners.  If no one available, he may need to present to [facility]/ER.    Fish Sharp MD, FAAP, FACP  Internal Medicine & Pediatrics

## 2024-04-12 NOTE — TELEPHONE ENCOUNTER
Please call with weight parameters minimum and maximum weight Yesterday he weighed 306.4     Please review his [facility] Visit.      He is also starting the extra Lasix this week.  He started yesterday.        Celia Man on 4/12/2024 at 9:00 AM

## 2024-04-12 NOTE — TELEPHONE ENCOUNTER
Patient is not established with cardiology yet. Message placed to LISANDRO Del Cid CNP about seeing this patient sooner than his 24 appointment. Spoke with Home Care RN, Nayeli, after verifying the patient's last name and .     Nurse stated patient was taking 20 mg of lasix prior to being seen in [facility] 24. He [facility] to [facility] because he was experiencing SOB. They increased lasix to 40 mg PO daily for a week. Nurse is wondering about weight parameters regarding the lasix dosing. Nurse saw patient yesterday and stated he had 2+ pitting edema in bilateral lower extremities. Weight was 306.4# yesterday. Weight was 295# 24. Lung sounds were clear yesterday and patient was not experiencing shortness of breath.     Routing to Dr. Chen for further recommendation.   Danna Oretga RN on 2024 at 10:08 AM

## 2024-04-16 RX ORDER — LOSARTAN POTASSIUM 25 MG/1
25 TABLET ORAL EVERY MORNING
Qty: 90 TABLET | Refills: 0 | Status: SHIPPED | OUTPATIENT
Start: 2024-04-16 | End: 2024-05-07

## 2024-04-16 RX ORDER — SPIRONOLACTONE 50 MG/1
50 TABLET, FILM COATED ORAL EVERY MORNING
Qty: 90 TABLET | Refills: 0 | Status: SHIPPED | OUTPATIENT
Start: 2024-04-16 | End: 2024-05-07

## 2024-04-16 NOTE — TELEPHONE ENCOUNTER
[id number] sent Rx request for the following:      Requested Prescriptions   Pending Prescriptions Disp Refills    losartan (COZAAR) 25 MG tablet [Pharmacy Med Name: LOSARTAN 25MG TABLET] 30 tablet 0     Sig: TAKE 1 TABLET BY MOUTH EVERY MORNING       Angiotensin-II Receptors Passed - 4/16/2024  2:47 PM        Last Prescription Date:   1/22/24  Last Fill Qty/Refills:         30, R-0             FLUoxetine (PROZAC) 20 MG capsule [Pharmacy Med Name: FLUOXETINE 20MG CAPSULE] 30 capsule 0     Sig: TAKE 1 CAPSULE BY MOUTH EVERY MORNING       SSRIs Protocol Passed - 4/16/2024  2:47 PM          Last Prescription Date:   1/23/24  Last Fill Qty/Refills:         30, R-0           spironolactone (ALDACTONE) 50 MG tablet [Pharmacy Med Name: SPIRONOLACTONE 50MG TABLET] 30 tablet 0     Sig: TAKE 1 TABLET BY MOUTH EVERY MORNING       Diuretics (Including Combos) Protocol Passed - 4/16/2024  2:47 PM          Last Prescription Date:   1/22/24  Last Fill Qty/Refills:         30, R-0    Last Office Visit:              3/12/24   Future Office visit:           6/11/24    Prescription approved per [facility] Refill Protocol.    Warnings Override History for spironolactone (ALDACTONE) 50 MG tablet [[id number]]    Overridden by Ifeoma Fox MD on Jan 26, 2024 2:00 PM   Drug-Drug   1. POTASSIUM-SPARING DIURETICS / ANGIOTENSIN II RECEPTOR ANTAGONISTS [Level: Major] [Reason: Tolerated medication/side effects in past]   Other Orders: losartan (COZAAR) 25 MG tablet          Erendira Falcon RN on 4/16/2024 at 2:59 PM

## 2024-04-26 ENCOUNTER — TELEPHONE (OUTPATIENT)
Dept: PEDIATRICS | Facility: OTHER | Age: 69
End: 2024-04-26
Payer: COMMERCIAL

## 2024-04-26 NOTE — TELEPHONE ENCOUNTER
Pratik from [facility] called to get verbal orders for 3 PRN skilled nurse visits also needs perimeter for weight as patient has had another 5lb weight gain this week. Please call.    Na Flood on 4/26/2024 at 1:15 PM

## 2024-04-29 ENCOUNTER — MEDICAL CORRESPONDENCE (OUTPATIENT)
Dept: HEALTH INFORMATION MANAGEMENT | Facility: OTHER | Age: 69
End: 2024-04-29
Payer: COMMERCIAL

## 2024-04-29 NOTE — TELEPHONE ENCOUNTER
I agree with the Home Care order requests below.  Fish Chen MD on 4/29/2024 at 10:11 AM    No weight criteria, needs to follow treatment plan for venous stasis.    Signed, Fish Chen MD, FAAP, FACP  Internal Medicine & Pediatrics

## 2024-04-29 NOTE — TELEPHONE ENCOUNTER
After verifying patient's name and date of birth, Jessie was given the below information.  Norma J. Gosselin, LPN....4/29/2024 11:04 AM

## 2024-04-30 ENCOUNTER — MEDICAL CORRESPONDENCE (OUTPATIENT)
Dept: HEALTH INFORMATION MANAGEMENT | Facility: OTHER | Age: 69
End: 2024-04-30
Payer: COMMERCIAL

## 2024-05-07 ENCOUNTER — OFFICE VISIT (OUTPATIENT)
Dept: PEDIATRICS | Facility: OTHER | Age: 69
End: 2024-05-07
Attending: INTERNAL MEDICINE
Payer: COMMERCIAL

## 2024-05-07 VITALS
DIASTOLIC BLOOD PRESSURE: 80 MMHG | HEIGHT: 72 IN | HEART RATE: 66 BPM | WEIGHT: 307.3 LBS | BODY MASS INDEX: 41.62 KG/M2 | SYSTOLIC BLOOD PRESSURE: 136 MMHG | RESPIRATION RATE: 20 BRPM | TEMPERATURE: 98.3 F | OXYGEN SATURATION: 94 %

## 2024-05-07 DIAGNOSIS — Z23 NEED FOR TDAP VACCINATION: ICD-10-CM

## 2024-05-07 DIAGNOSIS — I10 ESSENTIAL HYPERTENSION: ICD-10-CM

## 2024-05-07 DIAGNOSIS — I50.32 CHRONIC HEART FAILURE WITH PRESERVED EJECTION FRACTION (H): ICD-10-CM

## 2024-05-07 DIAGNOSIS — R06.89 HYPOVENTILATION SYNDROME: ICD-10-CM

## 2024-05-07 DIAGNOSIS — R53.81 PHYSICAL DECONDITIONING: ICD-10-CM

## 2024-05-07 DIAGNOSIS — Z29.11 NEED FOR VACCINATION AGAINST RESPIRATORY SYNCYTIAL VIRUS: ICD-10-CM

## 2024-05-07 DIAGNOSIS — I48.0 PAROXYSMAL ATRIAL FIBRILLATION WITH RVR (H): ICD-10-CM

## 2024-05-07 DIAGNOSIS — Z11.59 NEED FOR HEPATITIS C SCREENING TEST: ICD-10-CM

## 2024-05-07 DIAGNOSIS — I87.8 VENOUS STASIS OF BOTH LOWER EXTREMITIES: ICD-10-CM

## 2024-05-07 DIAGNOSIS — Z12.11 SCREEN FOR COLON CANCER: ICD-10-CM

## 2024-05-07 DIAGNOSIS — I50.32 HEART FAILURE, DIASTOLIC, CHRONIC (H): ICD-10-CM

## 2024-05-07 DIAGNOSIS — E66.01 MORBID OBESITY DUE TO EXCESS CALORIES (H): ICD-10-CM

## 2024-05-07 DIAGNOSIS — J43.1 PANLOBULAR EMPHYSEMA (H): ICD-10-CM

## 2024-05-07 DIAGNOSIS — E66.9 RESTRICTIVE LUNG DISEASE SECONDARY TO OBESITY: ICD-10-CM

## 2024-05-07 DIAGNOSIS — E11.65 TYPE 2 DIABETES MELLITUS WITH HYPERGLYCEMIA, WITHOUT LONG-TERM CURRENT USE OF INSULIN (H): ICD-10-CM

## 2024-05-07 DIAGNOSIS — E11.69 TYPE 2 DIABETES MELLITUS WITH OTHER SPECIFIED COMPLICATION, UNSPECIFIED WHETHER LONG TERM INSULIN USE (H): ICD-10-CM

## 2024-05-07 DIAGNOSIS — Z00.00 ENCOUNTER FOR MEDICARE ANNUAL WELLNESS EXAM: Primary | ICD-10-CM

## 2024-05-07 DIAGNOSIS — I87.2 VENOUS STASIS DERMATITIS: ICD-10-CM

## 2024-05-07 DIAGNOSIS — E11.65 UNCONTROLLED TYPE 2 DIABETES MELLITUS WITH HYPERGLYCEMIA (H): ICD-10-CM

## 2024-05-07 DIAGNOSIS — Z23 NEED FOR SHINGLES VACCINE: ICD-10-CM

## 2024-05-07 DIAGNOSIS — F34.1 DYSTHYMIC DISORDER: ICD-10-CM

## 2024-05-07 DIAGNOSIS — Z95.0 CARDIAC PACEMAKER IN SITU: ICD-10-CM

## 2024-05-07 DIAGNOSIS — J98.4 RESTRICTIVE LUNG DISEASE SECONDARY TO OBESITY: ICD-10-CM

## 2024-05-07 LAB
CREAT UR-MCNC: 132.4 MG/DL
MICROALBUMIN UR-MCNC: 30.6 MG/L
MICROALBUMIN/CREAT UR: 23.11 MG/G CR (ref 0–17)

## 2024-05-07 PROCEDURE — 99214 OFFICE O/P EST MOD 30 MIN: CPT | Mod: 25 | Performed by: INTERNAL MEDICINE

## 2024-05-07 PROCEDURE — G0463 HOSPITAL OUTPT CLINIC VISIT: HCPCS

## 2024-05-07 PROCEDURE — 99207 PR FOOT EXAM NO CHARGE: CPT | Performed by: INTERNAL MEDICINE

## 2024-05-07 PROCEDURE — 82043 UR ALBUMIN QUANTITATIVE: CPT | Mod: ZL | Performed by: INTERNAL MEDICINE

## 2024-05-07 PROCEDURE — G0439 PPPS, SUBSEQ VISIT: HCPCS | Performed by: INTERNAL MEDICINE

## 2024-05-07 RX ORDER — LOSARTAN POTASSIUM 25 MG/1
25 TABLET ORAL EVERY MORNING
Qty: 90 TABLET | Refills: 4 | Status: SHIPPED | OUTPATIENT
Start: 2024-05-07

## 2024-05-07 RX ORDER — METFORMIN HCL 500 MG
2000 TABLET, EXTENDED RELEASE 24 HR ORAL DAILY
Qty: 360 TABLET | Refills: 4 | Status: SHIPPED | OUTPATIENT
Start: 2024-05-07

## 2024-05-07 RX ORDER — SPIRONOLACTONE 50 MG/1
50 TABLET, FILM COATED ORAL EVERY MORNING
Qty: 90 TABLET | Refills: 4 | Status: SHIPPED | OUTPATIENT
Start: 2024-05-07

## 2024-05-07 RX ORDER — METOPROLOL SUCCINATE 50 MG/1
50 TABLET, EXTENDED RELEASE ORAL DAILY
Qty: 90 TABLET | Refills: 4 | Status: SHIPPED | OUTPATIENT
Start: 2024-05-07

## 2024-05-07 RX ORDER — ASPIRIN 81 MG/1
81 TABLET ORAL DAILY
Qty: 90 TABLET | Refills: 4 | Status: SHIPPED | OUTPATIENT
Start: 2024-05-07

## 2024-05-07 SDOH — HEALTH STABILITY: PHYSICAL HEALTH: ON AVERAGE, HOW MANY DAYS PER WEEK DO YOU ENGAGE IN MODERATE TO STRENUOUS EXERCISE (LIKE A BRISK WALK)?: 0 DAYS

## 2024-05-07 ASSESSMENT — PATIENT HEALTH QUESTIONNAIRE - PHQ9
10. IF YOU CHECKED OFF ANY PROBLEMS, HOW DIFFICULT HAVE THESE PROBLEMS MADE IT FOR YOU TO DO YOUR WORK, TAKE CARE OF THINGS AT HOME, OR GET ALONG WITH OTHER PEOPLE: NOT DIFFICULT AT ALL
SUM OF ALL RESPONSES TO PHQ QUESTIONS 1-9: 1
SUM OF ALL RESPONSES TO PHQ QUESTIONS 1-9: 1

## 2024-05-07 ASSESSMENT — SOCIAL DETERMINANTS OF HEALTH (SDOH): HOW OFTEN DO YOU GET TOGETHER WITH FRIENDS OR RELATIVES?: ONCE A WEEK

## 2024-05-07 ASSESSMENT — PAIN SCALES - GENERAL: PAINLEVEL: NO PAIN (0)

## 2024-05-07 NOTE — NURSING NOTE
"Chief Complaint   Patient presents with    Medicare Visit     annual    Recheck Medication     Doesn't want to take so many medications       Initial /80   Pulse 66   Temp 98.3  F (36.8  C) (Tympanic)   Resp 20   Ht 1.816 m (5' 11.5\")   Wt 139.4 kg (307 lb 4.8 oz)   SpO2 94%   BMI 42.26 kg/m   Estimated body mass index is 42.26 kg/m  as calculated from the following:    Height as of this encounter: 1.816 m (5' 11.5\").    Weight as of this encounter: 139.4 kg (307 lb 4.8 oz).  Medication Review: complete    The next two questions are to help us understand your food security.  If you are feeling you need any assistance in this area, we have resources available to support you today.          5/7/2024   SDOH- Food Insecurity   Within the past 12 months, did you worry that your food would run out before you got money to buy more? N   Within the past 12 months, did the food you bought just not last and you didn t have money to get more? N         Health Care Directive:  Patient has a Health Care Directive on file      Norma J. Gosselin, LPN      "

## 2024-05-07 NOTE — PATIENT INSTRUCTIONS
-- Start ozempic   -- Meet with dietician   -- Keep a food log for 1 week before the visit   -- Eat more veggies   -- PT/OT consult     -- Low salt diet, under 2000 mg/day   -- Fluid restrict, under 2000 mL/day aka 8.5 cups/day   -- Eat healthy protein   -- Learn about DASH Diet for dietary ways to reduce blood pressure  Google: [facility] DASH diet  [facility] site: https://www.nhlbi.nih.gov/health-topics/dash-eating-plan  PDF from [facility]: https://www.nhlbi.nih.gov/files/docs/public/heart/new_dash.pdf   -- Elevate feet above your hips for 20-30 minutes, 4 times per day   -- Compression stockings from morning to night   -- Work on 5-10% weight loss      Find Local Classes   * Preventing falls   * Preventing and managing diabetes   * Managing chronic conditions and pain   * Mental Health    Examples of classes:   -- Diabetes prevention program (Pre-Diabetes or at risk for diabetes)   -- Living well with Diabetes   -- Living well with Chronic Conditions   -- Living well with Chronic Pain   -- Stay Active and Independent for Life (SAIL)   -- Gavin Ji Ismael: Moving for better balance   -- Older Adult Mental Health First Aid   -- Walk with ease free walking program    How do I sign up?  Stop by the [facility] check-out desk for a [facility] referral  Call [facility] [place] [phone]  Contact [facility] directly via: [email or web address] or [phone]    Your BMI is Body mass index is 42.26 kg/m .  (BMI ranges: Normal 18.5 - 25, Overweight 25 - 30, Obesity greater than 30,     Morbid Obesity greater than 40 or greater than 35 with associated conditions.)    Facts about losing weight:   -- Overweight and Obesity increase your risk for developing diabetes, high blood pressure and stroke, and shorten your life.   -- 90% of weight loss comes from dietary changes, only 10% from exercise    What should I do?   -- Work on 5-10% weight loss   -- Focus on a few healthy dietary changes   -- Eat more fresh fruits and vegetables, and fewer  "carbohydrates   -- Cut out all calorie-containing beverages (milk, juice, alcohol, etc)   -- Exercise every day   -- Weigh yourself once a week   -- Learn about DASH Diet for dietary ways to reduce blood pressure  Google: NIH DASH diet  [facility] site: https://www.nhlbi.nih.gov/health-topics/dash-eating-plan  PDF from [facility]: https://www.nhlbi.nih.gov/files/docs/public/heart/new_dash.pdf   -- Consider Weight Watchers (http://www.[email or web address])   -- Consider My Fitness Pal (iOS, Android, [email or web address])   -- Consider time-restricted eating (eg. eating between noon and 8pm only)            Patient Education   Preventive Care Advice   This is general advice we often give to help people stay healthy. Your care team may have specific advice just for you. Please talk to your care team about your own preventive care needs.  Lifestyle  Exercise at least 150 minutes each week (30 minutes a day, 5 days a week).  Do muscle strengthening activities 2 days a week. These help control your weight and prevent disease.  No smoking.  Wear sunscreen to prevent skin cancer.  Have your home tested for radon every 2 to 5 years. Radon is a colorless, odorless gas that can harm your lungs. To learn more, go to www.health.state.[place]. and search for \"Radon in Homes.\"  Keep guns unloaded and locked up in a safe place like a safe or gun vault, or, use a gun lock and hide the keys. Always lock away bullets separately. To learn more, visit CliqSearch.[place].gov and search for \"safe gun storage.\"  Nutrition  Eat 5 or more servings of fruits and vegetables each day.  Try wheat bread, brown rice and whole grain pasta (instead of white bread, rice, and pasta).  Get enough calcium and vitamin D. Check the label on foods and aim for 100% of the RDA (recommended daily allowance).  Regular exams  Have a dental exam and cleaning every 6 months.  See your health care team every year to talk about:  Any changes in your health.  Any medicines your care team " has prescribed.  Preventive care, family planning, and ways to prevent chronic diseases.  Shots (vaccines)   HPV shots (up to age 26), if you've never had them before.  Hepatitis B shots (up to age 59), if you've never had them before.  COVID-19 shot: Get this shot when it's due.  Flu shot: Get a flu shot every year.  Tetanus shot: Get a tetanus shot every 10 years.  Pneumococcal, hepatitis A, and RSV shots: Ask your care team if you need these based on your risk.  Shingles shot (for age 50 and up).  General health tests  Diabetes screening:  Starting at age 35, Get screened for diabetes at least every 3 years.  If you are younger than age 35, ask your care team if you should be screened for diabetes.  Cholesterol test: At age 39, start having a cholesterol test every 5 years, or more often if advised.  Bone density scan (DEXA): At age 50, ask your care team if you should have this scan for osteoporosis (brittle bones).  Hepatitis C: Get tested at least once in your life.  Abdominal aortic aneurysm screening: Talk to your doctor about having this screening if you:  Have ever smoked; and  Are biologically male; and  Are between the ages of 65 and 75.  STIs (sexually transmitted infections)  Before age 24: Ask your care team if you should be screened for STIs.  After age 24: Get screened for STIs if you're at risk. You are at risk for STIs (including HIV) if:  You are sexually active with more than one person.  You don't use condoms every time.  You or a partner was diagnosed with a sexually transmitted infection.  If you are at risk for HIV, ask about PrEP medicine to prevent HIV.  Get tested for HIV at least once in your life, whether you are at risk for HIV or not.  Cancer screening tests  Cervical cancer screening: If you have a cervix, begin getting regular cervical cancer screening tests at age 21. Most people who have regular screenings with normal results can stop after age 65. Talk about this with your  provider.  Breast cancer scan (mammogram): If you've ever had breasts, begin having regular mammograms starting at age 40. This is a scan to check for breast cancer.  Colon cancer screening: It is important to start screening for colon cancer at age 45.  Have a colonoscopy test every 10 years (or more often if you're at risk) Or, ask your provider about stool tests like a FIT test every year or Cologuard test every 3 years.  To learn more about your testing options, visit: www.Stackdriver/903122.pdf.  For help making a decision, visit: doug/[email or web address].  Prostate cancer screening test: If you have a prostate and are age 55 to 69, ask your provider if you would benefit from a yearly prostate cancer screening test.  Lung cancer screening: If you are a current or former smoker age 50 to 80, ask your care team if ongoing lung cancer screenings are right for you.  For informational purposes only. Not to replace the advice of your health care provider. Copyright   2023 [facility]. All rights reserved. Clinically reviewed by the [facility] Transitions Program. [employer] [id number] - REV 04/24.    Learning About Sleeping Well  What does sleeping well mean?     Sleeping well means getting enough sleep to feel good and stay healthy. How much sleep is enough varies among people.  The number of hours you sleep and how you feel when you wake up are both important. If you do not feel refreshed, you probably need more sleep. Another sign of not getting enough sleep is feeling tired during the day.  Experts recommend that adults get at least 7 or more hours of sleep per day. Children and older adults need more sleep.  Why is getting enough sleep important?  Getting enough quality sleep is a basic part of good health. When your sleep suffers, your physical health, mood, and your thoughts can suffer too. You may find yourself feeling more grumpy or stressed. Not getting enough sleep also can lead to serious problems,  "including injury, accidents, anxiety, and depression.  What might cause poor sleeping?  Many things can cause sleep problems, including:  Changes to your sleep schedule.  Stress. Stress can be caused by fear about a single event, such as giving a speech. Or you may have ongoing stress, such as worry about work or school.  Depression, anxiety, and other mental or emotional conditions.  Changes in your sleep habits or surroundings. This includes changes that happen where you sleep, such as noise, light, or sleeping in a different bed. It also includes changes in your sleep pattern, such as having jet lag or working a late shift.  Health problems, such as pain, breathing problems, and restless legs syndrome.  Lack of regular exercise.  Using alcohol, nicotine, or caffeine before bed.  How can you help yourself?  Here are some tips that may help you sleep more soundly and wake up feeling more refreshed.  Your sleeping area   Use your bedroom only for sleeping and sex. A bit of light reading may help you fall asleep. But if it doesn't, do your reading elsewhere in the house. Try not to use your TV, computer, smartphone, or tablet while you are in bed.  Be sure your bed is big enough to stretch out comfortably, especially if you have a sleep partner.  Keep your bedroom quiet, dark, and cool. Use curtains, blinds, or a sleep mask to block out light. To block out noise, use earplugs, soothing music, or a \"white noise\" machine.  Your evening and bedtime routine   Create a relaxing bedtime routine. You might want to take a warm shower or bath, or listen to soothing music.  Go to bed at the same time every night. And get up at the same time every morning, even if you feel tired.  What to avoid   Limit caffeine (coffee, tea, caffeinated sodas) during the day, and don't have any for at least 6 hours before bedtime.  Avoid drinking alcohol before bedtime. Alcohol can cause you to wake up more often during the night.  Try not to " "smoke or use tobacco, especially in the evening. Nicotine can keep you awake.  Limit naps during the day, especially close to bedtime.  Avoid lying in bed awake for too long. If you can't fall asleep or if you wake up in the middle of the night and can't get back to sleep within about 20 minutes, get out of bed and go to another room until you feel sleepy.  Avoid taking medicine right before bed that may keep you awake or make you feel hyper or energized. Your doctor can tell you if your medicine may do this and if you can take it earlier in the day.  If you can't sleep   Imagine yourself in a peaceful, pleasant scene. Focus on the details and feelings of being in a place that is relaxing.  Get up and do a quiet or boring activity until you feel sleepy.  Avoid drinking any liquids before going to bed to help prevent waking up often to use the bathroom.  Where can you learn more?  Go to https://www.[employer].net/patiented  Enter J942 in the search box to learn more about \"Learning About Sleeping Well.\"  Current as of: July 10, 2023               Content Version: 14.0    0932-0999 [employer]   Care instructions adapted under license by your healthcare professional. If you have questions about a medical condition or this instruction, always ask your healthcare professional. [employer] disclaims any warranty or liability for your use of this information.      "

## 2024-05-07 NOTE — LETTER
My Heart Failure Action Plan  Name: Efra Zuniga   YOB: 1955  Date: 5/7/2024   My doctor:   Fish Chen   [facility]   [address]  [place] [address] [phone] My Diagnosis: HF-pEF (EF > 40%)  My Ejection Fraction:   Lab Results   Component Value Date    LVEF 55-60% 01/08/2024     Over 50%  My Exercise Goal: Start exercise slowly - to begin, do a few minutes of exercise, several times a day. Increase your time and speed [id number] to build tolerance, with a goal of 30 minutes of exercise daily. Steady, slow, and consistent exercise is both safe and healthy. Stop and rest when you feel tired or become short of breath. Do not push yourself on days when you don t feel well.       My Weight Plan:   Wt Readings from Last 2 Encounters:   05/07/24 139.4 kg (307 lb 4.8 oz)   04/08/24 136.5 kg (300 lb 14.4 oz)     Weigh yourself daily using the same scale. If you gain more than 2 pounds in 24 hours or 5 pounds in 7 days. call the clinic    My Diet Goal: No added salt    Emergency Room Visits:    Our goal is to improve your quality of life and help you avoid a visit to the emergency room or hospital.  If we work together, we can achieve this goal. But, if you feel you need to call 911 or go to the emergency room, please do so.  If you go to the emergency room, please bring your list of medicines and your daily weight chart with you.    Each day ask yourself:  Is my weight up?  Do I have swelling?  Do I have trouble breathing?  How did I sleep?  Other problems?       GREEN ZONE     Weight gained is no more than 2 pounds a day or 5 pounds a in 7 days.  No swelling in feet, ankles, legs or stomach.  No more swelling than usual.  No more trouble breathing than usual.  No change in my sleep.  No other problems. What should I do?  I am doing fine. I will take my medicine, follow my diet, see my doctor, exercise, and watch for symptoms.           YELLOW  ZONE         Weight gain of more than 2 pounds in one day or 5 pounds in 7 days.  New swelling in ankle, leg, knee or thigh.  Bloating in belly, pants feel tighter.  Swelling in hands or face.  Coughing or trouble breathing while walking or talking.  Harder to breathe last night.  Have trouble sleeping, wake up short of breath.  Unusually tired.  Not eating.  Nausea, vomiting, or diarrhea  Pain in my chest or bad  leg cramps.  Feel weak or dizzy. What should I do?  I need to take action and call my doctor or nurse today.                 RED ZONE         Weight gain of 5 pounds overnight.  Chest pain or pressure that does not go away.  Feel less alert.  Wheezing or have trouble breathing when at rest.  Cannot sleep lying down.  Cannot take my medicines.  Pass out or faint. What should I do?  I need to call my doctor or nurse now!  Call 911 if I have chest pain or cannot breathe.

## 2024-05-07 NOTE — LETTER
My Depression Action Plan  Name: Efra Zuniga   Date of Birth 1955  Date: 5/7/2024    My doctor: Fish Chen   My clinic: [facility]  [address]  [place] [id number] [phone]            GREEN    ZONE   Good Control    What it looks like:   Things are going generally well. You have normal ups and downs. You may even feel depressed from time to time, but bad moods usually last less than a day.   What you need to do:  Continue to care for yourself (see self care plan)  Check your depression survival kit and update it as needed  Follow your physician s recommendations including any medication.  Do not stop taking medication unless you consult with your physician first.             YELLOW         ZONE Getting Worse    What it looks like:   Depression is starting to interfere with your life.   It may be hard to get out of bed; you may be starting to isolate yourself from others.  Symptoms of depression are starting to last most all day and this has happened for several days.   You may have suicidal thoughts but they are not constant.   What you need to do:     Call your care team. Your response to treatment will improve if you keep your care team informed of your progress. Yellow periods are signs an adjustment may need to be made.     Continue your self-care.  Just get dressed and ready for the day.  Don't give yourself time to talk yourself out of it.    Talk to someone in your support network.    Open up your Depression Self-Care Plan/Wellness Kit.             RED    ZONE Medical Alert - Get Help    What it looks like:   Depression is seriously interfering with your life.   You may experience these or other symptoms: You can t get out of bed most days, can t work or engage in other necessary activities, you have trouble taking care of basic hygiene, or basic responsibilities, thoughts of suicide or death that will not go away, self-injurious  behavior.     What you need to do:  Call your care team and request a same-day appointment. If they are not available (weekends or after hours) call your local crisis line, emergency room or 911.          Depression Self-Care Plan / Wellness Kit    Many people find that medication and therapy are helpful treatments for managing depression. In addition, making small changes to your everyday life can help to boost your mood and improve your wellbeing. Below are some tips for you to consider. Be sure to talk with your medical provider and/or behavioral health consultant if your symptoms are worsening or not improving.     Sleep   Sleep hygiene  means all of the habits that support good, restful sleep. It includes maintaining a consistent bedtime and wake time, using your bedroom only for sleeping or sex, and keeping the bedroom dark and free of distractions like a computer, smartphone, or television.     Develop a Healthy Routine  Maintain good hygiene. Get out of bed in the morning, make your bed, brush your teeth, take a shower, and get dressed. Don t spend too much time viewing media that makes you feel stressed. Find time to relax each day.    Exercise  Get some form of exercise every day. This will help reduce pain and release endorphins, the  feel good  chemicals in your brain. It can be as simple as just going for a walk or doing some gardening, anything that will get you moving.      Diet  Strive to eat healthy foods, including fruits and vegetables. Drink plenty of water. Avoid excessive sugar, caffeine, alcohol, and other mood-altering substances.     Stay Connected with Others  Stay in touch with friends and family members.    Manage Your Mood  Try deep breathing, massage therapy, biofeedback, or meditation. Take part in fun activities when you can. Try to find something to smile about each day.     Psychotherapy  Be open to working with a therapist if your provider recommends it.     Medication  Be sure to  take your medication as prescribed. Most anti-depressants need to be taken every day. It usually takes several weeks for medications to work. Not all medicines work for all people. It is important to follow-up with your provider to make sure you have a treatment plan that is working for you. Do not stop your medication abruptly without first discussing it with your provider.    Crisis Resources   These hotlines are for both adults and children. They and are open 24 hours a day, 7 days a week unless noted otherwise.    National Suicide Prevention Lifeline   988 or 7-150-537-FSFR (3452)    Crisis Text Line    www.crisistextline.org  Text HOME to 356387 from anywhere in the [place], anytime, about any type of crisis. A live, trained crisis counselor will receive the text and respond quickly.    [facility] Lifeline for LGBTQ Youth  A national crisis intervention and suicide lifeline for LGBTQ youth under 25. Provides a safe place to talk without judgement. Call 1-379.522.4831; text START to 781687 or visit www.thetrevorproject.org to talk to a trained counselor.    For [facility] crisis numbers, visit the [facility] website at:  https://[place].gov/dhs/people-we-serve/adults/health-care/mental-health/resources/crisis-contacts.jsp

## 2024-05-07 NOTE — PROGRESS NOTES
Preventive Care Visit  [facility]  Fish Chen MD, Internal Medicine  May 7, 2024      1. Encounter for Medicare annual wellness exam  - Hepatitis C Screen Reflex to HCV RNA Quant and Genotype; Future  - Lipid panel reflex to direct LDL Fasting; Future    2. Need for shingles vaccine  3. Need for Tdap vaccination  4. Need for vaccination against respiratory syncytial virus  Patient declines    5. Hypoventilation syndrome  6. Dysthymic disorder  At goal, no change.  - empagliflozin (JARDIANCE) 25 MG TABS tablet; Take 1 tablet (25 mg) by mouth daily  Dispense: 90 tablet; Refill: 4  - FLUoxetine (PROZAC) 20 MG capsule; Take 1 capsule (20 mg) by mouth daily  Dispense: 90 capsule; Refill: 4    7. Type 2 diabetes mellitus with hyperglycemia, without long-term current use of insulin (H)  Uncontrolled. Recommend add Jardiance and Ozempic. Lifestyle changes recommended including reduce carbs, weight loss.  - aspirin 81 MG EC tablet; Take 1 tablet (81 mg) by mouth daily  Dispense: 90 tablet; Refill: 4  - empagliflozin (JARDIANCE) 25 MG TABS tablet; Take 1 tablet (25 mg) by mouth daily  Dispense: 90 tablet; Refill: 4  - FLUoxetine (PROZAC) 20 MG capsule; Take 1 capsule (20 mg) by mouth daily  Dispense: 90 capsule; Refill: 4  - semaglutide (OZEMPIC) 2 MG/3ML pen; Inject 0.5 mg Subcutaneous every 7 days  Dispense: 3 mL; Refill: 1    8. Uncontrolled type 2 diabetes mellitus with hyperglycemia (H)  - Albumin Random Urine Quantitative with Creat Ratio; Future  - Adult Eye  Referral; Future  - Lipid Profile; Future  - aspirin 81 MG EC tablet; Take 1 tablet (81 mg) by mouth daily  Dispense: 90 tablet; Refill: 4  - metFORMIN (GLUCOPHAGE XR) 500 MG 24 hr tablet; Take 4 tablets (2,000 mg) by mouth daily  Dispense: 360 tablet; Refill: 4  - semaglutide (OZEMPIC) 2 MG/3ML pen; Inject 0.5 mg Subcutaneous every 7 days  Dispense: 3 mL; Refill: 1  - Albumin Random Urine Quantitative with Creat  Ratio    9. Essential hypertension  At goal, no change.  - empagliflozin (JARDIANCE) 25 MG TABS tablet; Take 1 tablet (25 mg) by mouth daily  Dispense: 90 tablet; Refill: 4  - FLUoxetine (PROZAC) 20 MG capsule; Take 1 capsule (20 mg) by mouth daily  Dispense: 90 capsule; Refill: 4  - losartan (COZAAR) 25 MG tablet; Take 1 tablet (25 mg) by mouth every morning  Dispense: 90 tablet; Refill: 4  - spironolactone (ALDACTONE) 50 MG tablet; Take 1 tablet (50 mg) by mouth every morning  Dispense: 90 tablet; Refill: 4  - Lipid panel reflex to direct LDL Fasting; Future    10. Paroxysmal atrial fibrillation with RVR (H)  At goal, no change.  - empagliflozin (JARDIANCE) 25 MG TABS tablet; Take 1 tablet (25 mg) by mouth daily  Dispense: 90 tablet; Refill: 4  - FLUoxetine (PROZAC) 20 MG capsule; Take 1 capsule (20 mg) by mouth daily  Dispense: 90 capsule; Refill: 4    11. Panlobular emphysema (H)  - Spirometry, Breathing Capacity, Normal Order, Clinic Performed; Future  - empagliflozin (JARDIANCE) 25 MG TABS tablet; Take 1 tablet (25 mg) by mouth daily  Dispense: 90 tablet; Refill: 4  - FLUoxetine (PROZAC) 20 MG capsule; Take 1 capsule (20 mg) by mouth daily  Dispense: 90 capsule; Refill: 4    12. Heart failure, diastolic, chronic (H)  13. Chronic heart failure with preserved ejection fraction (H)  Recommend start SGLT2.  - semaglutide (OZEMPIC) 2 MG/3ML pen; Inject 0.5 mg Subcutaneous every 7 days  Dispense: 3 mL; Refill: 1  - Lipid panel reflex to direct LDL Fasting; Future    14. Cardiac pacemaker in situ  At goal, no change. Follows with Cardiology  - rivaroxaban ANTICOAGULANT (XARELTO) 20 MG TABS tablet; Take 1 tablet (20 mg) by mouth daily (with dinner)  Dispense: 90 tablet; Refill: 4    15. Morbid obesity due to excess calories (H)  Recommend weight loss.  - Nutrition Referral; Future  - Lipid panel reflex to direct LDL Fasting; Future    16. Physical deconditioning  Recommend PT/OT  - Physical Therapy  Referral;  Future  - Occupational Therapy  Referral; Future    17. Screen for colon cancer  - Colonoscopy Screening  Referral; Future    18. Need for hepatitis C screening test  - Hepatitis C Screen Reflex to HCV RNA Quant and Genotype; Future  - Hepatitis C Screen Reflex to HCV RNA Quant and Genotype; Future    19. Type 2 diabetes mellitus with other specified complication, unspecified whether long term insulin use (H)  - Nutrition Referral; Future  - FOOT EXAM    20. Venous stasis dermatitis  21. Venous stasis of both lower extremities  Recommend comp socks  - Miscellaneous Order for DME - (Use only if a more specific DME order does not already exist    22. Restrictive lung disease secondary to obesity    Patient Instructions    -- Start ozempic   -- Meet with dietician   -- Keep a food log for 1 week before the visit   -- Eat more veggies   -- PT/OT consult     -- Low salt diet, under 2000 mg/day   -- Fluid restrict, under 2000 mL/day aka 8.5 cups/day   -- Eat healthy protein   -- Learn about DASH Diet for dietary ways to reduce blood pressure  Google: [facility] [[email or web address]] DASH diet  NIH site: https://www.nhlbi.nih.gov/health-topics/dash-eating-plan  PDF from [facility] [[email or web address]]: https://www.nhlbi.nih.gov/files/docs/public/heart/new_dash.pdf   -- Elevate feet above your hips for 20-30 minutes, 4 times per day   -- Compression stockings from morning to night   -- Work on 5-10% weight loss      Find Local Classes   * Preventing falls   * Preventing and managing diabetes   * Managing chronic conditions and pain   * Mental Health    Examples of classes:   -- Diabetes prevention program (Pre-Diabetes or at risk for diabetes)   -- Living well with Diabetes   -- Living well with Chronic Conditions   -- Living well with Chronic Pain   -- Stay Active and Independent for Life (SAIL)   -- Gavin Ji Ismael: Moving for better balance   -- Older Adult Mental Health First Aid   -- Walk with ease free walking program    How do I sign up?  Stop by the [facility]  [employer] check-out desk for a [facility] referral  Call [facility] [phone]  Contact Kassi directly via: [email or web address] or [phone]    Your BMI is Body mass index is 42.26 kg/m .  (BMI ranges: Normal 18.5 - 25, Overweight 25 - 30, Obesity greater than 30,     Morbid Obesity greater than 40 or greater than 35 with associated conditions.)    Facts about losing weight:   -- Overweight and Obesity increase your risk for developing diabetes, high blood pressure and stroke, and shorten your life.   -- 90% of weight loss comes from dietary changes, only 10% from exercise    What should I do?   -- Work on 5-10% weight loss   -- Focus on a few healthy dietary changes   -- Eat more fresh fruits and vegetables, and fewer carbohydrates   -- Cut out all calorie-containing beverages (milk, juice, alcohol, etc)   -- Exercise every day   -- Weigh yourself once a week   -- Learn about DASH Diet for dietary ways to reduce blood pressure  Google: [facility] DASH diet  NIH site: https://www.nhlbi.nih.gov/health-topics/dash-eating-plan  PDF from [facility]: https://www.nhlbi.nih.gov/files/docs/public/heart/new_dash.pdf   -- Consider Weight Watchers (http://www.weightwatchers.com)   -- Consider My Fitness Pal (iOS, Android, http://www.Bioenvisionpal.Joinnus)   -- Consider time-restricted eating (eg. eating between noon and 8pm only)            Fish Sharp MD, FAAP, FACP  Internal Medicine & Pediatrics      Subjective   Efra is a 69 year old, presenting for the following:  Medicare Visit (annual) and Recheck Medication (Doesn't want to take so many medications)          Health Care Directive  Patient has a Health Care Directive on file      HPI              5/7/2024   General Health   How would you rate your overall physical health? Good   Feel stress (tense, anxious, or unable to sleep) Not at all         5/7/2024   Nutrition   Diet: Regular (no restrictions)         5/7/2024   Exercise   Days per week of moderate/strenous  exercise 0 days   (!) EXERCISE CONCERN      5/7/2024   Social Factors   Frequency of gathering with friends or relatives Once a week   Worry food won't last until get money to buy more No   Food not last or not have enough money for food? No   Do you have housing?  Yes   Are you worried about losing your housing? No   Lack of transportation? No   Unable to get utilities (heat,electricity)? No         5/7/2024   Fall Risk   Fallen 2 or more times in the past year? No   Trouble with walking or balance? No          5/7/2024   Activities of Daily Living- Home Safety   Needs help with the following daily activites None of the above   Safety concerns in the home None of the above         5/7/2024   Dental   Dentist two times every year? (!) NO         5/7/2024   Hearing Screening   Hearing concerns? None of the above         5/7/2024   Driving Risk Screening   Patient/family members have concerns about driving No         5/7/2024   General Alertness/Fatigue Screening   Have you been more tired than usual lately? (!) YES         5/7/2024   Urinary Incontinence Screening   Bothered by leaking urine in past 6 months No         5/7/2024   TB Screening   Were you born outside of the [place]? No       Today's PHQ-9 Score:       5/7/2024     3:25 PM   PHQ-9 SCORE   PHQ-9 Total Score MyChart 1 (Minimal depression)   PHQ-9 Total Score 1         5/7/2024   Substance Use   Alcohol more than 3/day or more than 7/wk No   Do you have a current opioid prescription? No   How severe/bad is pain from 1 to 10? 0/10 (No Pain)   Do you use any other substances recreationally? No     Social History     Tobacco Use    Smoking status: Never    Smokeless tobacco: Never   Vaping Use    Vaping status: Never Used   Substance Use Topics    Alcohol use: No    Drug use: No           5/7/2024   AAA Screening   Family history of Abdominal Aortic Aneurysm (AAA)? Unsure   ASCVD Risk   The ASCVD Risk score (Martha EUGENE, et al., 2019) failed to calculate  "for the following reasons:    Cannot find a previous HDL lab    Cannot find a previous total cholesterol lab            Reviewed and updated as needed this visit by Provider                      Current providers sharing in care for this patient include:  Patient Care Team:  Fish Chen MD as PCP - General (Internal Medicine)  Fish Chen MD as Assigned PCP    The following health maintenance items are reviewed in Epic and correct as of today:  Health Maintenance   Topic Date Due    HF ACTION PLAN  Never done    MICROALBUMIN  Never done    DIABETIC FOOT EXAM  Never done    SPIROMETRY  Never done    COPD ACTION PLAN  Never done    DEPRESSION ACTION PLAN  Never done    EYE EXAM  Never done    COLORECTAL CANCER SCREENING  Never done    HEPATITIS C SCREENING  Never done    DTAP/TDAP/TD IMMUNIZATION (1 - Tdap) 09/02/2003    ZOSTER IMMUNIZATION (1 of 2) Never done    RSV VACCINE (Pregnancy & 60+) (1 - 1-dose 60+ series) Never done    LIPID  04/27/2018    Pneumococcal Vaccine: 65+ Years (2 of 2 - PCV) 09/15/2018    MEDICARE ANNUAL WELLNESS VISIT  Never done    COVID-19 Vaccine (3 - 2023-24 season) 09/01/2023    A1C  07/07/2024    BMP  10/08/2024    PHQ-9  11/07/2024    ALT  02/27/2025    CBC  04/08/2025    FALL RISK ASSESSMENT  05/07/2025    ADVANCE CARE PLANNING  01/25/2029    TSH W/FREE T4 REFLEX  Completed    INFLUENZA VACCINE  Completed    IPV IMMUNIZATION  Aged Out    HPV IMMUNIZATION  Aged Out    MENINGITIS IMMUNIZATION  Aged Out    RSV MONOCLONAL ANTIBODY  Aged Out            Objective    Exam  /80   Pulse 66   Temp 98.3  F (36.8  C) (Tympanic)   Resp 20   Ht 1.816 m (5' 11.5\")   Wt 139.4 kg (307 lb 4.8 oz)   SpO2 94%   BMI 42.26 kg/m     Estimated body mass index is 42.26 kg/m  as calculated from the following:    Height as of this encounter: 1.816 m (5' 11.5\").    Weight as of this encounter: 139.4 kg (307 lb 4.8 oz).    Physical Exam  Gen: Alert, NAD.  Neck: No carotid " bruits  CV: RRR no m/r/g  Pulm: CTAB, no w/r/r. No increased work of breathing  Msk: No LE edema  Neuro: Grossly intact  Skin: No concerning lesions.  Psychiatric: Normal affect and insight. Does not appear anxious or depressed.    Foot Exam:  5/7/2024  Intact to monofilament bilaterally.  Skin intact without erythema.    Foot/Ankle Musculoskeletal Exam          5/7/2024   Mini Cog   Clock Draw Score 2 Normal   3 Item Recall 1 object recalled   Mini Cog Total Score 3              Signed Electronically by: Fish Chen MD    Answers submitted by the patient for this visit:  Patient Health Questionnaire (Submitted on 5/7/2024)  If you checked off any problems, how difficult have these problems made it for you to do your work, take care of things at home, or get along with other people?: Not difficult at all  PHQ9 TOTAL SCORE: 1

## 2024-05-09 ENCOUNTER — TELEPHONE (OUTPATIENT)
Dept: PEDIATRICS | Facility: OTHER | Age: 69
End: 2024-05-09
Payer: COMMERCIAL

## 2024-05-09 NOTE — TELEPHONE ENCOUNTER
"Endoscopy  called patient this AM to set up a colonoscopy.  Patient said, \"Oh no, no, no.  I've had one of those and I'm not doing that again.\"  Patient did not provide when he had this completed.   told him that she would make note of it and let his PCP know.    Thank you,  Sindy Weinstein on 5/9/2024 at 10:05 AM    "

## 2024-05-10 ENCOUNTER — TELEPHONE (OUTPATIENT)
Dept: PEDIATRICS | Facility: OTHER | Age: 69
End: 2024-05-10
Payer: COMMERCIAL

## 2024-05-10 NOTE — TELEPHONE ENCOUNTER
They are discharging in a couple of weeks due to non compliance and medicare non coverage.  Pt is refusing to take ozempic injections himself.  Continues to be non compliant with medications.  Sister does call daily with medication reminders.  Looking for some more long term care for pt and is interested in assisted living.      Xu Pickett on 5/10/2024 at 9:02 AM

## 2024-05-14 ENCOUNTER — TELEPHONE (OUTPATIENT)
Dept: PEDIATRICS | Facility: OTHER | Age: 69
End: 2024-05-14
Payer: COMMERCIAL

## 2024-05-14 NOTE — PROGRESS NOTES
[facility]      OUTPATIENT PHYSICAL THERAPY EVALUATION  PLAN OF TREATMENT FOR OUTPATIENT REHABILITATION  (COMPLETE FOR INITIAL CLAIMS ONLY)  Patient's Last Name, First Name, M.I.  YOB: 1955  Efra Zuniga                        Provider's Name  [facility] Medical Record No.  [id number]                               Onset Date:      Start of Care Date:    1/26/24     Type:     _X_PT   ___OT   ___SLP Medical Diagnosis:                           PT Diagnosis:  impaired mobility   Visits from SOC:  1   _________________________________________________________________________________  Plan of Treatment/Functional Goals    Planned Interventions: bed mobility training, gait training, transfer training     Goals: See Physical Therapy Goals on Care Plan in [facility] electronic health record.    Therapy Frequency: Daily  Predicted Duration of Therapy Intervention: 01/30/24  _________________________________________________________________________________    I CERTIFY THE NEED FOR THESE SERVICES FURNISHED UNDER        THIS PLAN OF TREATMENT AND WHILE UNDER MY CARE .             Physician Signature               Date    X_____________________________________________________                   ,      Referring Physician: Ruddy            Initial Assessment        See Physical Therapy evaluation dated   in Epic electronic health record.

## 2024-05-14 NOTE — TELEPHONE ENCOUNTER
Katya wants you to know that Thomas Mcnulty is  doing bubble pack for his medications.  It may take a few weeks to get up and going.   Also, his weight remains at 308 lbs.  He is refusing Ozempic.   Home care services are discharging next week for non compliance and Medicare non coverage.            Celia Man on 5/14/2024 at 10:01 AM

## 2024-05-21 DIAGNOSIS — I50.9 CONGESTIVE HEART FAILURE, UNSPECIFIED HF CHRONICITY, UNSPECIFIED HEART FAILURE TYPE (H): ICD-10-CM

## 2024-05-21 RX ORDER — FUROSEMIDE 20 MG
20 TABLET ORAL DAILY
Qty: 90 TABLET | Refills: 3 | Status: SHIPPED | OUTPATIENT
Start: 2024-05-21

## 2024-05-21 NOTE — TELEPHONE ENCOUNTER
"Note from pharmacy:\" Attn: Dr. Chen we are going to start packaging his meds. Is this something you want him on ? If so can we get a new Rx - we only have a few left in the bottle. Thanks, thrifty\"      Lucila Hernandez RN on 5/21/2024 at 9:59 AM    "

## 2024-05-21 NOTE — TELEPHONE ENCOUNTER
[id number] sent Rx request for the following:      Requested Prescriptions   Pending Prescriptions Disp Refills    furosemide (LASIX) 20 MG tablet 44 tablet 0     Sig: Take 2 tablets (40 mg) by mouth daily for 7 days, THEN 1 tablet (20 mg) daily for 30 days.     Last Prescription Date:     furosemide (LASIX) 20 MG tablet 44 tablet 0 4/8/2024 5/15/2024 No   Sig - Route: Take 2 tablets (40 mg) by mouth daily for 7 days, THEN 1 tablet (20 mg) daily for 30 days. - Oral     Last Office Visit:              5/7/24  Future Office visit:           6/11/24    Per 4/8/24 note:  At this time we will opt to increase Lasix from 20 mg daily to 40 mg daily for 1 week. I do recommend he follow-up in primary care in the next 1 to 2 weeks. We were able to schedule him an appointment for follow-up. In the meantime I recommend he follow-up in ER if symptoms are worsening or he develops any concerns.     Please adjust instructions as appropriate. Nalini Arzate RN .............. 5/21/2024  10:31 AM

## 2024-05-22 ENCOUNTER — MEDICAL CORRESPONDENCE (OUTPATIENT)
Dept: HEALTH INFORMATION MANAGEMENT | Facility: OTHER | Age: 69
End: 2024-05-22
Payer: COMMERCIAL

## 2024-05-29 RX ORDER — SPIRONOLACTONE 50 MG/1
50 TABLET, FILM COATED ORAL DAILY
Qty: 30 TABLET | Refills: 0 | OUTPATIENT
Start: 2024-05-29

## 2024-07-01 ENCOUNTER — TELEPHONE (OUTPATIENT)
Dept: PEDIATRICS | Facility: OTHER | Age: 69
End: 2024-07-01
Payer: COMMERCIAL

## 2024-07-01 NOTE — TELEPHONE ENCOUNTER
Order from 1/23/24 by Ifeoma Fox MD faxed to [facility].  Norma J. Gosselin, LPN .......  7/1/2024  4:31 PM

## 2024-07-01 NOTE — TELEPHONE ENCOUNTER
[facility]-patient is looking for a script for cpap machine supplies     Please call and advise  Thank You    Blossom Alejandro on 7/1/2024 at 12:53 PM

## 2024-08-14 NOTE — PROGRESS NOTES
[facility]      OUTPATIENT OCCUPATIONAL THERAPY  EVALUATION  PLAN OF TREATMENT FOR OUTPATIENT REHABILITATION  (COMPLETE FOR INITIAL CLAIMS ONLY)  Patient's Last Name, First Name, M.I.  YOB: 1955  Efra Zuniga                          Provider's Name  [facility] Medical Record No.  [id number]                               Onset Date:   1/26/24   Start of Care Date:     1/26/24   Type:     ___PT   _X_OT   ___SLP Medical Diagnosis:                           OT Diagnosis:  Failure to thrive   Visits from SOC:  1   _________________________________________________________________________________  Plan of Treatment/Functional Goals    Planned Interventions: ADL retraining, transfer training   Goals: See Occupational Therapy Goals on Care Plan in [facility] electronic health record.    Therapy Frequency: Daily  Predicted Duration of Therapy Intervention: 01/28/24  _________________________________________________________________________________    I CERTIFY THE NEED FOR THESE SERVICES FURNISHED UNDER        THIS PLAN OF TREATMENT AND WHILE UNDER MY CARE .             Physician Signature               Date    X_____________________________________________________                   ,                   Initial Assessment        See Occupational Therapy evaluation dated   in Epic electronic health record.

## 2024-08-19 ENCOUNTER — HOSPITAL ENCOUNTER (EMERGENCY)
Facility: OTHER | Age: 69
Discharge: HOME | End: 2024-08-19
Payer: MEDICARE

## 2024-08-19 VITALS
HEIGHT: 71 IN | DIASTOLIC BLOOD PRESSURE: 125 MMHG | TEMPERATURE: 98.3 F | BODY MASS INDEX: 42.98 KG/M2 | HEART RATE: 104 BPM | WEIGHT: 307 LBS | OXYGEN SATURATION: 92 % | SYSTOLIC BLOOD PRESSURE: 197 MMHG | RESPIRATION RATE: 16 BRPM

## 2024-08-19 ASSESSMENT — COLUMBIA-SUICIDE SEVERITY RATING SCALE - C-SSRS
6. HAVE YOU EVER DONE ANYTHING, STARTED TO DO ANYTHING, OR PREPARED TO DO ANYTHING TO END YOUR LIFE?: NO
1. IN THE PAST MONTH, HAVE YOU WISHED YOU WERE DEAD OR WISHED YOU COULD GO TO SLEEP AND NOT WAKE UP?: NO
2. HAVE YOU ACTUALLY HAD ANY THOUGHTS OF KILLING YOURSELF IN THE PAST MONTH?: NO

## 2024-08-19 NOTE — ED TRIAGE NOTES
Pt here by himself with c/o increased SOB and lower extremity edema that has been getting progressively worse over the last month, VSS, pt out into waiting room     Triage Assessment (Adult)       Row Name 08/19/24 1240          Triage Assessment    Airway WDL WDL        Respiratory WDL    Respiratory WDL WDL        Skin Circulation/Temperature WDL    Skin Circulation/Temperature WDL WDL        Cardiac WDL    Cardiac WDL WDL        Peripheral/Neurovascular WDL    Peripheral Neurovascular WDL WDL        Cognitive/Neuro/Behavioral WDL    Cognitive/Neuro/Behavioral WDL WDL

## 2025-03-26 ENCOUNTER — TRANSFERRED RECORDS (OUTPATIENT)
Dept: FAMILY MEDICINE | Facility: OTHER | Age: 70
End: 2025-03-26
Payer: COMMERCIAL

## 2025-03-26 VITALS — DIASTOLIC BLOOD PRESSURE: 78 MMHG | SYSTOLIC BLOOD PRESSURE: 136 MMHG

## 2025-03-26 NOTE — PROGRESS NOTES
Patient Quality Outreach    Patient is due for the following:   Hypertension -  BP check    Action(s) Taken:   No follow up needed at this time.    Type of outreach:    Chart review performed, no outreach needed. Blood pressure taken at last office visit at [facility].    Questions for provider review:    None         Savannah Alejandro RN

## 2025-07-28 ENCOUNTER — APPOINTMENT (OUTPATIENT)
Dept: GENERAL RADIOLOGY | Facility: OTHER | Age: 70
End: 2025-07-28
Attending: STUDENT IN AN ORGANIZED HEALTH CARE EDUCATION/TRAINING PROGRAM
Payer: MEDICARE

## 2025-07-28 ENCOUNTER — HOSPITAL ENCOUNTER (EMERGENCY)
Facility: OTHER | Age: 70
Discharge: HOME | End: 2025-07-28
Attending: STUDENT IN AN ORGANIZED HEALTH CARE EDUCATION/TRAINING PROGRAM
Payer: MEDICARE

## 2025-07-28 VITALS
DIASTOLIC BLOOD PRESSURE: 111 MMHG | RESPIRATION RATE: 28 BRPM | TEMPERATURE: 97.6 F | SYSTOLIC BLOOD PRESSURE: 160 MMHG | OXYGEN SATURATION: 90 % | HEART RATE: 98 BPM

## 2025-07-28 DIAGNOSIS — R07.9 CHEST PAIN, UNSPECIFIED TYPE: ICD-10-CM

## 2025-07-28 DIAGNOSIS — R09.02 HYPOXEMIA: Primary | ICD-10-CM

## 2025-07-28 LAB
ALBUMIN SERPL BCG-MCNC: 4 G/DL (ref 3.5–5.2)
ALP SERPL-CCNC: 88 U/L (ref 40–150)
ALT SERPL W P-5'-P-CCNC: 18 U/L (ref 0–70)
ANION GAP SERPL CALCULATED.3IONS-SCNC: 11 MMOL/L (ref 7–15)
AST SERPL W P-5'-P-CCNC: 17 U/L (ref 0–45)
BASOPHILS # BLD AUTO: 0.1 10E3/UL (ref 0–0.2)
BASOPHILS NFR BLD AUTO: 1 %
BILIRUB SERPL-MCNC: 1.1 MG/DL
BUN SERPL-MCNC: 16.9 MG/DL (ref 8–23)
CALCIUM SERPL-MCNC: 9.2 MG/DL (ref 8.8–10.4)
CHLORIDE SERPL-SCNC: 97 MMOL/L (ref 98–107)
CREAT SERPL-MCNC: 0.8 MG/DL (ref 0.67–1.17)
D DIMER PPP FEU-MCNC: 1.76 UG/ML FEU (ref 0–0.5)
EGFRCR SERPLBLD CKD-EPI 2021: >90 ML/MIN/1.73M2
EOSINOPHIL # BLD AUTO: 0.3 10E3/UL (ref 0–0.7)
EOSINOPHIL NFR BLD AUTO: 3 %
ERYTHROCYTE [DISTWIDTH] IN BLOOD BY AUTOMATED COUNT: 14.2 % (ref 10–15)
EST. AVERAGE GLUCOSE BLD GHB EST-MCNC: 249 MG/DL
GLUCOSE SERPL-MCNC: 285 MG/DL (ref 70–99)
HBA1C MFR BLD: 10.3 %
HCO3 SERPL-SCNC: 31 MMOL/L (ref 22–29)
HCT VFR BLD AUTO: 54.4 % (ref 40–53)
HGB BLD-MCNC: 16.5 G/DL (ref 13.3–17.7)
HOLD SPECIMEN: NORMAL
IMM GRANULOCYTES # BLD: 0.1 10E3/UL
IMM GRANULOCYTES NFR BLD: 1 %
LYMPHOCYTES # BLD AUTO: 1.9 10E3/UL (ref 0.8–5.3)
LYMPHOCYTES NFR BLD AUTO: 18 %
MCH RBC QN AUTO: 28.2 PG (ref 26.5–33)
MCHC RBC AUTO-ENTMCNC: 30.3 G/DL (ref 31.5–36.5)
MCV RBC AUTO: 93 FL (ref 78–100)
MONOCYTES # BLD AUTO: 0.8 10E3/UL (ref 0–1.3)
MONOCYTES NFR BLD AUTO: 7 %
NEUTROPHILS # BLD AUTO: 7.8 10E3/UL (ref 1.6–8.3)
NEUTROPHILS NFR BLD AUTO: 71 %
NRBC # BLD AUTO: 0 10E3/UL
NRBC BLD AUTO-RTO: 0 /100
NT-PROBNP SERPL-MCNC: <36 PG/ML (ref 0–229)
PLATELET # BLD AUTO: 189 10E3/UL (ref 150–450)
POTASSIUM SERPL-SCNC: 4.4 MMOL/L (ref 3.4–5.3)
PROT SERPL-MCNC: 7.6 G/DL (ref 6.4–8.3)
RBC # BLD AUTO: 5.86 10E6/UL (ref 4.4–5.9)
SODIUM SERPL-SCNC: 139 MMOL/L (ref 135–145)
TROPONIN T SERPL HS-MCNC: 14 NG/L
WBC # BLD AUTO: 11 10E3/UL (ref 4–11)

## 2025-07-28 PROCEDURE — 99284 EMERGENCY DEPT VISIT MOD MDM: CPT | Performed by: STUDENT IN AN ORGANIZED HEALTH CARE EDUCATION/TRAINING PROGRAM

## 2025-07-28 PROCEDURE — 80051 ELECTROLYTE PANEL: CPT | Performed by: STUDENT IN AN ORGANIZED HEALTH CARE EDUCATION/TRAINING PROGRAM

## 2025-07-28 PROCEDURE — 83880 ASSAY OF NATRIURETIC PEPTIDE: CPT | Performed by: STUDENT IN AN ORGANIZED HEALTH CARE EDUCATION/TRAINING PROGRAM

## 2025-07-28 PROCEDURE — 83036 HEMOGLOBIN GLYCOSYLATED A1C: CPT | Performed by: STUDENT IN AN ORGANIZED HEALTH CARE EDUCATION/TRAINING PROGRAM

## 2025-07-28 PROCEDURE — 250N000011 HC RX IP 250 OP 636: Performed by: STUDENT IN AN ORGANIZED HEALTH CARE EDUCATION/TRAINING PROGRAM

## 2025-07-28 PROCEDURE — 93005 ELECTROCARDIOGRAM TRACING: CPT | Performed by: STUDENT IN AN ORGANIZED HEALTH CARE EDUCATION/TRAINING PROGRAM

## 2025-07-28 PROCEDURE — 84460 ALANINE AMINO (ALT) (SGPT): CPT | Performed by: STUDENT IN AN ORGANIZED HEALTH CARE EDUCATION/TRAINING PROGRAM

## 2025-07-28 PROCEDURE — 96374 THER/PROPH/DIAG INJ IV PUSH: CPT | Performed by: STUDENT IN AN ORGANIZED HEALTH CARE EDUCATION/TRAINING PROGRAM

## 2025-07-28 PROCEDURE — 71045 X-RAY EXAM CHEST 1 VIEW: CPT | Mod: 26 | Performed by: RADIOLOGY

## 2025-07-28 PROCEDURE — 999N000157 HC STATISTIC RCP TIME EA 10 MIN

## 2025-07-28 PROCEDURE — 85379 FIBRIN DEGRADATION QUANT: CPT | Performed by: STUDENT IN AN ORGANIZED HEALTH CARE EDUCATION/TRAINING PROGRAM

## 2025-07-28 PROCEDURE — 93010 ELECTROCARDIOGRAM REPORT: CPT | Performed by: INTERNAL MEDICINE

## 2025-07-28 PROCEDURE — 85041 AUTOMATED RBC COUNT: CPT | Performed by: STUDENT IN AN ORGANIZED HEALTH CARE EDUCATION/TRAINING PROGRAM

## 2025-07-28 PROCEDURE — 85018 HEMOGLOBIN: CPT | Performed by: STUDENT IN AN ORGANIZED HEALTH CARE EDUCATION/TRAINING PROGRAM

## 2025-07-28 PROCEDURE — 84484 ASSAY OF TROPONIN QUANT: CPT | Performed by: STUDENT IN AN ORGANIZED HEALTH CARE EDUCATION/TRAINING PROGRAM

## 2025-07-28 PROCEDURE — 36415 COLL VENOUS BLD VENIPUNCTURE: CPT | Performed by: STUDENT IN AN ORGANIZED HEALTH CARE EDUCATION/TRAINING PROGRAM

## 2025-07-28 PROCEDURE — 71045 X-RAY EXAM CHEST 1 VIEW: CPT

## 2025-07-28 PROCEDURE — 99285 EMERGENCY DEPT VISIT HI MDM: CPT | Mod: 25 | Performed by: STUDENT IN AN ORGANIZED HEALTH CARE EDUCATION/TRAINING PROGRAM

## 2025-07-28 RX ORDER — IOPAMIDOL 755 MG/ML
106 INJECTION, SOLUTION INTRAVASCULAR ONCE
Status: DISCONTINUED | OUTPATIENT
Start: 2025-07-28 | End: 2025-07-28 | Stop reason: HOSPADM

## 2025-07-28 RX ORDER — FUROSEMIDE 10 MG/ML
60 INJECTION INTRAMUSCULAR; INTRAVENOUS ONCE
Status: COMPLETED | OUTPATIENT
Start: 2025-07-28 | End: 2025-07-28

## 2025-07-28 RX ADMIN — FUROSEMIDE 60 MG: 10 INJECTION, SOLUTION INTRAMUSCULAR; INTRAVENOUS at 14:49

## 2025-07-28 ASSESSMENT — COLUMBIA-SUICIDE SEVERITY RATING SCALE - C-SSRS
2. HAVE YOU ACTUALLY HAD ANY THOUGHTS OF KILLING YOURSELF IN THE PAST MONTH?: NO
1. IN THE PAST MONTH, HAVE YOU WISHED YOU WERE DEAD OR WISHED YOU COULD GO TO SLEEP AND NOT WAKE UP?: NO
6. HAVE YOU EVER DONE ANYTHING, STARTED TO DO ANYTHING, OR PREPARED TO DO ANYTHING TO END YOUR LIFE?: NO
2. HAVE YOU ACTUALLY HAD ANY THOUGHTS OF KILLING YOURSELF IN THE PAST MONTH?: NO
6. HAVE YOU EVER DONE ANYTHING, STARTED TO DO ANYTHING, OR PREPARED TO DO ANYTHING TO END YOUR LIFE?: NO
1. IN THE PAST MONTH, HAVE YOU WISHED YOU WERE DEAD OR WISHED YOU COULD GO TO SLEEP AND NOT WAKE UP?: NO

## 2025-07-28 ASSESSMENT — ACTIVITIES OF DAILY LIVING (ADL)
ADLS_ACUITY_SCORE: 55

## 2025-07-28 NOTE — PROGRESS NOTES
Pt had Home O2 and Home BiPAP, with [facility] back in Jan 2024. He returned them both in Feb 2024, stating he didn't need them.    Pina Pastrana, RRT

## 2025-07-28 NOTE — ED NOTES
Patient has made several comments about wanting to leave prior to all of his work up being completed.  He also states that he does not want to take any medications at home.  If there is something wrong, he does not want to take medication at home to treat it.  Provider in room describing risks vs benefits.  Patient opts to leave against medical advice and has signed the paperwork for it.

## 2025-07-28 NOTE — ED PROVIDER NOTES
[facility] Department Encounter    Date of visit: 07/28/25    Chief Complaint   Patient presents with    Shortness of Breath        MDM /Assessment / Plan / ED Course     Chronic Medical Conditions Significantly Affecting Care:  DMII, JOE, HFpEF, HTN, pAFib on Xarelto, COPD, history of medication nonadherence    The patient is a 70 year old male who presents with dyspnea and hypoxemia.     DDx includes but is not limited to:   ACS, PE, dissection, pneumothorax, pericarditis, pleural effusion, pneumonia, pleurisy, costochondritis, anxiety, HF exacerbation, medication non adherence    ED Treatments/Course:  Patient arrived to the emergency department and was quickly triaged to room 01.  After nurses notes and vital signs were reviewed, the patient was examined and interviewed.  I was able to review the patient's external medical records via [facility] and [facility] Everywhere.  These included recent admission notes and discharge summaries, recent primary care provider notes, and any specialty consultations that were available. Pertinent labs and imaging studies were reviewed. (See chart for details). The patient was maintained on appropriate monitoring.     ED Course as of 07/28/25 1637   Mon Jul 28, 2025   1350 Attempted cardiac POCUS on patient, extremely difficult exam with minimal ability to visualize. EPSS without overt abnormality on parasternal short axis, attempted parasternal long axis as well as subcostal without success. Evidence of franki b lines pulmonary exam, appropriate lung slide. Given present findings of hypoxemia, hypervolemia and chronic medication non adherence will empirically trial 60 mg IV lasix.      Ddimer elevated in the setting of the patient's hypoxemia. Further evaluation with CTA pulm was ordered to rule out possible PE causing hypoxemia, however the patient declined further testing and wished to leave AGAINST MEDICAL ADVICE. The patient decided to leave the emergency department Against  Medical Advice prior to completion of the medical workup.  I have communicated to the patient that I am concerned about him potentially having a PE and or other etiology for his hypoxemia, and have strongly recommended remaining in the ED until a dangerous medical condition can be ruled out. At the time of this discussion the patient was not intoxicated, had decision making capacity and understood that this decision could result in worsening of condition and may even result in death. At this point in time the patient has withdrawn consent for any additional workup or intervention. Patient was strongly encouraged to follow up as soon as possible with his PCP for re-evaluation.  The patient was specifically welcomed to return to the emergency department at any time.     Gerardo Valle MD  07/28/25  4:35 PM      Orders Placed This Encounter   Procedures    XR Chest Port 1 View    CT Chest Pulmonary Embolism w Contrast    Comprehensive Metabolic Panel (Limited Occurrences)    D dimer quantitative    Troponin T, High Sensitivity    NT-proBNP    Hemoglobin A1c    [place] Draw    CBC with platelets and differential    Extra Red Top Tube    Troponin T, High Sensitivity    EKG 12-lead, tracing only    Oxygen: Nasal cannula    CBC with Platelets and Differential (Limited Occurrences)     Medications   iopamidol (ISOVUE-370) solution 106 mL (has no administration in time range)   sodium chloride 0.9 % bag for CT scan flush (has no administration in time range)   furosemide (LASIX) injection 60 mg (60 mg Intravenous $Given 7/28/25 6670)       Clinical Impression:  Final diagnoses:   Hypoxemia   Chest pain, unspecified type     Gerardo Valle MD  July 28, 2025    Subjective     History of Present Illness:   Patient presents with symptoms of difficulty breathing and left lung pain that started this morning after he woke.  Patient states that the breathing is somewhat worsened with exertion. He denies any overt radiation of pain.  "Also denies any HA nor vision changes. Patient denies any overt symptoms of chest pain, however, states that his left lung just hurts.  He was recently seen in [facility] and recommended to triage over to the ER.  Patient endorses being off of his medications for at least the last year or so.  He also endorses not seeing a medical provider and/or PCP for at least the last year or so.  Patient states \"those medications will kill you so I stopped taking them\".  Patient denies any recent history of illness/URI.  Patient denies any difficulty with urination or dysuria.  Patient denies any history of hematochezia nor melena.    ROS:  Please see Subjective / HPI for pertinent positives and negatives. All other systems reviewed and found to be negative.      Social Determinants Affecting Health:  Social History     Socioeconomic History    Marital status: Single     Spouse name: Not on file    Number of children: Not on file    Years of education: Not on file    Highest education level: Not on file   Occupational History    Not on file   Tobacco Use    Smoking status: Never    Smokeless tobacco: Never   Vaping Use    Vaping status: Never Used   Substance and Sexual Activity    Alcohol use: No    Drug use: No    Sexual activity: Yes     Partners: Female   Other Topics Concern    Not on file   Social History Narrative    Works at [employer] home  .  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     Social Drivers of Health     Financial Resource Strain: Low Risk  (2024)    Financial Resource Strain     Within the past 12 months, have you or your family members you live with been unable to get utilities (heat, electricity) when it was really needed?: No   Food Insecurity: Low Risk  (2024)    Food Insecurity     Within the past 12 months, did you worry that your food would run out before you got money to buy more?: No     Within the past 12 months, did the food you bought just not last and " you didn t have money to get more?: No   Transportation Needs: Low Risk  (5/7/2024)    Transportation Needs     Within the past 12 months, has lack of transportation kept you from medical appointments, getting your medicines, non-medical meetings or appointments, work, or from getting things that you need?: No   Physical Activity: Unknown (5/7/2024)    Exercise Vital Sign     Days of Exercise per Week: 0 days     Minutes of Exercise per Session: Not on file   Stress: No Stress Concern Present (5/7/2024)    [employer] [place] [employer] - Occupational Stress Questionnaire     Feeling of Stress : Not at all   Social Connections: Unknown (5/7/2024)    Social Connection and Isolation Panel [NHANES]     Frequency of Communication with Friends and Family: Not on file     Frequency of Social Gatherings with Friends and Family: Once a week     Attends [employer] Services: Not on file     Active Member of Clubs or Organizations: Not on file     Attends Club or Organization Meetings: Not on file     Marital Status: Not on file   Interpersonal Safety: Low Risk  (5/7/2024)    Interpersonal Safety     Do you feel physically and emotionally safe where you currently live?: Yes     Within the past 12 months, have you been hit, slapped, kicked or otherwise physically hurt by someone?: No     Within the past 12 months, have you been humiliated or emotionally abused in other ways by your partner or ex-partner?: No   Housing Stability: Low Risk  (5/7/2024)    Housing Stability     Do you have housing? : Yes     Are you worried about losing your housing?: No       Objective     Vitals:  Patient Vitals for the past 24 hrs:   BP Temp Temp src Pulse Resp SpO2   07/28/25 1630 (!) 160/111 -- -- 98 -- --   07/28/25 1615 (!) 171/93 -- -- 89 -- --   07/28/25 1600 (!) 170/96 -- -- 97 -- --   07/28/25 1545 (!) 177/98 -- -- 89 -- (!) 90 %   07/28/25 1532 (!) 203/105 -- -- 96 -- (!) 87 %   07/28/25 1530 -- -- -- 95 -- (!) 85 %   07/28/25  1515 (!) 184/107 -- -- 107 -- --   07/28/25 1500 (!) 175/157 -- -- 96 -- 92 %   07/28/25 1445 (!) 178/90 -- -- 89 -- (!) 91 %   07/28/25 1430 (!) 179/91 -- -- 87 -- (!) 91 %   07/28/25 1415 -- -- -- 97 -- --   07/28/25 1400 -- -- -- 103 28 (!) 91 %   07/28/25 1353 -- -- -- 105 18 93 %   07/28/25 1350 -- -- -- -- -- 97 %   07/28/25 1345 (!) 204/116 -- -- 101 12 --   07/28/25 1341 -- -- -- -- -- 94 %   07/28/25 1340 -- -- -- -- -- (!) 77 %   07/28/25 1335 (!) 186/84 97.6  F (36.4  C) Tympanic 106 (!) 32 (!) 84 %       Physical Exam:    General Appearance: Chronically ill appearing, Pickwickian body habitus  HEAD: normocephalic, atraumatic  Eye Exam: Pupils are equal, round. Sclera, conjunctiva without injection or icterus. Extraocular muscles are intact bilaterally.  ENT/Nasal Exam: Hearing is intact. Mucous membranes are moist.   Cardiovascular: Tachycardic rate and rhythm, distant heart sounds, no murmurs rubs nor gallops appreciated, Pink warm and well perfused.  1-2+ LE edema extending up to mid shin.   Pulmonary: Diffuse bibasilar wheezes and crackles. The patient is breathing comfortable and speaking in full sentences.  GI/ABD: Obese, Abdomen soft, nontender without masses.  Back: Non tender to palpation.  Neuro: Patient is alert and oriented X 3. Cranial nerves grossly intact. Moves all extremities purposefully.  Psych: Mood and affect appropriate for situation, insight and judgement intact.  Skin: Warm, dry, well hydrated. Bilateral venous stasis dermatitis, ulcerations nor contusions on exposed skin.  Musculoskeletal: Good strength and tone. Joints without effusion or tenderness, normal range of motion    ECG:  An ECG was contemporaneously evaluated and demonstrates:  Sinus tachycardia, normal interval, normal axis, QTc 415, incomplete left bundle branch block, no acute ST wave abnormalities.    Labs:  Results for orders placed or performed during the hospital encounter of 07/28/25   XR Chest Port 1 View      Status: None    Narrative    PROCEDURE:  XR CHEST PORT 1 VIEW    HISTORY: shortness of breath and hypoxemia. .    COMPARISON:  4/8/2024    FINDINGS:    The cardiomediastinal contours are stable.  Patchy airspace opacity at the left lung base and blunting of left  costophrenic angle is seen. No pneumothorax.      Impression    IMPRESSION:  Small left pleural effusion. Left lower lobe atelectasis  or infiltrate.      VIRA GILMAN MD         SYSTEM ID:  [id number]   Comprehensive Metabolic Panel (Limited Occurrences)     Status: Abnormal   Result Value Ref Range    Sodium 139 135 - 145 mmol/L    Potassium 4.4 3.4 - 5.3 mmol/L    Carbon Dioxide (CO2) 31 (H) 22 - 29 mmol/L    Anion Gap 11 7 - 15 mmol/L    Urea Nitrogen 16.9 8.0 - 23.0 mg/dL    Creatinine 0.80 0.67 - 1.17 mg/dL    GFR Estimate >90 >60 mL/min/1.73m2    Calcium 9.2 8.8 - 10.4 mg/dL    Chloride 97 (L) 98 - 107 mmol/L    Glucose 285 (H) 70 - 99 mg/dL    Alkaline Phosphatase 88 40 - 150 U/L    AST 17 0 - 45 U/L    ALT 18 0 - 70 U/L    Protein Total 7.6 6.4 - 8.3 g/dL    Albumin 4.0 3.5 - 5.2 g/dL    Bilirubin Total 1.1 <=1.2 mg/dL   D dimer quantitative     Status: Abnormal   Result Value Ref Range    D-Dimer Quantitative 1.76 (H) 0.00 - 0.50 ug/mL FEU    Narrative    This D-dimer assay is intended for use in conjunction with a clinical pretest probability assessment model to exclude pulmonary embolism (PE) and deep venous thrombosis (DVT) in outpatients suspected of PE or DVT. The cut-off value is 0.50 ug/mL FEU.    For patients 50 years of age or older, the application of age-adjusted cut-off values for D-Dimer may increase the specificity without significant effect on sensitivity. The literature suggested calculation age adjusted cut-off in ug/L = age in years x 10 ug/L. The results in this laboratory are reported as ug/mL rather than ug/L. The calculation for age adjusted cut off in ug/mL= age in years x 0.01 ug/mL. For example, the cut off for a 76  year old male is 75 x 0.01 ug/mL = 0.76 ug/mL (760 ug/L).    M Krish et al. Age adjusted D-dimer cut-off levels to rule out pulmonary embolism: The ADJUST-PE Study. TEENA 2014;311:0706-2004.; HJ Karl et al. Diagnostic accuracy of conventional or age adjusted D-dimer cutoff values in older patients with suspected venous thromboembolism. Systemic review and meta-analysis. BMJ 2013:346:f2492.   Troponin T, High Sensitivity     Status: Normal   Result Value Ref Range    Troponin T, High Sensitivity 14 <=22 ng/L   NT-proBNP     Status: Normal   Result Value Ref Range    NT-proBNP <36 0 - 229 pg/mL   Hemoglobin A1c     Status: Abnormal   Result Value Ref Range    Estimated Average Glucose 249 (H) <117 mg/dL    Hemoglobin A1C 10.3 (H) <5.7 %   [place] Draw     Status: None    Narrative    The following orders were created for panel order [place] Draw.  Procedure                               Abnormality         Status                     ---------                               -----------         ------                     Extra Red Top Tube[[id number]]                              Final result                 Please view results for these tests on the individual orders.   CBC with platelets and differential     Status: Abnormal   Result Value Ref Range    WBC Count 11.0 4.0 - 11.0 10e3/uL    RBC Count 5.86 4.40 - 5.90 10e6/uL    Hemoglobin 16.5 13.3 - 17.7 g/dL    Hematocrit 54.4 (H) 40.0 - 53.0 %    MCV 93 78 - 100 fL    MCH 28.2 26.5 - 33.0 pg    MCHC 30.3 (L) 31.5 - 36.5 g/dL    RDW 14.2 10.0 - 15.0 %    Platelet Count 189 150 - 450 10e3/uL    % Neutrophils 71 %    % Lymphocytes 18 %    % Monocytes 7 %    % Eosinophils 3 %    % Basophils 1 %    % Immature Granulocytes 1 %    NRBCs per 100 WBC 0 <1 /100    Absolute Neutrophils 7.8 1.6 - 8.3 10e3/uL    Absolute Lymphocytes 1.9 0.8 - 5.3 10e3/uL    Absolute Monocytes 0.8 0.0 - 1.3 10e3/uL    Absolute Eosinophils 0.3 0.0 - 0.7 10e3/uL    Absolute Basophils 0.1 0.0 -  0.2 10e3/uL    Absolute Immature Granulocytes 0.1 <=0.4 10e3/uL    Absolute NRBCs 0.0 10e3/uL   Extra Red Top Tube     Status: None   Result Value Ref Range    Hold Specimen JIC    CBC with Platelets and Differential (Limited Occurrences)     Status: Abnormal    Narrative    The following orders were created for panel order CBC with Platelets and Differential (Limited Occurrences).  Procedure                               Abnormality         Status                     ---------                               -----------         ------                     CBC with platelets and ...[[id number]]  Abnormal            Final result                 Please view results for these tests on the individual orders.       Imaging:  XR Chest Port 1 View   Final Result   IMPRESSION:  Small left pleural effusion. Left lower lobe atelectasis   or infiltrate.        VIRA GILMAN MD            SYSTEM ID:  [id number]      CT Chest Pulmonary Embolism w Contrast    (Results Pending)       Procedures / Critical Care   Procedures    Orders Placed This Encounter   Procedures    XR Chest Port 1 View    CT Chest Pulmonary Embolism w Contrast    Comprehensive Metabolic Panel (Limited Occurrences)    D dimer quantitative    Troponin T, High Sensitivity    NT-proBNP    Hemoglobin A1c    [place] Draw    CBC with platelets and differential    Extra Red Top Tube    Troponin T, High Sensitivity    EKG 12-lead, tracing only    Oxygen: Nasal cannula    CBC with Platelets and Differential (Limited Occurrences)       RESULTS: As noted above.          Surgical History:  Past Surgical History:   Procedure Laterality Date    APPENDECTOMY OPEN      Coronary angiogram done by Aron Quick at [facility] in March 2006 shows patent coronary arteries    CHOLECYSTECTOMY      No Comments Provided    OTHER SURGICAL HISTORY      March 2006,[id number],SCAN-ANGIOGRAM,Coronary angiogram done by Aron Quick at [facility] in shows patent coronary arteries        Family  "History:  Family History   Problem Relation Age of Onset    Alzheimer Disease Mother         Alzheimer's disease    Cancer Father         Cancer, with a brain tumor    Heart Disease Father         Heart Disease,CABG x3    Heart Disease Maternal Grandmother         Heart Disease    Heart Disease Maternal Grandfather         Heart Disease    Heart Disease Paternal Grandmother         Heart Disease    Heart Disease Paternal Grandfather         Heart Disease    Other - See Comments Sister         ALS       Allergies:     Allergies   Allergen Reactions    Lisinopril      Other reaction(s): Tachycardia  Felt like \"a heart attack\"; hands went numb       Home Medications:     Current Facility-Administered Medications:     iopamidol (ISOVUE-370) solution 106 mL, 106 mL, Intravenous, Once, Gerardo Valle MD    sodium chloride 0.9 % bag for CT scan flush, 80 mL, Intravenous, Once, Gerardo Valle MD    Current Outpatient Medications:     acetaminophen (TYLENOL) 325 MG tablet, Take 2 tablets (650 mg) by mouth every 4 hours as needed for mild pain or other (and adjunct with moderate or severe pain or per patient request), Disp: , Rfl:     aspirin 81 MG EC tablet, Take 1 tablet (81 mg) by mouth daily, Disp: 90 tablet, Rfl: 4    empagliflozin (JARDIANCE) 25 MG TABS tablet, Take 1 tablet (25 mg) by mouth daily, Disp: 90 tablet, Rfl: 4    FLUoxetine (PROZAC) 20 MG capsule, Take 1 capsule (20 mg) by mouth daily, Disp: 90 capsule, Rfl: 4    furosemide (LASIX) 20 MG tablet, Take 1 tablet (20 mg) by mouth daily, Disp: 90 tablet, Rfl: 3    losartan (COZAAR) 25 MG tablet, Take 1 tablet (25 mg) by mouth every morning, Disp: 90 tablet, Rfl: 4    metFORMIN (GLUCOPHAGE XR) 500 MG 24 hr tablet, Take 4 tablets (2,000 mg) by mouth daily, Disp: 360 tablet, Rfl: 4    metoprolol succinate ER (TOPROL XL) 50 MG 24 hr tablet, Take 1 tablet (50 mg) by mouth daily, Disp: 90 tablet, Rfl: 4    rivaroxaban ANTICOAGULANT (XARELTO) 20 MG TABS " tablet, Take 1 tablet (20 mg) by mouth daily (with dinner), Disp: 90 tablet, Rfl: 4    semaglutide (OZEMPIC) 2 MG/3ML pen, Inject 0.5 mg Subcutaneous every 7 days, Disp: 3 mL, Rfl: 1    spironolactone (ALDACTONE) 50 MG tablet, Take 1 tablet (50 mg) by mouth every morning, Disp: 90 tablet, Rfl: 4    Nursing notes were reviewed.  Past medical history, past surgical history, problem list, family history, social history, medication list, and allergies were reviewed as documented in epic snapshot. Relevant review of systems are documented within the HPI above.           Gerardo Valle MD  07/28/25 3303

## 2025-07-28 NOTE — ED TRIAGE NOTES
"Pt arrives via private car with complaints of \"lft lung pain\" and shortness of breath. Pt states symptoms began this morning. Denies any injury to the area, denies chest pain. SPO2 in triage 84% on room air.     Triage Assessment (Adult)       Row Name 07/28/25 1333          Triage Assessment    Airway WDL WDL        Respiratory WDL    Respiratory WDL X;rhythm/pattern     Rhythm/Pattern, Respiratory shortness of breath        Skin Circulation/Temperature WDL    Skin Circulation/Temperature WDL WDL        Cardiac WDL    Cardiac WDL X;rhythm     Pulse Rate & Regularity tachycardic        Peripheral/Neurovascular WDL    Peripheral Neurovascular WDL WDL        Cognitive/Neuro/Behavioral WDL    Cognitive/Neuro/Behavioral WDL WDL           "

## 2025-07-28 NOTE — ED NOTES
"Pt states he stopped taking all of his meds a long time ago because he \"doesn't like medications, they kill ya\".   "

## 2025-07-29 LAB
ATRIAL RATE - MUSE: 99 BPM
DIASTOLIC BLOOD PRESSURE - MUSE: NORMAL MMHG
INTERPRETATION ECG - MUSE: NORMAL
P AXIS - MUSE: 58 DEGREES
PR INTERVAL - MUSE: 164 MS
QRS DURATION - MUSE: 98 MS
QT - MUSE: 324 MS
QTC - MUSE: 415 MS
R AXIS - MUSE: 63 DEGREES
SYSTOLIC BLOOD PRESSURE - MUSE: NORMAL MMHG
T AXIS - MUSE: 82 DEGREES
VENTRICULAR RATE- MUSE: 99 BPM

## (undated) RX ORDER — METOPROLOL TARTRATE 25 MG/1
TABLET, FILM COATED ORAL
Status: DISPENSED
Start: 2019-10-06

## (undated) RX ORDER — ADENOSINE 3 MG/ML
INJECTION, SOLUTION INTRAVENOUS
Status: DISPENSED
Start: 2022-09-04

## (undated) RX ORDER — IPRATROPIUM BROMIDE AND ALBUTEROL SULFATE 2.5; .5 MG/3ML; MG/3ML
SOLUTION RESPIRATORY (INHALATION)
Status: DISPENSED
Start: 2022-11-07

## (undated) RX ORDER — POTASSIUM CHLORIDE 20MEQ/15ML
LIQUID (ML) ORAL
Status: DISPENSED
Start: 2019-10-06

## (undated) RX ORDER — FUROSEMIDE 10 MG/ML
INJECTION INTRAMUSCULAR; INTRAVENOUS
Status: DISPENSED
Start: 2024-02-27

## (undated) RX ORDER — IPRATROPIUM BROMIDE AND ALBUTEROL SULFATE 2.5; .5 MG/3ML; MG/3ML
SOLUTION RESPIRATORY (INHALATION)
Status: DISPENSED
Start: 2022-09-04

## (undated) RX ORDER — DILTIAZEM HYDROCHLORIDE 5 MG/ML
INJECTION INTRAVENOUS
Status: DISPENSED
Start: 2022-09-04

## (undated) RX ORDER — PIPERACILLIN SODIUM, TAZOBACTAM SODIUM 4; .5 G/20ML; G/20ML
INJECTION, POWDER, LYOPHILIZED, FOR SOLUTION INTRAVENOUS
Status: DISPENSED
Start: 2024-01-16

## (undated) RX ORDER — AMIODARONE HYDROCHLORIDE 900 MG/18ML
INJECTION, SOLUTION INTRAVENOUS
Status: DISPENSED
Start: 2022-09-04

## (undated) RX ORDER — FUROSEMIDE 40 MG
TABLET ORAL
Status: DISPENSED
Start: 2019-10-06

## (undated) RX ORDER — FUROSEMIDE 10 MG/ML
INJECTION INTRAMUSCULAR; INTRAVENOUS
Status: DISPENSED
Start: 2025-07-28

## (undated) RX ORDER — FUROSEMIDE 10 MG/ML
INJECTION INTRAMUSCULAR; INTRAVENOUS
Status: DISPENSED
Start: 2024-01-07

## (undated) RX ORDER — ALBUTEROL SULFATE 0.83 MG/ML
SOLUTION RESPIRATORY (INHALATION)
Status: DISPENSED
Start: 2022-09-04

## (undated) RX ORDER — REMDESIVIR 100 MG/1
INJECTION, POWDER, LYOPHILIZED, FOR SOLUTION INTRAVENOUS
Status: DISPENSED
Start: 2024-01-16

## (undated) RX ORDER — WARFARIN SODIUM 5 MG/1
TABLET ORAL
Status: DISPENSED
Start: 2022-09-04

## (undated) RX ORDER — BUDESONIDE 0.5 MG/2ML
INHALANT ORAL
Status: DISPENSED
Start: 2022-09-04

## (undated) RX ORDER — MAGNESIUM OXIDE 400 MG/1
TABLET ORAL
Status: DISPENSED
Start: 2019-10-06